# Patient Record
Sex: MALE | Race: WHITE | URBAN - METROPOLITAN AREA
[De-identification: names, ages, dates, MRNs, and addresses within clinical notes are randomized per-mention and may not be internally consistent; named-entity substitution may affect disease eponyms.]

---

## 2017-01-03 ENCOUNTER — OUTPATIENT (OUTPATIENT)
Dept: OUTPATIENT SERVICES | Facility: HOSPITAL | Age: 72
LOS: 1 days | Discharge: HOME | End: 2017-01-03

## 2017-06-27 DIAGNOSIS — R80.9 PROTEINURIA, UNSPECIFIED: ICD-10-CM

## 2017-08-01 ENCOUNTER — INPATIENT (INPATIENT)
Facility: HOSPITAL | Age: 72
LOS: 4 days | Discharge: HOME | End: 2017-08-06
Attending: INTERNAL MEDICINE
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
## 2017-08-01 DIAGNOSIS — I21.4 NON-ST ELEVATION (NSTEMI) MYOCARDIAL INFARCTION: ICD-10-CM

## 2017-08-01 DIAGNOSIS — S22.49XA MULTIPLE FRACTURES OF RIBS, UNSPECIFIED SIDE, INITIAL ENCOUNTER FOR CLOSED FRACTURE: ICD-10-CM

## 2017-08-08 DIAGNOSIS — I25.10 ATHEROSCLEROTIC HEART DISEASE OF NATIVE CORONARY ARTERY WITHOUT ANGINA PECTORIS: ICD-10-CM

## 2017-08-08 DIAGNOSIS — R55 SYNCOPE AND COLLAPSE: ICD-10-CM

## 2017-08-08 DIAGNOSIS — Z87.891 PERSONAL HISTORY OF NICOTINE DEPENDENCE: ICD-10-CM

## 2017-08-08 DIAGNOSIS — T82.855A STENOSIS OF CORONARY ARTERY STENT, INITIAL ENCOUNTER: ICD-10-CM

## 2017-08-08 DIAGNOSIS — E11.65 TYPE 2 DIABETES MELLITUS WITH HYPERGLYCEMIA: ICD-10-CM

## 2017-08-08 DIAGNOSIS — I21.4 NON-ST ELEVATION (NSTEMI) MYOCARDIAL INFARCTION: ICD-10-CM
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
## 2017-08-08 DIAGNOSIS — N40.0 BENIGN PROSTATIC HYPERPLASIA WITHOUT LOWER URINARY TRACT SYMPTOMS: ICD-10-CM

## 2017-08-08 DIAGNOSIS — E78.5 HYPERLIPIDEMIA, UNSPECIFIED: ICD-10-CM

## 2017-08-08 DIAGNOSIS — L50.9 URTICARIA, UNSPECIFIED: ICD-10-CM

## 2017-08-08 DIAGNOSIS — Z79.84 LONG TERM (CURRENT) USE OF ORAL HYPOGLYCEMIC DRUGS: ICD-10-CM

## 2017-08-08 DIAGNOSIS — E11.42 TYPE 2 DIABETES MELLITUS WITH DIABETIC POLYNEUROPATHY: ICD-10-CM
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
## 2017-08-17 ENCOUNTER — OUTPATIENT (OUTPATIENT)
Dept: OUTPATIENT SERVICES | Facility: HOSPITAL | Age: 72
LOS: 1 days | Discharge: HOME | End: 2017-08-17

## 2017-08-17 DIAGNOSIS — S22.49XA MULTIPLE FRACTURES OF RIBS, UNSPECIFIED SIDE, INITIAL ENCOUNTER FOR CLOSED FRACTURE: ICD-10-CM
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
## 2017-08-17 DIAGNOSIS — I21.4 NON-ST ELEVATION (NSTEMI) MYOCARDIAL INFARCTION: ICD-10-CM

## 2017-08-17 DIAGNOSIS — Z01.812 ENCOUNTER FOR PREPROCEDURAL LABORATORY EXAMINATION: ICD-10-CM

## 2017-08-17 DIAGNOSIS — I25.10 ATHEROSCLEROTIC HEART DISEASE OF NATIVE CORONARY ARTERY WITHOUT ANGINA PECTORIS: ICD-10-CM

## 2017-08-22 ENCOUNTER — OUTPATIENT (OUTPATIENT)
Dept: OUTPATIENT SERVICES | Facility: HOSPITAL | Age: 72
LOS: 1 days | Discharge: HOME | End: 2017-08-22

## 2017-08-22 DIAGNOSIS — I21.4 NON-ST ELEVATION (NSTEMI) MYOCARDIAL INFARCTION: ICD-10-CM
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
## 2017-08-22 DIAGNOSIS — S22.49XA MULTIPLE FRACTURES OF RIBS, UNSPECIFIED SIDE, INITIAL ENCOUNTER FOR CLOSED FRACTURE: ICD-10-CM

## 2017-08-22 PROBLEM — Z00.00 ENCOUNTER FOR PREVENTIVE HEALTH EXAMINATION: Status: ACTIVE | Noted: 2017-08-22

## 2017-08-30 DIAGNOSIS — Z79.4 LONG TERM (CURRENT) USE OF INSULIN: ICD-10-CM

## 2017-08-30 DIAGNOSIS — E78.4 OTHER HYPERLIPIDEMIA: ICD-10-CM

## 2017-08-30 DIAGNOSIS — I10 ESSENTIAL (PRIMARY) HYPERTENSION: ICD-10-CM

## 2017-08-30 DIAGNOSIS — Z98.61 CORONARY ANGIOPLASTY STATUS: ICD-10-CM

## 2017-08-30 DIAGNOSIS — E11.9 TYPE 2 DIABETES MELLITUS WITHOUT COMPLICATIONS: ICD-10-CM

## 2017-08-30 DIAGNOSIS — R52 PAIN, UNSPECIFIED: ICD-10-CM

## 2017-08-30 DIAGNOSIS — I25.10 ATHEROSCLEROTIC HEART DISEASE OF NATIVE CORONARY ARTERY WITHOUT ANGINA PECTORIS: ICD-10-CM

## 2018-07-12 ENCOUNTER — OUTPATIENT (OUTPATIENT)
Dept: OUTPATIENT SERVICES | Facility: HOSPITAL | Age: 73
LOS: 1 days | Discharge: HOME | End: 2018-07-12

## 2018-07-16 DIAGNOSIS — E11.9 TYPE 2 DIABETES MELLITUS WITHOUT COMPLICATIONS: ICD-10-CM

## 2018-07-16 DIAGNOSIS — I10 ESSENTIAL (PRIMARY) HYPERTENSION: ICD-10-CM

## 2019-11-04 ENCOUNTER — OUTPATIENT (OUTPATIENT)
Dept: OUTPATIENT SERVICES | Facility: HOSPITAL | Age: 74
LOS: 1 days | Discharge: HOME | End: 2019-11-04

## 2019-11-04 DIAGNOSIS — E78.5 HYPERLIPIDEMIA, UNSPECIFIED: ICD-10-CM

## 2019-11-04 DIAGNOSIS — I10 ESSENTIAL (PRIMARY) HYPERTENSION: ICD-10-CM

## 2019-11-04 DIAGNOSIS — I25.10 ATHEROSCLEROTIC HEART DISEASE OF NATIVE CORONARY ARTERY WITHOUT ANGINA PECTORIS: ICD-10-CM

## 2020-12-10 ENCOUNTER — INPATIENT (INPATIENT)
Facility: HOSPITAL | Age: 75
LOS: 13 days | Discharge: HOME HEALTH | End: 2020-12-24
Attending: HOSPITALIST | Admitting: HOSPITALIST
Payer: MEDICARE

## 2020-12-10 VITALS
HEART RATE: 114 BPM | TEMPERATURE: 99 F | RESPIRATION RATE: 25 BRPM | DIASTOLIC BLOOD PRESSURE: 79 MMHG | SYSTOLIC BLOOD PRESSURE: 131 MMHG | OXYGEN SATURATION: 93 % | WEIGHT: 190.04 LBS

## 2020-12-10 DIAGNOSIS — I26.99 OTHER PULMONARY EMBOLISM WITHOUT ACUTE COR PULMONALE: ICD-10-CM

## 2020-12-10 LAB
ALBUMIN SERPL ELPH-MCNC: 3.7 G/DL — SIGNIFICANT CHANGE UP (ref 3.5–5.2)
ALP SERPL-CCNC: 91 U/L — SIGNIFICANT CHANGE UP (ref 30–115)
ALT FLD-CCNC: 28 U/L — SIGNIFICANT CHANGE UP (ref 0–41)
ANION GAP SERPL CALC-SCNC: 13 MMOL/L — SIGNIFICANT CHANGE UP (ref 7–14)
APTT BLD: 25.5 SEC — LOW (ref 27–39.2)
AST SERPL-CCNC: 33 U/L — SIGNIFICANT CHANGE UP (ref 0–41)
BASOPHILS # BLD AUTO: 0.01 K/UL — SIGNIFICANT CHANGE UP (ref 0–0.2)
BASOPHILS NFR BLD AUTO: 0.1 % — SIGNIFICANT CHANGE UP (ref 0–1)
BILIRUB SERPL-MCNC: 1.2 MG/DL — SIGNIFICANT CHANGE UP (ref 0.2–1.2)
BUN SERPL-MCNC: 32 MG/DL — HIGH (ref 10–20)
CALCIUM SERPL-MCNC: 9 MG/DL — SIGNIFICANT CHANGE UP (ref 8.5–10.1)
CHLORIDE SERPL-SCNC: 104 MMOL/L — SIGNIFICANT CHANGE UP (ref 98–110)
CO2 SERPL-SCNC: 21 MMOL/L — SIGNIFICANT CHANGE UP (ref 17–32)
CREAT SERPL-MCNC: 1.6 MG/DL — HIGH (ref 0.7–1.5)
D DIMER BLD IA.RAPID-MCNC: 9146 NG/ML DDU — HIGH (ref 0–230)
EOSINOPHIL # BLD AUTO: 0.02 K/UL — SIGNIFICANT CHANGE UP (ref 0–0.7)
EOSINOPHIL NFR BLD AUTO: 0.2 % — SIGNIFICANT CHANGE UP (ref 0–8)
GLUCOSE BLDC GLUCOMTR-MCNC: 224 MG/DL — HIGH (ref 70–99)
GLUCOSE SERPL-MCNC: 211 MG/DL — HIGH (ref 70–99)
HCT VFR BLD CALC: 47 % — SIGNIFICANT CHANGE UP (ref 42–52)
HGB BLD-MCNC: 15.7 G/DL — SIGNIFICANT CHANGE UP (ref 14–18)
IMM GRANULOCYTES NFR BLD AUTO: 1.5 % — HIGH (ref 0.1–0.3)
INR BLD: 1.19 RATIO — SIGNIFICANT CHANGE UP (ref 0.65–1.3)
LYMPHOCYTES # BLD AUTO: 1.16 K/UL — LOW (ref 1.2–3.4)
LYMPHOCYTES # BLD AUTO: 10 % — LOW (ref 20.5–51.1)
MCHC RBC-ENTMCNC: 31.5 PG — HIGH (ref 27–31)
MCHC RBC-ENTMCNC: 33.4 G/DL — SIGNIFICANT CHANGE UP (ref 32–37)
MCV RBC AUTO: 94.4 FL — HIGH (ref 80–94)
MONOCYTES # BLD AUTO: 0.75 K/UL — HIGH (ref 0.1–0.6)
MONOCYTES NFR BLD AUTO: 6.4 % — SIGNIFICANT CHANGE UP (ref 1.7–9.3)
NEUTROPHILS # BLD AUTO: 9.52 K/UL — HIGH (ref 1.4–6.5)
NEUTROPHILS NFR BLD AUTO: 81.8 % — HIGH (ref 42.2–75.2)
NRBC # BLD: 0 /100 WBCS — SIGNIFICANT CHANGE UP (ref 0–0)
NT-PROBNP SERPL-SCNC: 637 PG/ML — HIGH (ref 0–300)
PLATELET # BLD AUTO: 164 K/UL — SIGNIFICANT CHANGE UP (ref 130–400)
POTASSIUM SERPL-MCNC: 5.3 MMOL/L — HIGH (ref 3.5–5)
POTASSIUM SERPL-SCNC: 5.3 MMOL/L — HIGH (ref 3.5–5)
PROT SERPL-MCNC: 6.9 G/DL — SIGNIFICANT CHANGE UP (ref 6–8)
PROTHROM AB SERPL-ACNC: 13.7 SEC — HIGH (ref 9.95–12.87)
RAPID RVP RESULT: DETECTED
RBC # BLD: 4.98 M/UL — SIGNIFICANT CHANGE UP (ref 4.7–6.1)
RBC # FLD: 12.9 % — SIGNIFICANT CHANGE UP (ref 11.5–14.5)
SARS-COV-2 RNA SPEC QL NAA+PROBE: DETECTED
SODIUM SERPL-SCNC: 138 MMOL/L — SIGNIFICANT CHANGE UP (ref 135–146)
TROPONIN T SERPL-MCNC: <0.01 NG/ML — SIGNIFICANT CHANGE UP
WBC # BLD: 11.63 K/UL — HIGH (ref 4.8–10.8)
WBC # FLD AUTO: 11.63 K/UL — HIGH (ref 4.8–10.8)

## 2020-12-10 PROCEDURE — 99223 1ST HOSP IP/OBS HIGH 75: CPT | Mod: CS,AI

## 2020-12-10 PROCEDURE — 99285 EMERGENCY DEPT VISIT HI MDM: CPT | Mod: CS,GC

## 2020-12-10 PROCEDURE — 71275 CT ANGIOGRAPHY CHEST: CPT | Mod: 26

## 2020-12-10 PROCEDURE — 71045 X-RAY EXAM CHEST 1 VIEW: CPT | Mod: 26

## 2020-12-10 RX ORDER — SODIUM CHLORIDE 9 MG/ML
1000 INJECTION INTRAMUSCULAR; INTRAVENOUS; SUBCUTANEOUS
Refills: 0 | Status: DISCONTINUED | OUTPATIENT
Start: 2020-12-10 | End: 2020-12-11

## 2020-12-10 RX ORDER — GLUCAGON INJECTION, SOLUTION 0.5 MG/.1ML
1 INJECTION, SOLUTION SUBCUTANEOUS ONCE
Refills: 0 | Status: DISCONTINUED | OUTPATIENT
Start: 2020-12-10 | End: 2020-12-24

## 2020-12-10 RX ORDER — CLOPIDOGREL BISULFATE 75 MG/1
75 TABLET, FILM COATED ORAL DAILY
Refills: 0 | Status: DISCONTINUED | OUTPATIENT
Start: 2020-12-10 | End: 2020-12-24

## 2020-12-10 RX ORDER — RANOLAZINE 500 MG/1
500 TABLET, FILM COATED, EXTENDED RELEASE ORAL
Refills: 0 | Status: DISCONTINUED | OUTPATIENT
Start: 2020-12-10 | End: 2020-12-24

## 2020-12-10 RX ORDER — SODIUM CHLORIDE 9 MG/ML
1000 INJECTION, SOLUTION INTRAVENOUS
Refills: 0 | Status: DISCONTINUED | OUTPATIENT
Start: 2020-12-10 | End: 2020-12-24

## 2020-12-10 RX ORDER — ACETAMINOPHEN 500 MG
650 TABLET ORAL EVERY 4 HOURS
Refills: 0 | Status: DISCONTINUED | OUTPATIENT
Start: 2020-12-10 | End: 2020-12-24

## 2020-12-10 RX ORDER — TAMSULOSIN HYDROCHLORIDE 0.4 MG/1
0.4 CAPSULE ORAL AT BEDTIME
Refills: 0 | Status: DISCONTINUED | OUTPATIENT
Start: 2020-12-10 | End: 2020-12-24

## 2020-12-10 RX ORDER — DEXTROSE 50 % IN WATER 50 %
12.5 SYRINGE (ML) INTRAVENOUS ONCE
Refills: 0 | Status: DISCONTINUED | OUTPATIENT
Start: 2020-12-10 | End: 2020-12-24

## 2020-12-10 RX ORDER — HEPARIN SODIUM 5000 [USP'U]/ML
7000 INJECTION INTRAVENOUS; SUBCUTANEOUS ONCE
Refills: 0 | Status: COMPLETED | OUTPATIENT
Start: 2020-12-10 | End: 2020-12-10

## 2020-12-10 RX ORDER — DEXAMETHASONE 0.5 MG/5ML
6 ELIXIR ORAL DAILY
Refills: 0 | Status: DISCONTINUED | OUTPATIENT
Start: 2020-12-10 | End: 2020-12-13

## 2020-12-10 RX ORDER — METOPROLOL TARTRATE 50 MG
100 TABLET ORAL DAILY
Refills: 0 | Status: DISCONTINUED | OUTPATIENT
Start: 2020-12-10 | End: 2020-12-24

## 2020-12-10 RX ORDER — SODIUM ZIRCONIUM CYCLOSILICATE 10 G/10G
10 POWDER, FOR SUSPENSION ORAL ONCE
Refills: 0 | Status: COMPLETED | OUTPATIENT
Start: 2020-12-10 | End: 2020-12-10

## 2020-12-10 RX ORDER — DULOXETINE HYDROCHLORIDE 30 MG/1
20 CAPSULE, DELAYED RELEASE ORAL DAILY
Refills: 0 | Status: DISCONTINUED | OUTPATIENT
Start: 2020-12-10 | End: 2020-12-24

## 2020-12-10 RX ORDER — HEPARIN SODIUM 5000 [USP'U]/ML
1600 INJECTION INTRAVENOUS; SUBCUTANEOUS
Qty: 25000 | Refills: 0 | Status: ACTIVE | OUTPATIENT
Start: 2020-12-10 | End: 2021-11-08

## 2020-12-10 RX ORDER — ATORVASTATIN CALCIUM 80 MG/1
80 TABLET, FILM COATED ORAL AT BEDTIME
Refills: 0 | Status: DISCONTINUED | OUTPATIENT
Start: 2020-12-10 | End: 2020-12-24

## 2020-12-10 RX ORDER — ZOLPIDEM TARTRATE 10 MG/1
5 TABLET ORAL AT BEDTIME
Refills: 0 | Status: DISCONTINUED | OUTPATIENT
Start: 2020-12-10 | End: 2020-12-10

## 2020-12-10 RX ORDER — ZOLPIDEM TARTRATE 10 MG/1
5 TABLET ORAL AT BEDTIME
Refills: 0 | Status: DISCONTINUED | OUTPATIENT
Start: 2020-12-10 | End: 2020-12-12

## 2020-12-10 RX ORDER — INSULIN GLARGINE 100 [IU]/ML
15 INJECTION, SOLUTION SUBCUTANEOUS AT BEDTIME
Refills: 0 | Status: DISCONTINUED | OUTPATIENT
Start: 2020-12-10 | End: 2020-12-14

## 2020-12-10 RX ORDER — DEXTROSE 50 % IN WATER 50 %
25 SYRINGE (ML) INTRAVENOUS ONCE
Refills: 0 | Status: DISCONTINUED | OUTPATIENT
Start: 2020-12-10 | End: 2020-12-24

## 2020-12-10 RX ORDER — DULOXETINE HYDROCHLORIDE 30 MG/1
1 CAPSULE, DELAYED RELEASE ORAL
Qty: 0 | Refills: 0 | DISCHARGE

## 2020-12-10 RX ORDER — DEXTROSE 50 % IN WATER 50 %
15 SYRINGE (ML) INTRAVENOUS ONCE
Refills: 0 | Status: DISCONTINUED | OUTPATIENT
Start: 2020-12-10 | End: 2020-12-24

## 2020-12-10 RX ORDER — INSULIN LISPRO 100/ML
VIAL (ML) SUBCUTANEOUS
Refills: 0 | Status: DISCONTINUED | OUTPATIENT
Start: 2020-12-10 | End: 2020-12-11

## 2020-12-10 RX ADMIN — HEPARIN SODIUM 7000 UNIT(S): 5000 INJECTION INTRAVENOUS; SUBCUTANEOUS at 13:55

## 2020-12-10 RX ADMIN — SODIUM CHLORIDE 75 MILLILITER(S): 9 INJECTION INTRAMUSCULAR; INTRAVENOUS; SUBCUTANEOUS at 20:51

## 2020-12-10 RX ADMIN — HEPARIN SODIUM 16 UNIT(S)/HR: 5000 INJECTION INTRAVENOUS; SUBCUTANEOUS at 13:55

## 2020-12-10 NOTE — H&P ADULT - ASSESSMENT
Patient is a 75 year old Male with a past medical history of CAD s/p PCI, DM presented to the ER today with worsening Shortness of Breath x 2 weeks.       # COVID19   - Airborne isolation precautions   - ID consult   - Dexamethasone course  - Inflammatory markers CRP, Procalcitonin, Ferritin, LDH, Mg and Coags   - Continue supplemental oxygen. Maintain Sats >92%.    # CARINA   - Hold lisinopril   - Trend creatinine  - Intravenous Fluids     # Pulmonary Embolism   - Heparin drip   - Monitor PTT   - Vascular Consult     # DM   - Patient was prescribed with insulin 40units qPM but has never filled [facility]   - Monitor Fingersticks  - Diabetic carbohydrate consistent diet   - Sliding scale     # CAD   - Continue with Plavix, Atorvastatin  - Continue with Metoprolol     Patient is a 75 year old Male with a past medical history of CAD s/p PCI, DM presented to the ER today with worsening Shortness of Breath x 2 weeks.       # COVID19   - Airborne isolation precautions   - ID consult   - Dexamethasone course  - Inflammatory markers CRP, Procalcitonin, Ferritin, LDH, Mg and Coags   - Continue supplemental oxygen. Maintain Sats >92%.    # CARINA   - Hold lisinopril   - Trend creatinine  - Intravenous Fluids     # Pulmonary Embolism   - Heparin drip   - Monitor PTT  - Echocardiogram    - Consider Vascular Consult     # Hyperkalemia   - Lokelma x 1   - Monitor BMP     # DM   - Patient was prescribed with insulin 40units qPM but has never filled [facility], will start her with 15 units qHS   - Monitor Fingersticks  - Diabetic carbohydrate consistent diet   - Sliding scale     # CAD   - Continue with Plavix, Atorvastatin  - Continue with Metoprolol

## 2020-12-10 NOTE — ED PROVIDER NOTE - PHYSICAL EXAMINATION
CONSTITUTIONAL: tachypneic  SKIN: Warm dry, normal skin turgor  HEAD: NCAT  EYES: EOMI, PERRLA, no scleral icterus, conjunctiva pink  ENT: normal pharynx with no erythema or exudates  NECK: Supple; non tender. Full ROM.  CARD: tachy, no murmurs.  RESP: clear to ausculation b/l. No crackles or wheezing. 89% on RA at rest, 94% on 4L NC. Tachypneic.  ABD: soft, non-tender, non-distended, no rebound or guarding.  EXT: Full ROM, no bony tenderness, no pedal edema, no calf tenderness  NEURO: normal motor. normal sensory. Normal gait.  PSYCH: Cooperative, appropriate.

## 2020-12-10 NOTE — ED ADULT NURSE NOTE - CHIEF COMPLAINT QUOTE
BIBA pt tested positive Nov 27 th . Pt c/o SOB and cough. On EMS arrival pt pulse ox 76% RA. Pt placed on NRB pt pulse ox went to 93%. EMS noticed a cough with a streak of blood.    Family contacts  Sol wife- [phone]  Valery Daughter- [phone]

## 2020-12-10 NOTE — ED ADULT TRIAGE NOTE - CHIEF COMPLAINT QUOTE
BIBA pt tested positive Nov 27 th . Pt c/o SOB and cough. On EMS arrival pt pulse ox 76% RA. Pt placed on NRB pt pulse ox went to 93%. EMS noticed a cough with a streak of blood. BIBA pt tested positive Nov 27 th . Pt c/o SOB and cough. On EMS arrival pt pulse ox 76% RA. Pt placed on NRB pt pulse ox went to 93%. EMS noticed a cough with a streak of blood.    Family contacts  Sol wife- [phone]  Valery Daughter- [phone]

## 2020-12-10 NOTE — ED PROVIDER NOTE - PROGRESS NOTE DETAILS
BI: Per Dr. Jean Baptiste who spoke with Dr. Olivares, pt stable for floor at this time. Endorsed to Dr. Tommy Alexis hospitalist.

## 2020-12-10 NOTE — ED ADULT NURSE REASSESSMENT NOTE - NS ED NURSE REASSESS COMMENT FT1
[facility], heparin gtt infusing at 1600 units/hr as per orders

## 2020-12-10 NOTE — H&P ADULT - ATTENDING COMMENTS
#Acute hypoxic resp failure, due to covid with subsegmental PE  cta noted, no R heart strain  check tte  hep gtt  start decadron 6  check crp, ferritin, trop  nc to keep spo2 >92  #CARINA, c/b hyperkalemia  presumed pre-renal  ns 75 cc/hr  hold acei  give lokelma 10 x1  trend scr  check renal us  lyrica 150 bid change to daily

## 2020-12-10 NOTE — H&P ADULT - NSHPLABSRESULTS_GEN_ALL_CORE
15.7   11.63 )-----------( 164      ( 10 Dec 2020 10:32 )             47.0       12-10    138  |  104  |  32<H>  ----------------------------<  211<H>  5.3<H>   |  21  |  1.6<H>    Ca    9.0      10 Dec 2020 10:32    TPro  6.9  /  Alb  3.7  /  TBili  1.2  /  DBili  x   /  AST  33  /  ALT  28  /  AlkPhos  91  12-10                      PT/INR - ( 10 Dec 2020 13:30 )   PT: 13.70 sec;   INR: 1.19 ratio         PTT - ( 10 Dec 2020 13:30 )  PTT:25.5 sec    Lactate Trend      CARDIAC MARKERS ( 10 Dec 2020 10:32 )  x     / <0.01 ng/mL / x     / x     / x          SARS-CoV-2: Detected (10 Dec 2020 10:32)        CT Angio Chest PE Protocol w/ IV Cont (12.10.20 @ 12:39)   IMPRESSION:  Left middle lobe subsegmental pulmonary artery filling defect (3-106). No CT evidence of right heart strain.  New extensive bilateral peripheral patchy groundglass opacities are likely infectious/inflammatory. Though non-specific, these findings can be seen with COVID pneumonia.  Spoke with ILYAGUYEV, BENEDIKT on 12/10/2020 1:33 PM with readback.    Xray Chest 1 View- PORTABLE-Urgent (12.10.20 @ 11:34)   Impression:  Bilateral opacities. Further evaluation/follow-up is recommended.

## 2020-12-10 NOTE — ED PROVIDER NOTE - CLINICAL SUMMARY MEDICAL DECISION MAKING FREE TEXT BOX
pt with 2 weeks malaise/cough covid+ now with worsening sob, on EMS arrival sat 76% RA/93% nrb, in ED sat 94% on 4L and comfortable but desaturates to high 80s with movement, head nc/at, perrla, EOMI, conj pink op clear neck supple cor rrr lungs cta abd snt no c/c/e pulses equal calves nontender neuro intac, labs and studies reviewed and d/w Dr Olivares, will heparinize and admit floor at [place]

## 2020-12-10 NOTE — H&P ADULT - HISTORY OF PRESENT ILLNESS
Patient is a 75 year old Male with a past medical history of CAD s/p PCI, DM presented to the ER today with worsening Shortness of Breath x 2 weeks. Patient was tested positive for COVID 2 weeks ago and has been quarantined at home. Chief complaint is associated with ambulatory dysfunction. Patient was brought in by EMS with 79% Saturation. Patient was tachypnic 25bpm, and with 3L of NC sating 96%. Patient was tachycardic, leukocytosis of 11k but afebrile. K5.3, CARINA (32/1.6), D-dimer elevated, therefore, CTA showed PE. Patient is a 75 year old Male with a past medical history of CAD s/p PCI, DM presented to the ER today with worsening Shortness of Breath x 2 weeks. Patient was tested positive for COVID 2 weeks ago and has been quarantined at home. Chief complaint is associated with ambulatory dysfunction. Patient was brought in by EMS with 79% Saturation. Patient was tachypnic 25bpm, and with 3L of NC sating 96%. Patient was tachycardic, leukocytosis of 11k but afebrile. K5.3, CARINA (32/1.6), D-dimer elevated, therefore, CTA showed PE.   Patient was seen in the ER and examined. Patient admits to SOB but states it has improved with NC. Patient denies any fevers, chills, nausea, vomiting, chest pain, abdominal pain, dysuria, constipation, diarrhea, new onset of leg swelling, peripheral neuropathy. Patient admits to sedentary life style with a history of smoking (>40 years ago). Patient denies recent travel, sick contacts personally (wife had a few friends).

## 2020-12-10 NOTE — ED PROVIDER NOTE - OBJECTIVE STATEMENT
75 y.o M w/ pmhx CAD s/p PCI, DM p/w SOB x 2 weeks worsening since yesterday. Pt tested positive 2 wks ago for COVID and has been managing at home but has been having difficulty ambulating due to SOB. No CP, no dizziness, no abd pain, no n/v, no diarrhea, fevers. Per EMS pt was saturating 79% on RA when they arrived.

## 2020-12-10 NOTE — H&P ADULT - NSHPPHYSICALEXAM_GEN_ALL_CORE
VITALS: Vital Signs Last 24 Hrs  T(C): 37.7 (10 Dec 2020 12:43), Max: 37.7 (10 Dec 2020 12:43)  T(F): 99.9 (10 Dec 2020 12:43), Max: 99.9 (10 Dec 2020 12:43)  HR: 99 (10 Dec 2020 14:02) (99 - 114)  BP: 118/78 (10 Dec 2020 14:02) (109/62 - 131/79)  RR: 18 (10 Dec 2020 14:02) (18 - 25)  SpO2: 96% (10 Dec 2020 14:02) (93% - 96%)    GENERAL: NAD, lying in bed comfortably  HEAD:  Atraumatic, Normocephalic  EYES: Conjunctiva and sclera clear  ENT: Moist mucous membranes, on NC   NECK: Supple  CHEST/LUNG: Clear to auscultation bilaterally. Unlabored respirations, no accessory muscle use  HEART: Regular rate and rhythm  ABDOMEN: Bowel sounds present; Soft, Nontender, Nondistended.  EXTREMITIES:  2+ Peripheral Pulses, brisk capillary refill. No edema  NERVOUS SYSTEM:  Alert & Oriented X3, speech clear.   MSK: FROM all 4 extremities, full and equal strength  SKIN: No rashes or lesions

## 2020-12-10 NOTE — ED PROVIDER NOTE - ATTENDING CONTRIBUTION TO CARE
pt with 2 weeks malaise/cough covid+ now with worsening sob, on EMS arrival sat 76% RA/93% nrb, in ED sat 94% on 4L and comfortable but desaturates to high 80s with movement, head nc/at, perrla, EOMI, conj pink op clear neck supple cor rrr lungs cta abd snt no c/c/e pulses equal calves nontender neuro intac, labs and studies reviewed and d/w Dr Olivares, will heparinize and admit floor at [place]

## 2020-12-10 NOTE — ED ADULT NURSE REASSESSMENT NOTE - NS ED NURSE REASSESS COMMENT FT1
pt being transferred to [facility], 3A BED 15 report given to AUGIE Darden and transport requested

## 2020-12-10 NOTE — ED PROVIDER NOTE - CARE PLAN
Pharmacy Note  ED Culture Follow-up    Perry Gimenez is a 44 y.o. male. Allergies: Patient has no known allergies. Labs:  Lab Results   Component Value Date    BUN 9 07/16/2018    CREATININE 0.7 07/16/2018    WBC 13.7 (H) 07/16/2018     Estimated Creatinine Clearance: 188 mL/min (based on SCr of 0.7 mg/dL). Current antimicrobials:   · Cipro drops    ASSESSMENT:  Micro results:   Cx: pseudomonas      PLAN:  Need for intervention: No 2/2 s-cipro   Discussed with: Dr. Luann Terry MD   Chosen treatment:    · Patient already on appropriate treatment as above    Patient response:   · No need to contact patient    Called/sent in prescription to: Not applicable    Please call with any questions.  Sara Tobin PharmD 5:40 PM 7/21/2018 Principal Discharge DX:	COVID-19  Secondary Diagnosis:	Pulmonary embolism  Secondary Diagnosis:	Hypoxia

## 2020-12-11 LAB
A1C WITH ESTIMATED AVERAGE GLUCOSE RESULT: 8.4 % — HIGH (ref 4–5.6)
ABO RH CONFIRMATION: SIGNIFICANT CHANGE UP
ALBUMIN SERPL ELPH-MCNC: 3 G/DL — LOW (ref 3.5–5.2)
ALP SERPL-CCNC: 66 U/L — SIGNIFICANT CHANGE UP (ref 30–115)
ALT FLD-CCNC: 21 U/L — SIGNIFICANT CHANGE UP (ref 0–41)
ANION GAP SERPL CALC-SCNC: 12 MMOL/L — SIGNIFICANT CHANGE UP (ref 7–14)
ANION GAP SERPL CALC-SCNC: 15 MMOL/L — HIGH (ref 7–14)
APTT BLD: 24.9 SEC — LOW (ref 27–39.2)
APTT BLD: >200 SEC — CRITICAL HIGH (ref 27–39.2)
AST SERPL-CCNC: 24 U/L — SIGNIFICANT CHANGE UP (ref 0–41)
BASOPHILS # BLD AUTO: 0.03 K/UL — SIGNIFICANT CHANGE UP (ref 0–0.2)
BASOPHILS NFR BLD AUTO: 0.4 % — SIGNIFICANT CHANGE UP (ref 0–1)
BILIRUB SERPL-MCNC: 1.2 MG/DL — SIGNIFICANT CHANGE UP (ref 0.2–1.2)
BLD GP AB SCN SERPL QL: SIGNIFICANT CHANGE UP
BUN SERPL-MCNC: 25 MG/DL — HIGH (ref 10–20)
BUN SERPL-MCNC: 28 MG/DL — HIGH (ref 10–20)
CALCIUM SERPL-MCNC: 7.9 MG/DL — LOW (ref 8.5–10.1)
CALCIUM SERPL-MCNC: 8.1 MG/DL — LOW (ref 8.5–10.1)
CHLORIDE SERPL-SCNC: 106 MMOL/L — SIGNIFICANT CHANGE UP (ref 98–110)
CHLORIDE SERPL-SCNC: 111 MMOL/L — HIGH (ref 98–110)
CO2 SERPL-SCNC: 18 MMOL/L — SIGNIFICANT CHANGE UP (ref 17–32)
CO2 SERPL-SCNC: 18 MMOL/L — SIGNIFICANT CHANGE UP (ref 17–32)
CREAT SERPL-MCNC: 1.3 MG/DL — SIGNIFICANT CHANGE UP (ref 0.7–1.5)
CREAT SERPL-MCNC: 1.4 MG/DL — SIGNIFICANT CHANGE UP (ref 0.7–1.5)
CRP SERPL-MCNC: 10.58 MG/DL — HIGH (ref 0–0.4)
EOSINOPHIL # BLD AUTO: 0.11 K/UL — SIGNIFICANT CHANGE UP (ref 0–0.7)
EOSINOPHIL NFR BLD AUTO: 1.3 % — SIGNIFICANT CHANGE UP (ref 0–8)
ESTIMATED AVERAGE GLUCOSE: 194 MG/DL — HIGH (ref 68–114)
FERRITIN SERPL-MCNC: 537 NG/ML — HIGH (ref 30–400)
GLUCOSE BLDC GLUCOMTR-MCNC: 124 MG/DL — HIGH (ref 70–99)
GLUCOSE BLDC GLUCOMTR-MCNC: 294 MG/DL — HIGH (ref 70–99)
GLUCOSE BLDC GLUCOMTR-MCNC: 307 MG/DL — HIGH (ref 70–99)
GLUCOSE BLDC GLUCOMTR-MCNC: 349 MG/DL — HIGH (ref 70–99)
GLUCOSE BLDC GLUCOMTR-MCNC: 381 MG/DL — HIGH (ref 70–99)
GLUCOSE SERPL-MCNC: 143 MG/DL — HIGH (ref 70–99)
GLUCOSE SERPL-MCNC: 189 MG/DL — HIGH (ref 70–99)
HCT VFR BLD CALC: 42.6 % — SIGNIFICANT CHANGE UP (ref 42–52)
HCV AB S/CO SERPL IA: 0.03 COI — SIGNIFICANT CHANGE UP
HCV AB SERPL-IMP: SIGNIFICANT CHANGE UP
HGB BLD-MCNC: 14.3 G/DL — SIGNIFICANT CHANGE UP (ref 14–18)
IMM GRANULOCYTES NFR BLD AUTO: 2 % — HIGH (ref 0.1–0.3)
INR BLD: 1.2 RATIO — SIGNIFICANT CHANGE UP (ref 0.65–1.3)
INR BLD: 1.23 RATIO — SIGNIFICANT CHANGE UP (ref 0.65–1.3)
LYMPHOCYTES # BLD AUTO: 1.03 K/UL — LOW (ref 1.2–3.4)
LYMPHOCYTES # BLD AUTO: 12 % — LOW (ref 20.5–51.1)
MAGNESIUM SERPL-MCNC: 2 MG/DL — SIGNIFICANT CHANGE UP (ref 1.8–2.4)
MCHC RBC-ENTMCNC: 31.7 PG — HIGH (ref 27–31)
MCHC RBC-ENTMCNC: 33.6 G/DL — SIGNIFICANT CHANGE UP (ref 32–37)
MCV RBC AUTO: 94.5 FL — HIGH (ref 80–94)
MONOCYTES # BLD AUTO: 0.74 K/UL — HIGH (ref 0.1–0.6)
MONOCYTES NFR BLD AUTO: 8.7 % — SIGNIFICANT CHANGE UP (ref 1.7–9.3)
NEUTROPHILS # BLD AUTO: 6.47 K/UL — SIGNIFICANT CHANGE UP (ref 1.4–6.5)
NEUTROPHILS NFR BLD AUTO: 75.6 % — HIGH (ref 42.2–75.2)
NRBC # BLD: 0 /100 WBCS — SIGNIFICANT CHANGE UP (ref 0–0)
PLATELET # BLD AUTO: 137 K/UL — SIGNIFICANT CHANGE UP (ref 130–400)
POTASSIUM SERPL-MCNC: 4.3 MMOL/L — SIGNIFICANT CHANGE UP (ref 3.5–5)
POTASSIUM SERPL-MCNC: 4.8 MMOL/L — SIGNIFICANT CHANGE UP (ref 3.5–5)
POTASSIUM SERPL-SCNC: 4.3 MMOL/L — SIGNIFICANT CHANGE UP (ref 3.5–5)
POTASSIUM SERPL-SCNC: 4.8 MMOL/L — SIGNIFICANT CHANGE UP (ref 3.5–5)
PROT SERPL-MCNC: 5.5 G/DL — LOW (ref 6–8)
PROTHROM AB SERPL-ACNC: 13.8 SEC — HIGH (ref 9.95–12.87)
PROTHROM AB SERPL-ACNC: 14.2 SEC — HIGH (ref 9.95–12.87)
RBC # BLD: 4.51 M/UL — LOW (ref 4.7–6.1)
RBC # FLD: 13.1 % — SIGNIFICANT CHANGE UP (ref 11.5–14.5)
SODIUM SERPL-SCNC: 136 MMOL/L — SIGNIFICANT CHANGE UP (ref 135–146)
SODIUM SERPL-SCNC: 144 MMOL/L — SIGNIFICANT CHANGE UP (ref 135–146)
WBC # BLD: 8.55 K/UL — SIGNIFICANT CHANGE UP (ref 4.8–10.8)
WBC # FLD AUTO: 8.55 K/UL — SIGNIFICANT CHANGE UP (ref 4.8–10.8)

## 2020-12-11 PROCEDURE — 71045 X-RAY EXAM CHEST 1 VIEW: CPT | Mod: 26

## 2020-12-11 PROCEDURE — 99233 SBSQ HOSP IP/OBS HIGH 50: CPT | Mod: CS

## 2020-12-11 RX ORDER — APIXABAN 2.5 MG/1
5 TABLET, FILM COATED ORAL EVERY 12 HOURS
Refills: 0 | Status: DISCONTINUED | OUTPATIENT
Start: 2020-12-11 | End: 2020-12-24

## 2020-12-11 RX ORDER — SODIUM CHLORIDE 0.65 %
1 AEROSOL, SPRAY (ML) NASAL THREE TIMES A DAY
Refills: 0 | Status: DISCONTINUED | OUTPATIENT
Start: 2020-12-11 | End: 2020-12-24

## 2020-12-11 RX ORDER — INSULIN LISPRO 100/ML
VIAL (ML) SUBCUTANEOUS EVERY 6 HOURS
Refills: 0 | Status: DISCONTINUED | OUTPATIENT
Start: 2020-12-11 | End: 2020-12-24

## 2020-12-11 RX ORDER — APIXABAN 2.5 MG/1
2.5 TABLET, FILM COATED ORAL EVERY 12 HOURS
Refills: 0 | Status: DISCONTINUED | OUTPATIENT
Start: 2020-12-11 | End: 2020-12-11

## 2020-12-11 RX ORDER — PROTAMINE SULFATE 10 MG/ML
1 AMPUL (ML) INTRAVENOUS ONCE
Refills: 0 | Status: DISCONTINUED | OUTPATIENT
Start: 2020-12-11 | End: 2020-12-11

## 2020-12-11 RX ADMIN — INSULIN GLARGINE 15 UNIT(S): 100 INJECTION, SOLUTION SUBCUTANEOUS at 21:48

## 2020-12-11 RX ADMIN — Medication 100 MILLIGRAM(S): at 05:47

## 2020-12-11 RX ADMIN — Medication 8: at 21:53

## 2020-12-11 RX ADMIN — Medication 1 TABLET(S): at 12:37

## 2020-12-11 RX ADMIN — APIXABAN 5 MILLIGRAM(S): 2.5 TABLET, FILM COATED ORAL at 17:36

## 2020-12-11 RX ADMIN — RANOLAZINE 500 MILLIGRAM(S): 500 TABLET, FILM COATED, EXTENDED RELEASE ORAL at 17:36

## 2020-12-11 RX ADMIN — Medication 150 MILLIGRAM(S): at 12:39

## 2020-12-11 RX ADMIN — Medication 4: at 17:35

## 2020-12-11 RX ADMIN — Medication 6 MILLIGRAM(S): at 05:51

## 2020-12-11 RX ADMIN — Medication 5: at 12:12

## 2020-12-11 RX ADMIN — CLOPIDOGREL BISULFATE 75 MILLIGRAM(S): 75 TABLET, FILM COATED ORAL at 12:37

## 2020-12-11 RX ADMIN — TAMSULOSIN HYDROCHLORIDE 0.4 MILLIGRAM(S): 0.4 CAPSULE ORAL at 21:46

## 2020-12-11 RX ADMIN — Medication 1 SPRAY(S): at 21:46

## 2020-12-11 RX ADMIN — RANOLAZINE 500 MILLIGRAM(S): 500 TABLET, FILM COATED, EXTENDED RELEASE ORAL at 05:50

## 2020-12-11 RX ADMIN — ATORVASTATIN CALCIUM 80 MILLIGRAM(S): 80 TABLET, FILM COATED ORAL at 21:47

## 2020-12-11 RX ADMIN — Medication 1 SPRAY(S): at 14:13

## 2020-12-11 RX ADMIN — DULOXETINE HYDROCHLORIDE 20 MILLIGRAM(S): 30 CAPSULE, DELAYED RELEASE ORAL at 12:37

## 2020-12-11 NOTE — CHART NOTE - NSCHARTNOTEFT_GEN_A_CORE
I made rounds today with the treatment team including the hospitalist, residents,  nurses and  and discussed the patient's current medical status and discharge  planning needs, and reviewed the chart.    T(C): 35.6 (12-11-20 @ 08:40), Max: 37.7 (12-10-20 @ 12:43)  HR: 71 (12-11-20 @ 08:40) (71 - 106)  BP: 106/59 (12-11-20 @ 08:40) (106/59 - 132/75)  RR: 18 (12-11-20 @ 08:40) (18 - 20)  SpO2: 97% (12-11-20 @ 08:40) (94% - 97%)          I reached out to the patient's health care proxy/ responsible family member-           [     ]  I reached                                     and discussed the patient's medical condition,                   family concerns, and discharge planning           [     ]  I left a message with family               [     ]  I personally participated in rounds with the medical team and my resident and discussed the case. My resident reached                   family member/ HCP                                under my direction and supervision  and we reviewed the conversation.          [   x  ]  My resident tried to leave a message with family under my direction and supervision - [phone], but no answer or voice mail           [     ]   My resident attended medical rounds and called the family                [      ]    The following was discussed:  The patient's medical status over the past 24 hrs was reviewed, as well as oxygen needs and medication changes and labs.          [      ]   The following concerns were raised:           [     ] I spent 5-10 minutes on the above discussing medical issues with team members and family and/ or my resident    [     ] I spent 11-20 minutes on the above discussing medical issues with team members and family and/ or my resident    [     ] I spent 21-30 minutes on the above discussing medical issues with team members and family and/ or my resident

## 2020-12-11 NOTE — PROGRESS NOTE ADULT - ATTENDING COMMENTS
I saw and evaluated patient  by bedside, c/o dry nostrils with small blood clots , tolerating diet well.    All labs, radiology studies, VS was reviewed  I have reviewed the resident's note and agree with documented findings and  plan of care.  a/p:  Patient is a 75 year old Male with a past medical history of CAD s/p PCI, DM presented to the ER today with worsening Shortness of Breath x 2 weeks.       # Acute hypoxic respiratory failure due to COVID19 viral pneumonia  - Airborne/contact/droplet  isolation precautions   - ID f/up   - Dexamethasone 6 mg IV once daily x 10 days course  - monitor Inflammatory markers CRP, Procalcitonin, Ferritin, LDH  - Continue supplemental oxygen. Maintain Sats >92%.    # CARINA - renal function has improved with IVF, d/c IVF.  - Hold lisinopril   - Trend creatinine      # Acute Pulmonary Embolism - most likely due to covid induced hypercoagulable state  - transition to PO Eliquis tx.        # Hyperkalemia   - Lokelma x 1   - Monitor BMP     # DM   - Patient was prescribed with insulin 40units qPM but has never filled [facility], will start her with 15 units qHS   - Monitor Fingersticks  - Diabetic carbohydrate consistent diet   - Sliding scale     # CAD   - Continue with Plavix, Atorvastatin  - Continue with Metoprolol    # nasal blood clots due to mucosal dryness- ocean NS spray to b/l nostrils three times daily    # Obesity - BMI above 35- weight loss tx.    #Progress Note Handoff: monitor pulse ox, daily renal function, inflammatory markers every 48 hours.  Family discussion: medical plan of tx. d/w pt. by bedside Disposition: Home___once medically stable I saw and evaluated patient  by bedside, c/o cough with small blood clots in sputum, tolerating diet well.    All labs, radiology studies, VS was reviewed  I have reviewed the resident's note and agree with documented findings and  plan of care.  a/p:  Patient is a 75 year old Male with a past medical history of CAD s/p PCI, DM presented to the ER today with worsening Shortness of Breath x 2 weeks.       # Acute hypoxic respiratory failure due to COVID19 viral pneumonia  - Airborne/contact/droplet  isolation precautions   - ID f/up   - Dexamethasone 6 mg IV once daily x 10 days course ( day 2)  - monitor Inflammatory markers CRP, Procalcitonin, Ferritin, LDH  - Continue supplemental oxygen. Maintain Sats >92%.    # CARINA - renal function has improved with IVF, d/c IVF.  - Hold lisinopril   - Trend creatinine      # Acute Pulmonary Embolism - most likely due to covid induced hypercoagulable state  - transition to PO Eliquis tx.        # Hyperkalemia   - Lokelma x 1   - Monitor BMP     # DM   - Patient was prescribed with insulin 40units qPM but has never filled [facility], will start her with 15 units qHS   - Monitor Fingersticks  - Diabetic carbohydrate consistent diet   - Sliding scale     # CAD   - Continue with Plavix, Atorvastatin  - Continue with Metoprolol    # Hemoptysis due to mucosal dryness from oxygen tx. - ocean NS spray to b/l nostrils three times daily, oral hydration    # Obesity - BMI above 30- weight loss tx.    #Progress Note Handoff: monitor pulse ox, daily renal function, inflammatory markers every 48 hours.  Family discussion: medical plan of tx. d/w pt. by bedside Disposition: Home___once medically stable

## 2020-12-11 NOTE — CONSULT NOTE ADULT - ASSESSMENT
75 year old Male with a past medical history of CAD s/p PCI, DM presented to the ER today with worsening Shortness of Breath x 2 weeks.     IMPRESSION;  COVID 19 with severe illness. SpO2 < 94% on RA and need for supplemental O2.  Pt is in the late inflammatory response phase ot the illness based on the onset of symptoms. ( 2 weeks )  procalcitonin 0.01 , not suggestive of a bacterial PNA.  Ferritin 537  CRP 10.5  Ddimers 9146 ( secondary to PE )  CT 12/10 with GGO, LML PE  No bacterial PNA    RECOMMENDATIONS;  Anticoagulation as per team  Target SpO2 92 % to 96 %  Dexamethasone 6 mg iv q24h for 10 days 12/10 ( or until discharge ) or equivalent glucocorticoid dose may be substituted. Equivalent total dosis of alternative steroids are Methylprednisolone 32 mg and prednisone 40 mg q24h.  Monitor for side effects: hyperglycemia, neurological ( agitation/confusion), adrenal suppression, bacterial and fungal infections

## 2020-12-11 NOTE — PROGRESS NOTE ADULT - SUBJECTIVE AND OBJECTIVE BOX
24H events:    Patient is a 75y old Male who presents with a chief complaint of COVID and PE (10 Dec 2020 13:58)    Primary diagnosis of COVID-19       Today is hospital day 1d. This morning patient was seen and examined at bedside, resting comfortably in bed.    Patient had few episodes of hemoptysis; mild amount of pinkish sputum  No major complaints otherwise; not sob no chest or abdominal pain  no leg pain no diarrhea  No major events overnight  sating 95% on 5 LPM NC    PAST MEDICAL & SURGICAL HISTORY  CAD (coronary artery disease)    DM (diabetes mellitus)    No significant past surgical history      SOCIAL HISTORY:  Social History:  Tobacco use: Exsmoker, quit 40 years ago  EtOH use: Occasional   Illicit drug use: Denies (10 Dec 2020 13:58)      ALLERGIES:  No Known Allergies    MEDICATIONS:  STANDING MEDICATIONS  apixaban 2.5 milliGRAM(s) Oral every 12 hours  atorvastatin 80 milliGRAM(s) Oral at bedtime  clopidogrel Tablet 75 milliGRAM(s) Oral daily  dexAMETHasone  Injectable 6 milliGRAM(s) IV Push daily  dextrose 40% Gel 15 Gram(s) Oral once  dextrose 5%. 1000 milliLiter(s) IV Continuous <Continuous>  dextrose 5%. 1000 milliLiter(s) IV Continuous <Continuous>  dextrose 50% Injectable 25 Gram(s) IV Push once  dextrose 50% Injectable 12.5 Gram(s) IV Push once  dextrose 50% Injectable 25 Gram(s) IV Push once  DULoxetine 20 milliGRAM(s) Oral daily  glucagon  Injectable 1 milliGRAM(s) IntraMuscular once  guaiFENesin  milliGRAM(s) Oral every 12 hours  insulin glargine Injectable (LANTUS) 15 Unit(s) SubCutaneous at bedtime  insulin lispro (ADMELOG) corrective regimen sliding scale   SubCutaneous three times a day before meals  metoprolol succinate  milliGRAM(s) Oral daily  multivitamin 1 Tablet(s) Oral daily  pantoprazole    Tablet 40 milliGRAM(s) Oral before breakfast  pregabalin 150 milliGRAM(s) Oral daily  ranolazine 500 milliGRAM(s) Oral two times a day  sodium chloride 0.9%. 1000 milliLiter(s) IV Continuous <Continuous>  sodium zirconium cyclosilicate 10 Gram(s) Oral once  tamsulosin 0.4 milliGRAM(s) Oral at bedtime    PRN MEDICATIONS  acetaminophen   Tablet .. 650 milliGRAM(s) Oral every 4 hours PRN  acetaminophen  Suppository .. 650 milliGRAM(s) Rectal every 4 hours PRN  zolpidem 5 milliGRAM(s) Oral at bedtime PRN  zolpidem 5 milliGRAM(s) Oral at bedtime PRN    VITALS:   T(F): 96.1  HR: 71  BP: 106/59  RR: 18  SpO2: 97%    PHYSICAL EXAM:  GENERAL: NAD,  NERVOUS SYSTEM:  Alert & Oriented X3, non focal   CHEST/LUNG: Clear to auscultation bilaterally;   HEART: Regular rate and rhythm; No murmurs, rubs, or gallops  ABDOMEN: Soft, Nontender, Nondistended;   EXTREMITIES: No clubbing, cyanosis, or edema    LABS:                        14.3   8.55  )-----------( 137      ( 11 Dec 2020 06:50 )             42.6     12-11    136  |  106  |  25<H>  ----------------------------<  143<H>  4.3   |  18  |  1.3    Ca    7.9<L>      11 Dec 2020 06:50  Mg     2.0     12-11    TPro  5.5<L>  /  Alb  3.0<L>  /  TBili  1.2  /  DBili  x   /  AST  24  /  ALT  21  /  AlkPhos  66  12-11    PT/INR - ( 11 Dec 2020 06:50 )   PT: 13.80 sec;   INR: 1.20 ratio         PTT - ( 11 Dec 2020 06:50 )  PTT:24.9 sec      Troponin T, Serum: <0.01 ng/mL (12-10-20 @ 10:32)      CARDIAC MARKERS ( 10 Dec 2020 10:32 )  x     / <0.01 ng/mL / x     / x     / x          RADIOLOGY:      < from: CT Angio Chest PE Protocol w/ IV Cont (12.10.20 @ 12:39) >  Left middle lobe subsegmental pulmonary artery filling defect (3-106). No CT evidence of right heart strain.    New extensive bilateral peripheral patchy groundglass opacities are likely infectious/inflammatory. Though non-specific, these findings can be seen with COVID pneumonia.

## 2020-12-11 NOTE — CONSULT NOTE ADULT - SUBJECTIVE AND OBJECTIVE BOX
CASEY BENÍTEZ  75y, Male  Allergy: No Known Allergies      All historical available data reviewed.    HPI:  Patient is a 75 year old Male with a past medical history of CAD s/p PCI, DM presented to the ER today with worsening Shortness of Breath x 2 weeks. Patient was tested positive for COVID 2 weeks ago and has been quarantined at home. Chief complaint is associated with ambulatory dysfunction. Patient was brought in by EMS with 79% Saturation. Patient was tachypnic 25bpm, and with 3L of NC sating 96%. Patient was tachycardic, leukocytosis of 11k but afebrile. K5.3, CARINA (32/1.6), D-dimer elevated, therefore, CTA showed PE.   Patient was seen in the ER and examined. Patient admits to SOB but states it has improved with NC. Patient denies any fevers, chills, nausea, vomiting, chest pain, abdominal pain, dysuria, constipation, diarrhea, new onset of leg swelling, peripheral neuropathy. Patient admits to sedentary life style with a history of smoking (>40 years ago). Patient denies recent travel, sick contacts personally (wife had a few friends).  (10 Dec 2020 13:58)  ID called for COVID-19     FAMILY HISTORY:  Family history of breast cancer in mother      PAST MEDICAL & SURGICAL HISTORY:  CAD (coronary artery disease)    DM (diabetes mellitus)    No significant past surgical history          VITALS:  T(F): 96.1, Max: 99.9 (12-10-20 @ 12:43)  HR: 71  BP: 106/59  RR: 18Vital Signs Last 24 Hrs  T(C): 35.6 (11 Dec 2020 08:40), Max: 37.7 (10 Dec 2020 12:43)  T(F): 96.1 (11 Dec 2020 08:40), Max: 99.9 (10 Dec 2020 12:43)  HR: 71 (11 Dec 2020 08:40) (71 - 114)  BP: 106/59 (11 Dec 2020 08:40) (106/59 - 132/75)  BP(mean): --  RR: 18 (11 Dec 2020 08:40) (18 - 25)  SpO2: 97% (11 Dec 2020 08:40) (93% - 97%)    TESTS & MEASUREMENTS:                        14.3   8.55  )-----------( 137      ( 11 Dec 2020 06:50 )             42.6     12-11    136  |  106  |  25<H>  ----------------------------<  143<H>  4.3   |  18  |  1.3    Ca    7.9<L>      11 Dec 2020 06:50  Mg     2.0     12-11    TPro  5.5<L>  /  Alb  3.0<L>  /  TBili  1.2  /  DBili  x   /  AST  24  /  ALT  21  /  AlkPhos  66  12-11    LIVER FUNCTIONS - ( 11 Dec 2020 06:50 )  Alb: 3.0 g/dL / Pro: 5.5 g/dL / ALK PHOS: 66 U/L / ALT: 21 U/L / AST: 24 U/L / GGT: x                   RADIOLOGY & ADDITIONAL TESTS:  Personal review of radiological diagnostics performed  Echo and EKG results noted when applicable.     MEDICATIONS:  acetaminophen   Tablet .. 650 milliGRAM(s) Oral every 4 hours PRN  acetaminophen  Suppository .. 650 milliGRAM(s) Rectal every 4 hours PRN  apixaban 2.5 milliGRAM(s) Oral every 12 hours  atorvastatin 80 milliGRAM(s) Oral at bedtime  clopidogrel Tablet 75 milliGRAM(s) Oral daily  dexAMETHasone  Injectable 6 milliGRAM(s) IV Push daily  dextrose 40% Gel 15 Gram(s) Oral once  dextrose 5%. 1000 milliLiter(s) IV Continuous <Continuous>  dextrose 5%. 1000 milliLiter(s) IV Continuous <Continuous>  dextrose 50% Injectable 25 Gram(s) IV Push once  dextrose 50% Injectable 12.5 Gram(s) IV Push once  dextrose 50% Injectable 25 Gram(s) IV Push once  DULoxetine 20 milliGRAM(s) Oral daily  glucagon  Injectable 1 milliGRAM(s) IntraMuscular once  guaiFENesin  milliGRAM(s) Oral every 12 hours  insulin glargine Injectable (LANTUS) 15 Unit(s) SubCutaneous at bedtime  insulin lispro (ADMELOG) corrective regimen sliding scale   SubCutaneous three times a day before meals  metoprolol succinate  milliGRAM(s) Oral daily  multivitamin 1 Tablet(s) Oral daily  pantoprazole    Tablet 40 milliGRAM(s) Oral before breakfast  pregabalin 150 milliGRAM(s) Oral daily  ranolazine 500 milliGRAM(s) Oral two times a day  sodium chloride 0.9%. 1000 milliLiter(s) IV Continuous <Continuous>  sodium zirconium cyclosilicate 10 Gram(s) Oral once  tamsulosin 0.4 milliGRAM(s) Oral at bedtime  zolpidem 5 milliGRAM(s) Oral at bedtime PRN  zolpidem 5 milliGRAM(s) Oral at bedtime PRN      ANTIBIOTICS:

## 2020-12-11 NOTE — CHART NOTE - NSCHARTNOTEFT_GEN_A_CORE
Called by night float PGY1 that patient had just arrived from [facility] ~1AM.   Patient not signed out to night MAR but was admitted during the day shift @ south.   Patient is COVID+ve with PE on heparin drip due to CARINA. Patient was coughing up small clots and smears of blood.   Hemodynamically vitals stable.    PTT drawn at 1pm and was normal. Heparin ordered ~1pm.   Per floor RN she got in report that she was to call the Dr to draw a stat PTT ~2am.     PLAN:  Heparin drip @ 16 stopped  Pt seen at bedside, said he felt well on O2 except for the coughing. Never had this before.   Stat CBC, T&S, PTT/INR drawn  Stat CXR ordered    Will monitor. Called by night float PGY1 that patient had just arrived from [facility] ~1AM.   Patient not signed out to night MAR but was admitted during the day shift @ south.   Patient is COVID+ve with PE on heparin drip due to CARINA. Patient was coughing up small clots and smears of blood.   Hemodynamically vitals stable.    PTT drawn at 1pm and was normal. Heparin ordered ~1pm.   Per floor RN she got in report that she was to call the Dr to draw a stat PTT ~2am.     PLAN:  Heparin drip @ 16 stopped  Pt seen at bedside, said he felt well on O2 except for the coughing. Never had this before.   Stat CBC, T&S, PTT/INR drawn  Stat CXR ordered    Will monitor.    UPDATE: PTT >200  keep heparin drip off until AM draw.

## 2020-12-11 NOTE — PROGRESS NOTE ADULT - ASSESSMENT
Patient is a 75 year old Male with a past medical history of CAD s/p PCI, DM presented to the ER today with worsening Shortness of Breath x 2 weeks.       # COVID19 pneumonia     - Airborne isolation precautions   - FU ID recommendations   - Dexamethasone day 2/10  - Continue supplemental oxygen NC 5 LPM Maintain Sats >92%.  - Dimer 9146; Ferritin 537; CRP 10.58, Procal 0.09  - On eliquis for PE    #Left middle lobe subsegmental pulmonary artery filling defect #PE    - No evidence of right heart strain  - Seen on CTA  - eliquis 5 mg BID      # CARINA     - likely pre renal  - resolving with hydration; 75 cc /hr  - Hold lisinopril   - Hyperkalemia resolved with Lokelma    # DM type II    - Lantus 15 u at bed time  - Monitor Fingersticks  - Diabetic carbohydrate consistent diet   - Sliding scale     # CAD s/p PCI  - Continue with Plavix, Atorvastatin  - Continue with Metoprolol      Diet Consistent Carbs Dash/TLC  DVT ppx Eliquis  GI ppx protonix  Full code

## 2020-12-12 LAB
ALBUMIN SERPL ELPH-MCNC: 3.2 G/DL — LOW (ref 3.5–5.2)
ALP SERPL-CCNC: 79 U/L — SIGNIFICANT CHANGE UP (ref 30–115)
ALT FLD-CCNC: 22 U/L — SIGNIFICANT CHANGE UP (ref 0–41)
ANION GAP SERPL CALC-SCNC: 14 MMOL/L — SIGNIFICANT CHANGE UP (ref 7–14)
APTT BLD: 26.4 SEC — LOW (ref 27–39.2)
AST SERPL-CCNC: 19 U/L — SIGNIFICANT CHANGE UP (ref 0–41)
BASOPHILS # BLD AUTO: 0.02 K/UL — SIGNIFICANT CHANGE UP (ref 0–0.2)
BASOPHILS NFR BLD AUTO: 0.1 % — SIGNIFICANT CHANGE UP (ref 0–1)
BILIRUB SERPL-MCNC: 1 MG/DL — SIGNIFICANT CHANGE UP (ref 0.2–1.2)
BLD GP AB SCN SERPL QL: SIGNIFICANT CHANGE UP
BUN SERPL-MCNC: 29 MG/DL — HIGH (ref 10–20)
CALCIUM SERPL-MCNC: 8.5 MG/DL — SIGNIFICANT CHANGE UP (ref 8.5–10.1)
CHLORIDE SERPL-SCNC: 104 MMOL/L — SIGNIFICANT CHANGE UP (ref 98–110)
CO2 SERPL-SCNC: 19 MMOL/L — SIGNIFICANT CHANGE UP (ref 17–32)
CREAT SERPL-MCNC: 1.3 MG/DL — SIGNIFICANT CHANGE UP (ref 0.7–1.5)
D DIMER BLD IA.RAPID-MCNC: 2797 NG/ML DDU — HIGH (ref 0–230)
EOSINOPHIL # BLD AUTO: 0 K/UL — SIGNIFICANT CHANGE UP (ref 0–0.7)
EOSINOPHIL NFR BLD AUTO: 0 % — SIGNIFICANT CHANGE UP (ref 0–8)
GLUCOSE BLDC GLUCOMTR-MCNC: 195 MG/DL — HIGH (ref 70–99)
GLUCOSE BLDC GLUCOMTR-MCNC: 261 MG/DL — HIGH (ref 70–99)
GLUCOSE BLDC GLUCOMTR-MCNC: 272 MG/DL — HIGH (ref 70–99)
GLUCOSE BLDC GLUCOMTR-MCNC: 282 MG/DL — HIGH (ref 70–99)
GLUCOSE SERPL-MCNC: 223 MG/DL — HIGH (ref 70–99)
HCT VFR BLD CALC: 44.8 % — SIGNIFICANT CHANGE UP (ref 42–52)
HGB BLD-MCNC: 14.6 G/DL — SIGNIFICANT CHANGE UP (ref 14–18)
IMM GRANULOCYTES NFR BLD AUTO: 1.4 % — HIGH (ref 0.1–0.3)
INR BLD: 1.39 RATIO — HIGH (ref 0.65–1.3)
LYMPHOCYTES # BLD AUTO: 1.01 K/UL — LOW (ref 1.2–3.4)
LYMPHOCYTES # BLD AUTO: 6.8 % — LOW (ref 20.5–51.1)
MAGNESIUM SERPL-MCNC: 2.4 MG/DL — SIGNIFICANT CHANGE UP (ref 1.8–2.4)
MCHC RBC-ENTMCNC: 31 PG — SIGNIFICANT CHANGE UP (ref 27–31)
MCHC RBC-ENTMCNC: 32.6 G/DL — SIGNIFICANT CHANGE UP (ref 32–37)
MCV RBC AUTO: 95.1 FL — HIGH (ref 80–94)
MONOCYTES # BLD AUTO: 1.14 K/UL — HIGH (ref 0.1–0.6)
MONOCYTES NFR BLD AUTO: 7.7 % — SIGNIFICANT CHANGE UP (ref 1.7–9.3)
NEUTROPHILS # BLD AUTO: 12.37 K/UL — HIGH (ref 1.4–6.5)
NEUTROPHILS NFR BLD AUTO: 84 % — HIGH (ref 42.2–75.2)
NRBC # BLD: 0 /100 WBCS — SIGNIFICANT CHANGE UP (ref 0–0)
PLATELET # BLD AUTO: 168 K/UL — SIGNIFICANT CHANGE UP (ref 130–400)
POTASSIUM SERPL-MCNC: 5 MMOL/L — SIGNIFICANT CHANGE UP (ref 3.5–5)
POTASSIUM SERPL-SCNC: 5 MMOL/L — SIGNIFICANT CHANGE UP (ref 3.5–5)
PROT SERPL-MCNC: 6 G/DL — SIGNIFICANT CHANGE UP (ref 6–8)
PROTHROM AB SERPL-ACNC: 16 SEC — HIGH (ref 9.95–12.87)
RBC # BLD: 4.71 M/UL — SIGNIFICANT CHANGE UP (ref 4.7–6.1)
RBC # FLD: 12.8 % — SIGNIFICANT CHANGE UP (ref 11.5–14.5)
SARS-COV-2 IGG SERPL QL IA: POSITIVE
SARS-COV-2 IGM SERPL IA-ACNC: 6.61 INDEX — HIGH
SODIUM SERPL-SCNC: 137 MMOL/L — SIGNIFICANT CHANGE UP (ref 135–146)
WBC # BLD: 14.75 K/UL — HIGH (ref 4.8–10.8)
WBC # FLD AUTO: 14.75 K/UL — HIGH (ref 4.8–10.8)

## 2020-12-12 PROCEDURE — 99233 SBSQ HOSP IP/OBS HIGH 50: CPT | Mod: CS

## 2020-12-12 RX ADMIN — RANOLAZINE 500 MILLIGRAM(S): 500 TABLET, FILM COATED, EXTENDED RELEASE ORAL at 17:09

## 2020-12-12 RX ADMIN — INSULIN GLARGINE 15 UNIT(S): 100 INJECTION, SOLUTION SUBCUTANEOUS at 22:20

## 2020-12-12 RX ADMIN — Medication 100 MILLIGRAM(S): at 06:07

## 2020-12-12 RX ADMIN — RANOLAZINE 500 MILLIGRAM(S): 500 TABLET, FILM COATED, EXTENDED RELEASE ORAL at 06:07

## 2020-12-12 RX ADMIN — DULOXETINE HYDROCHLORIDE 20 MILLIGRAM(S): 30 CAPSULE, DELAYED RELEASE ORAL at 11:49

## 2020-12-12 RX ADMIN — Medication 2: at 08:09

## 2020-12-12 RX ADMIN — TAMSULOSIN HYDROCHLORIDE 0.4 MILLIGRAM(S): 0.4 CAPSULE ORAL at 22:20

## 2020-12-12 RX ADMIN — Medication 1 SPRAY(S): at 22:21

## 2020-12-12 RX ADMIN — Medication 6 MILLIGRAM(S): at 06:08

## 2020-12-12 RX ADMIN — Medication 1 SPRAY(S): at 13:00

## 2020-12-12 RX ADMIN — ZOLPIDEM TARTRATE 5 MILLIGRAM(S): 10 TABLET ORAL at 22:55

## 2020-12-12 RX ADMIN — APIXABAN 5 MILLIGRAM(S): 2.5 TABLET, FILM COATED ORAL at 17:09

## 2020-12-12 RX ADMIN — Medication 6: at 00:24

## 2020-12-12 RX ADMIN — ATORVASTATIN CALCIUM 80 MILLIGRAM(S): 80 TABLET, FILM COATED ORAL at 22:20

## 2020-12-12 RX ADMIN — Medication 1 SPRAY(S): at 06:08

## 2020-12-12 RX ADMIN — Medication 150 MILLIGRAM(S): at 11:52

## 2020-12-12 RX ADMIN — Medication 6: at 17:10

## 2020-12-12 RX ADMIN — Medication 6: at 11:50

## 2020-12-12 RX ADMIN — APIXABAN 5 MILLIGRAM(S): 2.5 TABLET, FILM COATED ORAL at 06:07

## 2020-12-12 RX ADMIN — Medication 1 TABLET(S): at 11:50

## 2020-12-12 RX ADMIN — CLOPIDOGREL BISULFATE 75 MILLIGRAM(S): 75 TABLET, FILM COATED ORAL at 11:49

## 2020-12-12 NOTE — PROGRESS NOTE ADULT - SUBJECTIVE AND OBJECTIVE BOX
24H events:    Patient is a 75y old Male who presents with a chief complaint of COVID and PE (12 Dec 2020 08:42)    Primary diagnosis of COVID-19       Today is hospital day 2d. This morning patient was seen and examined at bedside, resting comfortably in bed.    sating 97% on NC  No major complaints or events overnight  No chest pain no abdominal pain no diarrhea    PAST MEDICAL & SURGICAL HISTORY  CAD (coronary artery disease)    DM (diabetes mellitus)    No significant past surgical history      SOCIAL HISTORY:  Social History:  Tobacco use: Exsmoker, quit 40 years ago  EtOH use: Occasional   Illicit drug use: Denies (10 Dec 2020 13:58)      ALLERGIES:  No Known Allergies    MEDICATIONS:  STANDING MEDICATIONS  apixaban 5 milliGRAM(s) Oral every 12 hours  atorvastatin 80 milliGRAM(s) Oral at bedtime  clopidogrel Tablet 75 milliGRAM(s) Oral daily  dexAMETHasone  Injectable 6 milliGRAM(s) IV Push daily  dextrose 40% Gel 15 Gram(s) Oral once  dextrose 5%. 1000 milliLiter(s) IV Continuous <Continuous>  dextrose 5%. 1000 milliLiter(s) IV Continuous <Continuous>  dextrose 50% Injectable 25 Gram(s) IV Push once  dextrose 50% Injectable 12.5 Gram(s) IV Push once  dextrose 50% Injectable 25 Gram(s) IV Push once  DULoxetine 20 milliGRAM(s) Oral daily  glucagon  Injectable 1 milliGRAM(s) IntraMuscular once  guaiFENesin  milliGRAM(s) Oral every 12 hours  insulin glargine Injectable (LANTUS) 15 Unit(s) SubCutaneous at bedtime  insulin lispro (ADMELOG) corrective regimen sliding scale   SubCutaneous every 6 hours  metoprolol succinate  milliGRAM(s) Oral daily  multivitamin 1 Tablet(s) Oral daily  pantoprazole    Tablet 40 milliGRAM(s) Oral before breakfast  pregabalin 150 milliGRAM(s) Oral daily  ranolazine 500 milliGRAM(s) Oral two times a day  sodium chloride 0.65% Nasal 1 Spray(s) Both Nostrils three times a day  sodium zirconium cyclosilicate 10 Gram(s) Oral once  tamsulosin 0.4 milliGRAM(s) Oral at bedtime    PRN MEDICATIONS  acetaminophen   Tablet .. 650 milliGRAM(s) Oral every 4 hours PRN  acetaminophen  Suppository .. 650 milliGRAM(s) Rectal every 4 hours PRN  zolpidem 5 milliGRAM(s) Oral at bedtime PRN  zolpidem 5 milliGRAM(s) Oral at bedtime PRN    VITALS:   T(F): 96.9  HR: 75  BP: 120/68  RR: 18  SpO2: 96%    PHYSICAL EXAM:  GENERAL: NAD,   NERVOUS SYSTEM:  Alert & Oriented X3, non focal   CHEST/LUNG: Clear to auscultation bilaterally;   HEART: Regular rate and rhythm;   ABDOMEN: Soft, Nontender, Nondistended; Bowel sounds present  EXTREMITIES: No clubbing, cyanosis, or edema    LABS:                        14.3   8.55  )-----------( 137      ( 11 Dec 2020 06:50 )             42.6     12-11    136  |  106  |  25<H>  ----------------------------<  143<H>  4.3   |  18  |  1.3    Ca    7.9<L>      11 Dec 2020 06:50  Mg     2.0     12-11    TPro  5.5<L>  /  Alb  3.0<L>  /  TBili  1.2  /  DBili  x   /  AST  24  /  ALT  21  /  AlkPhos  66  12-11    PT/INR - ( 11 Dec 2020 06:50 )   PT: 13.80 sec;   INR: 1.20 ratio         PTT - ( 11 Dec 2020 06:50 )  PTT:24.9 sec          CARDIAC MARKERS ( 10 Dec 2020 10:32 )  x     / <0.01 ng/mL / x     / x     / x          RADIOLOGY:

## 2020-12-12 NOTE — PROGRESS NOTE ADULT - ATTENDING COMMENTS
I saw and evaluated patient  by bedside, no dyspnea at rest, patient remains on 5 L, sat 96%  , tolerating diet well.    All labs, radiology studies, VS was reviewed  I have reviewed the resident's note and agree with documented findings and  plan of care.  a/p:  Patient is a 75 year old Male with a past medical history of CAD s/p PCI, DM presented to the ER today with worsening Shortness of Breath x 2 weeks.       # Acute hypoxic respiratory failure due to COVID19 viral pneumonia  - Airborne/contact/droplet  isolation precautions   - ID f/up   - Dexamethasone 6 mg IV once daily x 10 days course ( day 3)  - repeat  Inflammatory markers today: CRP, Procalcitonin, Ferritin, LDH  - Continue supplemental oxygen. Maintain Sats >92%.  -12/12- 5 L -sat 96%    # CARINA - renal function has improved with IVF, d/c IVF.  - Hold lisinopril   - Trend creatinine      # Acute Pulmonary Embolism - most likely due to covid induced hypercoagulable state  -Ddimer- 9146  - transition to PO Eliquis tx.        # Hyperkalemia - potassium level borderline elevated, - Monitor BMP     # DM   - Patient was prescribed with insulin 40units qPM but has never filled as per [facility],   - started on lantus 15 units subc.  qHS   - Monitor Fingersticks  - Diabetic carbohydrate consistent diet   - Sliding scale     # CAD   - Continue with Plavix, Atorvastatin  - Continue with Metoprolol    # Hemoptysis due to mucosal dryness from oxygen tx. - ocean NS spray to b/l nostrils three times daily, oral hydration    # Obesity - BMI above 30- weight loss tx.    #Progress Note Handoff: monitor pulse ox, daily renal function, inflammatory markers every 48 hours. Patient will be transferred to [facility] for further inpatient care.   Family discussion: medical plan of tx. d/w pt. by bedside Disposition: Home___once medically stable.

## 2020-12-12 NOTE — PROGRESS NOTE ADULT - ASSESSMENT
75 year old Male with a past medical history of CAD s/p PCI, DM presented to the ER today with worsening Shortness of Breath x 2 weeks.     IMPRESSION;  COVID 19 with severe illness. SpO2 < 94% on RA and need for supplemental O2. 97% NC 5 LIT  Pt is in the late inflammatory response phase ot the illness based on the onset of symptoms. ( 2 weeks )  procalcitonin 0.01 , not suggestive of a bacterial PNA.  Ferritin 537  CRP 10.5  Ddimers 9146 ( secondary to PE )  CT 12/10 with GGO, LML PE  No bacterial PNA    RECOMMENDATIONS;  Anticoagulation as per team  Target SpO2 92 % to 96 %  Dexamethasone 6 mg iv q24h for 10 days 12/10 ( or until discharge ) or equivalent glucocorticoid dose may be substituted. Equivalent total dosis of alternative steroids are Methylprednisolone 32 mg and prednisone 40 mg q24h.  Monitor for side effects: hyperglycemia, neurological ( agitation/confusion), adrenal suppression, bacterial and fungal infections

## 2020-12-12 NOTE — PROGRESS NOTE ADULT - SUBJECTIVE AND OBJECTIVE BOX
CAESY BENÍTEZ  75y, Male    All available historical data reviewed    OVERNIGHT EVENTS:    no fevers  feels well and has no complaints  97% NC 5 LIT  ROS:  General: Denies rigors, nightsweats  HEENT: Denies headache, rhinorrhea, sore throat, eye pain  CV: Denies CP, palpitations  PULM: Denies wheezing, hemoptysis  GI: Denies hematemesis, hematochezia, melena  : Denies discharge, hematuria  MSK: Denies arthralgias, myalgias  SKIN: Denies rash, lesions  NEURO: Denies paresthesias, weakness  PSYCH: Denies depression, anxiety    VITALS:  T(F): 96.9, Max: 96.9 (12-12-20 @ 05:00)  HR: 75  BP: 120/68  RR: 18Vital Signs Last 24 Hrs  T(C): 36.1 (12 Dec 2020 05:00), Max: 36.1 (12 Dec 2020 05:00)  T(F): 96.9 (12 Dec 2020 05:00), Max: 96.9 (12 Dec 2020 05:00)  HR: 75 (12 Dec 2020 05:00) (71 - 79)  BP: 120/68 (12 Dec 2020 05:00) (111/64 - 129/71)  BP(mean): --  RR: 18 (12 Dec 2020 05:00) (18 - 18)  SpO2: 96% (12 Dec 2020 08:05) (94% - 99%)    TESTS & MEASUREMENTS:                        14.3   8.55  )-----------( 137      ( 11 Dec 2020 06:50 )             42.6     12-11    136  |  106  |  25<H>  ----------------------------<  143<H>  4.3   |  18  |  1.3    Ca    7.9<L>      11 Dec 2020 06:50  Mg     2.0     12-11    TPro  5.5<L>  /  Alb  3.0<L>  /  TBili  1.2  /  DBili  x   /  AST  24  /  ALT  21  /  AlkPhos  66  12-11    LIVER FUNCTIONS - ( 11 Dec 2020 06:50 )  Alb: 3.0 g/dL / Pro: 5.5 g/dL / ALK PHOS: 66 U/L / ALT: 21 U/L / AST: 24 U/L / GGT: x                   RADIOLOGY & ADDITIONAL TESTS:  Personal review of radiological diagnostics performed  Echo and EKG results noted when applicable.     MEDICATIONS:  acetaminophen   Tablet .. 650 milliGRAM(s) Oral every 4 hours PRN  acetaminophen  Suppository .. 650 milliGRAM(s) Rectal every 4 hours PRN  apixaban 5 milliGRAM(s) Oral every 12 hours  atorvastatin 80 milliGRAM(s) Oral at bedtime  clopidogrel Tablet 75 milliGRAM(s) Oral daily  dexAMETHasone  Injectable 6 milliGRAM(s) IV Push daily  dextrose 40% Gel 15 Gram(s) Oral once  dextrose 5%. 1000 milliLiter(s) IV Continuous <Continuous>  dextrose 5%. 1000 milliLiter(s) IV Continuous <Continuous>  dextrose 50% Injectable 25 Gram(s) IV Push once  dextrose 50% Injectable 12.5 Gram(s) IV Push once  dextrose 50% Injectable 25 Gram(s) IV Push once  DULoxetine 20 milliGRAM(s) Oral daily  glucagon  Injectable 1 milliGRAM(s) IntraMuscular once  guaiFENesin  milliGRAM(s) Oral every 12 hours  insulin glargine Injectable (LANTUS) 15 Unit(s) SubCutaneous at bedtime  insulin lispro (ADMELOG) corrective regimen sliding scale   SubCutaneous every 6 hours  metoprolol succinate  milliGRAM(s) Oral daily  multivitamin 1 Tablet(s) Oral daily  pantoprazole    Tablet 40 milliGRAM(s) Oral before breakfast  pregabalin 150 milliGRAM(s) Oral daily  ranolazine 500 milliGRAM(s) Oral two times a day  sodium chloride 0.65% Nasal 1 Spray(s) Both Nostrils three times a day  sodium zirconium cyclosilicate 10 Gram(s) Oral once  tamsulosin 0.4 milliGRAM(s) Oral at bedtime  zolpidem 5 milliGRAM(s) Oral at bedtime PRN  zolpidem 5 milliGRAM(s) Oral at bedtime PRN      ANTIBIOTICS:

## 2020-12-12 NOTE — PROGRESS NOTE ADULT - ASSESSMENT
Patient is a 75 year old Male with a past medical history of CAD s/p PCI, DM presented to the ER today with worsening Shortness of Breath x 2 weeks.       # COVID19 pneumonia     - Airborne isolation precautions   - ID on board  - Dexamethasone day 3/10  - Continue supplemental oxygen NC 5 LPM Maintain Sats >92%.  - Dimer 9146; Ferritin 537; CRP 10.58, Procal 0.09  - On eliquis for PE    #Left middle lobe subsegmental pulmonary artery filling defect #PE    - No evidence of right heart strain  - Seen on CTA  - eliquis 5 mg BID      # CARINA     - likely pre renal  - resolved with hydration; 75 cc /hr  - Hold lisinopril   - Hyperkalemia resolved with Lokelma    # DM type II    - Lantus 15 u at bed time  - Monitor Fingersticks  - Diabetic carbohydrate consistent diet   - Sliding scale     # CAD s/p PCI  - Continue with Plavix, Atorvastatin  - Continue with Metoprolol      Diet Consistent Carbs Dash/TLC  DVT ppx Eliquis  GI ppx protonix  Full code Patient is a 75 year old Male with a past medical history of CAD s/p PCI, DM presented to the ER today with worsening Shortness of Breath x 2 weeks.       # COVID19 pneumonia     - Airborne isolation precautions   - ID on board  - Dexamethasone day 3/10  - Continue supplemental oxygen NC 5 LPM Maintain Sats >92%.  - Dimer 9146; Ferritin 537; CRP 10.58, Procal 0.09  - On eliquis for PE    #Left middle lobe subsegmental pulmonary artery filling defect #PE    - No evidence of right heart strain  - Seen on CTA  - eliquis 5 mg BID      # CARINA     - likely pre renal  - resolved with hydration; 75 cc /hr  - Hold lisinopril   - Hyperkalemia resolved with Lokelma    # DM type II    - Lantus 15 u at bed time  - Monitor Fingersticks  - Diabetic carbohydrate consistent diet   - Sliding scale     # CAD s/p PCI  - Continue with Plavix, Atorvastatin  - Continue with Metoprolol      Diet Consistent Carbs Dash/TLC  DVT ppx Eliquis  GI ppx protonix  Full code    spoke to his wife Xiomara Mcclain at  and answered her questions

## 2020-12-13 LAB
CRP SERPL-MCNC: 5.29 MG/DL — HIGH (ref 0–0.4)
FERRITIN SERPL-MCNC: 584 NG/ML — HIGH (ref 30–400)
GLUCOSE BLDC GLUCOMTR-MCNC: 179 MG/DL — HIGH (ref 70–99)
GLUCOSE BLDC GLUCOMTR-MCNC: 265 MG/DL — HIGH (ref 70–99)
GLUCOSE BLDC GLUCOMTR-MCNC: 292 MG/DL — HIGH (ref 70–99)
GLUCOSE BLDC GLUCOMTR-MCNC: 294 MG/DL — HIGH (ref 70–99)
GLUCOSE BLDC GLUCOMTR-MCNC: 384 MG/DL — HIGH (ref 70–99)
PROCALCITONIN SERPL-MCNC: 0.04 NG/ML — SIGNIFICANT CHANGE UP (ref 0.02–0.1)

## 2020-12-13 PROCEDURE — 99233 SBSQ HOSP IP/OBS HIGH 50: CPT | Mod: CS

## 2020-12-13 RX ORDER — POLYETHYLENE GLYCOL 3350 17 G/17G
17 POWDER, FOR SOLUTION ORAL DAILY
Refills: 0 | Status: DISCONTINUED | OUTPATIENT
Start: 2020-12-13 | End: 2020-12-24

## 2020-12-13 RX ORDER — SENNA PLUS 8.6 MG/1
2 TABLET ORAL AT BEDTIME
Refills: 0 | Status: DISCONTINUED | OUTPATIENT
Start: 2020-12-13 | End: 2020-12-24

## 2020-12-13 RX ADMIN — Medication 1 TABLET(S): at 11:28

## 2020-12-13 RX ADMIN — Medication 10: at 11:27

## 2020-12-13 RX ADMIN — TAMSULOSIN HYDROCHLORIDE 0.4 MILLIGRAM(S): 0.4 CAPSULE ORAL at 21:10

## 2020-12-13 RX ADMIN — Medication 1 SPRAY(S): at 21:11

## 2020-12-13 RX ADMIN — RANOLAZINE 500 MILLIGRAM(S): 500 TABLET, FILM COATED, EXTENDED RELEASE ORAL at 17:20

## 2020-12-13 RX ADMIN — POLYETHYLENE GLYCOL 3350 17 GRAM(S): 17 POWDER, FOR SOLUTION ORAL at 21:07

## 2020-12-13 RX ADMIN — Medication 150 MILLIGRAM(S): at 11:30

## 2020-12-13 RX ADMIN — Medication 2: at 08:39

## 2020-12-13 RX ADMIN — Medication 100 MILLIGRAM(S): at 06:24

## 2020-12-13 RX ADMIN — APIXABAN 5 MILLIGRAM(S): 2.5 TABLET, FILM COATED ORAL at 17:20

## 2020-12-13 RX ADMIN — Medication 1 SPRAY(S): at 13:02

## 2020-12-13 RX ADMIN — Medication 6: at 00:11

## 2020-12-13 RX ADMIN — APIXABAN 5 MILLIGRAM(S): 2.5 TABLET, FILM COATED ORAL at 06:24

## 2020-12-13 RX ADMIN — INSULIN GLARGINE 15 UNIT(S): 100 INJECTION, SOLUTION SUBCUTANEOUS at 21:23

## 2020-12-13 RX ADMIN — DULOXETINE HYDROCHLORIDE 20 MILLIGRAM(S): 30 CAPSULE, DELAYED RELEASE ORAL at 11:28

## 2020-12-13 RX ADMIN — Medication 6: at 17:27

## 2020-12-13 RX ADMIN — Medication 1 SPRAY(S): at 06:25

## 2020-12-13 RX ADMIN — RANOLAZINE 500 MILLIGRAM(S): 500 TABLET, FILM COATED, EXTENDED RELEASE ORAL at 06:24

## 2020-12-13 RX ADMIN — CLOPIDOGREL BISULFATE 75 MILLIGRAM(S): 75 TABLET, FILM COATED ORAL at 11:28

## 2020-12-13 RX ADMIN — ATORVASTATIN CALCIUM 80 MILLIGRAM(S): 80 TABLET, FILM COATED ORAL at 21:10

## 2020-12-13 RX ADMIN — SENNA PLUS 2 TABLET(S): 8.6 TABLET ORAL at 21:08

## 2020-12-13 RX ADMIN — Medication 6 MILLIGRAM(S): at 06:24

## 2020-12-13 NOTE — PROGRESS NOTE ADULT - SUBJECTIVE AND OBJECTIVE BOX
CASEY BENÍTEZ  [facility] T2-3A 015 A ([facility] T2-3A)            Patient was evaluated and examined  by bedside, clinically has improved, no dyspnea at rest, remains on 4L via NC, sat 96%          REVIEW OF SYSTEMS:  please see pertinent positives mentioned above, all other 12 ROS negative      T(C): , Max: 36.4 (12-12-20 @ 14:30)  HR: 96 (12-13-20 @ 09:00)  BP: 115/63 (12-13-20 @ 09:00)  RR: 18 (12-13-20 @ 09:00)  SpO2: 96% (12-13-20 @ 06:42)  CAPILLARY BLOOD GLUCOSE      POCT Blood Glucose.: 179 mg/dL (13 Dec 2020 08:22)  POCT Blood Glucose.: 265 mg/dL (13 Dec 2020 00:08)  POCT Blood Glucose.: 272 mg/dL (12 Dec 2020 22:17)  POCT Blood Glucose.: 261 mg/dL (12 Dec 2020 16:58)  POCT Blood Glucose.: 282 mg/dL (12 Dec 2020 11:48)      PHYSICAL EXAM:  General: NAD, AAOX3, patient is laying comfortably in bed  HEENT: AT, NC, Supple, NO JVD, NO CB  Lungs: good breath sounds B/L, no wheezing, no rhonchi  CVS: normal S1, S2, RRR, NO M/G/R  Abdomen: soft, bowel sounds present, non-tender, non-distended  Extremities: no edema, no clubbing, no cyanosis, positive peripheral pulses b/l  Neuro: no acute focal neurological deficits, generalized body weakness  Skin: no rash, no ecchymosis      LAB  CBC  Date: 12-12-20 @ 06:56  Mean cell Slhnuufpns12.0  Mean cell Hemoglobin Conc32.6  Mean cell Volum 95.1  Platelet count-Automate 168  RBC Count 4.71  Red Cell Distrib Width12.8  WBC Count14.75  % Albumin, Urine--  Hematocrit 44.8  Hemoglobin 14.6  CBC  Date: 12-11-20 @ 06:50  Mean cell Gdjntfnntj31.7  Mean cell Hemoglobin Conc33.6  Mean cell Volum 94.5  Platelet count-Automate 137  RBC Count 4.51  Red Cell Distrib Width13.1  WBC Count8.55  % Albumin, Urine--  Hematocrit 42.6  Hemoglobin 14.3  CBC  Date: 12-11-20 @ 01:00  Mean cell Vjgyrdbseo49.8  Mean cell Hemoglobin Conc33.9  Mean cell Volum 93.7  Platelet count-Automate 140  RBC Count 4.75  Red Cell Distrib Width13.0  WBC Count9.86  % Albumin, Urine--  Hematocrit 44.5  Hemoglobin 15.1  CBC  Date: 12-10-20 @ 10:32  Mean cell Celedeawdf71.5  Mean cell Hemoglobin Conc33.4  Mean cell Volum 94.4  Platelet count-Automate 164  RBC Count 4.98  Red Cell Distrib Width12.9  WBC Count11.63  % Albumin, Urine--  Hematocrit 47.0  Hemoglobin 15.7    BMP  12-12-20 @ 06:56  Blood Gas Arterial-Calcium,Ionized--  Blood Urea Nitrogen, Serum 29 mg/dL<H> [10 - 20]  Carbon Dioxide, Serum19 mmol/L [17 - 32]  Chloride, Fnkvk224 mmol/L [98 - 110]  Creatinie, Serum1.3 mg/dL [0.7 - 1.5]  Glucose, Cicnf345 mg/dL<H> [70 - 99]  Potassium, Serum5.0 mmol/L [3.5 - 5.0]  Sodium, Serum 137 mmol/L [135 - 146]  [facility]  12-11-20 @ 06:50  Blood Gas Arterial-Calcium,Ionized--  Blood Urea Nitrogen, Serum 25 mg/dL<H> [10 - 20]  Carbon Dioxide, Serum18 mmol/L [17 - 32]  Chloride, Hhqgo479 mmol/L [98 - 110]  Creatinie, Serum1.3 mg/dL [0.7 - 1.5]  Glucose, Hnmhe508 mg/dL<H> [70 - 99]  Potassium, Serum4.3 mmol/L [3.5 - 5.0]  Sodium, Serum 136 mmol/L [135 - 146]  [facility]  12-11-20 @ 01:00  Blood Gas Arterial-Calcium,Ionized--  Blood Urea Nitrogen, Serum 28 mg/dL<H> [10 - 20]  Carbon Dioxide, Serum18 mmol/L [17 - 32]  Chloride, Fbxlq903 mmol/L<H> [98 - 110]  Creatinie, Serum1.4 mg/dL [0.7 - 1.5]  Glucose, Icems361 mg/dL<H> [70 - 99]  Potassium, Serum4.8 mmol/L [3.5 - 5.0]  Sodium, Serum 144 mmol/L [135 - 146]  BMP  12-10-20 @ 10:32  Blood Gas Arterial-Calcium,Ionized--  Blood Urea Nitrogen, Serum 32 mg/dL<H> [10 - 20]  Carbon Dioxide, Serum21 mmol/L [17 - 32]  Chloride, Fqsmr790 mmol/L [98 - 110]  Creatinie, Serum1.6 mg/dL<H> [0.7 - 1.5]  Glucose, Qmdlt496 mg/dL<H> [70 - 99]  Potassium, Serum5.3 mmol/L<H> [3.5 - 5.0]  Sodium, Serum 138 mmol/L [135 - 146]        PT/INR - ( 12 Dec 2020 06:56 )   PT: 16.00 sec;   INR: 1.39 ratio         PTT - ( 12 Dec 2020 06:56 )  PTT:26.4 sec    Medications:  acetaminophen   Tablet .. 650 milliGRAM(s) Oral every 4 hours PRN  acetaminophen  Suppository .. 650 milliGRAM(s) Rectal every 4 hours PRN  apixaban 5 milliGRAM(s) Oral every 12 hours  atorvastatin 80 milliGRAM(s) Oral at bedtime  clopidogrel Tablet 75 milliGRAM(s) Oral daily  dexAMETHasone  Injectable 6 milliGRAM(s) IV Push daily  dextrose 40% Gel 15 Gram(s) Oral once  dextrose 5%. 1000 milliLiter(s) IV Continuous <Continuous>  dextrose 5%. 1000 milliLiter(s) IV Continuous <Continuous>  dextrose 50% Injectable 25 Gram(s) IV Push once  dextrose 50% Injectable 12.5 Gram(s) IV Push once  dextrose 50% Injectable 25 Gram(s) IV Push once  DULoxetine 20 milliGRAM(s) Oral daily  glucagon  Injectable 1 milliGRAM(s) IntraMuscular once  guaiFENesin  milliGRAM(s) Oral every 12 hours  insulin glargine Injectable (LANTUS) 15 Unit(s) SubCutaneous at bedtime  insulin lispro (ADMELOG) corrective regimen sliding scale   SubCutaneous every 6 hours  metoprolol succinate  milliGRAM(s) Oral daily  multivitamin 1 Tablet(s) Oral daily  pantoprazole    Tablet 40 milliGRAM(s) Oral before breakfast  pregabalin 150 milliGRAM(s) Oral daily  ranolazine 500 milliGRAM(s) Oral two times a day  sodium chloride 0.65% Nasal 1 Spray(s) Both Nostrils three times a day  sodium zirconium cyclosilicate 10 Gram(s) Oral once  tamsulosin 0.4 milliGRAM(s) Oral at bedtime  zolpidem 5 milliGRAM(s) Oral at bedtime PRN  zolpidem 5 milliGRAM(s) Oral at bedtime PRN        Assessment and Plan:  Patient is a 75 year old Male with a past medical history of CAD s/p PCI, DM presented to the ER today with worsening Shortness of Breath x 2 weeks.       # Acute hypoxic respiratory failure due to COVID19 viral pneumonia  - Airborne/contact/droplet  isolation precautions   - ID f/up   - Dexamethasone 6 mg IV once daily x 10 days course ( day 4)- will switch to PO Prednisone from tomorrow to complete 10 days course.  - repeat  Inflammatory markers today: CRP, Procalcitonin, Ferritin, LDH  - Continue supplemental oxygen. Maintain Sats >92%.  -12/12- 5 L -sat 96%  12/13- 4L-sat 96%    # CARINA - renal function has improved with IVF, d/c IVF.  - Hold lisinopril   - Trend creatinine      # Acute Pulmonary Embolism - most likely due to covid induced hypercoagulable state  -Ddimer- 9146  - transitioned to PO Eliquis tx.        # Hyperkalemia - potassium level borderline elevated, - Monitor BMP     # DM   - Patient was prescribed with insulin 40units qPM but has never filled [facility],   - started on lantus 15 units subc.  qHS   - Monitor Fingersticks  - Diabetic carbohydrate consistent diet   - Sliding scale     # CAD   - Continue with Plavix, Atorvastatin  - Continue with Metoprolol    # Hemoptysis due to mucosal dryness from oxygen tx. - ocean NS spray to b/l nostrils three times daily, oral hydration    # Obesity - BMI above 30- weight loss tx.    #Progress Note Handoff: monitor pulse ox,  renal function. Patient can be  transferred to [facility] unit for further inpatient care. start PT tx. as tolerated  Family discussion: medical plan of tx. d/w pt. by bedside Disposition: Home___once medically stable.

## 2020-12-14 LAB
GLUCOSE BLDC GLUCOMTR-MCNC: 146 MG/DL — HIGH (ref 70–99)
GLUCOSE BLDC GLUCOMTR-MCNC: 175 MG/DL — HIGH (ref 70–99)
GLUCOSE BLDC GLUCOMTR-MCNC: 232 MG/DL — HIGH (ref 70–99)
GLUCOSE BLDC GLUCOMTR-MCNC: 233 MG/DL — HIGH (ref 70–99)
GLUCOSE BLDC GLUCOMTR-MCNC: 328 MG/DL — HIGH (ref 70–99)

## 2020-12-14 RX ORDER — INSULIN LISPRO 100/ML
6 VIAL (ML) SUBCUTANEOUS
Refills: 0 | Status: DISCONTINUED | OUTPATIENT
Start: 2020-12-14 | End: 2020-12-24

## 2020-12-14 RX ORDER — POLYETHYLENE GLYCOL 3350 17 G/17G
17 POWDER, FOR SOLUTION ORAL ONCE
Refills: 0 | Status: COMPLETED | OUTPATIENT
Start: 2020-12-14 | End: 2020-12-14

## 2020-12-14 RX ORDER — INSULIN GLARGINE 100 [IU]/ML
20 INJECTION, SOLUTION SUBCUTANEOUS AT BEDTIME
Refills: 0 | Status: DISCONTINUED | OUTPATIENT
Start: 2020-12-14 | End: 2020-12-20

## 2020-12-14 RX ORDER — LANOLIN ALCOHOL/MO/W.PET/CERES
3 CREAM (GRAM) TOPICAL ONCE
Refills: 0 | Status: COMPLETED | OUTPATIENT
Start: 2020-12-14 | End: 2020-12-14

## 2020-12-14 RX ADMIN — POLYETHYLENE GLYCOL 3350 17 GRAM(S): 17 POWDER, FOR SOLUTION ORAL at 12:26

## 2020-12-14 RX ADMIN — Medication 6 UNIT(S): at 17:27

## 2020-12-14 RX ADMIN — Medication 1 SPRAY(S): at 21:29

## 2020-12-14 RX ADMIN — Medication 6 UNIT(S): at 12:24

## 2020-12-14 RX ADMIN — Medication 40 MILLIGRAM(S): at 05:35

## 2020-12-14 RX ADMIN — INSULIN GLARGINE 20 UNIT(S): 100 INJECTION, SOLUTION SUBCUTANEOUS at 22:27

## 2020-12-14 RX ADMIN — CLOPIDOGREL BISULFATE 75 MILLIGRAM(S): 75 TABLET, FILM COATED ORAL at 12:26

## 2020-12-14 RX ADMIN — SENNA PLUS 2 TABLET(S): 8.6 TABLET ORAL at 21:28

## 2020-12-14 RX ADMIN — APIXABAN 5 MILLIGRAM(S): 2.5 TABLET, FILM COATED ORAL at 17:29

## 2020-12-14 RX ADMIN — APIXABAN 5 MILLIGRAM(S): 2.5 TABLET, FILM COATED ORAL at 05:36

## 2020-12-14 RX ADMIN — Medication 1 TABLET(S): at 12:24

## 2020-12-14 RX ADMIN — Medication 3 MILLIGRAM(S): at 23:19

## 2020-12-14 RX ADMIN — DULOXETINE HYDROCHLORIDE 20 MILLIGRAM(S): 30 CAPSULE, DELAYED RELEASE ORAL at 12:25

## 2020-12-14 RX ADMIN — Medication 1 SPRAY(S): at 17:18

## 2020-12-14 RX ADMIN — TAMSULOSIN HYDROCHLORIDE 0.4 MILLIGRAM(S): 0.4 CAPSULE ORAL at 21:29

## 2020-12-14 RX ADMIN — RANOLAZINE 500 MILLIGRAM(S): 500 TABLET, FILM COATED, EXTENDED RELEASE ORAL at 05:35

## 2020-12-14 RX ADMIN — Medication 6 UNIT(S): at 07:52

## 2020-12-14 RX ADMIN — RANOLAZINE 500 MILLIGRAM(S): 500 TABLET, FILM COATED, EXTENDED RELEASE ORAL at 17:29

## 2020-12-14 RX ADMIN — Medication 4: at 17:28

## 2020-12-14 RX ADMIN — Medication 4: at 07:04

## 2020-12-14 RX ADMIN — Medication 8: at 12:25

## 2020-12-14 RX ADMIN — ATORVASTATIN CALCIUM 80 MILLIGRAM(S): 80 TABLET, FILM COATED ORAL at 21:28

## 2020-12-14 RX ADMIN — Medication 1 SPRAY(S): at 05:38

## 2020-12-14 NOTE — PROGRESS NOTE ADULT - SUBJECTIVE AND OBJECTIVE BOX
· Subjective and Objective:     CASEY BENÍTEZ    Patient was evaluated and examined  by bedside, seen resting comfortably, no dyspnea at rest. Pt O2 94% on 5L. Plan to dc home once medically optimized.     Vital Signs Last 24 Hrs  T(C): 36.5 (14 Dec 2020 03:45), Max: 36.5 (13 Dec 2020 13:00)  T(F): 97.7 (14 Dec 2020 03:45), Max: 97.7 (13 Dec 2020 13:00)  HR: 54 (14 Dec 2020 04:19) (50 - 72)  BP: 146/81 (14 Dec 2020 03:45) (113/57 - 146/81)  BP(mean): 103 (14 Dec 2020 03:45) (103 - 103)  RR: 18 (14 Dec 2020 04:19) (18 - 18)  SpO2: 92% (14 Dec 2020 04:30) (89% - 98%)    PHYSICAL EXAM:  General: NAD, AAOX3, patient is laying comfortably in bed  HEENT: AT, NC, Supple, NO JVD, NO CB  Lungs: good breath sounds B/L, no wheezing, no rhonchi  CVS: normal S1, S2, RRR, NO M/G/R  Abdomen: soft, bowel sounds present, non-tender, non-distended  Extremities: no edema, no clubbing, no cyanosis, positive peripheral pulses b/l  Neuro: no acute focal neurological deficits, generalized body weakness  Skin: no rash, no ecchymosis      Medications:  acetaminophen   Tablet .. 650 milliGRAM(s) Oral every 4 hours PRN  acetaminophen  Suppository .. 650 milliGRAM(s) Rectal every 4 hours PRN  apixaban 5 milliGRAM(s) Oral every 12 hours  atorvastatin 80 milliGRAM(s) Oral at bedtime  clopidogrel Tablet 75 milliGRAM(s) Oral daily  dexAMETHasone  Injectable 6 milliGRAM(s) IV Push daily  dextrose 40% Gel 15 Gram(s) Oral once  dextrose 5%. 1000 milliLiter(s) IV Continuous <Continuous>  dextrose 5%. 1000 milliLiter(s) IV Continuous <Continuous>  dextrose 50% Injectable 25 Gram(s) IV Push once  dextrose 50% Injectable 12.5 Gram(s) IV Push once  dextrose 50% Injectable 25 Gram(s) IV Push once  DULoxetine 20 milliGRAM(s) Oral daily  glucagon  Injectable 1 milliGRAM(s) IntraMuscular once  guaiFENesin  milliGRAM(s) Oral every 12 hours  insulin glargine Injectable (LANTUS) 15 Unit(s) SubCutaneous at bedtime  insulin lispro (ADMELOG) corrective regimen sliding scale   SubCutaneous every 6 hours  metoprolol succinate  milliGRAM(s) Oral daily  multivitamin 1 Tablet(s) Oral daily  pantoprazole    Tablet 40 milliGRAM(s) Oral before breakfast  pregabalin 150 milliGRAM(s) Oral daily  ranolazine 500 milliGRAM(s) Oral two times a day  sodium chloride 0.65% Nasal 1 Spray(s) Both Nostrils three times a day  sodium zirconium cyclosilicate 10 Gram(s) Oral once  tamsulosin 0.4 milliGRAM(s) Oral at bedtime  zolpidem 5 milliGRAM(s) Oral at bedtime PRN  zolpidem 5 milliGRAM(s) Oral at bedtime PRN

## 2020-12-14 NOTE — CHART NOTE - NSCHARTNOTEFT_GEN_A_CORE
FF: 74 y/o male with a PMH of CAD s/p PCI, DM, full code, and PE on Eliquis presents to the [facility] from [facility] for evaluation of COVID pneumonia and hypoxia. Pt tested positive for COVID about 2 weeks ago for COVID. Pt CTA 12/10 revealed PE. Pt on 5L NC. pt seen by ID and placed on dexamethasone 6mg for 10 days, and to maintain saturations >92% on NC. pt to be discharged home once medically stable as per internal medicine resident progress note 12/12.       VS: FF: 76 y/o male with a PMH of CAD s/p PCI, DM, full code, and PE on Eliquis presents [facility] the [facility] from [facility] for evaluation of COVID pneumonia and hypoxia. Pt tested positive for COVID about 2 weeks ago for COVID. Pt CTA 12/10 revealed PE. Pt on 5L NC. pt seen by ID and placed on dexamethasone 6mg for 10 days, and to maintain saturations >92% on NC. pt to be discharged home once medically stable as per internal medicine resident progress note 12/12.       VS: HR: 50, BP: 146/81, RR: 18, Temp: 97.7, O2 Sat: 89%. FF: 76 y/o male with a PMH of CAD s/p PCI, DM, full code, and PE on Eliquis presents to the [facility] from [facility] for evaluation of COVID pneumonia and hypoxia. Pt tested positive for COVID about 2 weeks ago for COVID. Pt CTA 12/10 revealed PE. Pt on 5L NC. pt seen by ID and placed on dexamethasone 6mg for 10 days, and to maintain saturations >92% on NC. pt to be discharged home once medically stable as per internal medicine resident progress note 12/12.       VS: HR: 50, BP: 146/81, RR: 18, Temp: 97.7, O2 Sat: 89%.     Pt O2 saturation returned to 92% on 5L NC    Physical Exam    Vital Signs: I have reviewed the initial vital signs.  Constitutional: well-nourished, appears stated age, no acute distress  Eyes: Conjunctiva pink, Sclera clear  Cardiovascular: S1 and S2, regular rate, regular rhythm, well-perfused extremities, radial pulses equal and 2+ b/l.   Respiratory: unlabored respiratory effort, clear to auscultation bilaterally no wheezing, rales and rhonchi. pt is speaking full sentences. no accessory muscle use.   Gastrointestinal: soft, non-tender, nondistended abdomen, no pulsatile mass, normal bowl sounds, no rebound, no guarding  Musculoskeletal: supple neck, no lower extremity edema, no calf tenderness  Integumentary: warm, dry, no rash  Neurologic: awake, alert  Psychiatric: appropriate mood, appropriate affect

## 2020-12-14 NOTE — PROGRESS NOTE ADULT - ASSESSMENT
Assessment and Plan:  Patient is a 75 year old Male with a past medical history of CAD s/p PCI, DM presented to the ER with worsening Shortness of Breath x 2 weeks.     # Acute hypoxic respiratory failure due to COVID19 viral pneumonia  - Airborne/contact/droplet  isolation precautions   - ID f/up   - Dexamethasone 6 mg IV once daily x 10 days course ( day 4)- will switch to PO Prednisone from tomorrow to complete 10 days course.  - repeat  Inflammatory markers today: CRP, Procalcitonin, Ferritin, LDH  - Continue supplemental oxygen. Maintain Sats >92%.  -covid 12/10 +, 12/14 pending    # CARINA - renal function has improved with IVF, d/c IVF.  - Hold lisinopril   - Trend creatinine      # Acute Pulmonary Embolism - most likely due to covid induced hypercoagulable state  -Ddimer- 9146  - transitioned to PO Eliquis tx.     # Hyperkalemia - potassium level borderline elevated, - Monitor BMP     # DM   - Patient was prescribed with insulin 40units qPM but has [facility],   - started on lantus 15 units subc.  qHS   - Monitor Fingersticks  - Diabetic carbohydrate consistent diet   - Sliding scale     # CAD   - Continue with Plavix, Atorvastatin  - Continue with Metoprolol    # Hemoptysis due to mucosal dryness from oxygen tx. - ocean NS spray to b/l nostrils three times daily, oral hydration    # Obesity - BMI above 30- weight loss tx.    Dispo: home with O2 when medically optimized.

## 2020-12-14 NOTE — PROGRESS NOTE ADULT - ATTENDING COMMENTS
Patient seen and discussed with above provider. Agree with history, physical and plan except where noted. Patient has uncontrolled sugars, likely from use of steroids. Started long acting insulin 20U QHS and 6U per meal afterwards. Might improve after finishing course of steroids. Continue to monitor and swab.

## 2020-12-15 LAB
GLUCOSE BLDC GLUCOMTR-MCNC: 115 MG/DL — HIGH (ref 70–99)
GLUCOSE BLDC GLUCOMTR-MCNC: 156 MG/DL — HIGH (ref 70–99)
GLUCOSE BLDC GLUCOMTR-MCNC: 205 MG/DL — HIGH (ref 70–99)
GLUCOSE BLDC GLUCOMTR-MCNC: 270 MG/DL — HIGH (ref 70–99)
GLUCOSE BLDC GLUCOMTR-MCNC: 331 MG/DL — HIGH (ref 70–99)
GLUCOSE BLDC GLUCOMTR-MCNC: 398 MG/DL — HIGH (ref 70–99)

## 2020-12-15 RX ADMIN — Medication 40 MILLIGRAM(S): at 05:01

## 2020-12-15 RX ADMIN — DULOXETINE HYDROCHLORIDE 20 MILLIGRAM(S): 30 CAPSULE, DELAYED RELEASE ORAL at 13:36

## 2020-12-15 RX ADMIN — RANOLAZINE 500 MILLIGRAM(S): 500 TABLET, FILM COATED, EXTENDED RELEASE ORAL at 05:02

## 2020-12-15 RX ADMIN — APIXABAN 5 MILLIGRAM(S): 2.5 TABLET, FILM COATED ORAL at 05:01

## 2020-12-15 RX ADMIN — Medication 6: at 12:36

## 2020-12-15 RX ADMIN — TAMSULOSIN HYDROCHLORIDE 0.4 MILLIGRAM(S): 0.4 CAPSULE ORAL at 21:51

## 2020-12-15 RX ADMIN — RANOLAZINE 500 MILLIGRAM(S): 500 TABLET, FILM COATED, EXTENDED RELEASE ORAL at 18:24

## 2020-12-15 RX ADMIN — Medication 1 SPRAY(S): at 13:30

## 2020-12-15 RX ADMIN — CLOPIDOGREL BISULFATE 75 MILLIGRAM(S): 75 TABLET, FILM COATED ORAL at 13:36

## 2020-12-15 RX ADMIN — Medication 100 MILLIGRAM(S): at 05:02

## 2020-12-15 RX ADMIN — Medication 6 UNIT(S): at 12:36

## 2020-12-15 RX ADMIN — APIXABAN 5 MILLIGRAM(S): 2.5 TABLET, FILM COATED ORAL at 18:20

## 2020-12-15 RX ADMIN — ATORVASTATIN CALCIUM 80 MILLIGRAM(S): 80 TABLET, FILM COATED ORAL at 21:51

## 2020-12-15 RX ADMIN — SENNA PLUS 2 TABLET(S): 8.6 TABLET ORAL at 21:51

## 2020-12-15 RX ADMIN — Medication 1 SPRAY(S): at 21:52

## 2020-12-15 RX ADMIN — Medication 6 UNIT(S): at 17:15

## 2020-12-15 RX ADMIN — Medication 1 SPRAY(S): at 05:16

## 2020-12-15 RX ADMIN — Medication 150 MILLIGRAM(S): at 15:00

## 2020-12-15 RX ADMIN — INSULIN GLARGINE 20 UNIT(S): 100 INJECTION, SOLUTION SUBCUTANEOUS at 21:51

## 2020-12-15 RX ADMIN — Medication 1 TABLET(S): at 13:40

## 2020-12-15 RX ADMIN — Medication 8: at 17:15

## 2020-12-15 NOTE — PROGRESS NOTE ADULT - SUBJECTIVE AND OBJECTIVE BOX
CASEY BENÍTEZ      Patient was evaluated and examined  by bedside, seen resting comfortably, no dyspnea at rest. Pt O2 94% on 5L. Plan to dc home once medically optimized.     Vital Signs Last 24 Hrs  T(C): 36.5 (15 Dec 2020 04:53), Max: 36.6 (14 Dec 2020 17:44)  T(F): 97.7 (15 Dec 2020 04:53), Max: 97.8 (14 Dec 2020 17:44)  HR: 75 (15 Dec 2020 04:53) (69 - 86)  BP: 126/77 (15 Dec 2020 04:53) (121/79 - 147/68)  BP(mean): 93 (15 Dec 2020 04:53) (93 - 93)  RR: 18 (15 Dec 2020 04:53) (16 - 18)  SpO2: 91% (15 Dec 2020 04:53) (91% - 94%)    PHYSICAL EXAM:  General: NAD, AAOX3, patient is laying comfortably in bed  HEENT: AT, NC, Supple, NO JVD, NO CB  Lungs: good breath sounds B/L, no wheezing, no rhonchi  CVS: normal S1, S2, RRR, NO M/G/R  Abdomen: soft, bowel sounds present, non-tender, non-distended  Extremities: no edema, no clubbing, no cyanosis, positive peripheral pulses b/l  Neuro: no acute focal neurological deficits, generalized body weakness  Skin: no rash, no ecchymosis    MEDICATIONS  (STANDING):  apixaban 5 milliGRAM(s) Oral every 12 hours  atorvastatin 80 milliGRAM(s) Oral at bedtime  clopidogrel Tablet 75 milliGRAM(s) Oral daily  dextrose 40% Gel 15 Gram(s) Oral once  dextrose 5%. 1000 milliLiter(s) (50 mL/Hr) IV Continuous <Continuous>  dextrose 5%. 1000 milliLiter(s) (100 mL/Hr) IV Continuous <Continuous>  dextrose 50% Injectable 25 Gram(s) IV Push once  dextrose 50% Injectable 12.5 Gram(s) IV Push once  dextrose 50% Injectable 25 Gram(s) IV Push once  DULoxetine 20 milliGRAM(s) Oral daily  glucagon  Injectable 1 milliGRAM(s) IntraMuscular once  guaiFENesin  milliGRAM(s) Oral every 12 hours  insulin glargine Injectable (LANTUS) 20 Unit(s) SubCutaneous at bedtime  insulin lispro (ADMELOG) corrective regimen sliding scale   SubCutaneous every 6 hours  insulin lispro Injectable (ADMELOG) 6 Unit(s) SubCutaneous before breakfast  insulin lispro Injectable (ADMELOG) 6 Unit(s) SubCutaneous before lunch  insulin lispro Injectable (ADMELOG) 6 Unit(s) SubCutaneous before dinner  metoprolol succinate  milliGRAM(s) Oral daily  multivitamin 1 Tablet(s) Oral daily  pantoprazole    Tablet 40 milliGRAM(s) Oral before breakfast  predniSONE   Tablet 40 milliGRAM(s) Oral daily  pregabalin 150 milliGRAM(s) Oral daily  ranolazine 500 milliGRAM(s) Oral two times a day  senna 2 Tablet(s) Oral at bedtime  sodium chloride 0.65% Nasal 1 Spray(s) Both Nostrils three times a day  tamsulosin 0.4 milliGRAM(s) Oral at bedtime    MEDICATIONS  (PRN):  acetaminophen   Tablet .. 650 milliGRAM(s) Oral every 4 hours PRN Temp greater or equal to 38.5C (101.3F)  acetaminophen  Suppository .. 650 milliGRAM(s) Rectal every 4 hours PRN Temp greater or equal to 38.5C (101.3F)  polyethylene glycol 3350 17 Gram(s) Oral daily PRN Constipation  zolpidem 5 milliGRAM(s) Oral at bedtime PRN Insomnia  zolpidem 5 milliGRAM(s) Oral at bedtime PRN Insomnia   CASEY BENÍTEZ    Patient seen and examined bedside. Patient had to be placed on a non rebreather overnight when sating 85%; patient taken off this am and placed on 5L NC, now sating 92-93%. Will continue to monitor. Patient is receiving prednisone to be completed on 12/19. Plan to dc home with oxygen once medically stable.       Vital Signs Last 24 Hrs  T(C): 36.5 (15 Dec 2020 04:53), Max: 36.6 (14 Dec 2020 17:44)  T(F): 97.7 (15 Dec 2020 04:53), Max: 97.8 (14 Dec 2020 17:44)  HR: 75 (15 Dec 2020 04:53) (69 - 86)  BP: 126/77 (15 Dec 2020 04:53) (121/79 - 147/68)  BP(mean): 93 (15 Dec 2020 04:53) (93 - 93)  RR: 18 (15 Dec 2020 04:53) (16 - 18)  SpO2: 91% (15 Dec 2020 04:53) (91% - 94%)    PHYSICAL EXAM:  General: NAD, AAOX3, patient is laying comfortably in bed  HEENT: AT, NC, Supple, NO JVD, NO CB  Lungs: good breath sounds B/L, no wheezing, no rhonchi  CVS: normal S1, S2, RRR, NO M/G/R  Abdomen: soft, bowel sounds present, non-tender, non-distended  Extremities: no edema, no clubbing, no cyanosis, positive peripheral pulses b/l  Neuro: no acute focal neurological deficits, generalized body weakness  Skin: no rash, no ecchymosis    MEDICATIONS  (STANDING):  apixaban 5 milliGRAM(s) Oral every 12 hours  atorvastatin 80 milliGRAM(s) Oral at bedtime  clopidogrel Tablet 75 milliGRAM(s) Oral daily  dextrose 40% Gel 15 Gram(s) Oral once  dextrose 5%. 1000 milliLiter(s) (50 mL/Hr) IV Continuous <Continuous>  dextrose 5%. 1000 milliLiter(s) (100 mL/Hr) IV Continuous <Continuous>  dextrose 50% Injectable 25 Gram(s) IV Push once  dextrose 50% Injectable 12.5 Gram(s) IV Push once  dextrose 50% Injectable 25 Gram(s) IV Push once  DULoxetine 20 milliGRAM(s) Oral daily  glucagon  Injectable 1 milliGRAM(s) IntraMuscular once  guaiFENesin  milliGRAM(s) Oral every 12 hours  insulin glargine Injectable (LANTUS) 20 Unit(s) SubCutaneous at bedtime  insulin lispro (ADMELOG) corrective regimen sliding scale   SubCutaneous every 6 hours  insulin lispro Injectable (ADMELOG) 6 Unit(s) SubCutaneous before breakfast  insulin lispro Injectable (ADMELOG) 6 Unit(s) SubCutaneous before lunch  insulin lispro Injectable (ADMELOG) 6 Unit(s) SubCutaneous before dinner  metoprolol succinate  milliGRAM(s) Oral daily  multivitamin 1 Tablet(s) Oral daily  pantoprazole    Tablet 40 milliGRAM(s) Oral before breakfast  predniSONE   Tablet 40 milliGRAM(s) Oral daily  pregabalin 150 milliGRAM(s) Oral daily  ranolazine 500 milliGRAM(s) Oral two times a day  senna 2 Tablet(s) Oral at bedtime  sodium chloride 0.65% Nasal 1 Spray(s) Both Nostrils three times a day  tamsulosin 0.4 milliGRAM(s) Oral at bedtime    MEDICATIONS  (PRN):  acetaminophen   Tablet .. 650 milliGRAM(s) Oral every 4 hours PRN Temp greater or equal to 38.5C (101.3F)  acetaminophen  Suppository .. 650 milliGRAM(s) Rectal every 4 hours PRN Temp greater or equal to 38.5C (101.3F)  polyethylene glycol 3350 17 Gram(s) Oral daily PRN Constipation  zolpidem 5 milliGRAM(s) Oral at bedtime PRN Insomnia  zolpidem 5 milliGRAM(s) Oral at bedtime PRN Insomnia

## 2020-12-15 NOTE — PROGRESS NOTE ADULT - ASSESSMENT
Assessment and Plan:  Patient is a 75 year old Male with a past medical history of CAD s/p PCI, DM presented to the ER with worsening Shortness of Breath x 2 weeks.     # Acute hypoxic respiratory failure due to COVID19 viral pneumonia  - Airborne/contact/droplet  isolation precautions   - ID f/up   - Dexamethasone 6 mg IV once daily x 10 days course ( day 4)- will switch to PO Prednisone from tomorrow to complete 10 days course.  - repeat  Inflammatory markers today: CRP, Procalcitonin, Ferritin, LDH  - Continue supplemental oxygen. Maintain Sats >92%.  -covid 12/10 +, 12/14 pending  -patient began taking PO prednisone 40mg daily; began on 12/14--> complete on 12/19    # CARINA - renal function has improved with IVF, d/c IVF.  - Hold lisinopril   - Trend creatinine      # Acute Pulmonary Embolism - most likely due to covid induced hypercoagulable state  -Ddimer- 9146  - transitioned to PO Eliquis tx.     # Hyperkalemia - potassium level borderline elevated, - Monitor BMP     # DM   - Patient was prescribed with insulin 40units qPM but has never filled [facility],   - started on lantus 15 units subc.  qHS   - Monitor Fingersticks  - Diabetic carbohydrate consistent diet   - Sliding scale     # CAD   - Continue with Plavix, Atorvastatin  - Continue with Metoprolol    # Hemoptysis due to mucosal dryness from oxygen tx. - ocean NS spray to b/l nostrils three times daily, oral hydration    # Obesity - BMI above 30- weight loss tx.    Dispo: home with O2 when medically optimized.

## 2020-12-15 NOTE — CHART NOTE - NSCHARTNOTEFT_GEN_A_CORE
Attempted to call daughter Valery at [phone] 2x @ [phone]. Left message.     Will attempt again later.

## 2020-12-15 NOTE — OCCUPATIONAL THERAPY INITIAL EVALUATION ADULT - GENERAL OBSERVATIONS, REHAB EVAL
Pt encountered semi ware in bed in NAD, + IV locked, + 5L O2 via NC. Pt agreeable to OT evaluation, may be seen as confirmed by RN. Pt returned to bed in NAD, + 5L O2 via NC, RN aware

## 2020-12-16 LAB
GLUCOSE BLDC GLUCOMTR-MCNC: 113 MG/DL — HIGH (ref 70–99)
GLUCOSE BLDC GLUCOMTR-MCNC: 201 MG/DL — HIGH (ref 70–99)
GLUCOSE BLDC GLUCOMTR-MCNC: 259 MG/DL — HIGH (ref 70–99)
GLUCOSE BLDC GLUCOMTR-MCNC: 335 MG/DL — HIGH (ref 70–99)
SARS-COV-2 RNA SPEC QL NAA+PROBE: SIGNIFICANT CHANGE UP

## 2020-12-16 RX ORDER — SODIUM CHLORIDE 9 MG/ML
1000 INJECTION INTRAMUSCULAR; INTRAVENOUS; SUBCUTANEOUS ONCE
Refills: 0 | Status: COMPLETED | OUTPATIENT
Start: 2020-12-16 | End: 2020-12-16

## 2020-12-16 RX ADMIN — RANOLAZINE 500 MILLIGRAM(S): 500 TABLET, FILM COATED, EXTENDED RELEASE ORAL at 05:22

## 2020-12-16 RX ADMIN — RANOLAZINE 500 MILLIGRAM(S): 500 TABLET, FILM COATED, EXTENDED RELEASE ORAL at 17:49

## 2020-12-16 RX ADMIN — Medication 6: at 17:46

## 2020-12-16 RX ADMIN — Medication 1 TABLET(S): at 13:06

## 2020-12-16 RX ADMIN — CLOPIDOGREL BISULFATE 75 MILLIGRAM(S): 75 TABLET, FILM COATED ORAL at 13:05

## 2020-12-16 RX ADMIN — ATORVASTATIN CALCIUM 80 MILLIGRAM(S): 80 TABLET, FILM COATED ORAL at 21:27

## 2020-12-16 RX ADMIN — Medication 0: at 08:10

## 2020-12-16 RX ADMIN — Medication 40 MILLIGRAM(S): at 05:22

## 2020-12-16 RX ADMIN — Medication 1 SPRAY(S): at 05:22

## 2020-12-16 RX ADMIN — Medication 2: at 00:00

## 2020-12-16 RX ADMIN — Medication 6 UNIT(S): at 12:46

## 2020-12-16 RX ADMIN — Medication 1 SPRAY(S): at 21:28

## 2020-12-16 RX ADMIN — Medication 150 MILLIGRAM(S): at 13:06

## 2020-12-16 RX ADMIN — DULOXETINE HYDROCHLORIDE 20 MILLIGRAM(S): 30 CAPSULE, DELAYED RELEASE ORAL at 13:05

## 2020-12-16 RX ADMIN — Medication 1 SPRAY(S): at 13:06

## 2020-12-16 RX ADMIN — SENNA PLUS 2 TABLET(S): 8.6 TABLET ORAL at 21:27

## 2020-12-16 RX ADMIN — Medication 100 MILLIGRAM(S): at 05:21

## 2020-12-16 RX ADMIN — TAMSULOSIN HYDROCHLORIDE 0.4 MILLIGRAM(S): 0.4 CAPSULE ORAL at 21:27

## 2020-12-16 RX ADMIN — INSULIN GLARGINE 20 UNIT(S): 100 INJECTION, SOLUTION SUBCUTANEOUS at 21:26

## 2020-12-16 RX ADMIN — SODIUM CHLORIDE 1000 MILLILITER(S): 9 INJECTION INTRAMUSCULAR; INTRAVENOUS; SUBCUTANEOUS at 22:01

## 2020-12-16 RX ADMIN — Medication 6 UNIT(S): at 17:46

## 2020-12-16 RX ADMIN — APIXABAN 5 MILLIGRAM(S): 2.5 TABLET, FILM COATED ORAL at 05:21

## 2020-12-16 RX ADMIN — Medication 6 UNIT(S): at 08:33

## 2020-12-16 RX ADMIN — Medication 4: at 12:43

## 2020-12-16 RX ADMIN — APIXABAN 5 MILLIGRAM(S): 2.5 TABLET, FILM COATED ORAL at 17:48

## 2020-12-16 NOTE — CHART NOTE - NSCHARTNOTEFT_GEN_A_CORE
pt on % NRB sats are 95%.  pt taken off NRB and placed on NC 5L via concentrator.  sats are 87-89%. pt is asympt. will reeval.

## 2020-12-16 NOTE — PROGRESS NOTE ADULT - SUBJECTIVE AND OBJECTIVE BOX
Name: CASEY BENÍTEZ  Age: 75y  Gender: Male      HOD  Pt was seen and examined at bedside after i received a call from the nurse concerning about the patient desating. He was sitting in no acute distress with NRB 99% at 20 L. We placed him back in NC at 5 l and went to 83%. after a few minutes he went back to 925 in 5L. we will watch him closely for possible transfer back to ED if patient continue to desat    Allergies:  No Known Allergies      PHYSICAL EXAM:    Vitals:  Vital Signs Last 24 Hrs  T(C): 36.6 (16 Dec 2020 08:18), Max: 36.6 (15 Dec 2020 17:28)  T(F): 97.9 (16 Dec 2020 08:18), Max: 97.9 (15 Dec 2020 17:28)  HR: 66 (16 Dec 2020 08:18) (62 - 79)  BP: 134/96 (16 Dec 2020 08:18) (104/66 - 134/96)  BP(mean): 82 (16 Dec 2020 05:00) (79 - 82)  RR: 18 (16 Dec 2020 05:13) (17 - 18)  SpO2: 89% (16 Dec 2020 08:18) (86% - 91%)    GENERAL: NAD  CHEST/LUNG: CTAB  HEART: S1,S2 RRR  ABDOMEN: Soft, NT/ND, +BS  EXTREMITIES:  2+ DP, no LE edema      LABS:  MEDICATIONS  (STANDING):  apixaban 5 milliGRAM(s) Oral every 12 hours  atorvastatin 80 milliGRAM(s) Oral at bedtime  clopidogrel Tablet 75 milliGRAM(s) Oral daily  dextrose 40% Gel 15 Gram(s) Oral once  dextrose 5%. 1000 milliLiter(s) (50 mL/Hr) IV Continuous <Continuous>  dextrose 5%. 1000 milliLiter(s) (100 mL/Hr) IV Continuous <Continuous>  dextrose 50% Injectable 25 Gram(s) IV Push once  dextrose 50% Injectable 12.5 Gram(s) IV Push once  dextrose 50% Injectable 25 Gram(s) IV Push once  DULoxetine 20 milliGRAM(s) Oral daily  glucagon  Injectable 1 milliGRAM(s) IntraMuscular once  guaiFENesin  milliGRAM(s) Oral every 12 hours  insulin glargine Injectable (LANTUS) 20 Unit(s) SubCutaneous at bedtime  insulin lispro (ADMELOG) corrective regimen sliding scale   SubCutaneous every 6 hours  insulin lispro Injectable (ADMELOG) 6 Unit(s) SubCutaneous before breakfast  insulin lispro Injectable (ADMELOG) 6 Unit(s) SubCutaneous before lunch  insulin lispro Injectable (ADMELOG) 6 Unit(s) SubCutaneous before dinner  metoprolol succinate  milliGRAM(s) Oral daily  multivitamin 1 Tablet(s) Oral daily  pantoprazole    Tablet 40 milliGRAM(s) Oral before breakfast  predniSONE   Tablet 40 milliGRAM(s) Oral daily  pregabalin 150 milliGRAM(s) Oral daily  ranolazine 500 milliGRAM(s) Oral two times a day  senna 2 Tablet(s) Oral at bedtime  sodium chloride 0.65% Nasal 1 Spray(s) Both Nostrils three times a day  tamsulosin 0.4 milliGRAM(s) Oral at bedtime        RADIOLOGY & ADDITIONAL TESTS:    Imaging Personally Reviewed:  [x] YES  [ ] NO

## 2020-12-16 NOTE — PROGRESS NOTE ADULT - ASSESSMENT
Patient is a 75 year old Male with a past medical history of CAD s/p PCI, DM presented to the ER with worsening Shortness of Breath x 2 weeks.     # Acute hypoxic respiratory failure due to COVID19 viral pneumonia  - Airborne/contact/droplet  isolation precautions   - ID f/up   - Dexamethasone 6 mg IV once daily x 10 days course ( day 4)- will switch to PO Prednisone from tomorrow to complete 10 days course.  - repeat  Inflammatory markers today: CRP, Procalcitonin, Ferritin, LDH  - Continue supplemental oxygen. Maintain Sats >92%.  -covid 12/14 +, 12/16 to collect  -patient began taking PO prednisone 40mg daily; began on 12/14--> complete on 12/19    # CARINA - renal function has improved with IVF, d/c IVF.  - Hold lisinopril   - Trend creatinine      # Acute Pulmonary Embolism - most likely due to covid induced hypercoagulable state  -Ddimer- 9146  - transitioned to PO Eliquis tx.     # Hyperkalemia - potassium level borderline elevated, - Monitor BMP     # DM   - Patient was prescribed with insulin 40units qPM but has never filled as per [facility],   - started on lantus 15 units subc.  qHS   - Monitor Fingersticks  - Diabetic carbohydrate consistent diet   - Sliding scale     # CAD   - Continue with Plavix, Atorvastatin  - Continue with Metoprolol    # Hemoptysis due to mucosal dryness from oxygen tx. - ocean NS spray to b/l nostrils three times daily, oral hydration    # Obesity - BMI above 30- weight loss tx.    Dispo: home with O2 when medically optimized.

## 2020-12-16 NOTE — PROGRESS NOTE ADULT - ATTENDING COMMENTS
Continue to monitor pulse ox.   Oxygen saturation improved with 5L NC.   If patient becomes symptomatic and hypoxia worsens transfer back to ER for treatment.

## 2020-12-17 LAB
ALBUMIN SERPL ELPH-MCNC: 2.7 G/DL — LOW (ref 3.5–5.2)
ALP SERPL-CCNC: 98 U/L — SIGNIFICANT CHANGE UP (ref 30–115)
ALT FLD-CCNC: 30 U/L — SIGNIFICANT CHANGE UP (ref 0–41)
ANION GAP SERPL CALC-SCNC: 13 MMOL/L — SIGNIFICANT CHANGE UP (ref 7–14)
AST SERPL-CCNC: 22 U/L — SIGNIFICANT CHANGE UP (ref 0–41)
BILIRUB SERPL-MCNC: 0.7 MG/DL — SIGNIFICANT CHANGE UP (ref 0.2–1.2)
BUN SERPL-MCNC: 32 MG/DL — HIGH (ref 10–20)
CALCIUM SERPL-MCNC: 7.8 MG/DL — LOW (ref 8.5–10.1)
CHLORIDE SERPL-SCNC: 105 MMOL/L — SIGNIFICANT CHANGE UP (ref 98–110)
CO2 SERPL-SCNC: 19 MMOL/L — SIGNIFICANT CHANGE UP (ref 17–32)
CREAT SERPL-MCNC: 1.4 MG/DL — SIGNIFICANT CHANGE UP (ref 0.7–1.5)
GLUCOSE BLDC GLUCOMTR-MCNC: 132 MG/DL — HIGH (ref 70–99)
GLUCOSE BLDC GLUCOMTR-MCNC: 151 MG/DL — HIGH (ref 70–99)
GLUCOSE BLDC GLUCOMTR-MCNC: 157 MG/DL — HIGH (ref 70–99)
GLUCOSE BLDC GLUCOMTR-MCNC: 224 MG/DL — HIGH (ref 70–99)
GLUCOSE BLDC GLUCOMTR-MCNC: 354 MG/DL — HIGH (ref 70–99)
GLUCOSE SERPL-MCNC: 152 MG/DL — HIGH (ref 70–99)
POTASSIUM SERPL-MCNC: 4.4 MMOL/L — SIGNIFICANT CHANGE UP (ref 3.5–5)
POTASSIUM SERPL-SCNC: 4.4 MMOL/L — SIGNIFICANT CHANGE UP (ref 3.5–5)
PROT SERPL-MCNC: 5.3 G/DL — LOW (ref 6–8)
SODIUM SERPL-SCNC: 137 MMOL/L — SIGNIFICANT CHANGE UP (ref 135–146)

## 2020-12-17 RX ADMIN — INSULIN GLARGINE 20 UNIT(S): 100 INJECTION, SOLUTION SUBCUTANEOUS at 21:40

## 2020-12-17 RX ADMIN — DULOXETINE HYDROCHLORIDE 20 MILLIGRAM(S): 30 CAPSULE, DELAYED RELEASE ORAL at 13:00

## 2020-12-17 RX ADMIN — RANOLAZINE 500 MILLIGRAM(S): 500 TABLET, FILM COATED, EXTENDED RELEASE ORAL at 17:39

## 2020-12-17 RX ADMIN — Medication 100 MILLIGRAM(S): at 05:16

## 2020-12-17 RX ADMIN — Medication 150 MILLIGRAM(S): at 22:28

## 2020-12-17 RX ADMIN — ATORVASTATIN CALCIUM 80 MILLIGRAM(S): 80 TABLET, FILM COATED ORAL at 21:44

## 2020-12-17 RX ADMIN — Medication 6 UNIT(S): at 12:30

## 2020-12-17 RX ADMIN — Medication 6 UNIT(S): at 17:40

## 2020-12-17 RX ADMIN — Medication 40 MILLIGRAM(S): at 05:16

## 2020-12-17 RX ADMIN — SENNA PLUS 2 TABLET(S): 8.6 TABLET ORAL at 21:45

## 2020-12-17 RX ADMIN — Medication 1 TABLET(S): at 13:00

## 2020-12-17 RX ADMIN — Medication 1 SPRAY(S): at 05:17

## 2020-12-17 RX ADMIN — Medication 10: at 17:39

## 2020-12-17 RX ADMIN — RANOLAZINE 500 MILLIGRAM(S): 500 TABLET, FILM COATED, EXTENDED RELEASE ORAL at 05:16

## 2020-12-17 RX ADMIN — APIXABAN 5 MILLIGRAM(S): 2.5 TABLET, FILM COATED ORAL at 05:15

## 2020-12-17 RX ADMIN — TAMSULOSIN HYDROCHLORIDE 0.4 MILLIGRAM(S): 0.4 CAPSULE ORAL at 21:45

## 2020-12-17 RX ADMIN — CLOPIDOGREL BISULFATE 75 MILLIGRAM(S): 75 TABLET, FILM COATED ORAL at 13:00

## 2020-12-17 RX ADMIN — Medication 4: at 00:04

## 2020-12-17 RX ADMIN — Medication 6 UNIT(S): at 09:01

## 2020-12-17 RX ADMIN — Medication 1 SPRAY(S): at 13:01

## 2020-12-17 RX ADMIN — APIXABAN 5 MILLIGRAM(S): 2.5 TABLET, FILM COATED ORAL at 17:39

## 2020-12-17 NOTE — PROGRESS NOTE ADULT - ASSESSMENT
Patient is a 75 year old Male with a past medical history of CAD s/p PCI, DM presented to the ER with worsening Shortness of Breath x 2 weeks.     # Acute hypoxic respiratory failure due to COVID19 viral pneumonia  - Airborne/contact/droplet  isolation precautions   - ID consult appreciated  - Dexamethasone 6 mg IV once daily switched to PO Prednisone to complete 10 day course.  - Continue supplemental oxygen.  -covid 12/14 +, 12/16 to collect  -patient began taking PO prednisone 40mg daily; began on 12/14--> complete on 12/19    # CARINA - renal function has improved with IVF,  - cre 1.6--1.3--1.3   - IVF were dc'd  - Hold lisinopril   - f/u labs    # Acute Pulmonary Embolism - most likely due to covid induced hypercoagulable state  - Ddimer- 3975--2984  - transitioned to PO Eliquis tx.     # Hyperkalemia - potassium level borderline elevated, - Monitor BMP   -K+-5.3--5    # DM   - Patient was prescribed with insulin 40units qPM but has never filled as per [facility],   - c/w lantus 20 units subc.  qHS  - lispro 6 unit qac   - Monitor Fingersticks  - Diabetic carbohydrate consistent diet   - Sliding scale     # CAD   - Continue with Plavix, Atorvastatin  - Continue with Metoprolol    # Hemoptysis due to mucosal dryness from oxygen tx. - ocean NS spray to b/l nostrils three times daily, oral hydration    # Obesity - BMI above 30- weight loss tx.    Dispo: home with O2 when medically optimized.    Patient is a 75 year old Male with a past medical history of CAD s/p PCI, DM presented to the ER with worsening Shortness of Breath x 2 weeks.     # Acute hypoxic respiratory failure due to COVID19 viral pneumonia  - Airborne/contact/droplet  isolation precautions   - ID consult appreciated  - Dexamethasone 6 mg IV once daily switched to PO Prednisone to complete 10 day course.  - Continue supplemental oxygen.  -covid 12/14 +, 12/17 pending  -patient began taking PO prednisone 40mg daily; began on 12/14--> complete on 12/19    # CARINA - renal function has improved with IVF,  - cre 1.6--1.3--1.3--1.4   - IVF were dc'd  - Hold lisinopril   - f/u labs    # Acute Pulmonary Embolism - most likely due to covid induced hypercoagulable state  - Ddimer- 3429--2546  - transitioned to PO Eliquis tx.     # Hyperkalemia - potassium level borderline elevated, - Monitor BMP   -K+-5.3--5---4.4    # DM   - Patient was prescribed with insulin 40units qPM but has never filled as per [facility],   - c/w lantus 20 units subc.  qHS  - lispro 6 unit qac   - Monitor Fingersticks  - Diabetic carbohydrate consistent diet   - Sliding scale     # CAD   - Continue with Plavix, Atorvastatin  - Continue with Metoprolol    # Hemoptysis due to mucosal dryness from oxygen tx. - ocean NS spray to b/l nostrils three times daily, oral hydration    # Obesity - BMI above 30- weight loss tx.    Dispo: home with O2 when medically optimized.    Patient is a 75 year old Male with a past medical history of CAD s/p PCI, DM presented to the ER with worsening Shortness of Breath x 2 weeks.     # Acute hypoxic respiratory failure due to COVID19 viral pneumonia  - Airborne/contact/droplet  isolation precautions   - ID consult appreciated  - Dexamethasone 6 mg IV once daily switched to PO Prednisone to complete 10 day course.  - Continue supplemental oxygen.  -covid 12/14 +, 12/17 pending  -patient began taking PO prednisone 40mg daily; began on 12/14--> complete on 12/19    # CARINA - renal function has improved with IVF,  - cre 1.6--1.3--1.3--1.4   - Hold lisinopril   - Monitor renal function and electrolytes    # Acute Pulmonary Embolism - most likely due to covid induced hypercoagulable state  - Continue Eliquis for at least 3 months  - Needs follow up with PMD and vascular as outpatient    # Hyperkalemia   - Resolved  - Monitor electrolytes    # DM   - Patient was prescribed with insulin 40units qPM but has never filled as per [facility],   - c/w lantus 20 units subc.  qHS  - lispro 6 unit qac   - Monitor Fingersticks  - Diabetic carbohydrate consistent diet   - Sliding scale     # CAD   - Continue with Plavix, Atorvastatin  - Continue with Metoprolol    # Hemoptysis due to mucosal dryness from oxygen tx. - ocean NS spray to b/l nostrils three times daily, oral hydration    # Obesity - BMI above 30- weight loss tx.    Dispo: home with O2 when medically optimized.

## 2020-12-17 NOTE — PROGRESS NOTE ADULT - SUBJECTIVE AND OBJECTIVE BOX
Patient was noted to have Hyperglycemia (trending up) last night. 1L of NS bolus ordered and given. FS improved after IVF. will recheck in am again. Patient was saturating 92-94% on 5L NC last night. Appeared well this am. Encouraged incentive spirometer use. will continue to monitor

## 2020-12-17 NOTE — PROGRESS NOTE ADULT - ATTENDING COMMENTS
Patient seen and evaluated at bedside. Patient reports no acute concerns at this time. Requires oxygen by nasal cannula at 5L/min. Plan for discharge to home when condition improves. May need supplemental oxygen at home.

## 2020-12-17 NOTE — CHART NOTE - NSCHARTNOTEFT_GEN_A_CORE
Attempted to call daughter Vickie to give daily update but she did not answer the phone and could not leave message (not accepting according to automated recording).

## 2020-12-17 NOTE — PROGRESS NOTE ADULT - SUBJECTIVE AND OBJECTIVE BOX
HPI:    PAST MEDICAL & SURGICAL HISTORY:  CAD (coronary artery disease)    DM (diabetes mellitus)    No significant past surgical history        Allergies    No Known Allergies          MEDICATIONS  (STANDING):  apixaban 5 milliGRAM(s) Oral every 12 hours  atorvastatin 80 milliGRAM(s) Oral at bedtime  clopidogrel Tablet 75 milliGRAM(s) Oral daily  dextrose 40% Gel 15 Gram(s) Oral once  dextrose 5%. 1000 milliLiter(s) (100 mL/Hr) IV Continuous <Continuous>  dextrose 5%. 1000 milliLiter(s) (50 mL/Hr) IV Continuous <Continuous>  dextrose 50% Injectable 25 Gram(s) IV Push once  dextrose 50% Injectable 12.5 Gram(s) IV Push once  dextrose 50% Injectable 25 Gram(s) IV Push once  DULoxetine 20 milliGRAM(s) Oral daily  glucagon  Injectable 1 milliGRAM(s) IntraMuscular once  guaiFENesin  milliGRAM(s) Oral every 12 hours  insulin glargine Injectable (LANTUS) 20 Unit(s) SubCutaneous at bedtime  insulin lispro (ADMELOG) corrective regimen sliding scale   SubCutaneous every 6 hours  insulin lispro Injectable (ADMELOG) 6 Unit(s) SubCutaneous before breakfast  insulin lispro Injectable (ADMELOG) 6 Unit(s) SubCutaneous before lunch  insulin lispro Injectable (ADMELOG) 6 Unit(s) SubCutaneous before dinner  metoprolol succinate  milliGRAM(s) Oral daily  multivitamin 1 Tablet(s) Oral daily  pantoprazole    Tablet 40 milliGRAM(s) Oral before breakfast  predniSONE   Tablet 40 milliGRAM(s) Oral daily  pregabalin 150 milliGRAM(s) Oral daily  ranolazine 500 milliGRAM(s) Oral two times a day  senna 2 Tablet(s) Oral at bedtime  sodium chloride 0.65% Nasal 1 Spray(s) Both Nostrils three times a day  tamsulosin 0.4 milliGRAM(s) Oral at bedtime    MEDICATIONS  (PRN):  acetaminophen   Tablet .. 650 milliGRAM(s) Oral every 4 hours PRN Temp greater or equal to 38.5C (101.3F)  acetaminophen  Suppository .. 650 milliGRAM(s) Rectal every 4 hours PRN Temp greater or equal to 38.5C (101.3F)  polyethylene glycol 3350 17 Gram(s) Oral daily PRN Constipation  zolpidem 5 milliGRAM(s) Oral at bedtime PRN Insomnia  zolpidem 5 milliGRAM(s) Oral at bedtime PRN Insomnia      ROS:  General:	no fever, weight loss,  chills  Skin: no rash, ulcers  Ophthalmologic: no visual changes  ENMT:	no sore throat  Respiratory and Thorax: no cough, wheeze,  sob  Cardiovascular:	no chest pain, palpitations, dizziness  Gastrointestinal:	no nausea, vomiting, diarrhea, abd pain  Genitourinary:	no dysuria, hematuria  Musculoskeletal:	no joint pains  Neurological:	 no speech disturbance, focal weakness, numbness  Psychiatric:	no depression, anxiety, psychosis  Hematology/Lymphatics:	no anemia  Endocrine:	no polyuria, polydipsia        Vital Signs Last 24 Hrs  T(F): 97.3 (17 Dec 2020 05:11), Max: 97.9 (16 Dec 2020 08:18)  HR: 61 (17 Dec 2020 05:11) (61 - 67)  BP: 113/68 (17 Dec 2020 05:11) (113/68 - 143/71)  BP(mean): 93 (16 Dec 2020 21:14) (93 - 93)  RR: 18 (17 Dec 2020 05:11) (18 - 18)  SpO2: 94% (17 Dec 2020 06:09) (89% - 94%)    PHYSICAL EXAM:      Constitutional: A&Ox4  Respiratory: cta b/l  Cardiovascular: s1 s2 rrr  Gastrointestinal: soft nt  nd + bs no rebound or guarding  Genitourinary: no cva tenderness  Extremities: normal rom, no edema, calf tenderness  Neurological:no focal deficits  Skin: no rash                         HPI:  Pt feeling better, fatigue and sob has improved. Denies any cp. c/o nasal dryness.    PAST MEDICAL & SURGICAL HISTORY:  CAD (coronary artery disease)    DM (diabetes mellitus)    No significant past surgical history        Allergies    No Known Allergies          MEDICATIONS  (STANDING):  apixaban 5 milliGRAM(s) Oral every 12 hours  atorvastatin 80 milliGRAM(s) Oral at bedtime  clopidogrel Tablet 75 milliGRAM(s) Oral daily  dextrose 40% Gel 15 Gram(s) Oral once  dextrose 5%. 1000 milliLiter(s) (100 mL/Hr) IV Continuous <Continuous>  dextrose 5%. 1000 milliLiter(s) (50 mL/Hr) IV Continuous <Continuous>  dextrose 50% Injectable 25 Gram(s) IV Push once  dextrose 50% Injectable 12.5 Gram(s) IV Push once  dextrose 50% Injectable 25 Gram(s) IV Push once  DULoxetine 20 milliGRAM(s) Oral daily  glucagon  Injectable 1 milliGRAM(s) IntraMuscular once  guaiFENesin  milliGRAM(s) Oral every 12 hours  insulin glargine Injectable (LANTUS) 20 Unit(s) SubCutaneous at bedtime  insulin lispro (ADMELOG) corrective regimen sliding scale   SubCutaneous every 6 hours  insulin lispro Injectable (ADMELOG) 6 Unit(s) SubCutaneous before breakfast  insulin lispro Injectable (ADMELOG) 6 Unit(s) SubCutaneous before lunch  insulin lispro Injectable (ADMELOG) 6 Unit(s) SubCutaneous before dinner  metoprolol succinate  milliGRAM(s) Oral daily  multivitamin 1 Tablet(s) Oral daily  pantoprazole    Tablet 40 milliGRAM(s) Oral before breakfast  predniSONE   Tablet 40 milliGRAM(s) Oral daily  pregabalin 150 milliGRAM(s) Oral daily  ranolazine 500 milliGRAM(s) Oral two times a day  senna 2 Tablet(s) Oral at bedtime  sodium chloride 0.65% Nasal 1 Spray(s) Both Nostrils three times a day  tamsulosin 0.4 milliGRAM(s) Oral at bedtime    MEDICATIONS  (PRN):  acetaminophen   Tablet .. 650 milliGRAM(s) Oral every 4 hours PRN Temp greater or equal to 38.5C (101.3F)  acetaminophen  Suppository .. 650 milliGRAM(s) Rectal every 4 hours PRN Temp greater or equal to 38.5C (101.3F)  polyethylene glycol 3350 17 Gram(s) Oral daily PRN Constipation  zolpidem 5 milliGRAM(s) Oral at bedtime PRN Insomnia  zolpidem 5 milliGRAM(s) Oral at bedtime PRN Insomnia      ROS:  General:	no fever, weight loss,  chills  Skin: no rash, ulcers  Ophthalmologic: no visual changes  ENMT:	no sore throat  Respiratory and Thorax: + cough,  sob  Cardiovascular:	no chest pain, palpitations, dizziness  Gastrointestinal:	no nausea, vomiting, diarrhea, abd pain  Genitourinary:	no dysuria, hematuria  Musculoskeletal:	no joint pains  Neurological:	 no speech disturbance, focal weakness, numbness  Psychiatric:	no depression, anxiety, psychosis  Hematology/Lymphatics:	no anemia  Endocrine:	no polyuria, polydipsia        Vital Signs Last 24 Hrs  T(F): 97.3 (17 Dec 2020 05:11), Max: 97.9 (16 Dec 2020 08:18)  HR: 61 (17 Dec 2020 05:11) (61 - 67)  BP: 113/68 (17 Dec 2020 05:11) (113/68 - 143/71)  BP(mean): 93 (16 Dec 2020 21:14) (93 - 93)  RR: 18 (17 Dec 2020 05:11) (18 - 18)  SpO2: 94% (17 Dec 2020 06:09) (89% - 94%)    PHYSICAL EXAM:      Constitutional: A&Ox4  Respiratory: cta b/l  Cardiovascular: s1 s2 rrr  Gastrointestinal: soft nt  nd + bs no rebound or guarding  Genitourinary: no cva tenderness  Extremities: normal rom, no edema, calf tenderness  Neurological: no focal deficits  Skin: no rash          12-17    137  |  105  |  32<H>  ----------------------------<  152<H>  4.4   |  19  |  1.4    Ca    7.8<L>      17 Dec 2020 04:30    TPro  5.3<L>  /  Alb  2.7<L>  /  TBili  0.7  /  DBili  x   /  AST  22  /  ALT  30  /  AlkPhos  98  12-17              POCT Blood Glucose.: 157 mg/dL (17 Dec 2020 08:45)

## 2020-12-18 LAB
GLUCOSE BLDC GLUCOMTR-MCNC: 182 MG/DL — HIGH (ref 70–99)
GLUCOSE BLDC GLUCOMTR-MCNC: 273 MG/DL — HIGH (ref 70–99)
GLUCOSE BLDC GLUCOMTR-MCNC: 344 MG/DL — HIGH (ref 70–99)
GLUCOSE BLDC GLUCOMTR-MCNC: 81 MG/DL — SIGNIFICANT CHANGE UP (ref 70–99)
GLUCOSE BLDC GLUCOMTR-MCNC: 93 MG/DL — SIGNIFICANT CHANGE UP (ref 70–99)
SARS-COV-2 RNA SPEC QL NAA+PROBE: SIGNIFICANT CHANGE UP

## 2020-12-18 RX ADMIN — TAMSULOSIN HYDROCHLORIDE 0.4 MILLIGRAM(S): 0.4 CAPSULE ORAL at 21:18

## 2020-12-18 RX ADMIN — SENNA PLUS 2 TABLET(S): 8.6 TABLET ORAL at 21:18

## 2020-12-18 RX ADMIN — Medication 100 MILLIGRAM(S): at 05:59

## 2020-12-18 RX ADMIN — Medication 6 UNIT(S): at 16:49

## 2020-12-18 RX ADMIN — APIXABAN 5 MILLIGRAM(S): 2.5 TABLET, FILM COATED ORAL at 16:47

## 2020-12-18 RX ADMIN — DULOXETINE HYDROCHLORIDE 20 MILLIGRAM(S): 30 CAPSULE, DELAYED RELEASE ORAL at 12:30

## 2020-12-18 RX ADMIN — RANOLAZINE 500 MILLIGRAM(S): 500 TABLET, FILM COATED, EXTENDED RELEASE ORAL at 16:47

## 2020-12-18 RX ADMIN — Medication 6: at 23:14

## 2020-12-18 RX ADMIN — Medication 8: at 16:52

## 2020-12-18 RX ADMIN — APIXABAN 5 MILLIGRAM(S): 2.5 TABLET, FILM COATED ORAL at 05:58

## 2020-12-18 RX ADMIN — RANOLAZINE 500 MILLIGRAM(S): 500 TABLET, FILM COATED, EXTENDED RELEASE ORAL at 05:59

## 2020-12-18 RX ADMIN — Medication 6 UNIT(S): at 12:29

## 2020-12-18 RX ADMIN — CLOPIDOGREL BISULFATE 75 MILLIGRAM(S): 75 TABLET, FILM COATED ORAL at 12:30

## 2020-12-18 RX ADMIN — INSULIN GLARGINE 20 UNIT(S): 100 INJECTION, SOLUTION SUBCUTANEOUS at 23:02

## 2020-12-18 RX ADMIN — Medication 6 UNIT(S): at 08:41

## 2020-12-18 RX ADMIN — Medication 1 TABLET(S): at 12:31

## 2020-12-18 RX ADMIN — Medication 1 SPRAY(S): at 21:19

## 2020-12-18 RX ADMIN — Medication 2: at 12:28

## 2020-12-18 RX ADMIN — Medication 40 MILLIGRAM(S): at 05:59

## 2020-12-18 RX ADMIN — Medication 1 SPRAY(S): at 05:59

## 2020-12-18 RX ADMIN — Medication 1 SPRAY(S): at 15:47

## 2020-12-18 RX ADMIN — ATORVASTATIN CALCIUM 80 MILLIGRAM(S): 80 TABLET, FILM COATED ORAL at 21:18

## 2020-12-18 NOTE — PHYSICAL THERAPY INITIAL EVALUATION ADULT - LEVEL OF INDEPENDENCE: SIT/STAND, REHAB EVAL
deferred at this time; SpO2: low 76% sitting EOB at 5L/min; pt returned to bed, prone, SpO2: 89% on 4L/min O2. PA aware.

## 2020-12-18 NOTE — DIETITIAN INITIAL EVALUATION ADULT. - OTHER INFO
P/w:  Acute hypoxic respiratory failure due to COVID19 viral pneumonia. ID following. CARINA - renal function has improved with IVF. DM: insulin regimen.

## 2020-12-18 NOTE — PROGRESS NOTE ADULT - ATTENDING COMMENTS
Patient seen and evaluated at bedside. Patient had dyspnea this morning with low SaO2. SaO2 and symptoms improved with proning. Patient reports no other concerns at this time. Continue to monitor condition.

## 2020-12-18 NOTE — DIETITIAN INITIAL EVALUATION ADULT. - RD TO REMAIN AVAILABLE
RD to monitor diet order, energy intake, body composition, NFPF, glucose profile. Goal: In 7 days pt. to consistently consume >75% PO at meals ./yes

## 2020-12-18 NOTE — PROGRESS NOTE ADULT - SUBJECTIVE AND OBJECTIVE BOX
Name: CASEY BENÍTEZ  Age: 75y  Gender: Male        HOD 8  Pt was seen and examined.       Allergies:  No Known Allergies      PHYSICAL EXAM:    Vitals:  Vital Signs Last 24 Hrs  T(C): 36.6 (18 Dec 2020 12:43), Max: 36.9 (17 Dec 2020 16:00)  T(F): 97.8 (18 Dec 2020 12:43), Max: 98.5 (17 Dec 2020 16:00)  HR: 62 (18 Dec 2020 12:43) (59 - 67)  BP: 98/55 (18 Dec 2020 12:43) (98/55 - 143/80)  BP(mean): --  RR: 18 (18 Dec 2020 12:43) (17 - 18)  SpO2: 94% (18 Dec 2020 12:43) (94% - 95%)    GENERAL: NAD  CHEST/LUNG: CTAB  HEART: S1,S2 RRR  ABDOMEN: Soft, NT/ND, +BS  EXTREMITIES:  2+ DP, no LE edema      LABS:    12-17    137  |  105  |  32<H>  ----------------------------<  152<H>  4.4   |  19  |  1.4    Ca    7.8<L>      17 Dec 2020 04:30    TPro  5.3<L>  /  Alb  2.7<L>  /  TBili  0.7  /  DBili  x   /  AST  22  /  ALT  30  /  AlkPhos  98  12-17    LIVER FUNCTIONS - ( 17 Dec 2020 04:30 )  Alb: 2.7 g/dL / Pro: 5.3 g/dL / ALK PHOS: 98 U/L / ALT: 30 U/L / AST: 22 U/L / GGT: x                 MEDICATIONS  (STANDING):  apixaban 5 milliGRAM(s) Oral every 12 hours  atorvastatin 80 milliGRAM(s) Oral at bedtime  clopidogrel Tablet 75 milliGRAM(s) Oral daily  dextrose 40% Gel 15 Gram(s) Oral once  dextrose 5%. 1000 milliLiter(s) (50 mL/Hr) IV Continuous <Continuous>  dextrose 5%. 1000 milliLiter(s) (100 mL/Hr) IV Continuous <Continuous>  dextrose 50% Injectable 25 Gram(s) IV Push once  dextrose 50% Injectable 12.5 Gram(s) IV Push once  dextrose 50% Injectable 25 Gram(s) IV Push once  DULoxetine 20 milliGRAM(s) Oral daily  glucagon  Injectable 1 milliGRAM(s) IntraMuscular once  guaiFENesin  milliGRAM(s) Oral every 12 hours  insulin glargine Injectable (LANTUS) 20 Unit(s) SubCutaneous at bedtime  insulin lispro (ADMELOG) corrective regimen sliding scale   SubCutaneous every 6 hours  insulin lispro Injectable (ADMELOG) 6 Unit(s) SubCutaneous before lunch  insulin lispro Injectable (ADMELOG) 6 Unit(s) SubCutaneous before dinner  insulin lispro Injectable (ADMELOG) 6 Unit(s) SubCutaneous before breakfast  metoprolol succinate  milliGRAM(s) Oral daily  multivitamin 1 Tablet(s) Oral daily  pantoprazole    Tablet 40 milliGRAM(s) Oral before breakfast  predniSONE   Tablet 40 milliGRAM(s) Oral daily  ranolazine 500 milliGRAM(s) Oral two times a day  senna 2 Tablet(s) Oral at bedtime  sodium chloride 0.65% Nasal 1 Spray(s) Both Nostrils three times a day  tamsulosin 0.4 milliGRAM(s) Oral at bedtime        RADIOLOGY & ADDITIONAL TESTS:    Imaging Personally Reviewed:  [x] YES  [ ] NO Name: CASEY BENÍTEZ  Age: 75y  Gender: Male    HOD 8  Pt was seen and examined.     Allergies:  No Known Allergies      PHYSICAL EXAM:    Vitals:  Vital Signs Last 24 Hrs  T(C): 36.6 (18 Dec 2020 12:43), Max: 36.9 (17 Dec 2020 16:00)  T(F): 97.8 (18 Dec 2020 12:43), Max: 98.5 (17 Dec 2020 16:00)  HR: 62 (18 Dec 2020 12:43) (59 - 67)  BP: 98/55 (18 Dec 2020 12:43) (98/55 - 143/80)  BP(mean): --  RR: 18 (18 Dec 2020 12:43) (17 - 18)  SpO2: 94% (18 Dec 2020 12:43) (94% - 95%)    GENERAL: NAD  CHEST/LUNG: CTAB  HEART: S1,S2 RRR  ABDOMEN: Soft, NT/ND, +BS  EXTREMITIES:  2+ DP, no LE edema      LABS:    12-17    137  |  105  |  32<H>  ----------------------------<  152<H>  4.4   |  19  |  1.4    Ca    7.8<L>      17 Dec 2020 04:30    TPro  5.3<L>  /  Alb  2.7<L>  /  TBili  0.7  /  DBili  x   /  AST  22  /  ALT  30  /  AlkPhos  98  12-17    LIVER FUNCTIONS - ( 17 Dec 2020 04:30 )  Alb: 2.7 g/dL / Pro: 5.3 g/dL / ALK PHOS: 98 U/L / ALT: 30 U/L / AST: 22 U/L / GGT: x                 MEDICATIONS  (STANDING):  apixaban 5 milliGRAM(s) Oral every 12 hours  atorvastatin 80 milliGRAM(s) Oral at bedtime  clopidogrel Tablet 75 milliGRAM(s) Oral daily  dextrose 40% Gel 15 Gram(s) Oral once  dextrose 5%. 1000 milliLiter(s) (50 mL/Hr) IV Continuous <Continuous>  dextrose 5%. 1000 milliLiter(s) (100 mL/Hr) IV Continuous <Continuous>  dextrose 50% Injectable 25 Gram(s) IV Push once  dextrose 50% Injectable 12.5 Gram(s) IV Push once  dextrose 50% Injectable 25 Gram(s) IV Push once  DULoxetine 20 milliGRAM(s) Oral daily  glucagon  Injectable 1 milliGRAM(s) IntraMuscular once  guaiFENesin  milliGRAM(s) Oral every 12 hours  insulin glargine Injectable (LANTUS) 20 Unit(s) SubCutaneous at bedtime  insulin lispro (ADMELOG) corrective regimen sliding scale   SubCutaneous every 6 hours  insulin lispro Injectable (ADMELOG) 6 Unit(s) SubCutaneous before lunch  insulin lispro Injectable (ADMELOG) 6 Unit(s) SubCutaneous before dinner  insulin lispro Injectable (ADMELOG) 6 Unit(s) SubCutaneous before breakfast  metoprolol succinate  milliGRAM(s) Oral daily  multivitamin 1 Tablet(s) Oral daily  pantoprazole    Tablet 40 milliGRAM(s) Oral before breakfast  predniSONE   Tablet 40 milliGRAM(s) Oral daily  ranolazine 500 milliGRAM(s) Oral two times a day  senna 2 Tablet(s) Oral at bedtime  sodium chloride 0.65% Nasal 1 Spray(s) Both Nostrils three times a day  tamsulosin 0.4 milliGRAM(s) Oral at bedtime        RADIOLOGY & ADDITIONAL TESTS:    Imaging Personally Reviewed:  [x] YES  [ ] NO

## 2020-12-18 NOTE — DIETITIAN INITIAL EVALUATION ADULT. - OTHER CALCULATIONS
ideal weight 58.9kg  calorie 1472-1767kcal (25-30kcal/kg IBW) for obesity  protein 59-71g (1-1.2g/kg IBW) as above fluid 1ml/kcal or per LIP

## 2020-12-18 NOTE — DIETITIAN INITIAL EVALUATION ADULT. - CONTINUE CURRENT NUTRITION CARE PLAN
Nutrition intervention: meals and snacks. Continue [facility] soft diet order add consistent carb w/snack modifier./yes

## 2020-12-18 NOTE — CHART NOTE - NSCHARTNOTEFT_GEN_A_CORE
Notified family: Vickie  Update given: Patient is COVID negative, still requiring oxygen. Plan is for pt to DC home with oxygen once he requires less than 4L.  All questions and concerns addressed to family's satisfaction.  Family encouraged to contact me with any further concerns.

## 2020-12-19 LAB
GLUCOSE BLDC GLUCOMTR-MCNC: 136 MG/DL — HIGH (ref 70–99)
GLUCOSE BLDC GLUCOMTR-MCNC: 217 MG/DL — HIGH (ref 70–99)
GLUCOSE BLDC GLUCOMTR-MCNC: 241 MG/DL — HIGH (ref 70–99)
GLUCOSE BLDC GLUCOMTR-MCNC: 336 MG/DL — HIGH (ref 70–99)

## 2020-12-19 RX ADMIN — Medication 6 UNIT(S): at 09:11

## 2020-12-19 RX ADMIN — RANOLAZINE 500 MILLIGRAM(S): 500 TABLET, FILM COATED, EXTENDED RELEASE ORAL at 17:33

## 2020-12-19 RX ADMIN — Medication 6 UNIT(S): at 17:34

## 2020-12-19 RX ADMIN — APIXABAN 5 MILLIGRAM(S): 2.5 TABLET, FILM COATED ORAL at 17:33

## 2020-12-19 RX ADMIN — Medication 1 SPRAY(S): at 21:13

## 2020-12-19 RX ADMIN — CLOPIDOGREL BISULFATE 75 MILLIGRAM(S): 75 TABLET, FILM COATED ORAL at 12:57

## 2020-12-19 RX ADMIN — APIXABAN 5 MILLIGRAM(S): 2.5 TABLET, FILM COATED ORAL at 06:11

## 2020-12-19 RX ADMIN — Medication 4: at 17:33

## 2020-12-19 RX ADMIN — Medication 1 SPRAY(S): at 06:15

## 2020-12-19 RX ADMIN — SENNA PLUS 2 TABLET(S): 8.6 TABLET ORAL at 21:14

## 2020-12-19 RX ADMIN — Medication 6 UNIT(S): at 12:57

## 2020-12-19 RX ADMIN — Medication 1 TABLET(S): at 12:57

## 2020-12-19 RX ADMIN — RANOLAZINE 500 MILLIGRAM(S): 500 TABLET, FILM COATED, EXTENDED RELEASE ORAL at 06:10

## 2020-12-19 RX ADMIN — TAMSULOSIN HYDROCHLORIDE 0.4 MILLIGRAM(S): 0.4 CAPSULE ORAL at 21:14

## 2020-12-19 RX ADMIN — DULOXETINE HYDROCHLORIDE 20 MILLIGRAM(S): 30 CAPSULE, DELAYED RELEASE ORAL at 12:57

## 2020-12-19 RX ADMIN — Medication 40 MILLIGRAM(S): at 06:11

## 2020-12-19 RX ADMIN — INSULIN GLARGINE 20 UNIT(S): 100 INJECTION, SOLUTION SUBCUTANEOUS at 22:29

## 2020-12-19 RX ADMIN — Medication 2: at 06:12

## 2020-12-19 RX ADMIN — ATORVASTATIN CALCIUM 80 MILLIGRAM(S): 80 TABLET, FILM COATED ORAL at 21:14

## 2020-12-19 RX ADMIN — Medication 150 MILLIGRAM(S): at 17:33

## 2020-12-19 RX ADMIN — Medication 8: at 12:57

## 2020-12-19 NOTE — PROGRESS NOTE ADULT - ASSESSMENT
Patient is a 75 year old Male with a past medical history of CAD s/p PCI, DM presented to the ER with worsening Shortness of Breath x 2 weeks.     # Acute hypoxic respiratory failure due to COVID19 viral pneumonia  - Airborne/contact/droplet  isolation precautions   - ID consult appreciated  - Dexamethasone 6 mg IV once daily switched to PO Prednisone to complete 10 day course.  - Continue supplemental oxygen.  -covid 12/14 negative, 12/17 negative  -patient taking PO prednisone 40mg daily completed on 12/19  -On 12/18, pt desat to upper 70s with PT, proned and sat on 4LNC improved to 97%. Will follow  -12/19, pt on 3LNC sat 90%    # CARINA - renal function has improved with IVF,  - cre 1.6--1.3--1.3--1.4   - Hold lisinopril   - Monitor renal function and electrolytes    # Acute Pulmonary Embolism - most likely due to covid induced hypercoagulable state  - Continue Eliquis for at least 3 months  - Needs follow up with PMD and vascular as outpatient    # Hyperkalemia   - Resolved  - Monitor electrolytes    # DM   - Patient was prescribed with insulin 40units qPM but has never filled as per [facility],   - c/w lantus 20 units subc.  qHS  - lispro 6 unit qac   - Monitor Fingersticks  - Diabetic carbohydrate consistent diet   - Sliding scale     # CAD   - Continue with Plavix, Atorvastatin  - Continue with Metoprolol    # Hemoptysis due to mucosal dryness from oxygen tx. - ocean NS spray to b/l nostrils three times daily, oral hydration    # Obesity - BMI above 30- weight loss tx.    Dispo: home with O2 when medically optimized.    Patient is a 75 year old Male with a past medical history of CAD s/p PCI, DM presented to the ER with worsening Shortness of Breath x 2 weeks.     # Acute hypoxic respiratory failure due to COVID19 viral pneumonia  - Airborne/contact/droplet  isolation precautions   - ID consult appreciated  - Continue supplemental oxygen.  -covid 12/14 negative, 12/17 negative  -prednisone 40mg daily completed on 12/19  -12/19, pt on 3LNC sat 90%    # CARINA - renal function has improved with IVF,  - Hold lisinopril   - Monitor renal function and electrolytes    # Acute Pulmonary Embolism - most likely due to covid induced hypercoagulable state  - Continue Eliquis for at least 3 months  - Needs follow up with PMD and vascular as outpatient    # Hyperkalemia   - Resolved  - Monitor electrolytes    # DM   - c/w lantus 20 units subc at bedtime  - lispro 6 units TID with meals   - Monitor Fingersticks  - Diabetic carbohydrate consistent diet   - Sliding scale     # CAD   - Continue with Plavix, Atorvastatin  - Continue with Metoprolol    # Hemoptysis due to mucosal dryness from oxygen tx. - ocean NS spray to b/l nostrils three times daily, oral hydration    # Obesity - BMI above 30- weight loss tx.    Dispo: home with O2 when medically optimized.    Patient is a 75 year old Male with a past medical history of CAD s/p PCI, DM presented to the ER with worsening Shortness of Breath x 2 weeks.     # Acute hypoxic respiratory failure due to COVID19 viral pneumonia  - Airborne/contact/droplet  isolation precautions   - ID consult appreciated  - Continue supplemental oxygen.  -covid 12/14 negative, 12/17 negative  -prednisone 40mg daily completed on 12/19  -12/19, pt on 3LNC sat 90%    # CARINA - renal function has improved with IVF,  - Hold lisinopril   - Monitor renal function and electrolytes    # Acute Pulmonary Embolism - most likely due to covid induced hypercoagulable state  - Continue Eliquis for at least 3 months  - Needs follow up with PMD and vascular as outpatient    # Hyperkalemia   - Resolved  - Monitor electrolytes    # DM   - c/w lantus 20 units subc at bedtime  - lispro 6 units TID with meals   - Monitor Fingersticks  - Diabetic carbohydrate consistent diet   - Sliding scale     # CAD   - Continue with Plavix, Atorvastatin  - Continue with Metoprolol    # Hemoptysis due to mucosal dryness from oxygen tx. - ocean NS spray to b/l nostrils three times daily, oral hydration    # Obesity - BMI above 30- weight loss tx.    Confirmed patient's Lyrica 150mg q 12 dose with [facility]. Ordered for patient.     Dispo: home with O2 when medically optimized.

## 2020-12-19 NOTE — PROGRESS NOTE ADULT - ATTENDING COMMENTS
Patient seen and evaluated at bedside. Patient reports no acute concerns at this time. Using oxygen by nasal cannula, although requires more oxygen after activity. Physical exam shows no acute findings on lung exam. Consider Chest X-ray if requiring more oxygen by nasal cannula. Monitor glucose levels and adjust insulin as needed. May need adjustment on lispro as glucose usually elevated in middle of day.

## 2020-12-19 NOTE — PROGRESS NOTE ADULT - SUBJECTIVE AND OBJECTIVE BOX
Name: CASEY BENÍTEZ  Age: 75y  Gender: Male        HOD 9  Pt was seen and examined.       Allergies:  No Known Allergies      PHYSICAL EXAM:    Vitals:  Vital Signs Last 24 Hrs  T(C): 36.3 (19 Dec 2020 12:29), Max: 36.4 (18 Dec 2020 21:01)  T(F): 97.4 (19 Dec 2020 12:29), Max: 97.6 (19 Dec 2020 05:43)  HR: 94 (19 Dec 2020 12:29) (59 - 94)  BP: 115/64 (19 Dec 2020 12:29) (99/59 - 119/70)  BP(mean): --  RR: 17 (19 Dec 2020 12:29) (16 - 19)  SpO2: 89% (19 Dec 2020 12:29) (89% - 93%)    GENERAL: NAD  CHEST/LUNG: CTAB  HEART: S1,S2 RRR  ABDOMEN: Soft, NT/ND, +BS  EXTREMITIES:  2+ DP, no LE edema        MEDICATIONS  (STANDING):  apixaban 5 milliGRAM(s) Oral every 12 hours  atorvastatin 80 milliGRAM(s) Oral at bedtime  clopidogrel Tablet 75 milliGRAM(s) Oral daily  dextrose 40% Gel 15 Gram(s) Oral once  dextrose 5%. 1000 milliLiter(s) (50 mL/Hr) IV Continuous <Continuous>  dextrose 5%. 1000 milliLiter(s) (100 mL/Hr) IV Continuous <Continuous>  dextrose 50% Injectable 25 Gram(s) IV Push once  dextrose 50% Injectable 12.5 Gram(s) IV Push once  dextrose 50% Injectable 25 Gram(s) IV Push once  DULoxetine 20 milliGRAM(s) Oral daily  glucagon  Injectable 1 milliGRAM(s) IntraMuscular once  guaiFENesin  milliGRAM(s) Oral every 12 hours  insulin glargine Injectable (LANTUS) 20 Unit(s) SubCutaneous at bedtime  insulin lispro (ADMELOG) corrective regimen sliding scale   SubCutaneous every 6 hours  insulin lispro Injectable (ADMELOG) 6 Unit(s) SubCutaneous before breakfast  insulin lispro Injectable (ADMELOG) 6 Unit(s) SubCutaneous before lunch  insulin lispro Injectable (ADMELOG) 6 Unit(s) SubCutaneous before dinner  metoprolol succinate  milliGRAM(s) Oral daily  multivitamin 1 Tablet(s) Oral daily  pantoprazole    Tablet 40 milliGRAM(s) Oral before breakfast  pregabalin 150 milliGRAM(s) Oral every 12 hours  ranolazine 500 milliGRAM(s) Oral two times a day  senna 2 Tablet(s) Oral at bedtime  sodium chloride 0.65% Nasal 1 Spray(s) Both Nostrils three times a day  tamsulosin 0.4 milliGRAM(s) Oral at bedtime        RADIOLOGY & ADDITIONAL TESTS:    Imaging Personally Reviewed:  [x] YES  [ ] NO Name: CASEY BENÍTEZ  Age: 75y  Gender: Male    HOD 9  Pt was seen and examined.     Allergies:  No Known Allergies      PHYSICAL EXAM:    Vitals:  Vital Signs Last 24 Hrs  T(C): 36.3 (19 Dec 2020 12:29), Max: 36.4 (18 Dec 2020 21:01)  T(F): 97.4 (19 Dec 2020 12:29), Max: 97.6 (19 Dec 2020 05:43)  HR: 94 (19 Dec 2020 12:29) (59 - 94)  BP: 115/64 (19 Dec 2020 12:29) (99/59 - 119/70)  BP(mean): --  RR: 17 (19 Dec 2020 12:29) (16 - 19)  SpO2: 89% (19 Dec 2020 12:29) (89% - 93%)    GENERAL: NAD  CHEST/LUNG: CTAB  HEART: S1,S2 RRR  ABDOMEN: Soft, NT/ND, +BS  EXTREMITIES:  2+ DP, no LE edema        MEDICATIONS  (STANDING):  apixaban 5 milliGRAM(s) Oral every 12 hours  atorvastatin 80 milliGRAM(s) Oral at bedtime  clopidogrel Tablet 75 milliGRAM(s) Oral daily  dextrose 40% Gel 15 Gram(s) Oral once  dextrose 5%. 1000 milliLiter(s) (50 mL/Hr) IV Continuous <Continuous>  dextrose 5%. 1000 milliLiter(s) (100 mL/Hr) IV Continuous <Continuous>  dextrose 50% Injectable 25 Gram(s) IV Push once  dextrose 50% Injectable 12.5 Gram(s) IV Push once  dextrose 50% Injectable 25 Gram(s) IV Push once  DULoxetine 20 milliGRAM(s) Oral daily  glucagon  Injectable 1 milliGRAM(s) IntraMuscular once  guaiFENesin  milliGRAM(s) Oral every 12 hours  insulin glargine Injectable (LANTUS) 20 Unit(s) SubCutaneous at bedtime  insulin lispro (ADMELOG) corrective regimen sliding scale   SubCutaneous every 6 hours  insulin lispro Injectable (ADMELOG) 6 Unit(s) SubCutaneous before breakfast  insulin lispro Injectable (ADMELOG) 6 Unit(s) SubCutaneous before lunch  insulin lispro Injectable (ADMELOG) 6 Unit(s) SubCutaneous before dinner  metoprolol succinate  milliGRAM(s) Oral daily  multivitamin 1 Tablet(s) Oral daily  pantoprazole    Tablet 40 milliGRAM(s) Oral before breakfast  pregabalin 150 milliGRAM(s) Oral every 12 hours  ranolazine 500 milliGRAM(s) Oral two times a day  senna 2 Tablet(s) Oral at bedtime  sodium chloride 0.65% Nasal 1 Spray(s) Both Nostrils three times a day  tamsulosin 0.4 milliGRAM(s) Oral at bedtime        RADIOLOGY & ADDITIONAL TESTS:    Imaging Personally Reviewed:  [x] YES  [ ] NO

## 2020-12-19 NOTE — PROVIDER CONTACT NOTE (MEDICATION) - ACTION/TREATMENT ORDERED:
6am Metoprolol ER 100mg PO 6am dose held as per PA verbal order. Repeat vital signs later. Will endorse to incoming RN

## 2020-12-19 NOTE — PROVIDER CONTACT NOTE (MEDICATION) - SITUATION
Pt with Low BP: 99/59, rpt BP: 103/55; HR 60, rpt HR:59. Ordered medication Metoprolol ER 100mg PO @ 6am.

## 2020-12-20 LAB
GLUCOSE BLDC GLUCOMTR-MCNC: 103 MG/DL — HIGH (ref 70–99)
GLUCOSE BLDC GLUCOMTR-MCNC: 136 MG/DL — HIGH (ref 70–99)
GLUCOSE BLDC GLUCOMTR-MCNC: 147 MG/DL — HIGH (ref 70–99)
GLUCOSE BLDC GLUCOMTR-MCNC: 237 MG/DL — HIGH (ref 70–99)
GLUCOSE BLDC GLUCOMTR-MCNC: 321 MG/DL — HIGH (ref 70–99)

## 2020-12-20 RX ORDER — INSULIN GLARGINE 100 [IU]/ML
22 INJECTION, SOLUTION SUBCUTANEOUS AT BEDTIME
Refills: 0 | Status: DISCONTINUED | OUTPATIENT
Start: 2020-12-20 | End: 2020-12-24

## 2020-12-20 RX ADMIN — CLOPIDOGREL BISULFATE 75 MILLIGRAM(S): 75 TABLET, FILM COATED ORAL at 11:39

## 2020-12-20 RX ADMIN — RANOLAZINE 500 MILLIGRAM(S): 500 TABLET, FILM COATED, EXTENDED RELEASE ORAL at 07:46

## 2020-12-20 RX ADMIN — APIXABAN 5 MILLIGRAM(S): 2.5 TABLET, FILM COATED ORAL at 17:24

## 2020-12-20 RX ADMIN — Medication 6 UNIT(S): at 09:10

## 2020-12-20 RX ADMIN — Medication 8: at 12:48

## 2020-12-20 RX ADMIN — Medication 4: at 17:25

## 2020-12-20 RX ADMIN — SENNA PLUS 2 TABLET(S): 8.6 TABLET ORAL at 21:35

## 2020-12-20 RX ADMIN — Medication 150 MILLIGRAM(S): at 17:24

## 2020-12-20 RX ADMIN — ATORVASTATIN CALCIUM 80 MILLIGRAM(S): 80 TABLET, FILM COATED ORAL at 21:35

## 2020-12-20 RX ADMIN — Medication 1 SPRAY(S): at 21:38

## 2020-12-20 RX ADMIN — Medication 150 MILLIGRAM(S): at 07:46

## 2020-12-20 RX ADMIN — Medication 100 MILLIGRAM(S): at 06:18

## 2020-12-20 RX ADMIN — DULOXETINE HYDROCHLORIDE 20 MILLIGRAM(S): 30 CAPSULE, DELAYED RELEASE ORAL at 11:39

## 2020-12-20 RX ADMIN — Medication 1 SPRAY(S): at 06:17

## 2020-12-20 RX ADMIN — RANOLAZINE 500 MILLIGRAM(S): 500 TABLET, FILM COATED, EXTENDED RELEASE ORAL at 17:24

## 2020-12-20 RX ADMIN — Medication 1 TABLET(S): at 11:39

## 2020-12-20 RX ADMIN — TAMSULOSIN HYDROCHLORIDE 0.4 MILLIGRAM(S): 0.4 CAPSULE ORAL at 21:35

## 2020-12-20 RX ADMIN — APIXABAN 5 MILLIGRAM(S): 2.5 TABLET, FILM COATED ORAL at 06:18

## 2020-12-20 RX ADMIN — Medication 6 UNIT(S): at 12:49

## 2020-12-20 RX ADMIN — Medication 6 UNIT(S): at 17:26

## 2020-12-20 NOTE — PROGRESS NOTE ADULT - ASSESSMENT
Assessment and Plan:   · Assessment	  Patient is a 75 year old Male with a past medical history of CAD s/p PCI, DM presented to the ER with worsening Shortness of Breath x 2 weeks.     # Acute hypoxic respiratory failure due to COVID19 viral pneumonia  - Airborne/contact/droplet  isolation precautions   - ID consult appreciated  - Continue supplemental oxygen.  -covid 12/14 negative, 12/17 negative  -prednisone 40mg daily completed on 12/19  -12/20, pt on 7LNC sat 97%    # CARINA - renal function has improved with IVF,  - Hold lisinopril   - Monitor renal function and electrolytes    # Acute Pulmonary Embolism - most likely due to covid induced hypercoagulable state  - Continue Eliquis for at least 3 months  - Needs follow up with PMD and vascular as outpatient    # Hyperkalemia   - Resolved  - Monitor electrolytes    # DM   - c/w lantus 20 units subc at bedtime  - lispro 6 units TID with meals   - Monitor Fingersticks  - Diabetic carbohydrate consistent diet   - Sliding scale     # CAD   - Continue with Plavix, Atorvastatin  - Continue with Metoprolol    # Hemoptysis due to mucosal dryness from oxygen tx. - ocean NS spray to b/l nostrils three times daily, oral hydration    # Obesity - BMI above 30- weight loss tx.    COVID swab negative x 2.       Dispo: home with O2 when medically optimized.

## 2020-12-20 NOTE — PROGRESS NOTE ADULT - SUBJECTIVE AND OBJECTIVE BOX
Name: CASEY BENÍTEZ  Age: 75y  Gender: Male    HOD  Pt was seen and examined. Pt noted to be doing bedside exercise and ambulating. Noted to have difficulty with breathing. Saturating ~97% 4L.       Allergies:  No Known Allergies      PHYSICAL EXAM:    Vitals:  Vital Signs Last 24 Hrs  T(C): 36.2 (20 Dec 2020 09:37), Max: 36.7 (19 Dec 2020 18:50)  T(F): 97.1 (20 Dec 2020 09:37), Max: 98.1 (19 Dec 2020 18:50)  HR: 70 (20 Dec 2020 09:37) (65 - 94)  BP: 109/71 (20 Dec 2020 09:37) (109/71 - 126/79)  BP(mean): --  RR: 19 (20 Dec 2020 09:37) (17 - 19)  SpO2: 97% (20 Dec 2020 09:37) (89% - 97%)    GENERAL: NAD  CHEST/LUNG: CTAB  HEART: S1,S2 RRR  ABDOMEN: Soft, NT/ND, +BS  EXTREMITIES:  2+ DP, no LE edema    MEDICATIONS  (STANDING):  apixaban 5 milliGRAM(s) Oral every 12 hours  atorvastatin 80 milliGRAM(s) Oral at bedtime  clopidogrel Tablet 75 milliGRAM(s) Oral daily  dextrose 40% Gel 15 Gram(s) Oral once  dextrose 5%. 1000 milliLiter(s) (50 mL/Hr) IV Continuous <Continuous>  dextrose 5%. 1000 milliLiter(s) (100 mL/Hr) IV Continuous <Continuous>  dextrose 50% Injectable 25 Gram(s) IV Push once  dextrose 50% Injectable 12.5 Gram(s) IV Push once  dextrose 50% Injectable 25 Gram(s) IV Push once  DULoxetine 20 milliGRAM(s) Oral daily  glucagon  Injectable 1 milliGRAM(s) IntraMuscular once  guaiFENesin  milliGRAM(s) Oral every 12 hours  insulin glargine Injectable (LANTUS) 22 Unit(s) SubCutaneous at bedtime  insulin lispro (ADMELOG) corrective regimen sliding scale   SubCutaneous every 6 hours  insulin lispro Injectable (ADMELOG) 6 Unit(s) SubCutaneous before breakfast  insulin lispro Injectable (ADMELOG) 6 Unit(s) SubCutaneous before lunch  insulin lispro Injectable (ADMELOG) 6 Unit(s) SubCutaneous before dinner  metoprolol succinate  milliGRAM(s) Oral daily  multivitamin 1 Tablet(s) Oral daily  pantoprazole    Tablet 40 milliGRAM(s) Oral before breakfast  pregabalin 150 milliGRAM(s) Oral every 12 hours  ranolazine 500 milliGRAM(s) Oral two times a day  senna 2 Tablet(s) Oral at bedtime  sodium chloride 0.65% Nasal 1 Spray(s) Both Nostrils three times a day  tamsulosin 0.4 milliGRAM(s) Oral at bedtime        RADIOLOGY & ADDITIONAL TESTS:    Imaging Personally Reviewed:  [x] YES  [ ] NO

## 2020-12-20 NOTE — CHART NOTE - NSCHARTNOTEFT_GEN_A_CORE
Notified family: Xiomara - Wife  Update given: Patients oxygen saturation is doing better but still continues to have some difficulty with breathing.   All questions and concerns addressed to family's satisfaction.  Family encouraged to contact me with any further concerns.

## 2020-12-20 NOTE — PROGRESS NOTE ADULT - ATTENDING COMMENTS
75 yr old morbidly obese male with COVID. Last few days has been borderline, occasionally needing more than 5L NC. Patient may potentially need transfer back to [facility] if O2 requirements increase. Continue PT/OT with patient. Patient seen with above provider, agree with history, physical and plan except where noted.

## 2020-12-21 LAB
GLUCOSE BLDC GLUCOMTR-MCNC: 115 MG/DL — HIGH (ref 70–99)
GLUCOSE BLDC GLUCOMTR-MCNC: 151 MG/DL — HIGH (ref 70–99)
GLUCOSE BLDC GLUCOMTR-MCNC: 164 MG/DL — HIGH (ref 70–99)
GLUCOSE BLDC GLUCOMTR-MCNC: 188 MG/DL — HIGH (ref 70–99)
GLUCOSE BLDC GLUCOMTR-MCNC: 195 MG/DL — HIGH (ref 70–99)
GLUCOSE BLDC GLUCOMTR-MCNC: 242 MG/DL — HIGH (ref 70–99)
GLUCOSE BLDC GLUCOMTR-MCNC: 258 MG/DL — HIGH (ref 70–99)
GLUCOSE BLDC GLUCOMTR-MCNC: 78 MG/DL — SIGNIFICANT CHANGE UP (ref 70–99)

## 2020-12-21 RX ADMIN — DULOXETINE HYDROCHLORIDE 20 MILLIGRAM(S): 30 CAPSULE, DELAYED RELEASE ORAL at 11:54

## 2020-12-21 RX ADMIN — APIXABAN 5 MILLIGRAM(S): 2.5 TABLET, FILM COATED ORAL at 06:29

## 2020-12-21 RX ADMIN — ATORVASTATIN CALCIUM 80 MILLIGRAM(S): 80 TABLET, FILM COATED ORAL at 21:28

## 2020-12-21 RX ADMIN — CLOPIDOGREL BISULFATE 75 MILLIGRAM(S): 75 TABLET, FILM COATED ORAL at 11:55

## 2020-12-21 RX ADMIN — Medication 6 UNIT(S): at 11:54

## 2020-12-21 RX ADMIN — INSULIN GLARGINE 22 UNIT(S): 100 INJECTION, SOLUTION SUBCUTANEOUS at 21:38

## 2020-12-21 RX ADMIN — RANOLAZINE 500 MILLIGRAM(S): 500 TABLET, FILM COATED, EXTENDED RELEASE ORAL at 06:30

## 2020-12-21 RX ADMIN — Medication 100 MILLIGRAM(S): at 06:30

## 2020-12-21 RX ADMIN — Medication 1 SPRAY(S): at 14:00

## 2020-12-21 RX ADMIN — Medication 150 MILLIGRAM(S): at 06:28

## 2020-12-21 RX ADMIN — RANOLAZINE 500 MILLIGRAM(S): 500 TABLET, FILM COATED, EXTENDED RELEASE ORAL at 17:36

## 2020-12-21 RX ADMIN — TAMSULOSIN HYDROCHLORIDE 0.4 MILLIGRAM(S): 0.4 CAPSULE ORAL at 21:41

## 2020-12-21 RX ADMIN — Medication 6 UNIT(S): at 18:22

## 2020-12-21 RX ADMIN — Medication 6 UNIT(S): at 08:55

## 2020-12-21 RX ADMIN — Medication 1 SPRAY(S): at 06:30

## 2020-12-21 RX ADMIN — Medication 1 SPRAY(S): at 21:26

## 2020-12-21 RX ADMIN — Medication 1 TABLET(S): at 11:54

## 2020-12-21 RX ADMIN — SENNA PLUS 2 TABLET(S): 8.6 TABLET ORAL at 21:27

## 2020-12-21 RX ADMIN — Medication 6: at 11:53

## 2020-12-21 RX ADMIN — Medication 150 MILLIGRAM(S): at 17:34

## 2020-12-21 RX ADMIN — APIXABAN 5 MILLIGRAM(S): 2.5 TABLET, FILM COATED ORAL at 17:37

## 2020-12-21 NOTE — PROGRESS NOTE ADULT - ASSESSMENT
Assessment	  Patient is a 75 year old Male with a past medical history of CAD s/p PCI, DM presented to the ER with worsening Shortness of Breath x 2 weeks.     # Acute hypoxic respiratory failure due to COVID19 viral pneumonia  - Airborne/contact/droplet  isolation precautions   - ID consult appreciated  - Continue supplemental oxygen.  -covid 12/14 negative, 12/17 negative  -prednisone 40mg daily completed on 12/19  -12/20, pt on 7LNC sat 97%  -12/21 pt on nc 3L sat : 96%    # CARINA - renal function has improved with IVF,  - Hold lisinopril   - Monitor renal function and electrolytes    # Acute Pulmonary Embolism - most likely due to covid induced hypercoagulable state  - Continue Eliquis for at least 3 months  - Needs follow up with PMD and vascular as outpatient    # Hyperkalemia   - Resolved  - Monitor electrolytes    # DM   - c/w lantus 20 units subc at bedtime  - lispro 6 units TID with meals   - Monitor Fingersticks  - Diabetic carbohydrate consistent diet   - Sliding scale     # CAD   - Continue with Plavix, Atorvastatin  - Continue with Metoprolol    # Hemoptysis due to mucosal dryness from oxygen tx. - ocean NS spray to b/l nostrils three times daily, oral hydration    # Obesity - BMI above 30- weight loss tx.    COVID swab negative x 2.       Dispo: home with O2 when medically optimized.       Assessment	  Patient is a 75 year old Male with a past medical history of CAD s/p PCI, DM presented to the ER with worsening Shortness of Breath x 2 weeks.     # Acute hypoxic respiratory failure due to COVID19 viral pneumonia  - Airborne/contact/droplet  isolation precautions   - ID consult appreciated  - Continue supplemental oxygen.  -covid 12/14 negative, 12/17 negative  -prednisone 40mg daily completed on 12/19  -12/20, pt on 7LNC sat 97%  -12/21 pt on nc 3L sat : 96%    # CARINA - renal function has improved with IVF,  - Hold lisinopril   - Monitor renal function and electrolytes    # Acute Pulmonary Embolism - most likely due to covid induced hypercoagulable state  - Continue Eliquis for at least 3 months  - Needs follow up with PMD and vascular as outpatient    # Hyperkalemia   - Resolved  - Monitor electrolytes    # DM   - c/w lantus 22 units subc at bedtime  - lispro 6 units TID with meals   - Monitor Fingersticks  - Diabetic carbohydrate consistent diet   - Sliding scale     # CAD   - Continue with Plavix, Atorvastatin  - Continue with Metoprolol    # Hemoptysis due to mucosal dryness from oxygen tx. - ocean NS spray to b/l nostrils three times daily, oral hydration    # Obesity - BMI above 30- weight loss tx.    COVID swab negative x 2.       Dispo: home with O2 when medically optimized.

## 2020-12-21 NOTE — PROGRESS NOTE ADULT - SUBJECTIVE AND OBJECTIVE BOX
Name: CASEY BENÍTEZ  Age: 75y  Gender: Male    HOD  Pt was seen and examined. Pt noted to be doing bedside exercise and ambulating.   required oxygen over night   denies any cp/ nvd/ fever/ chills    PAST MEDICAL & SURGICAL HISTORY:  CAD (coronary artery disease)    DM (diabetes mellitus)    No significant past surgical history    Allergies:  No Known Allergies      PHYSICAL EXAM:    Vital Signs Last 24 Hrs  T(C): 36.5 (21 Dec 2020 08:26), Max: 37.2 (20 Dec 2020 16:43)  T(F): 97.7 (21 Dec 2020 08:26), Max: 99 (20 Dec 2020 16:43)  HR: 57 (21 Dec 2020 08:26) (57 - 78)  BP: 103/65 (21 Dec 2020 08:26) (103/65 - 127/80)  RR: 19 (21 Dec 2020 08:26) (17 - 19)  SpO2: 80% (21 Dec 2020 08:26) (80% - 97%)    GENERAL: NAD  CHEST/LUNG: CTAB  HEART: S1,S2 RRR  ABDOMEN: Soft, NT/ND, +BS  EXTREMITIES:  2+ DP, no LE edema    MEDICATIONS  (STANDING):  apixaban 5 milliGRAM(s) Oral every 12 hours  atorvastatin 80 milliGRAM(s) Oral at bedtime  clopidogrel Tablet 75 milliGRAM(s) Oral daily  dextrose 40% Gel 15 Gram(s) Oral once  dextrose 5%. 1000 milliLiter(s) (50 mL/Hr) IV Continuous <Continuous>  dextrose 5%. 1000 milliLiter(s) (100 mL/Hr) IV Continuous <Continuous>  dextrose 50% Injectable 25 Gram(s) IV Push once  dextrose 50% Injectable 12.5 Gram(s) IV Push once  dextrose 50% Injectable 25 Gram(s) IV Push once  DULoxetine 20 milliGRAM(s) Oral daily  glucagon  Injectable 1 milliGRAM(s) IntraMuscular once  guaiFENesin  milliGRAM(s) Oral every 12 hours  insulin glargine Injectable (LANTUS) 22 Unit(s) SubCutaneous at bedtime  insulin lispro (ADMELOG) corrective regimen sliding scale   SubCutaneous every 6 hours  insulin lispro Injectable (ADMELOG) 6 Unit(s) SubCutaneous before breakfast  insulin lispro Injectable (ADMELOG) 6 Unit(s) SubCutaneous before lunch  insulin lispro Injectable (ADMELOG) 6 Unit(s) SubCutaneous before dinner  metoprolol succinate  milliGRAM(s) Oral daily  multivitamin 1 Tablet(s) Oral daily  pantoprazole    Tablet 40 milliGRAM(s) Oral before breakfast  pregabalin 150 milliGRAM(s) Oral every 12 hours  ranolazine 500 milliGRAM(s) Oral two times a day  senna 2 Tablet(s) Oral at bedtime  sodium chloride 0.65% Nasal 1 Spray(s) Both Nostrils three times a day  tamsulosin 0.4 milliGRAM(s) Oral at bedtime

## 2020-12-21 NOTE — PROGRESS NOTE ADULT - ATTENDING COMMENTS
75 yr old morbidly obese male patient with a hx of COVID currently on O2 pending weaning. Patient has needed more oxygen in the past, but requirements are decreasing. Patient also has elevated sugars likely from prednisone, increased lantus to 22U QHS. Continue to do rehab. Patient seen with above provider, agree with history, physical and plan except where noted.

## 2020-12-22 LAB
GLUCOSE BLDC GLUCOMTR-MCNC: 169 MG/DL — HIGH (ref 70–99)
GLUCOSE BLDC GLUCOMTR-MCNC: 178 MG/DL — HIGH (ref 70–99)
GLUCOSE BLDC GLUCOMTR-MCNC: 205 MG/DL — HIGH (ref 70–99)
GLUCOSE BLDC GLUCOMTR-MCNC: 352 MG/DL — HIGH (ref 70–99)
GLUCOSE BLDC GLUCOMTR-MCNC: 86 MG/DL — SIGNIFICANT CHANGE UP (ref 70–99)

## 2020-12-22 RX ADMIN — Medication 6 UNIT(S): at 17:10

## 2020-12-22 RX ADMIN — Medication 2: at 17:09

## 2020-12-22 RX ADMIN — APIXABAN 5 MILLIGRAM(S): 2.5 TABLET, FILM COATED ORAL at 17:06

## 2020-12-22 RX ADMIN — Medication 6 UNIT(S): at 08:39

## 2020-12-22 RX ADMIN — RANOLAZINE 500 MILLIGRAM(S): 500 TABLET, FILM COATED, EXTENDED RELEASE ORAL at 05:04

## 2020-12-22 RX ADMIN — Medication 10: at 12:28

## 2020-12-22 RX ADMIN — Medication 1 SPRAY(S): at 22:37

## 2020-12-22 RX ADMIN — Medication 1 TABLET(S): at 11:20

## 2020-12-22 RX ADMIN — DULOXETINE HYDROCHLORIDE 20 MILLIGRAM(S): 30 CAPSULE, DELAYED RELEASE ORAL at 11:20

## 2020-12-22 RX ADMIN — Medication 150 MILLIGRAM(S): at 05:31

## 2020-12-22 RX ADMIN — SENNA PLUS 2 TABLET(S): 8.6 TABLET ORAL at 22:38

## 2020-12-22 RX ADMIN — Medication 4: at 05:31

## 2020-12-22 RX ADMIN — APIXABAN 5 MILLIGRAM(S): 2.5 TABLET, FILM COATED ORAL at 05:04

## 2020-12-22 RX ADMIN — Medication 0: at 22:05

## 2020-12-22 RX ADMIN — RANOLAZINE 500 MILLIGRAM(S): 500 TABLET, FILM COATED, EXTENDED RELEASE ORAL at 17:06

## 2020-12-22 RX ADMIN — Medication 150 MILLIGRAM(S): at 17:09

## 2020-12-22 RX ADMIN — ATORVASTATIN CALCIUM 80 MILLIGRAM(S): 80 TABLET, FILM COATED ORAL at 22:38

## 2020-12-22 RX ADMIN — CLOPIDOGREL BISULFATE 75 MILLIGRAM(S): 75 TABLET, FILM COATED ORAL at 11:20

## 2020-12-22 RX ADMIN — TAMSULOSIN HYDROCHLORIDE 0.4 MILLIGRAM(S): 0.4 CAPSULE ORAL at 22:38

## 2020-12-22 RX ADMIN — Medication 6 UNIT(S): at 12:29

## 2020-12-22 NOTE — CHART NOTE - NSCHARTNOTEFT_GEN_A_CORE
Spoke with pts wife, sts she would like to take pt home when he is ready to be dc. Pt currently on 4 L O2.  All questions and concerns addressed to family's satisfaction.  Family encouraged to contact me with any further concerns.

## 2020-12-22 NOTE — PROGRESS NOTE ADULT - SUBJECTIVE AND OBJECTIVE BOX
· Reason for Admission	COVID and PE    · Subjective and Objective:   Name: CASEY BENÍTEZ  Age: 75y  Gender: Male    Pt was seen and examined. Pt ambulating with PT. O2 sat at 4L NC 95%, drops to 85% with ambulation.     PAST MEDICAL & SURGICAL HISTORY:  CAD (coronary artery disease)    DM (diabetes mellitus)    No significant past surgical history    Allergies:  No Known Allergies    Vital Signs Last 24 Hrs  T(C): 36.4 (22 Dec 2020 12:03), Max: 37.1 (21 Dec 2020 16:25)  T(F): 97.5 (22 Dec 2020 12:03), Max: 98.8 (21 Dec 2020 16:25)  HR: 72 (22 Dec 2020 12:03) (61 - 72)  BP: 106/65 (22 Dec 2020 12:03) (96/60 - 106/65)  BP(mean): --  RR: 16 (22 Dec 2020 12:03) (16 - 19)  SpO2: 92% (22 Dec 2020 12:03) (92% - 95%)    GENERAL: NAD  CHEST/LUNG: CTAB  HEART: S1,S2 RRR  ABDOMEN: Soft, NT/ND, +BS  EXTREMITIES:  2+ DP, no LE edema    MEDICATIONS  (STANDING):  apixaban 5 milliGRAM(s) Oral every 12 hours  atorvastatin 80 milliGRAM(s) Oral at bedtime  clopidogrel Tablet 75 milliGRAM(s) Oral daily  dextrose 40% Gel 15 Gram(s) Oral once  dextrose 5%. 1000 milliLiter(s) (50 mL/Hr) IV Continuous <Continuous>  dextrose 5%. 1000 milliLiter(s) (100 mL/Hr) IV Continuous <Continuous>  dextrose 50% Injectable 25 Gram(s) IV Push once  dextrose 50% Injectable 12.5 Gram(s) IV Push once  dextrose 50% Injectable 25 Gram(s) IV Push once  DULoxetine 20 milliGRAM(s) Oral daily  glucagon  Injectable 1 milliGRAM(s) IntraMuscular once  guaiFENesin  milliGRAM(s) Oral every 12 hours  insulin glargine Injectable (LANTUS) 22 Unit(s) SubCutaneous at bedtime  insulin lispro (ADMELOG) corrective regimen sliding scale   SubCutaneous every 6 hours  insulin lispro Injectable (ADMELOG) 6 Unit(s) SubCutaneous before breakfast  insulin lispro Injectable (ADMELOG) 6 Unit(s) SubCutaneous before lunch  insulin lispro Injectable (ADMELOG) 6 Unit(s) SubCutaneous before dinner  metoprolol succinate  milliGRAM(s) Oral daily  multivitamin 1 Tablet(s) Oral daily  pantoprazole    Tablet 40 milliGRAM(s) Oral before breakfast  pregabalin 150 milliGRAM(s) Oral every 12 hours  ranolazine 500 milliGRAM(s) Oral two times a day  senna 2 Tablet(s) Oral at bedtime  sodium chloride 0.65% Nasal 1 Spray(s) Both Nostrils three times a day  tamsulosin 0.4 milliGRAM(s) Oral at bedtime

## 2020-12-22 NOTE — PROGRESS NOTE ADULT - ASSESSMENT
Assessment and Plan:   · Assessment	     Assessment	  Patient is a 75 year old Male with a past medical history of CAD s/p PCI, DM presented to the ER with worsening Shortness of Breath x 2 weeks.     # Acute hypoxic respiratory failure due to COVID19 viral pneumonia  - Airborne/contact/droplet  isolation precautions   - ID consult appreciated  - Continue supplemental oxygen.  -covid 12/21 pending, will swab 12/23  -prednisone 40mg daily completed on 12/19  -12/22 on 4L: 95 % at rest    # CARINA - renal function has improved with IVF,  - Hold lisinopril   - Monitor renal function and electrolytes    # Acute Pulmonary Embolism - most likely due to covid induced hypercoagulable state  - Continue Eliquis for at least 3 months  - Needs follow up with PMD and vascular as outpatient    # Hyperkalemia   - Resolved  - Monitor electrolytes    # DM   - c/w lantus 20 units subc at bedtime  - lispro 6 units TID with meals   - Monitor Fingersticks  - Diabetic carbohydrate consistent diet   - Sliding scale     # CAD   - Continue with Plavix, Atorvastatin  - Continue with Metoprolol    # Hemoptysis due to mucosal dryness from oxygen tx. - ocean NS spray to b/l nostrils three times daily, oral hydration    # Obesity - BMI above 30- weight loss tx.    COVID swab negative x 2.       Dispo: home with O2 when medically optimized.

## 2020-12-23 LAB
GLUCOSE BLDC GLUCOMTR-MCNC: 127 MG/DL — HIGH (ref 70–99)
GLUCOSE BLDC GLUCOMTR-MCNC: 137 MG/DL — HIGH (ref 70–99)
GLUCOSE BLDC GLUCOMTR-MCNC: 208 MG/DL — HIGH (ref 70–99)
GLUCOSE BLDC GLUCOMTR-MCNC: 292 MG/DL — HIGH (ref 70–99)
GLUCOSE BLDC GLUCOMTR-MCNC: 96 MG/DL — SIGNIFICANT CHANGE UP (ref 70–99)
SARS-COV-2 RNA SPEC QL NAA+PROBE: SIGNIFICANT CHANGE UP

## 2020-12-23 RX ADMIN — Medication 1 SPRAY(S): at 14:26

## 2020-12-23 RX ADMIN — Medication 100 MILLIGRAM(S): at 05:27

## 2020-12-23 RX ADMIN — Medication 150 MILLIGRAM(S): at 17:31

## 2020-12-23 RX ADMIN — CLOPIDOGREL BISULFATE 75 MILLIGRAM(S): 75 TABLET, FILM COATED ORAL at 12:49

## 2020-12-23 RX ADMIN — Medication 0: at 05:38

## 2020-12-23 RX ADMIN — Medication 1 SPRAY(S): at 05:30

## 2020-12-23 RX ADMIN — Medication 1 TABLET(S): at 12:49

## 2020-12-23 RX ADMIN — TAMSULOSIN HYDROCHLORIDE 0.4 MILLIGRAM(S): 0.4 CAPSULE ORAL at 22:45

## 2020-12-23 RX ADMIN — SENNA PLUS 2 TABLET(S): 8.6 TABLET ORAL at 22:45

## 2020-12-23 RX ADMIN — Medication 150 MILLIGRAM(S): at 05:35

## 2020-12-23 RX ADMIN — APIXABAN 5 MILLIGRAM(S): 2.5 TABLET, FILM COATED ORAL at 05:37

## 2020-12-23 RX ADMIN — DULOXETINE HYDROCHLORIDE 20 MILLIGRAM(S): 30 CAPSULE, DELAYED RELEASE ORAL at 15:22

## 2020-12-23 RX ADMIN — Medication 6 UNIT(S): at 12:42

## 2020-12-23 RX ADMIN — Medication 6 UNIT(S): at 17:00

## 2020-12-23 RX ADMIN — Medication 6: at 12:40

## 2020-12-23 RX ADMIN — RANOLAZINE 500 MILLIGRAM(S): 500 TABLET, FILM COATED, EXTENDED RELEASE ORAL at 17:31

## 2020-12-23 RX ADMIN — APIXABAN 5 MILLIGRAM(S): 2.5 TABLET, FILM COATED ORAL at 17:30

## 2020-12-23 RX ADMIN — Medication 1 SPRAY(S): at 22:45

## 2020-12-23 RX ADMIN — RANOLAZINE 500 MILLIGRAM(S): 500 TABLET, FILM COATED, EXTENDED RELEASE ORAL at 05:30

## 2020-12-23 RX ADMIN — Medication 6 UNIT(S): at 08:31

## 2020-12-23 RX ADMIN — ATORVASTATIN CALCIUM 80 MILLIGRAM(S): 80 TABLET, FILM COATED ORAL at 22:45

## 2020-12-23 NOTE — PROGRESS NOTE ADULT - PROVIDER SPECIALTY LIST ADULT
Hospitalist
Infectious Disease
Internal Medicine
Hospitalist
Hospitalist
Internal Medicine
Hospitalist
Internal Medicine

## 2020-12-23 NOTE — PROGRESS NOTE ADULT - REASON FOR ADMISSION
COVID and PE

## 2020-12-23 NOTE — PROGRESS NOTE ADULT - ASSESSMENT
Assessment and Plan:   · Assessment	     Assessment	  Patient is a 75 year old Male with a past medical history of CAD s/p PCI, DM presented to the ER with worsening Shortness of Breath x 2 weeks. Admitted to [facility], [facility].     # Acute hypoxic respiratory failure due to COVID19 viral pneumonia  - Airborne/contact/droplet  isolation precautions   - ID consult appreciated  - Continue supplemental oxygen.  -covid 12/22 (-)  -prednisone 40mg daily completed on 12/19  -12/23 on 2L: 93 % at rest    # CARINA - renal function has improved with IVF,  - Hold lisinopril   - Monitor renal function and electrolytes    # Acute Pulmonary Embolism - most likely due to covid induced hypercoagulable state  - Continue Eliquis for at least 3 months  - Needs follow up with PMD and vascular as outpatient    # Hyperkalemia   - Resolved  - Monitor electrolytes    # DM   - c/w lantus 20 units subc at bedtime  - lispro 6 units TID with meals   - Monitor Fingersticks  - Diabetic carbohydrate consistent diet   - Sliding scale     # CAD   - Continue with Plavix, Atorvastatin  - Continue with Metoprolol    # Hemoptysis due to mucosal dryness from oxygen tx. - ocean NS spray to b/l nostrils three times daily, oral hydration    # Obesity - BMI above 30- weight loss tx.    Dispo: home with O2, discussed with case management 12/23

## 2020-12-23 NOTE — CHART NOTE - NSCHARTNOTEFT_GEN_A_CORE
Patient's O2 sat is 90% on room air at rest. Patient's O2 sat is 88% on room air with ambualtion. Patient's O2 sat is 92% on 2 LPM via nasal cannula upon ambulation. Patient has been diagnosed with COVID PNA and therefore needs home O2. Patient is aware that he/she will be going home on oxygen. Patient was tested in a chronic stable state.

## 2020-12-23 NOTE — CHART NOTE - NSCHARTNOTEFT_GEN_A_CORE
spoke with pts wife  discussed plan for dispo home with home O2 possible 12/24 or 12/25 depending on clinical improvement today

## 2020-12-23 NOTE — PROGRESS NOTE ADULT - SUBJECTIVE AND OBJECTIVE BOX
Name: CASEY BENÍTEZ  Age: 75y  Gender: Male    Pt was seen and examined. 93% on 2L at rest, mild SOB with ambulation on 2L and 3L requirement when walking. Discussed dispo plan wit pt.     PAST MEDICAL & SURGICAL HISTORY:  CAD (coronary artery disease)    DM (diabetes mellitus)    No significant past surgical history    Allergies:  No Known Allergies    Vital Signs Last 24 Hrs  Vital Signs Last 24 Hrs  T(C): 36.8 (23 Dec 2020 11:54), Max: 36.8 (23 Dec 2020 11:54)  T(F): 98.3 (23 Dec 2020 11:54), Max: 98.3 (23 Dec 2020 11:54)  HR: 77 (23 Dec 2020 11:54) (59 - 77)  BP: 111/65 (23 Dec 2020 11:54) (102/67 - 128/72)  BP(mean): 98 (22 Dec 2020 23:45) (98 - 98)  RR: 22 (23 Dec 2020 11:54) (16 - 22)  SpO2: 93% (23 Dec 2020 11:54) (93% - 96%)    GENERAL: NAD  CHEST/LUNG: CTAB  HEART: S1,S2 RRR  ABDOMEN: Soft, NT/ND, +BS  EXTREMITIES:  2+ DP, no LE edema    MEDICATIONS  (STANDING):  apixaban 5 milliGRAM(s) Oral every 12 hours  atorvastatin 80 milliGRAM(s) Oral at bedtime  clopidogrel Tablet 75 milliGRAM(s) Oral daily  dextrose 40% Gel 15 Gram(s) Oral once  dextrose 5%. 1000 milliLiter(s) (50 mL/Hr) IV Continuous <Continuous>  dextrose 5%. 1000 milliLiter(s) (100 mL/Hr) IV Continuous <Continuous>  dextrose 50% Injectable 25 Gram(s) IV Push once  dextrose 50% Injectable 12.5 Gram(s) IV Push once  dextrose 50% Injectable 25 Gram(s) IV Push once  DULoxetine 20 milliGRAM(s) Oral daily  glucagon  Injectable 1 milliGRAM(s) IntraMuscular once  guaiFENesin  milliGRAM(s) Oral every 12 hours  insulin glargine Injectable (LANTUS) 22 Unit(s) SubCutaneous at bedtime  insulin lispro (ADMELOG) corrective regimen sliding scale   SubCutaneous every 6 hours  insulin lispro Injectable (ADMELOG) 6 Unit(s) SubCutaneous before breakfast  insulin lispro Injectable (ADMELOG) 6 Unit(s) SubCutaneous before lunch  insulin lispro Injectable (ADMELOG) 6 Unit(s) SubCutaneous before dinner  metoprolol succinate  milliGRAM(s) Oral daily  multivitamin 1 Tablet(s) Oral daily  pantoprazole    Tablet 40 milliGRAM(s) Oral before breakfast  pregabalin 150 milliGRAM(s) Oral every 12 hours  ranolazine 500 milliGRAM(s) Oral two times a day  senna 2 Tablet(s) Oral at bedtime  sodium chloride 0.65% Nasal 1 Spray(s) Both Nostrils three times a day  tamsulosin 0.4 milliGRAM(s) Oral at bedtime

## 2020-12-24 ENCOUNTER — TRANSCRIPTION ENCOUNTER (OUTPATIENT)
Age: 75
End: 2020-12-24

## 2020-12-24 VITALS
RESPIRATION RATE: 19 BRPM | HEART RATE: 66 BPM | TEMPERATURE: 98 F | DIASTOLIC BLOOD PRESSURE: 68 MMHG | OXYGEN SATURATION: 93 % | SYSTOLIC BLOOD PRESSURE: 105 MMHG

## 2020-12-24 LAB
GLUCOSE BLDC GLUCOMTR-MCNC: 114 MG/DL — HIGH (ref 70–99)
GLUCOSE BLDC GLUCOMTR-MCNC: 139 MG/DL — HIGH (ref 70–99)
GLUCOSE BLDC GLUCOMTR-MCNC: 203 MG/DL — HIGH (ref 70–99)
GLUCOSE BLDC GLUCOMTR-MCNC: 398 MG/DL — HIGH (ref 70–99)

## 2020-12-24 RX ORDER — METOPROLOL TARTRATE 50 MG
1 TABLET ORAL
Qty: 0 | Refills: 0 | DISCHARGE

## 2020-12-24 RX ORDER — ATORVASTATIN CALCIUM 80 MG/1
1 TABLET, FILM COATED ORAL
Qty: 0 | Refills: 0 | DISCHARGE

## 2020-12-24 RX ORDER — RANOLAZINE 500 MG/1
1 TABLET, FILM COATED, EXTENDED RELEASE ORAL
Qty: 0 | Refills: 0 | DISCHARGE
Start: 2020-12-24

## 2020-12-24 RX ORDER — METFORMIN HYDROCHLORIDE 850 MG/1
1 TABLET ORAL
Qty: 60 | Refills: 0
Start: 2020-12-24 | End: 2021-01-22

## 2020-12-24 RX ORDER — DULOXETINE HYDROCHLORIDE 30 MG/1
1 CAPSULE, DELAYED RELEASE ORAL
Qty: 0 | Refills: 0 | DISCHARGE
Start: 2020-12-24

## 2020-12-24 RX ORDER — PANTOPRAZOLE SODIUM 20 MG/1
1 TABLET, DELAYED RELEASE ORAL
Qty: 30 | Refills: 0
Start: 2020-12-24 | End: 2021-01-22

## 2020-12-24 RX ORDER — LISINOPRIL 2.5 MG/1
1 TABLET ORAL
Qty: 0 | Refills: 0 | DISCHARGE

## 2020-12-24 RX ORDER — DULOXETINE HYDROCHLORIDE 30 MG/1
1 CAPSULE, DELAYED RELEASE ORAL
Qty: 0 | Refills: 0 | DISCHARGE

## 2020-12-24 RX ORDER — METOPROLOL TARTRATE 50 MG
1 TABLET ORAL
Qty: 0 | Refills: 0 | DISCHARGE
Start: 2020-12-24

## 2020-12-24 RX ORDER — APIXABAN 2.5 MG/1
1 TABLET, FILM COATED ORAL
Qty: 180 | Refills: 0
Start: 2020-12-24 | End: 2021-03-23

## 2020-12-24 RX ORDER — CLOPIDOGREL BISULFATE 75 MG/1
1 TABLET, FILM COATED ORAL
Qty: 0 | Refills: 0 | DISCHARGE

## 2020-12-24 RX ORDER — ACETAMINOPHEN 500 MG
2 TABLET ORAL
Qty: 0 | Refills: 0 | DISCHARGE
Start: 2020-12-24

## 2020-12-24 RX ORDER — RANOLAZINE 500 MG/1
1 TABLET, FILM COATED, EXTENDED RELEASE ORAL
Qty: 0 | Refills: 0 | DISCHARGE

## 2020-12-24 RX ORDER — CLOPIDOGREL BISULFATE 75 MG/1
1 TABLET, FILM COATED ORAL
Qty: 0 | Refills: 0 | DISCHARGE
Start: 2020-12-24

## 2020-12-24 RX ORDER — ACETAMINOPHEN 500 MG
2 TABLET ORAL
Qty: 100 | Refills: 0
Start: 2020-12-24 | End: 2021-01-22

## 2020-12-24 RX ORDER — ATORVASTATIN CALCIUM 80 MG/1
1 TABLET, FILM COATED ORAL
Qty: 0 | Refills: 0 | DISCHARGE
Start: 2020-12-24

## 2020-12-24 RX ORDER — TAMSULOSIN HYDROCHLORIDE 0.4 MG/1
1 CAPSULE ORAL
Qty: 0 | Refills: 0 | DISCHARGE
Start: 2020-12-24

## 2020-12-24 RX ORDER — SODIUM CHLORIDE 0.65 %
1 AEROSOL, SPRAY (ML) NASAL
Qty: 15 | Refills: 0
Start: 2020-12-24 | End: 2021-01-22

## 2020-12-24 RX ORDER — TAMSULOSIN HYDROCHLORIDE 0.4 MG/1
1 CAPSULE ORAL
Qty: 0 | Refills: 0 | DISCHARGE

## 2020-12-24 RX ADMIN — Medication 100 MILLIGRAM(S): at 06:30

## 2020-12-24 RX ADMIN — DULOXETINE HYDROCHLORIDE 20 MILLIGRAM(S): 30 CAPSULE, DELAYED RELEASE ORAL at 12:25

## 2020-12-24 RX ADMIN — RANOLAZINE 500 MILLIGRAM(S): 500 TABLET, FILM COATED, EXTENDED RELEASE ORAL at 17:29

## 2020-12-24 RX ADMIN — Medication 1 SPRAY(S): at 13:50

## 2020-12-24 RX ADMIN — APIXABAN 5 MILLIGRAM(S): 2.5 TABLET, FILM COATED ORAL at 06:30

## 2020-12-24 RX ADMIN — Medication 150 MILLIGRAM(S): at 17:31

## 2020-12-24 RX ADMIN — Medication 1 TABLET(S): at 12:23

## 2020-12-24 RX ADMIN — Medication 150 MILLIGRAM(S): at 06:35

## 2020-12-24 RX ADMIN — APIXABAN 5 MILLIGRAM(S): 2.5 TABLET, FILM COATED ORAL at 17:27

## 2020-12-24 RX ADMIN — CLOPIDOGREL BISULFATE 75 MILLIGRAM(S): 75 TABLET, FILM COATED ORAL at 12:23

## 2020-12-24 RX ADMIN — Medication 10: at 12:27

## 2020-12-24 RX ADMIN — Medication 6 UNIT(S): at 08:29

## 2020-12-24 RX ADMIN — Medication 6 UNIT(S): at 12:28

## 2020-12-24 RX ADMIN — Medication 1 SPRAY(S): at 06:29

## 2020-12-24 RX ADMIN — Medication 6 UNIT(S): at 17:26

## 2020-12-24 RX ADMIN — RANOLAZINE 500 MILLIGRAM(S): 500 TABLET, FILM COATED, EXTENDED RELEASE ORAL at 06:29

## 2020-12-24 NOTE — DISCHARGE NOTE PROVIDER - NSDCCPCAREPLAN_GEN_ALL_CORE_FT
PRINCIPAL DISCHARGE DIAGNOSIS  Diagnosis: COVID-19  Assessment and Plan of Treatment: Follow up with your primary care provider. Continue home oxygen. Take medication as prescribed      SECONDARY DISCHARGE DIAGNOSES  Diagnosis: CARINA (acute kidney injury)  Assessment and Plan of Treatment: Lisinopril was discontinued    Diagnosis: Drug-induced hyperkalemia  Assessment and Plan of Treatment: Likely due to Lisinipril, was switched to metoprolol    Diagnosis: Hypoxia  Assessment and Plan of Treatment: Continuous oxygen supplementation    Diagnosis: Pulmonary embolism  Assessment and Plan of Treatment: Take eliquis as prescribed for 3 months and follow up with vascular surgery     PRINCIPAL DISCHARGE DIAGNOSIS  Diagnosis: COVID-19  Assessment and Plan of Treatment: Follow up with your primary care provider, or Dr. Oswald in the COVID clinic. Continue home oxygen. Take medication as prescribed.   You have tested POSITIVE for the novel coronavirus (COVID-19). Upon discharge, you must self-quarantine for 14 days, or until the Department of Health contacts you. Please wear a face mask if you are around other individuals. Try to avoid contact with house members, family, and friends for the duration of this quarantine. Please follow up with your primary care physician within 2-3 weeks of your discharge from [facility]. Please take all medications as prescribed. If you experience any worsening or recurrence of your symptoms, particularly worsening or high fever, shortness of breathe, extreme fatigue, or bloody cough please call [phone] immediately or report to the nearest Emergency Department. If you have any questions or concerns, please do not hesitate to call the hospital at [phone]        SECONDARY DISCHARGE DIAGNOSES  Diagnosis: CARINA (acute kidney injury)  Assessment and Plan of Treatment: Lisinopril was discontinued. Avoid nephrotoxic medications such as NSAIDS, ACE inhibitors.    Diagnosis: Drug-induced hyperkalemia  Assessment and Plan of Treatment: Likely due to Lisinipril, was switched to metoprolol    Diagnosis: Hypoxia  Assessment and Plan of Treatment: Continuous oxygen supplementation    Diagnosis: Pulmonary embolism  Assessment and Plan of Treatment: Take eliquis as prescribed for 3 months and follow up with vascular surgery

## 2020-12-24 NOTE — CHART NOTE - NSCHARTNOTESELECT_GEN_ALL_CORE
PA NOTE
chart/Event Note
family discussion- PA note
Event Note
Event Note/COVID Communication Team
Event Note/family contact
Event Note/family discussion
Family note
Family note
Family note/Event Note
Home O2/Event Note
Transfer Note

## 2020-12-24 NOTE — DISCHARGE NOTE PROVIDER - NSDCMRMEDTOKEN_GEN_ALL_CORE_FT
acetaminophen 325 mg oral tablet: 2 tab(s) orally every 6 hours, As Needed for fevers or pain  apixaban 5 mg oral tablet: 1 tab(s) orally every 12 hours  atorvastatin 80 mg oral tablet: 1 tab(s) orally once a day (at bedtime)  clopidogrel 75 mg oral tablet: 1 tab(s) orally once a day  DULoxetine 20 mg oral delayed release capsule: 1 cap(s) orally once a day  metFORMIN 500 mg oral tablet: 1 tab(s) orally 2 times a day   metoprolol succinate 100 mg oral tablet, extended release: 1 tab(s) orally once a day  Multiple Vitamins oral tablet: 1 tab(s) orally once a day  pantoprazole 20 mg oral delayed release tablet: 1 tab(s) orally once a day   pregabalin 150 mg oral capsule: 1 cap(s) orally every 12 hours  ranolazine 500 mg oral tablet, extended release: 1 tab(s) orally 2 times a day  sodium chloride 0.65% nasal spray: 1 spray(s) nasal 3 times a day as needed for dry nostrils  tamsulosin 0.4 mg oral capsule: 1 cap(s) orally once a day (at bedtime)  zolpidem 10 mg oral tablet: 1 tab(s) orally once a day (at bedtime)

## 2020-12-24 NOTE — DISCHARGE NOTE PROVIDER - HOSPITAL COURSE
Patient is a 75 year old Male with a past medical history of CAD s/p PCI, DM, was admitted for COVID19, complicated by acute hypoxic respiratory failure, acute pulmonary embolism, CARINA, and hyperkalemia. Prednisone course was given and completed on 12/19, lisinopril was held due to CAIRNA and hyperK.  Pt is improving now sating at 95% on 2LNC at rest. Pt is stable for discharge home with home oxygen.     Dr. León is aware and approves of this plan.      	  Patient is a 75 year old Male with a past medical history of CAD s/p PCI, DM, was admitted for COVID19, complicated by acute hypoxic respiratory failure, acute pulmonary embolism, CARINA, and hyperkalemia. Prednisone course was given and completed on 12/19, lisinopril was held due to CARINA and hyperK.  Pt is improving now sating at 95% on 2LNC at rest. Pt is stable for discharge home with home oxygen.     Dr. León is aware and approves of this plan.

## 2020-12-24 NOTE — CHART NOTE - NSCHARTNOTEFT_GEN_A_CORE
Notified family: Valery, daughter of patient   Update given:   Pt is stable for discharge home today once home oxygen care has been established. Daughter is aware and understands plan. Daughter was notified of prescription changes and follow ups. Daughter was also told to notify us once the home oxygen has been delivered to prepare discharge orders for pt.   All questions and concerns addressed to family's satisfaction.  Family encouraged to contact me with any further concerns.

## 2020-12-24 NOTE — DISCHARGE NOTE PROVIDER - CARE PROVIDER_API CALL
Sen Oswald (DO)  Medicine  [facility]  [address] [place] [address]  Phone: [phone]  Fax: [phone]  Follow Up Time:

## 2020-12-29 DIAGNOSIS — R26.2 DIFFICULTY IN WALKING, NOT ELSEWHERE CLASSIFIED: ICD-10-CM

## 2020-12-29 DIAGNOSIS — T46.4X5A ADVERSE EFFECT OF ANGIOTENSIN-CONVERTING-ENZYME INHIBITORS, INITIAL ENCOUNTER: ICD-10-CM

## 2020-12-29 DIAGNOSIS — R06.02 SHORTNESS OF BREATH: ICD-10-CM

## 2020-12-29 DIAGNOSIS — I26.93 SINGLE SUBSEGMENTAL PULMONARY EMBOLISM WITHOUT ACUTE COR PULMONALE: ICD-10-CM

## 2020-12-29 DIAGNOSIS — Z87.891 PERSONAL HISTORY OF NICOTINE DEPENDENCE: ICD-10-CM

## 2020-12-29 DIAGNOSIS — D68.69 OTHER THROMBOPHILIA: ICD-10-CM

## 2020-12-29 DIAGNOSIS — R68.2 DRY MOUTH, UNSPECIFIED: ICD-10-CM

## 2020-12-29 DIAGNOSIS — E87.5 HYPERKALEMIA: ICD-10-CM

## 2020-12-29 DIAGNOSIS — J96.01 ACUTE RESPIRATORY FAILURE WITH HYPOXIA: ICD-10-CM

## 2020-12-29 DIAGNOSIS — R79.89 OTHER SPECIFIED ABNORMAL FINDINGS OF BLOOD CHEMISTRY: ICD-10-CM

## 2020-12-29 DIAGNOSIS — E11.9 TYPE 2 DIABETES MELLITUS WITHOUT COMPLICATIONS: ICD-10-CM

## 2020-12-29 DIAGNOSIS — R00.0 TACHYCARDIA, UNSPECIFIED: ICD-10-CM

## 2020-12-29 DIAGNOSIS — R04.2 HEMOPTYSIS: ICD-10-CM
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
## 2020-12-29 DIAGNOSIS — Z98.61 CORONARY ANGIOPLASTY STATUS: ICD-10-CM

## 2020-12-29 DIAGNOSIS — I25.10 ATHEROSCLEROTIC HEART DISEASE OF NATIVE CORONARY ARTERY WITHOUT ANGINA PECTORIS: ICD-10-CM

## 2020-12-29 DIAGNOSIS — J12.89 OTHER VIRAL PNEUMONIA: ICD-10-CM

## 2020-12-29 DIAGNOSIS — U07.1 COVID-19: ICD-10-CM

## 2020-12-29 DIAGNOSIS — D72.829 ELEVATED WHITE BLOOD CELL COUNT, UNSPECIFIED: ICD-10-CM

## 2020-12-29 DIAGNOSIS — I10 ESSENTIAL (PRIMARY) HYPERTENSION: ICD-10-CM

## 2020-12-29 DIAGNOSIS — N17.9 ACUTE KIDNEY FAILURE, UNSPECIFIED: ICD-10-CM

## 2020-12-29 DIAGNOSIS — E66.9 OBESITY, UNSPECIFIED: ICD-10-CM

## 2021-01-11 PROBLEM — I25.10 ATHEROSCLEROTIC HEART DISEASE OF NATIVE CORONARY ARTERY WITHOUT ANGINA PECTORIS: Chronic | Status: ACTIVE | Noted: 2020-12-10

## 2021-01-11 PROBLEM — E11.9 TYPE 2 DIABETES MELLITUS WITHOUT COMPLICATIONS: Chronic | Status: ACTIVE | Noted: 2020-12-10

## 2021-01-12 ENCOUNTER — APPOINTMENT (OUTPATIENT)
Dept: OTOLARYNGOLOGY | Facility: CLINIC | Age: 76
End: 2021-01-12
Payer: MEDICARE

## 2021-01-12 VITALS — TEMPERATURE: 98.4 F

## 2021-01-12 DIAGNOSIS — H91.8X9 OTHER SPECIFIED HEARING LOSS, UNSPECIFIED EAR: ICD-10-CM

## 2021-01-12 DIAGNOSIS — H90.72 MIXED CONDUCTIVE AND SENSORINEURAL HEARING LOSS, UNILATERAL, LEFT EAR, WITH UNRESTRICTED HEARING ON THE CONTRALATERAL SIDE: ICD-10-CM

## 2021-01-12 DIAGNOSIS — Z90.89 ACQUIRED ABSENCE OF OTHER ORGANS: ICD-10-CM

## 2021-01-12 PROCEDURE — 99203 OFFICE O/P NEW LOW 30 MIN: CPT | Mod: 25

## 2021-01-12 PROCEDURE — 92504 EAR MICROSCOPY EXAMINATION: CPT

## 2021-01-12 PROCEDURE — 92557 COMPREHENSIVE HEARING TEST: CPT

## 2021-01-12 PROCEDURE — 92550 TYMPANOMETRY & REFLEX THRESH: CPT

## 2021-01-12 NOTE — PHYSICAL EXAM
[Normal] : mucosa is normal [Midline] : trachea located in midline position [de-identified] : s/p middle ear and mastoid surgery

## 2021-01-12 NOTE — HISTORY OF PRESENT ILLNESS
[None] : No associated symptoms are reported. [de-identified] : Patient presents today c/o hearing loss . Left ear is worst then right ear.  Last hearing test was over 6 years ago. Denies any impacted cerumen . Had left ear surgery back in 1992, cyst in ear and near his brain. s/p mastoid and middle ear surgery as well.  [Ear Fullness] : no ear fullness [Tinnitus] : no tinnitus

## 2021-01-22 ENCOUNTER — APPOINTMENT (OUTPATIENT)
Dept: OTOLARYNGOLOGY | Facility: CLINIC | Age: 76
End: 2021-01-22
Payer: MEDICARE

## 2021-01-22 PROCEDURE — V5299A: CUSTOM | Mod: NC

## 2021-04-06 ENCOUNTER — APPOINTMENT (OUTPATIENT)
Dept: PULMONOLOGY | Facility: CLINIC | Age: 76
End: 2021-04-06
Payer: MEDICARE

## 2021-04-06 VITALS
SYSTOLIC BLOOD PRESSURE: 122 MMHG | WEIGHT: 198 LBS | OXYGEN SATURATION: 95 % | HEIGHT: 65 IN | HEART RATE: 70 BPM | BODY MASS INDEX: 32.99 KG/M2 | DIASTOLIC BLOOD PRESSURE: 82 MMHG | RESPIRATION RATE: 16 BRPM

## 2021-04-06 PROCEDURE — 99072 ADDL SUPL MATRL&STAF TM PHE: CPT

## 2021-04-06 PROCEDURE — 99213 OFFICE O/P EST LOW 20 MIN: CPT | Mod: 25

## 2021-04-06 PROCEDURE — 71046 X-RAY EXAM CHEST 2 VIEWS: CPT

## 2021-06-17 ENCOUNTER — APPOINTMENT (OUTPATIENT)
Dept: PULMONOLOGY | Facility: CLINIC | Age: 76
End: 2021-06-17

## 2022-02-10 ENCOUNTER — RESULT REVIEW (OUTPATIENT)
Age: 77
End: 2022-02-10

## 2022-02-10 ENCOUNTER — OUTPATIENT (OUTPATIENT)
Dept: OUTPATIENT SERVICES | Facility: HOSPITAL | Age: 77
LOS: 1 days | Discharge: HOME | End: 2022-02-10
Payer: MEDICARE

## 2022-02-10 DIAGNOSIS — M54.9 DORSALGIA, UNSPECIFIED: ICD-10-CM

## 2022-02-10 PROCEDURE — 72100 X-RAY EXAM L-S SPINE 2/3 VWS: CPT | Mod: 26

## 2022-02-14 ENCOUNTER — RESULT REVIEW (OUTPATIENT)
Age: 77
End: 2022-02-14

## 2022-02-14 ENCOUNTER — OUTPATIENT (OUTPATIENT)
Dept: OUTPATIENT SERVICES | Facility: HOSPITAL | Age: 77
LOS: 1 days | Discharge: HOME | End: 2022-02-14
Payer: MEDICARE

## 2022-02-14 DIAGNOSIS — R10.84 GENERALIZED ABDOMINAL PAIN: ICD-10-CM

## 2022-02-14 PROCEDURE — 76770 US EXAM ABDO BACK WALL COMP: CPT | Mod: 26

## 2022-03-31 ENCOUNTER — EMERGENCY (EMERGENCY)
Facility: HOSPITAL | Age: 77
LOS: 0 days | Discharge: HOME | End: 2022-03-31
Attending: EMERGENCY MEDICINE | Admitting: EMERGENCY MEDICINE
Payer: MEDICARE

## 2022-03-31 VITALS
RESPIRATION RATE: 19 BRPM | OXYGEN SATURATION: 95 % | DIASTOLIC BLOOD PRESSURE: 70 MMHG | TEMPERATURE: 97 F | SYSTOLIC BLOOD PRESSURE: 160 MMHG | HEART RATE: 84 BPM

## 2022-03-31 VITALS — WEIGHT: 190.04 LBS | HEIGHT: 65 IN

## 2022-03-31 DIAGNOSIS — I25.10 ATHEROSCLEROTIC HEART DISEASE OF NATIVE CORONARY ARTERY WITHOUT ANGINA PECTORIS: ICD-10-CM

## 2022-03-31 DIAGNOSIS — R42 DIZZINESS AND GIDDINESS: ICD-10-CM

## 2022-03-31 DIAGNOSIS — R26.9 UNSPECIFIED ABNORMALITIES OF GAIT AND MOBILITY: ICD-10-CM

## 2022-03-31 DIAGNOSIS — E11.40 TYPE 2 DIABETES MELLITUS WITH DIABETIC NEUROPATHY, UNSPECIFIED: ICD-10-CM

## 2022-03-31 DIAGNOSIS — Z79.84 LONG TERM (CURRENT) USE OF ORAL HYPOGLYCEMIC DRUGS: ICD-10-CM

## 2022-03-31 DIAGNOSIS — Z87.891 PERSONAL HISTORY OF NICOTINE DEPENDENCE: ICD-10-CM

## 2022-03-31 DIAGNOSIS — Z86.16 PERSONAL HISTORY OF COVID-19: ICD-10-CM

## 2022-03-31 DIAGNOSIS — R27.0 ATAXIA, UNSPECIFIED: ICD-10-CM

## 2022-03-31 LAB
ALBUMIN SERPL ELPH-MCNC: 4.4 G/DL — SIGNIFICANT CHANGE UP (ref 3.5–5.2)
ALP SERPL-CCNC: 89 U/L — SIGNIFICANT CHANGE UP (ref 30–115)
ALT FLD-CCNC: 17 U/L — SIGNIFICANT CHANGE UP (ref 0–41)
ANION GAP SERPL CALC-SCNC: 8 MMOL/L — SIGNIFICANT CHANGE UP (ref 7–14)
AST SERPL-CCNC: 23 U/L — SIGNIFICANT CHANGE UP (ref 0–41)
BASOPHILS # BLD AUTO: 0.05 K/UL — SIGNIFICANT CHANGE UP (ref 0–0.2)
BASOPHILS NFR BLD AUTO: 0.5 % — SIGNIFICANT CHANGE UP (ref 0–1)
BILIRUB SERPL-MCNC: 0.6 MG/DL — SIGNIFICANT CHANGE UP (ref 0.2–1.2)
BUN SERPL-MCNC: 30 MG/DL — HIGH (ref 10–20)
CALCIUM SERPL-MCNC: 9.1 MG/DL — SIGNIFICANT CHANGE UP (ref 8.5–10.1)
CHLORIDE SERPL-SCNC: 105 MMOL/L — SIGNIFICANT CHANGE UP (ref 98–110)
CO2 SERPL-SCNC: 26 MMOL/L — SIGNIFICANT CHANGE UP (ref 17–32)
CREAT SERPL-MCNC: 2 MG/DL — HIGH (ref 0.7–1.5)
EGFR: 34 ML/MIN/1.73M2 — LOW
EOSINOPHIL # BLD AUTO: 0.3 K/UL — SIGNIFICANT CHANGE UP (ref 0–0.7)
EOSINOPHIL NFR BLD AUTO: 3.1 % — SIGNIFICANT CHANGE UP (ref 0–8)
GLUCOSE SERPL-MCNC: 117 MG/DL — HIGH (ref 70–99)
HCT VFR BLD CALC: 39 % — LOW (ref 42–52)
HGB BLD-MCNC: 12.5 G/DL — LOW (ref 14–18)
IMM GRANULOCYTES NFR BLD AUTO: 0.3 % — SIGNIFICANT CHANGE UP (ref 0.1–0.3)
LYMPHOCYTES # BLD AUTO: 2.6 K/UL — SIGNIFICANT CHANGE UP (ref 1.2–3.4)
LYMPHOCYTES # BLD AUTO: 26.8 % — SIGNIFICANT CHANGE UP (ref 20.5–51.1)
MCHC RBC-ENTMCNC: 28.8 PG — SIGNIFICANT CHANGE UP (ref 27–31)
MCHC RBC-ENTMCNC: 32.1 G/DL — SIGNIFICANT CHANGE UP (ref 32–37)
MCV RBC AUTO: 89.9 FL — SIGNIFICANT CHANGE UP (ref 80–94)
MONOCYTES # BLD AUTO: 0.96 K/UL — HIGH (ref 0.1–0.6)
MONOCYTES NFR BLD AUTO: 9.9 % — HIGH (ref 1.7–9.3)
NEUTROPHILS # BLD AUTO: 5.77 K/UL — SIGNIFICANT CHANGE UP (ref 1.4–6.5)
NEUTROPHILS NFR BLD AUTO: 59.4 % — SIGNIFICANT CHANGE UP (ref 42.2–75.2)
NRBC # BLD: 0 /100 WBCS — SIGNIFICANT CHANGE UP (ref 0–0)
PLATELET # BLD AUTO: 133 K/UL — SIGNIFICANT CHANGE UP (ref 130–400)
POTASSIUM SERPL-MCNC: 5.1 MMOL/L — HIGH (ref 3.5–5)
POTASSIUM SERPL-SCNC: 5.1 MMOL/L — HIGH (ref 3.5–5)
PROT SERPL-MCNC: 6.6 G/DL — SIGNIFICANT CHANGE UP (ref 6–8)
RBC # BLD: 4.34 M/UL — LOW (ref 4.7–6.1)
RBC # FLD: 15.1 % — HIGH (ref 11.5–14.5)
SODIUM SERPL-SCNC: 139 MMOL/L — SIGNIFICANT CHANGE UP (ref 135–146)
WBC # BLD: 9.71 K/UL — SIGNIFICANT CHANGE UP (ref 4.8–10.8)
WBC # FLD AUTO: 9.71 K/UL — SIGNIFICANT CHANGE UP (ref 4.8–10.8)

## 2022-03-31 PROCEDURE — 70498 CT ANGIOGRAPHY NECK: CPT | Mod: 26,MA

## 2022-03-31 PROCEDURE — 93010 ELECTROCARDIOGRAM REPORT: CPT

## 2022-03-31 PROCEDURE — 99285 EMERGENCY DEPT VISIT HI MDM: CPT | Mod: FS

## 2022-03-31 PROCEDURE — 70450 CT HEAD/BRAIN W/O DYE: CPT | Mod: 26,MA,59

## 2022-03-31 PROCEDURE — 70496 CT ANGIOGRAPHY HEAD: CPT | Mod: 26,MA

## 2022-03-31 RX ORDER — METOCLOPRAMIDE HCL 10 MG
4 TABLET ORAL ONCE
Refills: 0 | Status: COMPLETED | OUTPATIENT
Start: 2022-03-31 | End: 2022-03-31

## 2022-03-31 RX ADMIN — Medication 4 MILLIGRAM(S): at 20:59

## 2022-03-31 NOTE — ED PROVIDER NOTE - NSFOLLOWUPCLINICS_GEN_ALL_ED_FT
Neurosurgery at [facility]  [address] [place] [address], [address]  [place] [address]  Phone: [phone]  Fax:

## 2022-03-31 NOTE — ED PROVIDER NOTE - NS ED ROS FT
Review of Systems  Constitutional:  No fever, chills, malaise, generalized weakness.  Eyes:  No visual changes, eye pain, or discharge.  ENMT:  No hearing changes, pain, or discharge. No nasal congestion, discharge, or bleeding. No throat pain, swelling, or difficulty swallowing.  Cardiac:  No chest pain,  syncope, or edema.  Respiratory:  No dyspnea, or cough.   GI:  No nausea, vomiting, diarrhea, or abdominal pain  :  No dysuria,   MS:  No myalgia, muscle weakness, gait abnormality, joint swelling, joint pain, or back pain.  Skin:  No skin rash, pruritis, jaundice, or lesions.  Neuro:   headache,+ dizziness,- loss of sensation, - focal weakness.  No change in mental status.

## 2022-03-31 NOTE — ED PROVIDER NOTE - PATIENT PORTAL LINK FT
You can access the FollowMyHealth Patient Portal offered by [facility] by registering at the following website: [email or web address]. By joining [facility]’s FollowMyHealth portal, you will also be able to view your health information using other applications (apps) compatible with our system.

## 2022-03-31 NOTE — ED PROVIDER NOTE - ATTENDING CONTRIBUTION TO CARE
77yM DM CAD p/w dizziness - has prior hx vertigo - reports dizziness and gait instability/balance issue for days to weeks that seemed worse today.  No fever, resp distress, CP, SOB.  No ear pain.

## 2022-03-31 NOTE — ED PROVIDER NOTE - PROGRESS NOTE DETAILS
reassessed pt, neuro exam:  Alert, oriented. Grossly unremarkable. No focal deficits.     pt feels better, plan to DC home with neuro f/u and Antivert PA fellow note reviewed and agree Patient Dc'd home

## 2022-03-31 NOTE — ED PROVIDER NOTE - CLINICAL SUMMARY MEDICAL DECISION MAKING FREE TEXT BOX
77yF CAD DM intermittent vertigo p/w dizziness.  No focal neuro deficits or hemodynamic instability and pt reports this dizziness is not that different from multiple prior episodes (all of which seem to have started after COVID infection in Dec 2020), for which she has not yet seen neurology.  EKG w/o ischemia or arrhythmia.  Imaging w/o acute pathology.  Labs notable for Cr 2.0, up from only visible prior 1.4 in 2020, though unknown renal function in the interim 18 months or so.  Pt feeling much better after reglan, now able to ambulate w/o dizziness.  Recommend supportive care, o/p neuro f/u, return precautions.

## 2022-03-31 NOTE — ED PROVIDER NOTE - OBJECTIVE STATEMENT
77 M pmhx of DM, vertigo, neuropathy, CAD s/p triple bypass 2017 presents to the ED for intermittent dizziness since 2020. pt states he had COVID at the end of 2020 and since then has been having dizziness with associated ataxia. pt denies f/u with neurologist and is coming in today to be assessed. denies HA, blurry vison, fever, chills, sick contacts,

## 2022-03-31 NOTE — ED PROVIDER NOTE - PHYSICAL EXAMINATION
VITAL SIGNS: I have reviewed nursing notes and confirm.  CONSTITUTIONAL: Well-developed; well-nourished; in no acute distress.  SKIN: Skin exam is warm and dry, no acute rash.  HEAD: Normocephalic; atraumatic.  EYES: PERRL, EOM intact; conjunctiva and sclera clear.  ENT: No nasal discharge; airway clear. TMs clear.  NECK: Supple; non tender.  CARD: S1, S2 normal; no murmurs, gallops, or rubs. Regular rate and rhythm.  RESP: No wheezes, rales or rhonchi. Speaking in full sentences.   ABD: Normal bowel sounds; soft; non-distended; non-tender; No rebound or guarding. No CVA tenderness.  EXT: Normal ROM. No clubbing, cyanosis or edema.  NEURO: Alert, oriented. Grossly unremarkable. No focal deficits.   PSYCH: Cooperative, appropriate. VITAL SIGNS: I have reviewed nursing notes and confirm.  CONSTITUTIONAL: Well-developed; well-nourished; in no acute distress.  SKIN: Skin exam is warm and dry, no acute rash.  HEAD: Normocephalic; atraumatic.  EYES: PERRL, EOM intact; conjunctiva and sclera clear.  ENT: No nasal discharge; airway clear.   NECK: Supple; non tender.  CARD: S1, S2 normal; no murmurs, gallops, or rubs. Regular rate and rhythm.  RESP: No wheezes, rales or rhonchi. Speaking in full sentences.   ABD: soft; non-distended; non-tender  EXT: Normal ROM. No clubbing, cyanosis or edema.  NEURO: Alert, oriented. Grossly unremarkable. No focal deficits.   PSYCH: Cooperative, appropriate.

## 2022-03-31 NOTE — ED PROVIDER NOTE - NSFOLLOWUPINSTRUCTIONS_ED_ALL_ED_FT
Please follow up with conner in 1-3 days     Dizziness    Dizziness is a common problem. It is a feeling of unsteadiness or light-headedness. You may feel like you are about to faint. This condition can be caused by a number of things, including medicines, dehydration, or illness. Drink enough fluid to keep your urine clear or pale yellow. Do not drink alcohol and limit your caffeine and salt intake. Avoid quick movement.  Rise slowly from chairs and steady yourself until you feel okay. In the morning, first sit up on the side of the bed.    SEEK IMMEDIATE MEDICAL CARE IF YOU HAVE THE FOLLOWING SYMPTOMS: vomiting, changes in your vision or speech, weakness in your arms or legs, trouble speaking or swallowing, chest pain, abdominal pain, shortness of breath, sweating, bleeding, headache, neck pain, or fever.

## 2022-03-31 NOTE — ED PROVIDER NOTE - NS ED ATTENDING STATEMENT MOD
This was a shared visit with the ALISA. I reviewed and verified the documentation and independently performed the documented:

## 2022-03-31 NOTE — ED PROVIDER NOTE - CHIEF COMPLAINT
The patient is a 77y Male complaining of dizziness. DVT ppx: lovenox daily  Diet: pleasure feeds  Dispo: likely home  Transitions of Care Status:  1.  Name of PCP:   2.  PCP Contacted on Admission: [ ] Y    [ x] N    3.  PCP contacted at Discharge: [ ] Y    [ ] N    [ ] N/A  4.  Post-Discharge Appointment Date and Location:  5.  Summary of Handoff given to PCP: DVT ppx: lovenox daily  Diet: pleasure feeds  Dispo: Plan is for home with home PT  Transitions of Care Status:  1.  Name of PCP:   2.  PCP Contacted on Admission: [ ] Y    [ x] N    3.  PCP contacted at Discharge: [ ] Y    [ ] N    [ ] N/A  4.  Post-Discharge Appointment Date and Location:  5.  Summary of Handoff given to PCP:

## 2022-04-27 ENCOUNTER — APPOINTMENT (OUTPATIENT)
Dept: NEUROLOGY | Facility: CLINIC | Age: 77
End: 2022-04-27

## 2023-03-20 NOTE — DISCHARGE NOTE PROVIDER - NS AS DC PROVIDER CONTACT Y/N MULTI
Prescription refill received for Metformin     Last office visit 01/13/2023-Jessica Hendricks    Next scheduled office visit-No follow up yet scheduled. Patient instructed follow up in 3-4 months. Writer transferred patient up to  to schedule a follow up.    CMP completed on 8/19/2022--GFR=>90    At this moment the [facility] don't have standing lab order guidelines. Jessica Hendricks us under  who also does not currently have standing orders to follow.  is currently covering for  writer will follow her guidelines for ordering labs.    -Continue Metformin  mg daily     Labs ordered: CMP,CBC.    Patient will receive a 30 day supply with note to complete labs for further refills. Spoke to patient and informed her of labs needing to be performed.      Yes

## 2023-03-22 ENCOUNTER — EMERGENCY (EMERGENCY)
Facility: HOSPITAL | Age: 78
LOS: 0 days | Discharge: HOME | End: 2023-03-22
Attending: EMERGENCY MEDICINE
Payer: MEDICARE

## 2023-03-22 ENCOUNTER — OUTPATIENT (OUTPATIENT)
Dept: OUTPATIENT SERVICES | Facility: HOSPITAL | Age: 78
LOS: 1 days | End: 2023-03-22
Payer: MEDICARE

## 2023-03-22 VITALS
WEIGHT: 194.01 LBS | DIASTOLIC BLOOD PRESSURE: 90 MMHG | TEMPERATURE: 97 F | HEART RATE: 56 BPM | RESPIRATION RATE: 16 BRPM | HEIGHT: 65 IN | SYSTOLIC BLOOD PRESSURE: 158 MMHG | OXYGEN SATURATION: 100 %

## 2023-03-22 VITALS
HEART RATE: 72 BPM | HEIGHT: 65 IN | DIASTOLIC BLOOD PRESSURE: 67 MMHG | TEMPERATURE: 96 F | WEIGHT: 190.04 LBS | OXYGEN SATURATION: 98 % | RESPIRATION RATE: 18 BRPM | SYSTOLIC BLOOD PRESSURE: 145 MMHG

## 2023-03-22 VITALS
RESPIRATION RATE: 16 BRPM | OXYGEN SATURATION: 98 % | SYSTOLIC BLOOD PRESSURE: 140 MMHG | DIASTOLIC BLOOD PRESSURE: 70 MMHG | HEART RATE: 76 BPM

## 2023-03-22 DIAGNOSIS — Z95.5 PRESENCE OF CORONARY ANGIOPLASTY IMPLANT AND GRAFT: ICD-10-CM

## 2023-03-22 DIAGNOSIS — E87.5 HYPERKALEMIA: ICD-10-CM

## 2023-03-22 DIAGNOSIS — Z95.5 PRESENCE OF CORONARY ANGIOPLASTY IMPLANT AND GRAFT: Chronic | ICD-10-CM

## 2023-03-22 DIAGNOSIS — I25.10 ATHEROSCLEROTIC HEART DISEASE OF NATIVE CORONARY ARTERY WITHOUT ANGINA PECTORIS: ICD-10-CM

## 2023-03-22 DIAGNOSIS — Z79.82 LONG TERM (CURRENT) USE OF ASPIRIN: ICD-10-CM

## 2023-03-22 DIAGNOSIS — Z98.890 OTHER SPECIFIED POSTPROCEDURAL STATES: Chronic | ICD-10-CM

## 2023-03-22 DIAGNOSIS — Z79.84 LONG TERM (CURRENT) USE OF ORAL HYPOGLYCEMIC DRUGS: ICD-10-CM

## 2023-03-22 DIAGNOSIS — N40.0 BENIGN PROSTATIC HYPERPLASIA WITHOUT LOWER URINARY TRACT SYMPTOMS: ICD-10-CM

## 2023-03-22 DIAGNOSIS — Z98.890 OTHER SPECIFIED POSTPROCEDURAL STATES: ICD-10-CM

## 2023-03-22 DIAGNOSIS — R19.00 INTRA-ABDOMINAL AND PELVIC SWELLING, MASS AND LUMP, UNSPECIFIED SITE: ICD-10-CM

## 2023-03-22 DIAGNOSIS — Z79.4 LONG TERM (CURRENT) USE OF INSULIN: ICD-10-CM

## 2023-03-22 DIAGNOSIS — E11.9 TYPE 2 DIABETES MELLITUS WITHOUT COMPLICATIONS: ICD-10-CM

## 2023-03-22 DIAGNOSIS — Z01.818 ENCOUNTER FOR OTHER PREPROCEDURAL EXAMINATION: ICD-10-CM

## 2023-03-22 LAB
A1C WITH ESTIMATED AVERAGE GLUCOSE RESULT: 7.4 % — HIGH (ref 4–5.6)
ALBUMIN SERPL ELPH-MCNC: 4.4 G/DL — SIGNIFICANT CHANGE UP (ref 3.5–5.2)
ALP SERPL-CCNC: 83 U/L — SIGNIFICANT CHANGE UP (ref 30–115)
ALT FLD-CCNC: 21 U/L — SIGNIFICANT CHANGE UP (ref 0–41)
ANION GAP SERPL CALC-SCNC: 12 MMOL/L — SIGNIFICANT CHANGE UP (ref 7–14)
ANION GAP SERPL CALC-SCNC: 9 MMOL/L — SIGNIFICANT CHANGE UP (ref 7–14)
APTT BLD: 29.4 SEC — SIGNIFICANT CHANGE UP (ref 27–39.2)
AST SERPL-CCNC: 20 U/L — SIGNIFICANT CHANGE UP (ref 0–41)
BASE EXCESS BLDV CALC-SCNC: -2.3 MMOL/L — LOW (ref -2–3)
BASOPHILS # BLD AUTO: 0.08 K/UL — SIGNIFICANT CHANGE UP (ref 0–0.2)
BASOPHILS NFR BLD AUTO: 0.9 % — SIGNIFICANT CHANGE UP (ref 0–1)
BILIRUB SERPL-MCNC: 0.7 MG/DL — SIGNIFICANT CHANGE UP (ref 0.2–1.2)
BUN SERPL-MCNC: 35 MG/DL — HIGH (ref 10–20)
BUN SERPL-MCNC: 39 MG/DL — HIGH (ref 10–20)
CA-I SERPL-SCNC: 1.22 MMOL/L — SIGNIFICANT CHANGE UP (ref 1.15–1.33)
CALCIUM SERPL-MCNC: 9.1 MG/DL — SIGNIFICANT CHANGE UP (ref 8.4–10.5)
CALCIUM SERPL-MCNC: 9.6 MG/DL — SIGNIFICANT CHANGE UP (ref 8.4–10.5)
CHLORIDE SERPL-SCNC: 104 MMOL/L — SIGNIFICANT CHANGE UP (ref 98–110)
CHLORIDE SERPL-SCNC: 105 MMOL/L — SIGNIFICANT CHANGE UP (ref 98–110)
CO2 SERPL-SCNC: 23 MMOL/L — SIGNIFICANT CHANGE UP (ref 17–32)
CO2 SERPL-SCNC: 24 MMOL/L — SIGNIFICANT CHANGE UP (ref 17–32)
CREAT SERPL-MCNC: 1.8 MG/DL — HIGH (ref 0.7–1.5)
CREAT SERPL-MCNC: 1.9 MG/DL — HIGH (ref 0.7–1.5)
EGFR: 36 ML/MIN/1.73M2 — LOW
EGFR: 38 ML/MIN/1.73M2 — LOW
EOSINOPHIL # BLD AUTO: 0.31 K/UL — SIGNIFICANT CHANGE UP (ref 0–0.7)
EOSINOPHIL NFR BLD AUTO: 3.4 % — SIGNIFICANT CHANGE UP (ref 0–8)
ESTIMATED AVERAGE GLUCOSE: 166 MG/DL — HIGH (ref 68–114)
GAS PNL BLDV: 133 MMOL/L — LOW (ref 136–145)
GAS PNL BLDV: SIGNIFICANT CHANGE UP
GLUCOSE SERPL-MCNC: 179 MG/DL — HIGH (ref 70–99)
GLUCOSE SERPL-MCNC: 237 MG/DL — HIGH (ref 70–99)
HCO3 BLDV-SCNC: 25 MMOL/L — SIGNIFICANT CHANGE UP (ref 22–29)
HCT VFR BLD CALC: 52.6 % — HIGH (ref 42–52)
HCT VFR BLDA CALC: 50 % — SIGNIFICANT CHANGE UP (ref 39–51)
HGB BLD CALC-MCNC: 16.7 G/DL — SIGNIFICANT CHANGE UP (ref 12.6–17.4)
HGB BLD-MCNC: 17 G/DL — SIGNIFICANT CHANGE UP (ref 14–18)
IMM GRANULOCYTES NFR BLD AUTO: 1.1 % — HIGH (ref 0.1–0.3)
INR BLD: 0.96 RATIO — SIGNIFICANT CHANGE UP (ref 0.65–1.3)
LACTATE BLDV-MCNC: 1.4 MMOL/L — SIGNIFICANT CHANGE UP (ref 0.5–2)
LYMPHOCYTES # BLD AUTO: 2.54 K/UL — SIGNIFICANT CHANGE UP (ref 1.2–3.4)
LYMPHOCYTES # BLD AUTO: 28.1 % — SIGNIFICANT CHANGE UP (ref 20.5–51.1)
MCHC RBC-ENTMCNC: 32.3 G/DL — SIGNIFICANT CHANGE UP (ref 32–37)
MCHC RBC-ENTMCNC: 32.8 PG — HIGH (ref 27–31)
MCV RBC AUTO: 101.3 FL — HIGH (ref 80–94)
MONOCYTES # BLD AUTO: 0.85 K/UL — HIGH (ref 0.1–0.6)
MONOCYTES NFR BLD AUTO: 9.4 % — HIGH (ref 1.7–9.3)
NEUTROPHILS # BLD AUTO: 5.15 K/UL — SIGNIFICANT CHANGE UP (ref 1.4–6.5)
NEUTROPHILS NFR BLD AUTO: 57.1 % — SIGNIFICANT CHANGE UP (ref 42.2–75.2)
NRBC # BLD: 0 /100 WBCS — SIGNIFICANT CHANGE UP (ref 0–0)
PCO2 BLDV: 49 MMHG — SIGNIFICANT CHANGE UP (ref 42–55)
PH BLDV: 7.31 — LOW (ref 7.32–7.43)
PLATELET # BLD AUTO: 132 K/UL — SIGNIFICANT CHANGE UP (ref 130–400)
PO2 BLDV: 35 MMHG — SIGNIFICANT CHANGE UP
POTASSIUM BLDV-SCNC: 5.3 MMOL/L — HIGH (ref 3.5–5.1)
POTASSIUM SERPL-MCNC: 5.2 MMOL/L — HIGH (ref 3.5–5)
POTASSIUM SERPL-MCNC: 6 MMOL/L — CRITICAL HIGH (ref 3.5–5)
POTASSIUM SERPL-SCNC: 5.2 MMOL/L — HIGH (ref 3.5–5)
POTASSIUM SERPL-SCNC: 6 MMOL/L — CRITICAL HIGH (ref 3.5–5)
PROT SERPL-MCNC: 6.9 G/DL — SIGNIFICANT CHANGE UP (ref 6–8)
PROTHROM AB SERPL-ACNC: 10.9 SEC — SIGNIFICANT CHANGE UP (ref 9.95–12.87)
RBC # BLD: 5.19 M/UL — SIGNIFICANT CHANGE UP (ref 4.7–6.1)
RBC # FLD: 13.7 % — SIGNIFICANT CHANGE UP (ref 11.5–14.5)
SAO2 % BLDV: 60.6 % — SIGNIFICANT CHANGE UP
SODIUM SERPL-SCNC: 137 MMOL/L — SIGNIFICANT CHANGE UP (ref 135–146)
SODIUM SERPL-SCNC: 140 MMOL/L — SIGNIFICANT CHANGE UP (ref 135–146)
WBC # BLD: 9.03 K/UL — SIGNIFICANT CHANGE UP (ref 4.8–10.8)
WBC # FLD AUTO: 9.03 K/UL — SIGNIFICANT CHANGE UP (ref 4.8–10.8)

## 2023-03-22 PROCEDURE — 82803 BLOOD GASES ANY COMBINATION: CPT

## 2023-03-22 PROCEDURE — 82330 ASSAY OF CALCIUM: CPT

## 2023-03-22 PROCEDURE — 85014 HEMATOCRIT: CPT

## 2023-03-22 PROCEDURE — 84132 ASSAY OF SERUM POTASSIUM: CPT

## 2023-03-22 PROCEDURE — 93010 ELECTROCARDIOGRAM REPORT: CPT | Mod: NC

## 2023-03-22 PROCEDURE — 85018 HEMOGLOBIN: CPT

## 2023-03-22 PROCEDURE — 83605 ASSAY OF LACTIC ACID: CPT

## 2023-03-22 PROCEDURE — 93005 ELECTROCARDIOGRAM TRACING: CPT

## 2023-03-22 PROCEDURE — 80048 BASIC METABOLIC PNL TOTAL CA: CPT

## 2023-03-22 PROCEDURE — 71046 X-RAY EXAM CHEST 2 VIEWS: CPT | Mod: 26

## 2023-03-22 PROCEDURE — 36415 COLL VENOUS BLD VENIPUNCTURE: CPT

## 2023-03-22 PROCEDURE — 99283 EMERGENCY DEPT VISIT LOW MDM: CPT | Mod: 25

## 2023-03-22 PROCEDURE — 99285 EMERGENCY DEPT VISIT HI MDM: CPT | Mod: FS

## 2023-03-22 PROCEDURE — 84295 ASSAY OF SERUM SODIUM: CPT

## 2023-03-22 PROCEDURE — 93010 ELECTROCARDIOGRAM REPORT: CPT

## 2023-03-22 RX ORDER — ZOLPIDEM TARTRATE 10 MG/1
1 TABLET ORAL
Qty: 0 | Refills: 0 | DISCHARGE

## 2023-03-22 NOTE — H&P PST ADULT - HISTORY OF PRESENT ILLNESS
Pts first language is Estonian but prefers to complete visit in English. Wife (Sol) was also present. Pt states he had an abdominal cyst for 2 years. Cyst periodically becomes inflamed and drains blood and pus. Most recent episode was within the last month. Denies any pain at this time. Was advised to remove the area to prevent reoccurrence. Proceeding with above.    Denies any chest pain, difficulty breathing, SOB, palpitations, dysuria, URI, or any other infections in the last 2 weeks. Denies any recent travel, contact, or exposure to any persons with known or suspected COVID-19. Pt also denies COVID testing within the last 2 weeks. Pt admits to receiving all doses of COVID vaccine. Denies any suicidal or homicidal ideations. Pt advised to self quarantine until day of procedure. Exercise tolerance of 1-2 flights of stairs without dyspnea. AUDIE reviewed with patient. Pt verbalized understanding of all pre-operative instructions.    Anesthesia Alert  NO--Difficult Airway  YES--History of neck surgery or radiation  NO--Limited ROM of neck  NO--History of Malignant hyperthermia  NO--No personal or family history of Pseudocholinesterase deficiency.  NO--Prior Anesthesia Complication  NO--Latex Allergy  NO--Loose teeth  NO--History of Rheumatoid Arthritis  NO--AUDIE  NO--Bleeding Risk  NO--Other_____

## 2023-03-22 NOTE — H&P PST ADULT - NSICDXPASTSURGICALHX_GEN_ALL_CORE_FT
PAST SURGICAL HISTORY:  H/O coronary angiogram     History of ear surgery     Stented coronary artery

## 2023-03-22 NOTE — ED PROVIDER NOTE - DURATION
AB patient asking to resend order for rosuvstatin 10 mg medication as pharmacy told patient they never got order for it. Would like for 90 days upply   today

## 2023-03-22 NOTE — ED PROVIDER NOTE - PROGRESS NOTE DETAILS
results discussed with patient.   patient is on lisinopril. instructed to call primary medical provider in am to discuss

## 2023-03-22 NOTE — ED PROVIDER NOTE - CLINICAL SUMMARY MEDICAL DECISION MAKING FREE TEXT BOX
Patient here with outpatient labs demonstrating hyperkalemia.  However, here BMP demonstrates potassium of 5.2 with no EKG changes.  Patient's creatinine is 1.9 which appears her baseline for the patient.  Advised patient discussed with PMD about continuing losartan.

## 2023-03-22 NOTE — ED PROVIDER NOTE - NSFOLLOWUPINSTRUCTIONS_ED_ALL_ED_FT
return to ed for any new or worsening symptoms       call primary medical provider kateryna and follow up

## 2023-03-22 NOTE — ED PROVIDER NOTE - OBJECTIVE STATEMENT
78-year-old male presents to the ED for evaluation.  Patient states that he went for preadmission testing today.  Patient states he received a phone call stating that his potassium level was abnormal.  Patient denies any symptoms

## 2023-03-22 NOTE — ED PROVIDER NOTE - PATIENT PORTAL LINK FT
You can access the FollowMyHealth Patient Portal offered by [facility] by registering at the following website: [email or web address]. By joining [employer]’s FollowMyHealth portal, you will also be able to view your health information using other applications (apps) compatible with our system.

## 2023-03-22 NOTE — H&P PST ADULT - REASON FOR ADMISSION
79 yo male presents for PAST in preparation for excision of abdominal cyst on 3/27/2023 under [facility] by Dr. CA Venegas ([facility]).

## 2023-03-22 NOTE — ED PROVIDER NOTE - PHYSICAL EXAMINATION
--EXAM--  VITAL SIGNS: I have reviewed vs documented at present.  CONSTITUTIONAL: Well-developed; well-nourished; in no acute distress.   SKIN: Warm and dry, no acute rash.     NECK: Supple; non tender.  CARD: S1, S2, Regular rate and rhythm.   RESP: No wheezes, rales or rhonchi.  ABD: Normal bowel sounds; soft; non-distended; non-tender.

## 2023-03-22 NOTE — ED ADULT NURSE NOTE - NSICDXPASTMEDICALHX_GEN_ALL_CORE_FT
PAST MEDICAL HISTORY:  BPH (benign prostatic hyperplasia)     CAD (coronary artery disease)     DM (diabetes mellitus)     History of hematologic disorder

## 2023-03-22 NOTE — H&P PST ADULT - ANESTHESIA, PREVIOUS REACTION, PROFILE
"Reoccurring stomach issue.    No covid symptoms.  No known covid contact.        Reason for Disposition  • Abdominal pain is a chronic symptom (recurrent or ongoing AND lasting > 4 weeks)    Additional Information  • Negative: Passed out (i.e., fainted, collapsed and was not responding)  • Negative: Shock suspected (e.g., cold/pale/clammy skin, too weak to stand, low BP, rapid pulse)  • Negative: Sounds like a life-threatening emergency to the triager  • Negative: Chest pain  • Negative: Pain is mainly in upper abdomen (if needed ask: 'is it mainly above the belly button?')  • Negative: SEVERE abdominal pain (e.g., excruciating)  • Negative: Vomiting red blood or black (coffee ground) material  • Negative: Bloody, black, or tarry bowel movements  • Negative: Unable to urinate (or only a few drops) and bladder feels very full  • Negative: Pain in scrotum persists > 1 hour  • Negative: Constant abdominal pain lasting > 2 hours  • Negative: Vomiting bile (green color)  • Negative: Patient sounds very sick or weak to the triager  • Negative: Vomiting and abdomen looks much more swollen than usual  • Negative: White of the eyes have turned yellow (i.e., jaundice)  • Negative: Blood in urine (red, pink, or tea-colored)  • Negative: Fever > 103 F (39.4 C)  • Negative: Fever > 101 F (38.3 C) and over 60 years of age  • Negative: Fever > 100.0 F (37.8 C) and has diabetes mellitus or a weak immune system (e.g., HIV positive, cancer chemotherapy, organ transplant, splenectomy, chronic steroids)  • Negative: Fever > 100.0 F (37.8 C) and bedridden (e.g., nursing home patient, stroke, chronic illness, recovering from surgery)    Answer Assessment - Initial Assessment Questions  1. LOCATION: \"Where does it hurt?\"       No higher than belly button & lower abdomen, mostly low  2. RADIATION: \"Does the pain shoot anywhere else?\" (e.g., chest, back)      Sometimes into back  3. ONSET: \"When did the pain begin?\" (Minutes, hours or days " "ago)       11/4  4. SUDDEN: \"Gradual or sudden onset?\"      sudden  5. PATTERN \"Does the pain come and go, or is it constant?\"     - If constant: \"Is it getting better, staying the same, or worsening?\"       (Note: Constant means the pain never goes away completely; most serious pain is constant and it progresses)      - If intermittent: \"How long does it last?\" \"Do you have pain now?\"      (Note: Intermittent means the pain goes away completely between bouts)      yes  6. SEVERITY: \"How bad is the pain?\"  (e.g., Scale 1-10; mild, moderate, or severe)     - MILD (1-3): doesn't interfere with normal activities, abdomen soft and not tender to touch      - MODERATE (4-7): interferes with normal activities or awakens from sleep, tender to touch      - SEVERE (8-10): excruciating pain, doubled over, unable to do any normal activities        6 to 8  7. RECURRENT SYMPTOM: \"Have you ever had this type of abdominal pain before?\" If so, ask: \"When was the last time?\" and \"What happened that time?\"       Yes, always a little uncomfortable gut feeling all the time, every month, get diarrhea all the time, comes & goes, wants to find out the cause.    8. CAUSE: \"What do you think is causing the abdominal pain?\"      Don't know, diet maybe  9. RELIEVING/AGGRAVATING FACTORS: \"What makes it better or worse?\" (e.g., movement, antacids, bowel movement)      Heat sometimes makes it better, feels gassy a lot  10. OTHER SYMPTOMS: \"Has there been any vomiting, diarrhea, constipation, or urine problems?\"        Sometimes wakes up & feels bladder completely full.    Protocols used: ABDOMINAL PAIN - MALE-A-OH      " none

## 2023-03-22 NOTE — ED PROVIDER NOTE - ATTENDING APP SHARED VISIT CONTRIBUTION OF CARE
78-year-old male, starting consideration of low potassium.  Patient permission testing done as well as potassium was 6.  The patient has no complaints.  Here on exam patient distress, s1s2 clear auscultation belly soft nontender  Impression  Patient here with outpatient labs demonstrating hyperkalemia.  However, here BMP demonstrates potassium of 5.2 with no EKG changes.  Patient's creatinine is 1.9 which appears her baseline for the patient.  Advised patient discussed with PMD about continuing losartan.

## 2023-03-22 NOTE — H&P PST ADULT - NSICDXFAMILYHX_GEN_ALL_CORE_FT
FAMILY HISTORY:  Family history of breast cancer in mother  Family history of diabetes mellitus (DM)  FHx: heart disease

## 2023-03-22 NOTE — ED PROVIDER NOTE - NSICDXPASTMEDICALHX_GEN_ALL_CORE_FT
PAST MEDICAL HISTORY:  BPH (benign prostatic hyperplasia)     CAD (coronary artery disease)     DM (diabetes mellitus)

## 2023-03-22 NOTE — H&P PST ADULT - NSICDXPASTMEDICALHX_GEN_ALL_CORE_FT
PAST MEDICAL HISTORY:  CAD (coronary artery disease)     DM (diabetes mellitus)      PAST MEDICAL HISTORY:  BPH (benign prostatic hyperplasia)     CAD (coronary artery disease)     DM (diabetes mellitus)

## 2023-03-23 DIAGNOSIS — R19.00 INTRA-ABDOMINAL AND PELVIC SWELLING, MASS AND LUMP, UNSPECIFIED SITE: ICD-10-CM

## 2023-03-23 DIAGNOSIS — Z01.818 ENCOUNTER FOR OTHER PREPROCEDURAL EXAMINATION: ICD-10-CM

## 2023-03-27 ENCOUNTER — TRANSCRIPTION ENCOUNTER (OUTPATIENT)
Age: 78
End: 2023-03-27

## 2023-03-27 ENCOUNTER — OUTPATIENT (OUTPATIENT)
Dept: INPATIENT UNIT | Facility: HOSPITAL | Age: 78
LOS: 1 days | Discharge: HOME | End: 2023-03-27
Payer: MEDICARE

## 2023-03-27 VITALS — HEART RATE: 51 BPM | SYSTOLIC BLOOD PRESSURE: 126 MMHG | DIASTOLIC BLOOD PRESSURE: 51 MMHG | RESPIRATION RATE: 15 BRPM

## 2023-03-27 VITALS
WEIGHT: 190.04 LBS | OXYGEN SATURATION: 96 % | HEART RATE: 49 BPM | DIASTOLIC BLOOD PRESSURE: 79 MMHG | SYSTOLIC BLOOD PRESSURE: 146 MMHG | RESPIRATION RATE: 17 BRPM | HEIGHT: 65 IN | TEMPERATURE: 98 F

## 2023-03-27 DIAGNOSIS — Z98.890 OTHER SPECIFIED POSTPROCEDURAL STATES: Chronic | ICD-10-CM

## 2023-03-27 DIAGNOSIS — L72.0 EPIDERMAL CYST: ICD-10-CM

## 2023-03-27 DIAGNOSIS — Z95.5 PRESENCE OF CORONARY ANGIOPLASTY IMPLANT AND GRAFT: ICD-10-CM

## 2023-03-27 DIAGNOSIS — Z95.5 PRESENCE OF CORONARY ANGIOPLASTY IMPLANT AND GRAFT: Chronic | ICD-10-CM

## 2023-03-27 DIAGNOSIS — Z87.09 PERSONAL HISTORY OF OTHER DISEASES OF THE RESPIRATORY SYSTEM: Chronic | ICD-10-CM

## 2023-03-27 DIAGNOSIS — I25.10 ATHEROSCLEROTIC HEART DISEASE OF NATIVE CORONARY ARTERY WITHOUT ANGINA PECTORIS: ICD-10-CM

## 2023-03-27 DIAGNOSIS — E11.9 TYPE 2 DIABETES MELLITUS WITHOUT COMPLICATIONS: ICD-10-CM

## 2023-03-27 DIAGNOSIS — R19.00 INTRA-ABDOMINAL AND PELVIC SWELLING, MASS AND LUMP, UNSPECIFIED SITE: ICD-10-CM

## 2023-03-27 DIAGNOSIS — Z79.4 LONG TERM (CURRENT) USE OF INSULIN: ICD-10-CM

## 2023-03-27 LAB — GLUCOSE BLDC GLUCOMTR-MCNC: 166 MG/DL — HIGH (ref 70–99)

## 2023-03-27 PROCEDURE — 88304 TISSUE EXAM BY PATHOLOGIST: CPT | Mod: 26

## 2023-03-27 PROCEDURE — 82962 GLUCOSE BLOOD TEST: CPT

## 2023-03-27 PROCEDURE — 88304 TISSUE EXAM BY PATHOLOGIST: CPT

## 2023-03-27 RX ORDER — HYDROMORPHONE HYDROCHLORIDE 2 MG/ML
0.5 INJECTION INTRAMUSCULAR; INTRAVENOUS; SUBCUTANEOUS
Refills: 0 | Status: DISCONTINUED | OUTPATIENT
Start: 2023-03-27 | End: 2023-03-27

## 2023-03-27 RX ORDER — SODIUM CHLORIDE 9 MG/ML
1000 INJECTION, SOLUTION INTRAVENOUS
Refills: 0 | Status: DISCONTINUED | OUTPATIENT
Start: 2023-03-27 | End: 2023-03-27

## 2023-03-27 RX ORDER — ONDANSETRON 8 MG/1
4 TABLET, FILM COATED ORAL ONCE
Refills: 0 | Status: DISCONTINUED | OUTPATIENT
Start: 2023-03-27 | End: 2023-03-27

## 2023-03-27 RX ADMIN — SODIUM CHLORIDE 100 MILLILITER(S): 9 INJECTION, SOLUTION INTRAVENOUS at 10:47

## 2023-03-27 NOTE — ASU PATIENT PROFILE, ADULT - NSICDXPASTSURGICALHX_GEN_ALL_CORE_FT
PAST SURGICAL HISTORY:  H/O coronary angiogram     H/O tonsillitis     History of ear surgery     Stented coronary artery

## 2023-03-27 NOTE — ASU PATIENT PROFILE, ADULT - FALL HARM RISK - HARM RISK INTERVENTIONS
Assistance with ambulation/Assistance OOB with selected safe patient handling equipment/Communicate Risk of Fall with Harm to all staff/Discuss with provider need for PT consult/Monitor gait and stability/Reinforce activity limits and safety measures with patient and family/Tailored Fall Risk Interventions/Visual Cue: Yellow wristband and red socks/Bed in lowest position, wheels locked, appropriate side rails in place/Call bell, personal items and telephone in reach/Instruct patient to call for assistance before getting out of bed or chair/Non-slip footwear when patient is out of bed/[place] to call system/Physically safe environment - no spills, clutter or unnecessary equipment/Purposeful Proactive Rounding/Room/bathroom lighting operational, light cord in reach

## 2023-03-27 NOTE — ASU PREOP CHECKLIST - INTERNAL PROSTHESES
b/l iols 3x cardiacc stents dental top and bottom/yes(specify) b/l iols 3x cardiac stents dental top and bottom/yes(specify)

## 2023-03-27 NOTE — ASU DISCHARGE PLAN (ADULT/PEDIATRIC) - CARE PROVIDER_API CALL
Zaynab Venegas)  Surgery  [address] [place] [address]  Phone: [phone]  Fax: [phone]  Established Patient  Follow Up Time:

## 2023-03-27 NOTE — CHART NOTE - NSCHARTNOTEFT_GEN_A_CORE
Pt to PACU  All VSS  No issues  Report given to RN What Type Of Note Output Would You Prefer (Optional)?: Bullet Format How Severe Is Your Skin Lesion?: mild Has Your Skin Lesion Been Treated?: not been treated Is This A New Presentation, Or A Follow-Up?: Skin Lesion

## 2023-03-27 NOTE — ASU PATIENT PROFILE, ADULT - NSICDXPASTMEDICALHX_GEN_ALL_CORE_FT
PAST MEDICAL HISTORY:  Acute myocardial infarction     BPH (benign prostatic hyperplasia)     CAD (coronary artery disease)     DM (diabetes mellitus)     History of hematologic disorder     [place] (hard of hearing)     Total cataract

## 2023-03-27 NOTE — ASU DISCHARGE PLAN (ADULT/PEDIATRIC) - NS MD DC FALL RISK RISK
For information on Fall & Injury Prevention, visit: [email or web address] OR  [email or web address] OR  https://www.cdc.gov/steadi/patient.html

## 2023-03-29 LAB — SURGICAL PATHOLOGY STUDY: SIGNIFICANT CHANGE UP

## 2023-04-15 ENCOUNTER — INPATIENT (INPATIENT)
Facility: HOSPITAL | Age: 78
LOS: 5 days | Discharge: HOME HEALTH | DRG: 244 | End: 2023-04-21
Attending: INTERNAL MEDICINE | Admitting: INTERNAL MEDICINE
Payer: MEDICARE

## 2023-04-15 VITALS
RESPIRATION RATE: 18 BRPM | HEART RATE: 101 BPM | OXYGEN SATURATION: 96 % | TEMPERATURE: 98 F | HEIGHT: 65 IN | WEIGHT: 197.98 LBS | DIASTOLIC BLOOD PRESSURE: 62 MMHG | SYSTOLIC BLOOD PRESSURE: 117 MMHG

## 2023-04-15 DIAGNOSIS — Z98.890 OTHER SPECIFIED POSTPROCEDURAL STATES: Chronic | ICD-10-CM

## 2023-04-15 DIAGNOSIS — Z95.5 PRESENCE OF CORONARY ANGIOPLASTY IMPLANT AND GRAFT: Chronic | ICD-10-CM

## 2023-04-15 DIAGNOSIS — I49.9 CARDIAC ARRHYTHMIA, UNSPECIFIED: ICD-10-CM

## 2023-04-15 DIAGNOSIS — Z87.09 PERSONAL HISTORY OF OTHER DISEASES OF THE RESPIRATORY SYSTEM: Chronic | ICD-10-CM

## 2023-04-15 PROBLEM — N40.0 BENIGN PROSTATIC HYPERPLASIA WITHOUT LOWER URINARY TRACT SYMPTOMS: Chronic | Status: ACTIVE | Noted: 2023-03-22

## 2023-04-15 PROBLEM — H91.90 UNSPECIFIED HEARING LOSS, UNSPECIFIED EAR: Chronic | Status: ACTIVE | Noted: 2023-03-27

## 2023-04-15 PROBLEM — Z86.2 PERSONAL HISTORY OF DISEASES OF THE BLOOD AND BLOOD-FORMING ORGANS AND CERTAIN DISORDERS INVOLVING THE IMMUNE MECHANISM: Chronic | Status: ACTIVE | Noted: 2023-03-22

## 2023-04-15 PROBLEM — I21.9 ACUTE MYOCARDIAL INFARCTION, UNSPECIFIED: Chronic | Status: ACTIVE | Noted: 2023-03-27

## 2023-04-15 PROBLEM — H26.9 UNSPECIFIED CATARACT: Chronic | Status: ACTIVE | Noted: 2023-03-27

## 2023-04-15 LAB
ALBUMIN SERPL ELPH-MCNC: 4.1 G/DL — SIGNIFICANT CHANGE UP (ref 3.5–5.2)
ALP SERPL-CCNC: 86 U/L — SIGNIFICANT CHANGE UP (ref 30–115)
ALT FLD-CCNC: 20 U/L — SIGNIFICANT CHANGE UP (ref 0–41)
ANION GAP SERPL CALC-SCNC: 13 MMOL/L — SIGNIFICANT CHANGE UP (ref 7–14)
AST SERPL-CCNC: 42 U/L — HIGH (ref 0–41)
BASOPHILS # BLD AUTO: 0.07 K/UL — SIGNIFICANT CHANGE UP (ref 0–0.2)
BASOPHILS NFR BLD AUTO: 0.7 % — SIGNIFICANT CHANGE UP (ref 0–1)
BILIRUB SERPL-MCNC: 0.5 MG/DL — SIGNIFICANT CHANGE UP (ref 0.2–1.2)
BUN SERPL-MCNC: 34 MG/DL — HIGH (ref 10–20)
CALCIUM SERPL-MCNC: 8.6 MG/DL — SIGNIFICANT CHANGE UP (ref 8.4–10.5)
CHLORIDE SERPL-SCNC: 103 MMOL/L — SIGNIFICANT CHANGE UP (ref 98–110)
CO2 SERPL-SCNC: 20 MMOL/L — SIGNIFICANT CHANGE UP (ref 17–32)
CREAT SERPL-MCNC: 1.6 MG/DL — HIGH (ref 0.7–1.5)
D DIMER BLD IA.RAPID-MCNC: <150 NG/ML DDU — SIGNIFICANT CHANGE UP
EGFR: 44 ML/MIN/1.73M2 — LOW
EOSINOPHIL # BLD AUTO: 0.19 K/UL — SIGNIFICANT CHANGE UP (ref 0–0.7)
EOSINOPHIL NFR BLD AUTO: 1.9 % — SIGNIFICANT CHANGE UP (ref 0–8)
GLUCOSE BLDC GLUCOMTR-MCNC: 115 MG/DL — HIGH (ref 70–99)
GLUCOSE BLDC GLUCOMTR-MCNC: 222 MG/DL — HIGH (ref 70–99)
GLUCOSE SERPL-MCNC: 204 MG/DL — HIGH (ref 70–99)
HCT VFR BLD CALC: 46.5 % — SIGNIFICANT CHANGE UP (ref 42–52)
HGB BLD-MCNC: 15.2 G/DL — SIGNIFICANT CHANGE UP (ref 14–18)
IMM GRANULOCYTES NFR BLD AUTO: 1 % — HIGH (ref 0.1–0.3)
LYMPHOCYTES # BLD AUTO: 2.75 K/UL — SIGNIFICANT CHANGE UP (ref 1.2–3.4)
LYMPHOCYTES # BLD AUTO: 26.8 % — SIGNIFICANT CHANGE UP (ref 20.5–51.1)
MAGNESIUM SERPL-MCNC: 2 MG/DL — SIGNIFICANT CHANGE UP (ref 1.8–2.4)
MCHC RBC-ENTMCNC: 32.5 PG — HIGH (ref 27–31)
MCHC RBC-ENTMCNC: 32.7 G/DL — SIGNIFICANT CHANGE UP (ref 32–37)
MCV RBC AUTO: 99.6 FL — HIGH (ref 80–94)
MONOCYTES # BLD AUTO: 0.75 K/UL — HIGH (ref 0.1–0.6)
MONOCYTES NFR BLD AUTO: 7.3 % — SIGNIFICANT CHANGE UP (ref 1.7–9.3)
NEUTROPHILS # BLD AUTO: 6.41 K/UL — SIGNIFICANT CHANGE UP (ref 1.4–6.5)
NEUTROPHILS NFR BLD AUTO: 62.3 % — SIGNIFICANT CHANGE UP (ref 42.2–75.2)
NRBC # BLD: 0 /100 WBCS — SIGNIFICANT CHANGE UP (ref 0–0)
NT-PROBNP SERPL-SCNC: 181 PG/ML — SIGNIFICANT CHANGE UP (ref 0–300)
PLATELET # BLD AUTO: 137 K/UL — SIGNIFICANT CHANGE UP (ref 130–400)
PMV BLD: 12.9 FL — HIGH (ref 7.4–10.4)
POTASSIUM SERPL-MCNC: SIGNIFICANT CHANGE UP MMOL/L (ref 3.5–5)
POTASSIUM SERPL-SCNC: SIGNIFICANT CHANGE UP MMOL/L (ref 3.5–5)
PROT SERPL-MCNC: 6.8 G/DL — SIGNIFICANT CHANGE UP (ref 6–8)
RBC # BLD: 4.67 M/UL — LOW (ref 4.7–6.1)
RBC # FLD: 13 % — SIGNIFICANT CHANGE UP (ref 11.5–14.5)
SARS-COV-2 RNA SPEC QL NAA+PROBE: SIGNIFICANT CHANGE UP
SODIUM SERPL-SCNC: 136 MMOL/L — SIGNIFICANT CHANGE UP (ref 135–146)
TROPONIN T SERPL-MCNC: <0.01 NG/ML — SIGNIFICANT CHANGE UP
WBC # BLD: 10.27 K/UL — SIGNIFICANT CHANGE UP (ref 4.8–10.8)
WBC # FLD AUTO: 10.27 K/UL — SIGNIFICANT CHANGE UP (ref 4.8–10.8)

## 2023-04-15 PROCEDURE — C1785: CPT

## 2023-04-15 PROCEDURE — U0005: CPT

## 2023-04-15 PROCEDURE — C1898: CPT

## 2023-04-15 PROCEDURE — 93306 TTE W/DOPPLER COMPLETE: CPT

## 2023-04-15 PROCEDURE — 80048 BASIC METABOLIC PNL TOTAL CA: CPT

## 2023-04-15 PROCEDURE — C1892: CPT

## 2023-04-15 PROCEDURE — 71045 X-RAY EXAM CHEST 1 VIEW: CPT | Mod: 26

## 2023-04-15 PROCEDURE — C1769: CPT

## 2023-04-15 PROCEDURE — 93010 ELECTROCARDIOGRAM REPORT: CPT

## 2023-04-15 PROCEDURE — 36415 COLL VENOUS BLD VENIPUNCTURE: CPT

## 2023-04-15 PROCEDURE — 71046 X-RAY EXAM CHEST 2 VIEWS: CPT

## 2023-04-15 PROCEDURE — 93280 PM DEVICE PROGR EVAL DUAL: CPT

## 2023-04-15 PROCEDURE — 85025 COMPLETE CBC W/AUTO DIFF WBC: CPT

## 2023-04-15 PROCEDURE — 71045 X-RAY EXAM CHEST 1 VIEW: CPT

## 2023-04-15 PROCEDURE — 84100 ASSAY OF PHOSPHORUS: CPT

## 2023-04-15 PROCEDURE — U0003: CPT

## 2023-04-15 PROCEDURE — 80053 COMPREHEN METABOLIC PANEL: CPT

## 2023-04-15 PROCEDURE — 82962 GLUCOSE BLOOD TEST: CPT

## 2023-04-15 PROCEDURE — 99223 1ST HOSP IP/OBS HIGH 75: CPT

## 2023-04-15 PROCEDURE — 84484 ASSAY OF TROPONIN QUANT: CPT

## 2023-04-15 PROCEDURE — 85027 COMPLETE CBC AUTOMATED: CPT

## 2023-04-15 PROCEDURE — 84443 ASSAY THYROID STIM HORMONE: CPT

## 2023-04-15 PROCEDURE — 83735 ASSAY OF MAGNESIUM: CPT

## 2023-04-15 PROCEDURE — 99285 EMERGENCY DEPT VISIT HI MDM: CPT

## 2023-04-15 PROCEDURE — 33208 INSRT HEART PM ATRIAL & VENT: CPT | Mod: KX

## 2023-04-15 RX ORDER — MAGNESIUM OXIDE 400 MG ORAL TABLET 241.3 MG
400 TABLET ORAL DAILY
Refills: 0 | Status: DISCONTINUED | OUTPATIENT
Start: 2023-04-15 | End: 2023-04-21

## 2023-04-15 RX ORDER — GLUCAGON INJECTION, SOLUTION 0.5 MG/.1ML
1 INJECTION, SOLUTION SUBCUTANEOUS ONCE
Refills: 0 | Status: DISCONTINUED | OUTPATIENT
Start: 2023-04-15 | End: 2023-04-21

## 2023-04-15 RX ORDER — DEXTROSE 50 % IN WATER 50 %
25 SYRINGE (ML) INTRAVENOUS ONCE
Refills: 0 | Status: DISCONTINUED | OUTPATIENT
Start: 2023-04-15 | End: 2023-04-21

## 2023-04-15 RX ORDER — INSULIN LISPRO 100/ML
VIAL (ML) SUBCUTANEOUS
Refills: 0 | Status: DISCONTINUED | OUTPATIENT
Start: 2023-04-15 | End: 2023-04-21

## 2023-04-15 RX ORDER — INSULIN LISPRO 100/ML
5 VIAL (ML) SUBCUTANEOUS
Refills: 0 | Status: DISCONTINUED | OUTPATIENT
Start: 2023-04-15 | End: 2023-04-21

## 2023-04-15 RX ORDER — HEPARIN SODIUM 5000 [USP'U]/ML
5000 INJECTION INTRAVENOUS; SUBCUTANEOUS EVERY 12 HOURS
Refills: 0 | Status: DISCONTINUED | OUTPATIENT
Start: 2023-04-15 | End: 2023-04-21

## 2023-04-15 RX ORDER — SODIUM CHLORIDE 9 MG/ML
1000 INJECTION, SOLUTION INTRAVENOUS
Refills: 0 | Status: DISCONTINUED | OUTPATIENT
Start: 2023-04-15 | End: 2023-04-21

## 2023-04-15 RX ORDER — TAMSULOSIN HYDROCHLORIDE 0.4 MG/1
0.4 CAPSULE ORAL AT BEDTIME
Refills: 0 | Status: DISCONTINUED | OUTPATIENT
Start: 2023-04-15 | End: 2023-04-21

## 2023-04-15 RX ORDER — PANTOPRAZOLE SODIUM 20 MG/1
40 TABLET, DELAYED RELEASE ORAL
Refills: 0 | Status: DISCONTINUED | OUTPATIENT
Start: 2023-04-15 | End: 2023-04-21

## 2023-04-15 RX ORDER — RANOLAZINE 500 MG/1
500 TABLET, FILM COATED, EXTENDED RELEASE ORAL
Refills: 0 | Status: DISCONTINUED | OUTPATIENT
Start: 2023-04-15 | End: 2023-04-21

## 2023-04-15 RX ORDER — DEXTROSE 50 % IN WATER 50 %
15 SYRINGE (ML) INTRAVENOUS ONCE
Refills: 0 | Status: DISCONTINUED | OUTPATIENT
Start: 2023-04-15 | End: 2023-04-21

## 2023-04-15 RX ORDER — ASPIRIN/CALCIUM CARB/MAGNESIUM 324 MG
81 TABLET ORAL DAILY
Refills: 0 | Status: DISCONTINUED | OUTPATIENT
Start: 2023-04-15 | End: 2023-04-21

## 2023-04-15 RX ORDER — METOPROLOL TARTRATE 50 MG
100 TABLET ORAL
Refills: 0 | Status: DISCONTINUED | OUTPATIENT
Start: 2023-04-15 | End: 2023-04-17

## 2023-04-15 RX ORDER — ATORVASTATIN CALCIUM 80 MG/1
80 TABLET, FILM COATED ORAL AT BEDTIME
Refills: 0 | Status: DISCONTINUED | OUTPATIENT
Start: 2023-04-15 | End: 2023-04-21

## 2023-04-15 RX ORDER — INSULIN GLARGINE 100 [IU]/ML
45 INJECTION, SOLUTION SUBCUTANEOUS AT BEDTIME
Refills: 0 | Status: DISCONTINUED | OUTPATIENT
Start: 2023-04-15 | End: 2023-04-21

## 2023-04-15 RX ORDER — DEXTROSE 50 % IN WATER 50 %
12.5 SYRINGE (ML) INTRAVENOUS ONCE
Refills: 0 | Status: DISCONTINUED | OUTPATIENT
Start: 2023-04-15 | End: 2023-04-21

## 2023-04-15 RX ADMIN — PANTOPRAZOLE SODIUM 40 MILLIGRAM(S): 20 TABLET, DELAYED RELEASE ORAL at 22:03

## 2023-04-15 RX ADMIN — Medication 150 MILLIGRAM(S): at 22:02

## 2023-04-15 RX ADMIN — TAMSULOSIN HYDROCHLORIDE 0.4 MILLIGRAM(S): 0.4 CAPSULE ORAL at 22:02

## 2023-04-15 RX ADMIN — INSULIN GLARGINE 45 UNIT(S): 100 INJECTION, SOLUTION SUBCUTANEOUS at 22:03

## 2023-04-15 RX ADMIN — MAGNESIUM OXIDE 400 MG ORAL TABLET 400 MILLIGRAM(S): 241.3 TABLET ORAL at 22:08

## 2023-04-15 RX ADMIN — ATORVASTATIN CALCIUM 80 MILLIGRAM(S): 80 TABLET, FILM COATED ORAL at 22:02

## 2023-04-15 RX ADMIN — Medication 100 MILLIGRAM(S): at 20:18

## 2023-04-15 RX ADMIN — RANOLAZINE 500 MILLIGRAM(S): 500 TABLET, FILM COATED, EXTENDED RELEASE ORAL at 22:03

## 2023-04-15 RX ADMIN — HEPARIN SODIUM 5000 UNIT(S): 5000 INJECTION INTRAVENOUS; SUBCUTANEOUS at 20:17

## 2023-04-15 NOTE — H&P ADULT - ATTENDING COMMENTS
Patient seen and examined at bedside independently of the residents. I read the resident's note and agree with the plan with the additions and corrections as noted below. My note supersedes the resident's note.     REVIEW OF SYSTEMS:  CONSTITUTIONAL: No weakness, fevers or chills  EYES/ENT: No visual changes;  No vertigo or throat pain   NECK: No pain or stiffness  RESPIRATORY: No cough, wheezing, hemoptysis; No shortness of breath  CARDIOVASCULAR: No chest pain. + palpitation.  GASTROINTESTINAL: No abdominal or epigastric pain. No nausea, vomiting, or hematemesis; No diarrhea or constipation. No melena or hematochezia.  GENITOURINARY: No dysuria, frequency or hematuria  NEUROLOGICAL: No numbness or weakness  MSK: No pain. No weakness.   SKIN: No itching, rashes.     PMH: CAD s/p PCI, DM, HTM, HLD, BPH and hemochromatosis (phlebotomy every 3 months)    FHx: Reviewed. No fhx of asthma/copd, No fhx of liver and pulmonary disease. No fhx of hematological disorder.     Physical Exam:  GEN: No acute distress. Awake, Alert and oriented x 3.   Head: Atraumatic, Normocephalic.   Eye: PEERLA. No sclera icterus. EOMI.   ENT: Normal oropharynx, no thyromegaly, no mass, no lymphadenopathy.   LUNGS: Clear to auscultation bilaterally. No wheeze/rales/crackles.   HEART: Normal. S1/S2 present. RRR. No murmur/gallops.   ABD: Soft, non-tender, non-distended. Bowel sounds present.   EXT: No pitting edema. No erythema. No tenderness.  Integumentary: No rash, No sore, No petechia.   NEURO: CN III-XII intact. Strength: 5/5 b/l ULE. Sensory intact b/l ULE.     Vital Signs Last 24 Hrs  T(C): 36.7 (15 Apr 2023 10:32), Max: 36.7 (15 Apr 2023 10:32)  T(F): 98.1 (15 Apr 2023 10:32), Max: 98.1 (15 Apr 2023 10:32)  HR: 101 (15 Apr 2023 10:32) (101 - 101)  BP: 117/62 (15 Apr 2023 10:32) (117/62 - 117/62)  BP(mean): --  RR: 18 (15 Apr 2023 10:32) (18 - 18)  SpO2: 96% (15 Apr 2023 10:32) (96% - 96%)    Parameters below as of 15 Apr 2023 10:32  Patient On (Oxygen Delivery Method): room air      Please see the above notes for Labs and radiology.     Assessment and Plan:     77 yo M with hx of CAD s/p PCI, DM, HTM, HLD, BPH and hemochromatosis (phlebotomy every 3 months) sent by PMD Dr. Merlos out of concern for possible A-fib/arrhythmia.    Palpitation  - EKG shows NSR.   - monitor electrolytes and correct prn.   - check TTE and TSH   - monitor on Telemetry.   - Cardiology consult with Dr. Freed.     CAD s/p PCI   - c/w home meds    HTN - hold anti-HTN med for now.     Hemochromatosis - on phlebotomy q3mo    BPH - on flomax     DM II - monitor FS AC HS. Insulin regimen.     DVT ppx: Heparin SC  GI ppx:  PPI  Diet: DASH/CC diet   Activity: as tolerated.     Date seen by the attendin/15/2023  Total time spent: 75 minutes.

## 2023-04-15 NOTE — H&P ADULT - HISTORY OF PRESENT ILLNESS
78-year-old male with past medical history of CAD diabetes hypertension hyperlipidemia BPH hemochromatosis status post phlebotomy every 3 months, last was yesterday.  Sent by PMD Dr. Merlos out of concern for possible A-fib/arrhythmia.  Patient reports 1 week of intermittent hypotension at home.  Yesterday after phlebotomy was 80/60, felt better after IV fluids given.  Seen by PMD today who noted irregular heartbeat so sent patient to ED.  No chest pain.  Reports intermittent dyspnea on exertion which is worse in the last week.  No leg pain swelling.  No fever cough.  No LOC.  Follows with Dr. Freed cardiology.    On exam, AFVSS, Well appearing, No acute distress, NCAT, EOMI, PERRLA, MMM, Neck supple, LCTAB, RRR nl s1s2 No mrg, Abdomen Soft NTND, AAOx3, No Focal Deficits, No LE edema or calf TTP    Vital Signs Last 24 Hrs  T(C): 36.7 (15 Apr 2023 10:32), Max: 36.7 (15 Apr 2023 10:32)  T(F): 98.1 (15 Apr 2023 10:32), Max: 98.1 (15 Apr 2023 10:32)  HR: 101 (15 Apr 2023 10:32) (101 - 101)  BP: 117/62 (15 Apr 2023 10:32) (117/62 - 117/62)  BP(mean): --  RR: 18 (15 Apr 2023 10:32) (18 - 18)  SpO2: 96% (15 Apr 2023 10:32) (96% - 96%)    Parameters below as of 15 Apr 2023 10:32  Patient On (Oxygen Delivery Method): room air   78-year-old male with PMHx of CAD s/p PCI, DM, HTM, HLD, BPH and hemochromatosis (phlebotomy every 3 months) sent by PMD Dr. Merlos out of concern for possible A-fib/arrhythmia.  PAtient reports that since sunday he has noted some episodes of palpitations with diaphoresis but no chest pain,  unsure if he was having hypoglycemic episodes but wife reports that at time of event his FS is . Patient also reports 1 week of intermittent hypotension at home with yesterday an episode of BP 80/60 after phlebotomy felt better after IV fluids given.  Seen by PMD today who noted irregular heartbeat so sent patient to ED.  No chest pain.  Reports intermittent dyspnea on exertion which is worse in the last week.  No leg pain swelling.  No fever cough.  No LOC.  Follows with Dr. Freed cardiology.    Vital Signs Last 24 Hrs  T(C): 36.7 (15 Apr 2023 10:32), Max: 36.7 (15 Apr 2023 10:32)  T(F): 98.1 (15 Apr 2023 10:32), Max: 98.1 (15 Apr 2023 10:32)  HR: 101 (15 Apr 2023 10:32) (101 - 101)  BP: 117/62 (15 Apr 2023 10:32) (117/62 - 117/62)  BP(mean): --  RR: 18 (15 Apr 2023 10:32) (18 - 18)  SpO2: 96% (15 Apr 2023 10:32) (96% - 96%)    Parameters below as of 15 Apr 2023 10:32  Patient On (Oxygen Delivery Method): room air

## 2023-04-15 NOTE — ED PROVIDER NOTE - PHYSICAL EXAMINATION
CONSTITUTIONAL: Well-appearing; well-nourished; in no apparent distress.   NECK: Supple; non-tender; no cervical lymphadenopathy.   CARDIOVASCULAR: Normal S1, S2; no murmurs, rubs, or gallops.   RESPIRATORY: Normal chest excursion with respiration; breath sounds clear and equal bilaterally; no wheezes, rhonchi, or rales.  GI/: Non-distended; non-tender; no palpable organomegaly.   MS: No evidence of trauma or deformity. No CVA tenderness. Normal ROM in all four extremities; non-tender to palpation; distal pulses are normal.   SKIN: Normal for age and race; warm; dry; good turgor; no apparent lesions or exudate.   NEURO/PSYCH: A & O x 4; grossly unremarkable. mood and manner are appropriate.

## 2023-04-15 NOTE — H&P ADULT - NSHPLABSRESULTS_GEN_ALL_CORE
15.2   10.27 )-----------( 137      ( 15 Apr 2023 12:48 )             46.5       04-15    136  |  103  |  34<H>  ----------------------------<  204<H>  HEMOLYZED   |  20  |  1.6<H>    Ca    8.6      15 Apr 2023 12:48  Mg     2.0     04-15    TPro  6.8  /  Alb  4.1  /  TBili  0.5  /  DBili  x   /  AST  42<H>  /  ALT  20  /  AlkPhos  86  04-15

## 2023-04-15 NOTE — ED PROVIDER NOTE - OBJECTIVE STATEMENT
pt with pmhx CAD, DM, HTN, HLD, BPH, hemochromatosis status post phlebotomy every 3 months, last done yesterday at which time he was found to be HOTN and given IVF with improvement. saw PMD for f/u today, concern for transient A.fib, sent to ED for admission to tele. Denies fever/chill/HA/dizziness/chest pain/palpitation/sob/abd pain/n/v/d/ black stool/bloody stool/urinary sxs  Follows with Dr. Freed cardiology.

## 2023-04-15 NOTE — ED PROVIDER NOTE - WET READ LAUNCH FT
[address] [facility]     Department of Interventional Radiology     [facility] ([place], [place]) Radiology     [phone] Office     [phone] Fax       POST LUMBAR PUNCTURE / Lazara [place] Rosario / INTRATHECAL CHEMOTHERAPY DISCHARGE INSTRUCTIONS           General Information:           Lumbar Puncture: A LP is done to help diagnose several disorders, like pseudo tumor, migraines, meningitis, and multiple sclerosis. It involves a puncture (usually in the lower spine) into the sac that protects the spinal column. A sample of the fluid in that space is removed and tested in the lab. Myelogram:        A Myelogram involves a lumbar puncture, and instead of removing fluid, contrast will be injected into the sac surrounding the spinal column. It is done to visualize the spinal column, nerve roots, spinal canal, vertebral discs and disc space. It is usually done to diagnose back pain with unknown cause or in preparation for surgery. After the injection, a CT scan will be done, usually within two hours of the injection. Intrathecal Chemotherapy:         Chemotherapy can be given in many forms. Intrathecal chemo involves a lumbar puncture, and instead of removing fluid, the chemo will be injected into the space. After any of procedures, you will be asked to lie flat on your back for 4-6 hours to prevent complications. You should also rest for 24 hours after you go home, and force fluids. If you have a headache, you should take Tylenol or acetaminophen. Call If:        You should call your Physician and/or the Radiology Nurse if you develop a headache that is not relieved by Tylenol, and worsens when you stand and eases when you lie down, you need to call. You may have developed what is referred to as a spinal headache. Our physician's will probably advise you to be on strict bed rest for 24 hours, to drink lots of fluids and caffeine.  If this does not help the head pain, call again the next day. You should call if you have bleeding other than a small spot on your bandage. You should call if you have any numbness, tingling, weakness, fever, chills, urinary retention, severe itching, rash, welts, swelling, or confusion. Follow-Up Instructions:   See the doctor who ordered your procedure as he/she has instructed. If you had a Lumbar Puncture or Myelogram, your results should be available to your ordering doctor in 3-5 business days. You can remove your dressing in 24 hours and shower regularly. Do not bathe or swim for 72 hours. To Reach Us: If you have any questions about your procedure, please call the Interventional Radiology department at [phone]. After business hours (5pm) and weekends, call the answering service at [phone] and ask for the Radiologist on call to be paged. Si tiene Preguntas acerca del procedimiento, por favor llame al departamento de Radiología Intervencional al [phone]. Después de horas de oficina (5 pm) y los fines de [place], llamar al Dejan Arnett al [phone] y pregunte por el Radiologo de [facility] [place] [facility]. There is 1 Wet Read(s) to document. There are no Wet Read(s) to document.

## 2023-04-15 NOTE — ED ADULT TRIAGE NOTE - CHIEF COMPLAINT QUOTE
as per daughter his bp has been low. yesterday 80/60 and was given 2 bags of iv fluid and today palpitations  fs 195

## 2023-04-15 NOTE — ED PROVIDER NOTE - ATTENDING APP SHARED VISIT CONTRIBUTION OF CARE
78-year-old male with past medical history of CAD diabetes hypertension hyperlipidemia BPH hemochromatosis status post phlebotomy every 3 months, last was yesterday.  Sent by PMD Dr. Merlos out of concern for possible A-fib/arrhythmia.  Patient reports 1 week of intermittent hypotension at home.  Yesterday after phlebotomy was 80/60, felt better after IV fluids given.  Seen by PMD today who noted irregular heartbeat so sent patient to ED.  No chest pain.  Reports intermittent dyspnea on exertion which is worse in the last week.  No leg pain swelling.  No fever cough.  No LOC.  Follows with Dr. Freed cardiology.    On exam, AFVSS, Well appearing, No acute distress, NCAT, EOMI, PERRLA, MMM, Neck supple, LCTAB, RRR nl s1s2 No mrg, Abdomen Soft NTND, AAOx3, No Focal Deficits, No LE edema or calf TTP,    A/P; concern for arrhythmia, dyspnea on exertion, hypotension at home, /62 in ED now, rule out ACS/CHF/PE, A-fib, will do labs EKG troponin BNP D-dimer chest x-ray telemetry monitor reeval

## 2023-04-15 NOTE — H&P ADULT - NSICDXPASTMEDICALHX_GEN_ALL_CORE_FT
PAST MEDICAL HISTORY:  Acute myocardial infarction     BPH (benign prostatic hyperplasia)     CAD (coronary artery disease)     DM (diabetes mellitus)     History of hematologic disorder     [place] (hard of hearing)     Total cataract

## 2023-04-15 NOTE — ED PROVIDER NOTE - INPATIENT RESIDENT/ACP NOTIFIED
Patient is awake and alert. Post combi treatment patient lung sounds clear bilaterally. + Abdominal breathing noted.+ Cough noted. Will continue to monitor closely.
mar

## 2023-04-15 NOTE — ED PROVIDER NOTE - NSICDXPASTMEDICALHX_GEN_ALL_CORE_FT
PAST MEDICAL HISTORY:  Acute myocardial infarction     BPH (benign prostatic hyperplasia)     CAD (coronary artery disease)     DM (diabetes mellitus)     History of hematologic disorder     [place] (hard of hearing)     Total cataract

## 2023-04-15 NOTE — H&P ADULT - NSHPPHYSICALEXAM_GEN_ALL_CORE
T(C): 36.7 (04-15-23 @ 10:32), Max: 36.7 (04-15-23 @ 10:32)  HR: 101 (04-15-23 @ 10:32) (101 - 101)  BP: 117/62 (04-15-23 @ 10:32) (117/62 - 117/62)  RR: 18 (04-15-23 @ 10:32) (18 - 18)  SpO2: 96% (04-15-23 @ 10:32) (96% - 96%)    CONSTITUTIONAL: Well groomed, no apparent distress  RESP: No respiratory distress, no use of accessory muscles; CTA b/l, no WRR  CV: RRR, +S1S2, no MRG; no JVD; no peripheral edema  GI: Soft, NT, ND, no rebound, no guarding; no palpable masses; no hepatosplenomegaly  PSYCH: Appropriate insight/judgment; A+O x 3 T(C): 36.7 (04-15-23 @ 10:32), Max: 36.7 (04-15-23 @ 10:32)  HR: 101 (04-15-23 @ 10:32) (101 - 101)  BP: 117/62 (04-15-23 @ 10:32) (117/62 - 117/62)  RR: 18 (04-15-23 @ 10:32) (18 - 18)  SpO2: 96% (04-15-23 @ 10:32) (96% - 96%)    CONSTITUTIONAL: Well groomed, no apparent distress  RESP: No respiratory distress, no use of accessory muscles; CTA b/l, no WRR  CV: irregular , +S1S2, no MRG; no JVD; no peripheral edema  GI: Soft, NT, ND, no rebound, no guarding; stables >> from recent cyst removal   PSYCH: Appropriate insight/judgment; A+O x 3

## 2023-04-15 NOTE — ED ADULT NURSE NOTE - NSICDXPASTMEDICALHX_GEN_ALL_CORE_FT
PAST MEDICAL HISTORY:  Acute myocardial infarction     BPH (benign prostatic hyperplasia)     CAD (coronary artery disease)     DM (diabetes mellitus)     History of hematologic disorder     [place] (hard of hearing)     Total cataract

## 2023-04-15 NOTE — H&P ADULT - ASSESSMENT
78-year-old male with PMHx of CAD s/p PCI, DM, HTM, HLD, BPH and hemochromatosis (phlebotomy every 3 months) sent by PMD Dr. Merlos out of concern for possible A-fib/arrhythmia.        #DVT - Heparin SQ  #GI - pantoprazole   #Diet - DASH/ CC  #Activity - IAT   #Code - Full code     #Disposition - IP  Med/Rec Completed bedside  78-year-old male with PMHx of CAD s/p PCI, DM, HTM, HLD, BPH and hemochromatosis (phlebotomy every 3 months) sent by PMD Dr. Merlos out of concern for possible A-fib/arrhythmia.    # Irregular rhythm   # Palpitations/diaphoresis   Possible episodes of arrhythmias. Episodic hypotension with diaphoresis and palpitations. Nee monitoring on Telemetry   - Normotensive in ED BP 120s and ECG: NRS with PACs   - no further episodes today  - Monitoring in telemetry > might need outpatient holter   - Consult Dr Freed for Cardiology   - Will hold antihypertensive for now     # CAD s/p PCI   Home meds: Metoprolol 100mg BID, atorvastatin and aspirin   - c/w with all home medications   - trops neg for now   - no need for TTE inpatient   - f/u with Cardiology consult     # HTN   Home med: Lisinopril 5 mg   - Will hold given recurrent hypotension to monitor   - If BP increased in hospital >>> will resume home meds    # Hemochromatosis   - Gets recurrent phlebotomy [id number]   - last one yesterday >>> possible hypovolemia afterwards causing hypotension s/p IVF   - currently stable     # BPH  Home med: Tamsulosin   - c/w with home meds    # DM   Home med: Lantus 45 units, Lispro 10 units TID, Jardiance and Glipizide   - Monitor FS   - Resume Lantus and lispro in hospital   - adjust insulin according to FS     #DVT - Heparin SQ  #GI - pantoprazole   #Diet - DASH/ CC  #Activity - IAT   #Code - Full code     #Disposition - IP  Med/Rec Completed bedside

## 2023-04-16 LAB
ALBUMIN SERPL ELPH-MCNC: 3.8 G/DL — SIGNIFICANT CHANGE UP (ref 3.5–5.2)
ALP SERPL-CCNC: 65 U/L — SIGNIFICANT CHANGE UP (ref 30–115)
ALT FLD-CCNC: 16 U/L — SIGNIFICANT CHANGE UP (ref 0–41)
ANION GAP SERPL CALC-SCNC: 10 MMOL/L — SIGNIFICANT CHANGE UP (ref 7–14)
AST SERPL-CCNC: 20 U/L — SIGNIFICANT CHANGE UP (ref 0–41)
BASOPHILS # BLD AUTO: 0.06 K/UL — SIGNIFICANT CHANGE UP (ref 0–0.2)
BASOPHILS NFR BLD AUTO: 0.7 % — SIGNIFICANT CHANGE UP (ref 0–1)
BILIRUB SERPL-MCNC: 0.8 MG/DL — SIGNIFICANT CHANGE UP (ref 0.2–1.2)
BUN SERPL-MCNC: 29 MG/DL — HIGH (ref 10–20)
CALCIUM SERPL-MCNC: 8.8 MG/DL — SIGNIFICANT CHANGE UP (ref 8.4–10.5)
CHLORIDE SERPL-SCNC: 105 MMOL/L — SIGNIFICANT CHANGE UP (ref 98–110)
CO2 SERPL-SCNC: 25 MMOL/L — SIGNIFICANT CHANGE UP (ref 17–32)
CREAT SERPL-MCNC: 1.6 MG/DL — HIGH (ref 0.7–1.5)
EGFR: 44 ML/MIN/1.73M2 — LOW
EOSINOPHIL # BLD AUTO: 0.37 K/UL — SIGNIFICANT CHANGE UP (ref 0–0.7)
EOSINOPHIL NFR BLD AUTO: 4.2 % — SIGNIFICANT CHANGE UP (ref 0–8)
GLUCOSE BLDC GLUCOMTR-MCNC: 187 MG/DL — HIGH (ref 70–99)
GLUCOSE BLDC GLUCOMTR-MCNC: 191 MG/DL — HIGH (ref 70–99)
GLUCOSE BLDC GLUCOMTR-MCNC: 59 MG/DL — LOW (ref 70–99)
GLUCOSE BLDC GLUCOMTR-MCNC: 99 MG/DL — SIGNIFICANT CHANGE UP (ref 70–99)
GLUCOSE SERPL-MCNC: 79 MG/DL — SIGNIFICANT CHANGE UP (ref 70–99)
HCT VFR BLD CALC: 41.8 % — LOW (ref 42–52)
HGB BLD-MCNC: 14 G/DL — SIGNIFICANT CHANGE UP (ref 14–18)
IMM GRANULOCYTES NFR BLD AUTO: 0.8 % — HIGH (ref 0.1–0.3)
LYMPHOCYTES # BLD AUTO: 2.89 K/UL — SIGNIFICANT CHANGE UP (ref 1.2–3.4)
LYMPHOCYTES # BLD AUTO: 32.6 % — SIGNIFICANT CHANGE UP (ref 20.5–51.1)
MAGNESIUM SERPL-MCNC: 2.1 MG/DL — SIGNIFICANT CHANGE UP (ref 1.8–2.4)
MCHC RBC-ENTMCNC: 32.6 PG — HIGH (ref 27–31)
MCHC RBC-ENTMCNC: 33.5 G/DL — SIGNIFICANT CHANGE UP (ref 32–37)
MCV RBC AUTO: 97.2 FL — HIGH (ref 80–94)
MONOCYTES # BLD AUTO: 0.74 K/UL — HIGH (ref 0.1–0.6)
MONOCYTES NFR BLD AUTO: 8.4 % — SIGNIFICANT CHANGE UP (ref 1.7–9.3)
NEUTROPHILS # BLD AUTO: 4.73 K/UL — SIGNIFICANT CHANGE UP (ref 1.4–6.5)
NEUTROPHILS NFR BLD AUTO: 53.3 % — SIGNIFICANT CHANGE UP (ref 42.2–75.2)
NRBC # BLD: 0 /100 WBCS — SIGNIFICANT CHANGE UP (ref 0–0)
PHOSPHATE SERPL-MCNC: 3.3 MG/DL — SIGNIFICANT CHANGE UP (ref 2.1–4.9)
PLATELET # BLD AUTO: 129 K/UL — LOW (ref 130–400)
PMV BLD: 13 FL — HIGH (ref 7.4–10.4)
POTASSIUM SERPL-MCNC: 4.9 MMOL/L — SIGNIFICANT CHANGE UP (ref 3.5–5)
POTASSIUM SERPL-SCNC: 4.9 MMOL/L — SIGNIFICANT CHANGE UP (ref 3.5–5)
PROT SERPL-MCNC: 5.9 G/DL — LOW (ref 6–8)
RBC # BLD: 4.3 M/UL — LOW (ref 4.7–6.1)
RBC # FLD: 13.2 % — SIGNIFICANT CHANGE UP (ref 11.5–14.5)
SODIUM SERPL-SCNC: 140 MMOL/L — SIGNIFICANT CHANGE UP (ref 135–146)
WBC # BLD: 8.86 K/UL — SIGNIFICANT CHANGE UP (ref 4.8–10.8)
WBC # FLD AUTO: 8.86 K/UL — SIGNIFICANT CHANGE UP (ref 4.8–10.8)

## 2023-04-16 PROCEDURE — 93306 TTE W/DOPPLER COMPLETE: CPT | Mod: 26

## 2023-04-16 PROCEDURE — 99231 SBSQ HOSP IP/OBS SF/LOW 25: CPT

## 2023-04-16 RX ADMIN — Medication 150 MILLIGRAM(S): at 18:24

## 2023-04-16 RX ADMIN — MAGNESIUM OXIDE 400 MG ORAL TABLET 400 MILLIGRAM(S): 241.3 TABLET ORAL at 11:54

## 2023-04-16 RX ADMIN — Medication 5 UNIT(S): at 17:25

## 2023-04-16 RX ADMIN — Medication 2: at 17:26

## 2023-04-16 RX ADMIN — TAMSULOSIN HYDROCHLORIDE 0.4 MILLIGRAM(S): 0.4 CAPSULE ORAL at 22:58

## 2023-04-16 RX ADMIN — Medication 2: at 11:56

## 2023-04-16 RX ADMIN — Medication 150 MILLIGRAM(S): at 05:40

## 2023-04-16 RX ADMIN — Medication 100 MILLIGRAM(S): at 18:24

## 2023-04-16 RX ADMIN — ATORVASTATIN CALCIUM 80 MILLIGRAM(S): 80 TABLET, FILM COATED ORAL at 22:59

## 2023-04-16 RX ADMIN — RANOLAZINE 500 MILLIGRAM(S): 500 TABLET, FILM COATED, EXTENDED RELEASE ORAL at 18:24

## 2023-04-16 RX ADMIN — Medication 81 MILLIGRAM(S): at 11:54

## 2023-04-16 RX ADMIN — Medication 5 UNIT(S): at 11:56

## 2023-04-16 RX ADMIN — RANOLAZINE 500 MILLIGRAM(S): 500 TABLET, FILM COATED, EXTENDED RELEASE ORAL at 05:39

## 2023-04-16 NOTE — PATIENT PROFILE ADULT - FALL HARM RISK - RISK INTERVENTIONS

## 2023-04-16 NOTE — ED ADULT NURSE REASSESSMENT NOTE - NS ED NURSE REASSESS COMMENT FT1
Pt alert and oriented x3. Airway patent with equal respirations. No distress noted. Arom x 4 extremities. Pt easily arousal. Pt on cardiac monitor with heart rate  ranging from 40-60. Pt non symptomatic, denies feeling dizzy, lightheaded, etc. Pt other vitals remain stable. Endorsed to provider.

## 2023-04-16 NOTE — PATIENT PROFILE ADULT - FUNCTIONAL ASSESSMENT - BASIC MOBILITY 6.
3-calculated by average/Not able to assess (calculate score using [facility] averaging method)

## 2023-04-16 NOTE — PATIENT PROFILE ADULT - STATED REASON FOR ADMISSION
as per daughter his bp has been low. yesterday 80/60 and was given 2 bags of iv fluid and today palpitations  fs 195  hypotension

## 2023-04-17 ENCOUNTER — TRANSCRIPTION ENCOUNTER (OUTPATIENT)
Age: 78
End: 2023-04-17

## 2023-04-17 LAB
ANION GAP SERPL CALC-SCNC: 7 MMOL/L — SIGNIFICANT CHANGE UP (ref 7–14)
BUN SERPL-MCNC: 29 MG/DL — HIGH (ref 10–20)
CALCIUM SERPL-MCNC: 9.2 MG/DL — SIGNIFICANT CHANGE UP (ref 8.4–10.4)
CHLORIDE SERPL-SCNC: 107 MMOL/L — SIGNIFICANT CHANGE UP (ref 98–110)
CO2 SERPL-SCNC: 26 MMOL/L — SIGNIFICANT CHANGE UP (ref 17–32)
CREAT SERPL-MCNC: 1.6 MG/DL — HIGH (ref 0.7–1.5)
EGFR: 44 ML/MIN/1.73M2 — LOW
GLUCOSE BLDC GLUCOMTR-MCNC: 128 MG/DL — HIGH (ref 70–99)
GLUCOSE BLDC GLUCOMTR-MCNC: 136 MG/DL — HIGH (ref 70–99)
GLUCOSE BLDC GLUCOMTR-MCNC: 162 MG/DL — HIGH (ref 70–99)
GLUCOSE BLDC GLUCOMTR-MCNC: 233 MG/DL — HIGH (ref 70–99)
GLUCOSE SERPL-MCNC: 185 MG/DL — HIGH (ref 70–99)
HCT VFR BLD CALC: 43.8 % — SIGNIFICANT CHANGE UP (ref 42–52)
HGB BLD-MCNC: 14.5 G/DL — SIGNIFICANT CHANGE UP (ref 14–18)
MCHC RBC-ENTMCNC: 32.6 PG — HIGH (ref 27–31)
MCHC RBC-ENTMCNC: 33.1 G/DL — SIGNIFICANT CHANGE UP (ref 32–37)
MCV RBC AUTO: 98.4 FL — HIGH (ref 80–94)
NRBC # BLD: 0 /100 WBCS — SIGNIFICANT CHANGE UP (ref 0–0)
PLATELET # BLD AUTO: 137 K/UL — SIGNIFICANT CHANGE UP (ref 130–400)
PMV BLD: 12.8 FL — HIGH (ref 7.4–10.4)
POTASSIUM SERPL-MCNC: 5.6 MMOL/L — HIGH (ref 3.5–5)
POTASSIUM SERPL-SCNC: 5.6 MMOL/L — HIGH (ref 3.5–5)
RBC # BLD: 4.45 M/UL — LOW (ref 4.7–6.1)
RBC # FLD: 13.2 % — SIGNIFICANT CHANGE UP (ref 11.5–14.5)
SODIUM SERPL-SCNC: 140 MMOL/L — SIGNIFICANT CHANGE UP (ref 135–146)
TROPONIN T SERPL-MCNC: <0.01 NG/ML — SIGNIFICANT CHANGE UP
TSH SERPL-MCNC: 1.4 UIU/ML — SIGNIFICANT CHANGE UP (ref 0.27–4.2)
WBC # BLD: 8.63 K/UL — SIGNIFICANT CHANGE UP (ref 4.8–10.8)
WBC # FLD AUTO: 8.63 K/UL — SIGNIFICANT CHANGE UP (ref 4.8–10.8)

## 2023-04-17 PROCEDURE — 99232 SBSQ HOSP IP/OBS MODERATE 35: CPT

## 2023-04-17 PROCEDURE — 99223 1ST HOSP IP/OBS HIGH 75: CPT

## 2023-04-17 RX ORDER — LISINOPRIL 2.5 MG/1
1 TABLET ORAL
Qty: 0 | Refills: 0 | DISCHARGE

## 2023-04-17 RX ORDER — METOPROLOL TARTRATE 50 MG
50 TABLET ORAL
Refills: 0 | Status: DISCONTINUED | OUTPATIENT
Start: 2023-04-17 | End: 2023-04-18

## 2023-04-17 RX ORDER — METOPROLOL TARTRATE 50 MG
1 TABLET ORAL
Qty: 180 | Refills: 0
Start: 2023-04-17 | End: 2023-07-15

## 2023-04-17 RX ORDER — ATROPINE SULFATE 0.1 MG/ML
5 SYRINGE (ML) INJECTION ONCE
Refills: 0 | Status: DISCONTINUED | OUTPATIENT
Start: 2023-04-17 | End: 2023-04-17

## 2023-04-17 RX ORDER — METOPROLOL TARTRATE 50 MG
1 TABLET ORAL
Qty: 180 | Refills: 0 | DISCHARGE
Start: 2023-04-17 | End: 2023-07-15

## 2023-04-17 RX ORDER — INSULIN LISPRO 100/ML
0 VIAL (ML) SUBCUTANEOUS
Qty: 0 | Refills: 0 | DISCHARGE

## 2023-04-17 RX ORDER — ATROPINE SULFATE 0.1 MG/ML
1 SYRINGE (ML) INJECTION ONCE
Refills: 0 | Status: DISCONTINUED | OUTPATIENT
Start: 2023-04-17 | End: 2023-04-21

## 2023-04-17 RX ORDER — SODIUM ZIRCONIUM CYCLOSILICATE 10 G/10G
10 POWDER, FOR SUSPENSION ORAL
Refills: 0 | Status: COMPLETED | OUTPATIENT
Start: 2023-04-17 | End: 2023-04-19

## 2023-04-17 RX ADMIN — Medication 5 UNIT(S): at 11:44

## 2023-04-17 RX ADMIN — Medication 4: at 11:44

## 2023-04-17 RX ADMIN — Medication 150 MILLIGRAM(S): at 05:47

## 2023-04-17 RX ADMIN — Medication 5 UNIT(S): at 18:03

## 2023-04-17 RX ADMIN — Medication 81 MILLIGRAM(S): at 11:44

## 2023-04-17 RX ADMIN — Medication 150 MILLIGRAM(S): at 18:10

## 2023-04-17 RX ADMIN — Medication 100 MILLIGRAM(S): at 05:47

## 2023-04-17 RX ADMIN — Medication 2: at 08:57

## 2023-04-17 RX ADMIN — SODIUM ZIRCONIUM CYCLOSILICATE 10 GRAM(S): 10 POWDER, FOR SUSPENSION ORAL at 18:03

## 2023-04-17 RX ADMIN — RANOLAZINE 500 MILLIGRAM(S): 500 TABLET, FILM COATED, EXTENDED RELEASE ORAL at 05:47

## 2023-04-17 RX ADMIN — INSULIN GLARGINE 45 UNIT(S): 100 INJECTION, SOLUTION SUBCUTANEOUS at 22:10

## 2023-04-17 RX ADMIN — RANOLAZINE 500 MILLIGRAM(S): 500 TABLET, FILM COATED, EXTENDED RELEASE ORAL at 18:03

## 2023-04-17 RX ADMIN — Medication 5 UNIT(S): at 08:56

## 2023-04-17 RX ADMIN — HEPARIN SODIUM 5000 UNIT(S): 5000 INJECTION INTRAVENOUS; SUBCUTANEOUS at 18:03

## 2023-04-17 RX ADMIN — TAMSULOSIN HYDROCHLORIDE 0.4 MILLIGRAM(S): 0.4 CAPSULE ORAL at 22:09

## 2023-04-17 RX ADMIN — MAGNESIUM OXIDE 400 MG ORAL TABLET 400 MILLIGRAM(S): 241.3 TABLET ORAL at 11:44

## 2023-04-17 RX ADMIN — SODIUM ZIRCONIUM CYCLOSILICATE 10 GRAM(S): 10 POWDER, FOR SUSPENSION ORAL at 11:43

## 2023-04-17 RX ADMIN — ATORVASTATIN CALCIUM 80 MILLIGRAM(S): 80 TABLET, FILM COATED ORAL at 22:09

## 2023-04-17 NOTE — DISCHARGE NOTE PROVIDER - NSDCPNSUBOBJ_GEN_ALL_CORE
Pt seen and examined at bedside. Case d/w pt, daughter, and EP. As per Ep ok for dc home witth metoprolol. Pt stable for dc.

## 2023-04-17 NOTE — DISCHARGE NOTE PROVIDER - HOSPITAL COURSE
78-year-old male with PMHx of CAD s/p PCI, DM, HTM, HLD, BPH and hemochromatosis (phlebotomy every 3 months) sent by PMD Dr. Merlos out of concern for possible A-fib/arrhythmia.  PAtient reports that since sunday he has noted some episodes of palpitations with diaphoresis but no chest pain,  unsure if he was having hypoglycemic episodes but wife reports that at time of event his FS is . Patient also reports 1 week of intermittent hypotension at home with yesterday an episode of BP 80/60 after phlebotomy felt better after IV fluids given.  Seen by PMD today who noted irregular heartbeat so sent patient to ED.  No chest pain.  Reports intermittent dyspnea on exertion which is worse in the last week.  No leg pain swelling.  No fever cough.  No LOC.  Follows with Dr. Freed cardiology.    Vital Signs Last 24 Hrs  T(C): 36.7 (15 Apr 2023 10:32), Max: 36.7 (15 Apr 2023 10:32)  T(F): 98.1 (15 Apr 2023 10:32), Max: 98.1 (15 Apr 2023 10:32)  HR: 101 (15 Apr 2023 10:32) (101 - 101)  BP: 117/62 (15 Apr 2023 10:32) (117/62 - 117/62)  BP(mean): --  RR: 18 (15 Apr 2023 10:32) (18 - 18)  SpO2: 96% (15 Apr 2023 10:32) (96% - 96%)    Parameters below as of 15 Apr 2023 10:32  Patient On (Oxygen Delivery Method): room air    Hospital Course:    Pt evaluated in ED in NSR. Monitored on tele with findings of PACs, per cardiologist episodes of sinus bradycardia with blocked APCs, rec to reduce BB dose. Per EP, _______________. Pt had echo with EF 62%, G1DD. Pt is otherwise stable for discharge with close cardiology follow up 78-year-old male with PMHx of CAD s/p PCI, DM, HTM, HLD, BPH and hemochromatosis (phlebotomy every 3 months) sent by PMD Dr. Merlos out of concern for possible A-fib/arrhythmia.  PAtient reports that since sunday he has noted some episodes of palpitations with diaphoresis but no chest pain,  unsure if he was having hypoglycemic episodes but wife reports that at time of event his FS is . Patient also reports 1 week of intermittent hypotension at home with an episode of BP 80/60 after phlebotomy. The patient felt better after IV fluids given.  Seen by PMD on the day of presentation, who noted irregular heartbeat so sent patient to ED.  No chest pain.  Reported intermittent dyspnea on exertion which is worse in the last week.  No leg pain swelling.  No fever cough.  No LOC.  Follows with Dr. Freed cardiology.    Vital Signs in the ED:  T(C): 36.7 (15 Apr 2023 10:32), Max: 36.7 (15 Apr 2023 10:32)  T(F): 98.1 (15 Apr 2023 10:32), Max: 98.1 (15 Apr 2023 10:32)  HR: 101 (15 Apr 2023 10:32) (101 - 101)  BP: 117/62 (15 Apr 2023 10:32) (117/62 - 117/62)  BP(mean): --  RR: 18 (15 Apr 2023 10:32) (18 - 18)  SpO2: 96% (15 Apr 2023 10:32) (96% - 96%)    The hospital course also included the following:  # Bradycardia  #Dizziness  # Palpitations/diaphoresis   -symptoms resolved  - Continue telemetry monitoring> might need outpatient holter   - Cardiology consulted and recommendations appreciated.  -PPM placed by EP, Follow up EP after the procedure     # CAD s/p PCI   - trops neg for now     # HTN  - If BP increased in hospital , resume home meds    # Hemochromatosis   - Gets recurrent phlebotomy [id number]   - currently stable     # BPH  Home med: Tamsulosin   - c/w with home meds    # DM   - Monitor FS   - Continue Lantus and lispro in hospital      #DVT - Heparin SQ  #GI - pantoprazole   #Diet - DASH/ CC  #Activity - IAT   #Code - Full code     The patient was followed by EP after the PPM placement. EP interrogated the device. The patient is clinically stable, tolerating diet and activity and can be discharged.

## 2023-04-17 NOTE — DISCHARGE NOTE PROVIDER - NSDCMRMEDTOKEN_GEN_ALL_CORE_FT
Aspir 81 oral delayed release tablet: 1 tab(s) orally once a day  atorvastatin 80 mg oral tablet: 1 tab(s) orally once a day (at bedtime)  glipiZIDE 5 mg oral tablet: 1 tab(s) orally once a day  HumaLOG 100 units/mL injectable solution: 10 unit(s) injectable 3 times a day  Jardiance 25 mg oral tablet: 1 tab(s) orally once a day (in the morning)  Lantus 100 units/mL subcutaneous solution: 45 unit(s) subcutaneous once a day (at bedtime)  magnesium oxide 400 mg oral tablet: 1 tab(s) orally once a day  metoprolol tartrate 50 mg oral tablet: 1 tab(s) orally 2 times a day  omeprazole 40 mg oral delayed release capsule: 1 cap(s) orally once a day  pregabalin 150 mg oral capsule: 1 cap(s) orally every 12 hours  ranolazine 500 mg oral tablet, extended release: 1 tab(s) orally 2 times a day  tamsulosin 0.4 mg oral capsule: 1 cap(s) orally once a day (at bedtime)   Aspir 81 oral delayed release tablet: 1 tab(s) orally once a day  atorvastatin 80 mg oral tablet: 1 tab(s) orally once a day (at bedtime)  glipiZIDE 5 mg oral tablet: 1 tab(s) orally once a day  HumaLOG 100 units/mL injectable solution: 10 unit(s) injectable 3 times a day  Jardiance 25 mg oral tablet: 1 tab(s) orally once a day (in the morning)  Lantus 100 units/mL subcutaneous solution: 45 unit(s) subcutaneous once a day (at bedtime)  magnesium oxide 400 mg oral tablet: 1 tab(s) orally once a day  metoprolol tartrate 50 mg oral tablet: 1 tab(s) orally 2 times a day  omeprazole 40 mg oral delayed release capsule: 1 cap(s) orally once a day  pregabalin 150 mg oral capsule: 1 cap(s) orally every 12 hours  ranolazine 500 mg oral tablet, extended release: 1 tab(s) orally 2 times a day  tamsulosin 0.4 mg oral capsule: 1 cap(s) orally once a day (at bedtime)  Zestril 5 mg oral tablet: 1 tab(s) orally once a day

## 2023-04-17 NOTE — DISCHARGE NOTE PROVIDER - CARE PROVIDER_API CALL
Raymundo Montano; SUGEY)  CardiologyElectrophyslgy [place] [id number] [address] [address]  Phone: [phone]  Fax: [phone]  Follow Up Time: Routine    Karen Ferris)  Medicine  [address] [place] [address]  Phone: [phone]  Fax: [phone]  Follow Up Time: 1-3 days    Ben Freed)  Cardiovascular Disease; Internal Medicine; Interventional Cardiology  [address] [place] [id number]  Phone: [phone]  Fax: [phone]  Follow Up Time: Routine

## 2023-04-17 NOTE — DISCHARGE NOTE PROVIDER - NSDCFUADDINST_GEN_ALL_CORE_FT
Please follow up with your PCP.  Please follow up with your cardiologist.  Please follow up with EP physician.  Please take your medications as prescribed.   Please follow up with your PCP.  Please follow up with your cardiologist.  Please follow up with EP physician.  Please take your medications as prescribed.  Please do not start aspirin until 48hours after the procedure.  Your home dose of lisinopril was stopped due te to low blood pressure. Please monitor your BP and consult your primary care provider/cardiologist if your blood pressure remains low.

## 2023-04-17 NOTE — DISCHARGE NOTE PROVIDER - NSDCFUSCHEDAPPT_GEN_ALL_CORE_FT
[facility]  [facility] [address] [facility]  Scheduled Appointment: 05/15/2023

## 2023-04-17 NOTE — DISCHARGE NOTE PROVIDER - NSDCCPCAREPLAN_GEN_ALL_CORE_FT
PRINCIPAL DISCHARGE DIAGNOSIS  Diagnosis: Irregular heartbeat  Assessment and Plan of Treatment: -Please reduce your beta blocker dose to metoprolol tartrate 50mg BID. Please return to the emergency department if you have repeat symptoms, dizziness, lightheadedness      SECONDARY DISCHARGE DIAGNOSES  Diagnosis: Hyperkalemia  Assessment and Plan of Treatment: -Please follow up with your primary care provider for repeat blood work to check on your potassium levels  -Please stop lisinopril until your potassium level is checked by your primary are provider     PRINCIPAL DISCHARGE DIAGNOSIS  Diagnosis: Bradycardia  Assessment and Plan of Treatment: Bradycardia is a slower than normal heart rate. The hearts of adults at rest usually beat between 60 and 100 times a minute. If you have bradycardia ([id number]), your heart beats fewer than 60 times a minute.  Bradycardia can be a serious problem if the heart doesn't pump enough oxygen-rich blood to the body. For some people, however, bradycardia doesn't cause symptoms or complications.  An implanted pacemaker can correct bradycardia and help your heart maintain an appropriate rate.  Bradycardia can also occur because electrical signals transmitted through the atria aren't transmitted to the ventricles (heart block, or atrioventricular block).  In the [facility], you underwent PPM placement by the EP team. The EP team followed after the procedure and the rcommedndations from EP ws followed. You are being discharged with the advise to follow with your PCP, cardiologist and EP outpatient.   Please seek medical attention if you have chest pain, shortness of breath, loss of consciousness or worsening of symptoms.        SECONDARY DISCHARGE DIAGNOSES  Diagnosis: Hyperkalemia  Assessment and Plan of Treatment: -Please follow up with your primary care provider for repeat blood work to check on your potassium levels  -Please stop lisinopril until your potassium level is checked by your primary are provider

## 2023-04-17 NOTE — CONSULT NOTE ADULT - SUBJECTIVE AND OBJECTIVE BOX
Patient is a 78y old  Male who presents with a chief complaint of abnormal rhythm (16 Apr 2023 13:58)    HPI: Patient is a 78-year-old male with PMHx of CAD s/p PCI, DM, HTM, HLD, BPH and hemochromatosis (phlebotomy every 3 months) sent by PMD Dr. Merlos out of concern for possible A-fib/arrhythmia.  PAtient reports that since sunday he has noted some episodes of palpitations with diaphoresis but no chest pain,  unsure if he was having hypoglycemic episodes but wife reports that at time of event his FS is . Patient also reports 1 week of intermittent hypotension at home with yesterday an episode of BP 80/60 after phlebotomy felt better after IV fluids given. Seen by PMD today who noted irregular heartbeat so sent patient to ED.  No chest pain.  Reports intermittent dyspnea on exertion which is worse in the last week.  No leg pain swelling.  No fever cough. Follows with Dr. Freed cardiology. Patient admits to feeling occasional palpitations and lightheadedness, no syncope. Last ischemic workup ~6 years ago when last stent placed.     PAST MEDICAL & SURGICAL HISTORY:  DM (diabetes mellitus)      CAD (coronary artery disease)      BPH (benign prostatic hyperplasia)      History of hematologic disorder      Total cataract      [place] (hard of hearing)      Acute myocardial infarction      H/O coronary angiogram      Stented coronary artery      History of ear surgery      H/O tonsillitis      PREVIOUS DIAGNOSTIC TESTING:      ECHO  FINDINGS:  < from: TTE Echo Complete w/o Contrast w/ Doppler (04.16.23 @ 08:50) >    Summary:   1. Left ventricular ejection fraction, by visual estimation, is 50 to   55%.   2. Technically difficult study.   3. LV Ejection Fraction by Woods's Method with a biplane EF of 62 %.   4. Elevated mean left atrial pressure.   5. Moderate concentric left ventricular hypertrophy.   6. Spectral Doppler shows impaired relaxation pattern of left   ventricular myocardial filling (Grade I diastolic dysfunction).   7. E/e': 15 TR alvina: 3.2 m/s.   8. Mildly enlarged left atrium.   9. Degenerative mitral valve.  10. Thickened aortic mitral curtain.  11. Mild tricuspid regurgitation.  12. Ao valve nwv but appears restricted and calcified Peak AV alvina: 1.64   m/s.    < end of copied text >    STRESS  FINDINGS:    CATHETERIZATION  FINDINGS:    ELECTROPHYSIOLOGY STUDY  FINDINGS:    CAROTID ULTRASOUND:  FINDINGS    VENOUS DUPLEX SCAN:  FINDINGS:    CHEST CT PULMONARY ANGIO with IV Contrast:  FINDINGS:    MEDICATIONS  (STANDING):  aspirin enteric coated 81 milliGRAM(s) Oral daily  atorvastatin 80 milliGRAM(s) Oral at bedtime  dextrose 5%. 1000 milliLiter(s) (50 mL/Hr) IV Continuous <Continuous>  dextrose 5%. 1000 milliLiter(s) (100 mL/Hr) IV Continuous <Continuous>  dextrose 50% Injectable 25 Gram(s) IV Push once  dextrose 50% Injectable 12.5 Gram(s) IV Push once  dextrose 50% Injectable 25 Gram(s) IV Push once  glucagon  Injectable 1 milliGRAM(s) IntraMuscular once  heparin   Injectable 5000 Unit(s) SubCutaneous every 12 hours  insulin glargine Injectable (LANTUS) 45 Unit(s) SubCutaneous at bedtime  insulin lispro (ADMELOG) corrective regimen sliding scale   SubCutaneous three times a day before meals  insulin lispro Injectable (ADMELOG) 5 Unit(s) SubCutaneous three times a day before meals  magnesium oxide 400 milliGRAM(s) Oral daily  metoprolol tartrate 100 milliGRAM(s) Oral two times a day  pantoprazole    Tablet 40 milliGRAM(s) Oral before breakfast  pregabalin 150 milliGRAM(s) Oral every 12 hours  ranolazine 500 milliGRAM(s) Oral two times a day  sodium zirconium cyclosilicate 10 Gram(s) Oral two times a day  tamsulosin 0.4 milliGRAM(s) Oral at bedtime    MEDICATIONS  (PRN):  dextrose Oral Gel 15 Gram(s) Oral once PRN Blood Glucose LESS THAN 70 milliGRAM(s)/deciliter      FAMILY HISTORY:  Family history of breast cancer in mother    FHx: heart disease    Family history of diabetes mellitus (DM)    SOCIAL HISTORY: No smoking, ETOH or illicit drug use    Past Surgical History: see above    Allergies:  Seafood (Anaphylaxis)  No Known Drug Allergies      REVIEW OF SYSTEMS:  CONSTITUTIONAL: No fever, weight loss, chills, shakes, or fatigue  RESPIRATORY: No cough, wheezing, hemoptysis, or shortness of breath  CARDIOVASCULAR: No chest pain, dyspnea, palpitations, dizziness, syncope, paroxysmal nocturnal dyspnea, orthopnea, or arm or leg swelling  GASTROINTESTINAL: No abdominal  or epigastric pain, nausea, vomiting, hematemesis, diarrhea, constipation, melena or bright red blood.  NEUROLOGICAL: +Occasional dizziness; No headaches, memory loss, slurred speech, limb weakness, loss of strength, numbness, or tremors  MUSCULOSKELETAL: No joint pain or swelling, muscle, back, or extremity pain      Vital Signs Last 24 Hrs  T(C): 36.4 (17 Apr 2023 07:59), Max: 36.5 (17 Apr 2023 00:24)  T(F): 97.5 (17 Apr 2023 07:59), Max: 97.7 (17 Apr 2023 00:24)  HR: 50 (17 Apr 2023 07:59) (50 - 79)  BP: 102/61 (17 Apr 2023 07:59) (102/61 - 129/75)  BP(mean): 67 (17 Apr 2023 07:59) (67 - 67)  RR: 18 (17 Apr 2023 07:59) (18 - 18)  SpO2: 98% (17 Apr 2023 07:59) (97% - 98%)    Parameters below as of 17 Apr 2023 07:59  Patient On (Oxygen Delivery Method): room air        PHYSICAL EXAM:  GENERAL: In no apparent distress, well nourished, and hydrated.  NECK: Supple, No JVD   HEART: Regular rate and rhythm; No murmurs, rubs, or gallops.  PULMONARY: Clear to auscultation and perfusion.  No rales, wheezing, or rhonchi bilaterally.  EXTREMITIES:  2+ Peripheral Pulses, no LE edema BL  NEUROLOGICAL: Grossly nonfocal      INTERPRETATION OF TELEMETRY: NSR 62 bpm    ECG:  < from: 12 Lead ECG (03.22.23 @ 16:25) >    Ventricular Rate 52 BPM    Atrial Rate 52 BPM    P-R Interval 158 ms    QRS Duration 82 ms    Q-T Interval 422 ms    QTC Calculation(Bazett) 392 ms    P Axis 29 degrees    R Axis -17 degrees    T Axis 45 degrees    Diagnosis Line Sinus bradycardia  Possible Inferior infarct , age undetermined    Confirmed by JONAS MACHUCA MD ([id number]) on 3/23/2023 10:31:13 AM    < end of copied text >      I&O's Detail      LABS:                        14.5   8.63  )-----------( 137      ( 17 Apr 2023 07:46 )             43.8     04-17    140  |  107  |  29<H>  ----------------------------<  185<H>  5.6<H>   |  26  |  1.6<H>    Ca    9.2      17 Apr 2023 07:46  Phos  3.3     04-16  Mg     2.1     04-16    TPro  5.9<L>  /  Alb  3.8  /  TBili  0.8  /  DBili  x   /  AST  20  /  ALT  16  /  AlkPhos  65  04-16    CARDIAC MARKERS ( 17 Apr 2023 07:46 )  x     / <0.01 ng/mL / x     / x     / x      CARDIAC MARKERS ( 15 Apr 2023 12:48 )  x     / <0.01 ng/mL / x     / x     / x              BNP  I&O's Detail    Daily     Daily     RADIOLOGY & ADDITIONAL STUDIES:
Patient is a 78y old  Male who presents with a chief complaint of abnormal rhythm (17 Apr 2023 15:07)      HPI:  78-year-old male with PMHx of CAD s/p PCI, DM, HTM, HLD, BPH and hemochromatosis (phlebotomy every 3 months) sent by PMD Dr. Merlos out of concern for possible A-fib/arrhythmia.  PAtient reports that since sunday he has noted some episodes of palpitations with diaphoresis but no chest pain,  unsure if he was having hypoglycemic episodes but wife reports that at time of event his FS is . Patient also reports 1 week of intermittent hypotension at home with yesterday an episode of BP 80/60 after phlebotomy felt better after IV fluids given.  Seen by PMD today who noted irregular heartbeat so sent patient to ED.  No chest pain.  Reports intermittent dyspnea on exertion which is worse in the last week.  No leg pain swelling.  No fever cough.  No LOC.  Follows with Dr. Freed cardiology.    Vital Signs Last 24 Hrs  T(C): 36.7 (15 Apr 2023 10:32), Max: 36.7 (15 Apr 2023 10:32)  T(F): 98.1 (15 Apr 2023 10:32), Max: 98.1 (15 Apr 2023 10:32)  HR: 101 (15 Apr 2023 10:32) (101 - 101)  BP: 117/62 (15 Apr 2023 10:32) (117/62 - 117/62)  BP(mean): --  RR: 18 (15 Apr 2023 10:32) (18 - 18)  SpO2: 96% (15 Apr 2023 10:32) (96% - 96%)    Parameters below as of 15 Apr 2023 10:32  Patient On (Oxygen Delivery Method): room air   (15 Apr 2023 15:20)      PAST MEDICAL & SURGICAL HISTORY:  DM (diabetes mellitus)      CAD (coronary artery disease)      BPH (benign prostatic hyperplasia)      History of hematologic disorder      Total cataract      [place] (hard of hearing)      Acute myocardial infarction      H/O coronary angiogram      Stented coronary artery      History of ear surgery      H/O tonsillitis                          PREVIOUS DIAGNOSTIC TESTING:      ECHO  FINDINGS:    STRESS  FINDINGS:    CATHETERIZATION  FINDINGS:    MEDICATIONS  (STANDING):  aspirin enteric coated 81 milliGRAM(s) Oral daily  atorvastatin 80 milliGRAM(s) Oral at bedtime  dextrose 5%. 1000 milliLiter(s) (100 mL/Hr) IV Continuous <Continuous>  dextrose 5%. 1000 milliLiter(s) (50 mL/Hr) IV Continuous <Continuous>  dextrose 50% Injectable 25 Gram(s) IV Push once  dextrose 50% Injectable 12.5 Gram(s) IV Push once  dextrose 50% Injectable 25 Gram(s) IV Push once  glucagon  Injectable 1 milliGRAM(s) IntraMuscular once  heparin   Injectable 5000 Unit(s) SubCutaneous every 12 hours  insulin glargine Injectable (LANTUS) 45 Unit(s) SubCutaneous at bedtime  insulin lispro (ADMELOG) corrective regimen sliding scale   SubCutaneous three times a day before meals  insulin lispro Injectable (ADMELOG) 5 Unit(s) SubCutaneous three times a day before meals  magnesium oxide 400 milliGRAM(s) Oral daily  metoprolol tartrate 50 milliGRAM(s) Oral two times a day  pantoprazole    Tablet 40 milliGRAM(s) Oral before breakfast  pregabalin 150 milliGRAM(s) Oral every 12 hours  ranolazine 500 milliGRAM(s) Oral two times a day  sodium zirconium cyclosilicate 10 Gram(s) Oral two times a day  tamsulosin 0.4 milliGRAM(s) Oral at bedtime    MEDICATIONS  (PRN):  atropine Injectable 1 milliGRAM(s) IV Push once PRN HR < 30  dextrose Oral Gel 15 Gram(s) Oral once PRN Blood Glucose LESS THAN 70 milliGRAM(s)/deciliter      FAMILY HISTORY:  Family history of breast cancer in mother    FHx: heart disease    Family history of diabetes mellitus (DM)        SOCIAL HISTORY:    CIGARETTES:    ALCOHOL:        Vital Signs Last 24 Hrs  T(C): 37 (17 Apr 2023 15:32), Max: 37 (17 Apr 2023 15:32)  T(F): 98.6 (17 Apr 2023 15:32), Max: 98.6 (17 Apr 2023 15:32)  HR: 62 (17 Apr 2023 15:32) (50 - 64)  BP: 118/55 (17 Apr 2023 15:32) (102/61 - 118/55)  BP(mean): 78 (17 Apr 2023 15:32) (67 - 78)  RR: 18 (17 Apr 2023 15:32) (18 - 18)  SpO2: 97% (17 Apr 2023 15:32) (97% - 98%)    Parameters below as of 17 Apr 2023 15:32  Patient On (Oxygen Delivery Method): room air                INTERPRETATION OF TELEMETRY:    ECG:    I&O's Detail      LABS:                        14.5   8.63  )-----------( 137      ( 17 Apr 2023 07:46 )             43.8     04-17    140  |  107  |  29<H>  ----------------------------<  185<H>  5.6<H>   |  26  |  1.6<H>    Ca    9.2      17 Apr 2023 07:46  Phos  3.3     04-16  Mg     2.1     04-16    TPro  5.9<L>  /  Alb  3.8  /  TBili  0.8  /  DBili  x   /  AST  20  /  ALT  16  /  AlkPhos  65  04-16    CARDIAC MARKERS ( 17 Apr 2023 07:46 )  x     / <0.01 ng/mL / x     / x     / x              I&O's Summary    BNP  RADIOLOGY & ADDITIONAL STUDIES:

## 2023-04-17 NOTE — CONSULT NOTE ADULT - ASSESSMENT
Cardiologist: Dr Freed    Assessment: 78-year-old male with PMHx of CAD s/p PCI, DM, HTM, HLD, BPH and hemochromatosis (phlebotomy every 3 months) sent by PMD Dr. Merlos out of concern for possible A-fib/arrhythmia. Here in the hospital patient with transient episodes of bradycardia, asymptomatic.    Impression:  Intermittent bradycardia  Palpitations  Lightheadedness  CAD sp PCI  DM  HTN  Hemochromatosis    Plan:  - Will discuss with attending
pt w/o cp. pt w/ some bradycardia. decrease metoprolol to 50mg po bid. monitor. eps pending.

## 2023-04-17 NOTE — DISCHARGE NOTE PROVIDER - CARE PROVIDERS DIRECT ADDRESSES
[email or web address]

## 2023-04-17 NOTE — DISCHARGE NOTE PROVIDER - PROVIDER TOKENS
PROVIDER:[TOKEN:[[id number]],FOLLOWUP:[Routine]],PROVIDER:[TOKEN:[[id number]],FOLLOWUP:[1-3 days]],PROVIDER:[TOKEN:[[id number]],FOLLOWUP:[Routine]]

## 2023-04-18 LAB
ALBUMIN SERPL ELPH-MCNC: 4 G/DL — SIGNIFICANT CHANGE UP (ref 3.5–5.2)
ALP SERPL-CCNC: 81 U/L — SIGNIFICANT CHANGE UP (ref 30–115)
ALT FLD-CCNC: 17 U/L — SIGNIFICANT CHANGE UP (ref 0–41)
ANION GAP SERPL CALC-SCNC: 9 MMOL/L — SIGNIFICANT CHANGE UP (ref 7–14)
AST SERPL-CCNC: 18 U/L — SIGNIFICANT CHANGE UP (ref 0–41)
BASOPHILS # BLD AUTO: 0.03 K/UL — SIGNIFICANT CHANGE UP (ref 0–0.2)
BASOPHILS NFR BLD AUTO: 0.3 % — SIGNIFICANT CHANGE UP (ref 0–1)
BILIRUB SERPL-MCNC: 0.9 MG/DL — SIGNIFICANT CHANGE UP (ref 0.2–1.2)
BUN SERPL-MCNC: 21 MG/DL — HIGH (ref 10–20)
CALCIUM SERPL-MCNC: 8.7 MG/DL — SIGNIFICANT CHANGE UP (ref 8.4–10.5)
CHLORIDE SERPL-SCNC: 107 MMOL/L — SIGNIFICANT CHANGE UP (ref 98–110)
CO2 SERPL-SCNC: 25 MMOL/L — SIGNIFICANT CHANGE UP (ref 17–32)
CREAT SERPL-MCNC: 1.5 MG/DL — SIGNIFICANT CHANGE UP (ref 0.7–1.5)
EGFR: 47 ML/MIN/1.73M2 — LOW
EOSINOPHIL # BLD AUTO: 0.34 K/UL — SIGNIFICANT CHANGE UP (ref 0–0.7)
EOSINOPHIL NFR BLD AUTO: 3.7 % — SIGNIFICANT CHANGE UP (ref 0–8)
GLUCOSE BLDC GLUCOMTR-MCNC: 136 MG/DL — HIGH (ref 70–99)
GLUCOSE BLDC GLUCOMTR-MCNC: 143 MG/DL — HIGH (ref 70–99)
GLUCOSE BLDC GLUCOMTR-MCNC: 184 MG/DL — HIGH (ref 70–99)
GLUCOSE BLDC GLUCOMTR-MCNC: 273 MG/DL — HIGH (ref 70–99)
GLUCOSE SERPL-MCNC: 171 MG/DL — HIGH (ref 70–99)
HCT VFR BLD CALC: 44.9 % — SIGNIFICANT CHANGE UP (ref 42–52)
HGB BLD-MCNC: 15 G/DL — SIGNIFICANT CHANGE UP (ref 14–18)
IMM GRANULOCYTES NFR BLD AUTO: 0.9 % — HIGH (ref 0.1–0.3)
LYMPHOCYTES # BLD AUTO: 2.16 K/UL — SIGNIFICANT CHANGE UP (ref 1.2–3.4)
LYMPHOCYTES # BLD AUTO: 23.5 % — SIGNIFICANT CHANGE UP (ref 20.5–51.1)
MAGNESIUM SERPL-MCNC: 2.3 MG/DL — SIGNIFICANT CHANGE UP (ref 1.8–2.4)
MCHC RBC-ENTMCNC: 33 PG — HIGH (ref 27–31)
MCHC RBC-ENTMCNC: 33.4 G/DL — SIGNIFICANT CHANGE UP (ref 32–37)
MCV RBC AUTO: 98.7 FL — HIGH (ref 80–94)
MONOCYTES # BLD AUTO: 0.65 K/UL — HIGH (ref 0.1–0.6)
MONOCYTES NFR BLD AUTO: 7.1 % — SIGNIFICANT CHANGE UP (ref 1.7–9.3)
NEUTROPHILS # BLD AUTO: 5.95 K/UL — SIGNIFICANT CHANGE UP (ref 1.4–6.5)
NEUTROPHILS NFR BLD AUTO: 64.5 % — SIGNIFICANT CHANGE UP (ref 42.2–75.2)
NRBC # BLD: 0 /100 WBCS — SIGNIFICANT CHANGE UP (ref 0–0)
PLATELET # BLD AUTO: 131 K/UL — SIGNIFICANT CHANGE UP (ref 130–400)
PMV BLD: 12.8 FL — HIGH (ref 7.4–10.4)
POTASSIUM SERPL-MCNC: 4.5 MMOL/L — SIGNIFICANT CHANGE UP (ref 3.5–5)
POTASSIUM SERPL-SCNC: 4.5 MMOL/L — SIGNIFICANT CHANGE UP (ref 3.5–5)
PROT SERPL-MCNC: 6.2 G/DL — SIGNIFICANT CHANGE UP (ref 6–8)
RBC # BLD: 4.55 M/UL — LOW (ref 4.7–6.1)
RBC # FLD: 13.2 % — SIGNIFICANT CHANGE UP (ref 11.5–14.5)
SODIUM SERPL-SCNC: 141 MMOL/L — SIGNIFICANT CHANGE UP (ref 135–146)
WBC # BLD: 9.21 K/UL — SIGNIFICANT CHANGE UP (ref 4.8–10.8)
WBC # FLD AUTO: 9.21 K/UL — SIGNIFICANT CHANGE UP (ref 4.8–10.8)

## 2023-04-18 PROCEDURE — 99233 SBSQ HOSP IP/OBS HIGH 50: CPT

## 2023-04-18 PROCEDURE — 99233 SBSQ HOSP IP/OBS HIGH 50: CPT | Mod: 57

## 2023-04-18 RX ORDER — METOPROLOL TARTRATE 50 MG
50 TABLET ORAL ONCE
Refills: 0 | Status: DISCONTINUED | OUTPATIENT
Start: 2023-04-18 | End: 2023-04-18

## 2023-04-18 RX ADMIN — Medication 5 UNIT(S): at 17:24

## 2023-04-18 RX ADMIN — RANOLAZINE 500 MILLIGRAM(S): 500 TABLET, FILM COATED, EXTENDED RELEASE ORAL at 17:17

## 2023-04-18 RX ADMIN — INSULIN GLARGINE 45 UNIT(S): 100 INJECTION, SOLUTION SUBCUTANEOUS at 22:04

## 2023-04-18 RX ADMIN — TAMSULOSIN HYDROCHLORIDE 0.4 MILLIGRAM(S): 0.4 CAPSULE ORAL at 21:59

## 2023-04-18 RX ADMIN — MAGNESIUM OXIDE 400 MG ORAL TABLET 400 MILLIGRAM(S): 241.3 TABLET ORAL at 11:58

## 2023-04-18 RX ADMIN — HEPARIN SODIUM 5000 UNIT(S): 5000 INJECTION INTRAVENOUS; SUBCUTANEOUS at 17:17

## 2023-04-18 RX ADMIN — SODIUM ZIRCONIUM CYCLOSILICATE 10 GRAM(S): 10 POWDER, FOR SUSPENSION ORAL at 17:17

## 2023-04-18 RX ADMIN — ATORVASTATIN CALCIUM 80 MILLIGRAM(S): 80 TABLET, FILM COATED ORAL at 21:59

## 2023-04-18 RX ADMIN — Medication 150 MILLIGRAM(S): at 17:19

## 2023-04-18 RX ADMIN — Medication 5 UNIT(S): at 08:36

## 2023-04-18 RX ADMIN — Medication 81 MILLIGRAM(S): at 11:58

## 2023-04-19 LAB
ALBUMIN SERPL ELPH-MCNC: 3.7 G/DL — SIGNIFICANT CHANGE UP (ref 3.5–5.2)
ALP SERPL-CCNC: 73 U/L — SIGNIFICANT CHANGE UP (ref 30–115)
ALT FLD-CCNC: 16 U/L — SIGNIFICANT CHANGE UP (ref 0–41)
ANION GAP SERPL CALC-SCNC: 11 MMOL/L — SIGNIFICANT CHANGE UP (ref 7–14)
AST SERPL-CCNC: 17 U/L — SIGNIFICANT CHANGE UP (ref 0–41)
BASOPHILS # BLD AUTO: 0.05 K/UL — SIGNIFICANT CHANGE UP (ref 0–0.2)
BASOPHILS NFR BLD AUTO: 0.6 % — SIGNIFICANT CHANGE UP (ref 0–1)
BILIRUB SERPL-MCNC: 0.8 MG/DL — SIGNIFICANT CHANGE UP (ref 0.2–1.2)
BUN SERPL-MCNC: 20 MG/DL — SIGNIFICANT CHANGE UP (ref 10–20)
CALCIUM SERPL-MCNC: 8.8 MG/DL — SIGNIFICANT CHANGE UP (ref 8.4–10.5)
CHLORIDE SERPL-SCNC: 106 MMOL/L — SIGNIFICANT CHANGE UP (ref 98–110)
CO2 SERPL-SCNC: 24 MMOL/L — SIGNIFICANT CHANGE UP (ref 17–32)
CREAT SERPL-MCNC: 1.4 MG/DL — SIGNIFICANT CHANGE UP (ref 0.7–1.5)
EGFR: 51 ML/MIN/1.73M2 — LOW
EOSINOPHIL # BLD AUTO: 0.47 K/UL — SIGNIFICANT CHANGE UP (ref 0–0.7)
EOSINOPHIL NFR BLD AUTO: 5.3 % — SIGNIFICANT CHANGE UP (ref 0–8)
GLUCOSE BLDC GLUCOMTR-MCNC: 115 MG/DL — HIGH (ref 70–99)
GLUCOSE BLDC GLUCOMTR-MCNC: 130 MG/DL — HIGH (ref 70–99)
GLUCOSE BLDC GLUCOMTR-MCNC: 135 MG/DL — HIGH (ref 70–99)
GLUCOSE BLDC GLUCOMTR-MCNC: 143 MG/DL — HIGH (ref 70–99)
GLUCOSE SERPL-MCNC: 110 MG/DL — HIGH (ref 70–99)
HCT VFR BLD CALC: 43.2 % — SIGNIFICANT CHANGE UP (ref 42–52)
HGB BLD-MCNC: 14.4 G/DL — SIGNIFICANT CHANGE UP (ref 14–18)
IMM GRANULOCYTES NFR BLD AUTO: 0.8 % — HIGH (ref 0.1–0.3)
LYMPHOCYTES # BLD AUTO: 2.49 K/UL — SIGNIFICANT CHANGE UP (ref 1.2–3.4)
LYMPHOCYTES # BLD AUTO: 27.8 % — SIGNIFICANT CHANGE UP (ref 20.5–51.1)
MAGNESIUM SERPL-MCNC: 2.2 MG/DL — SIGNIFICANT CHANGE UP (ref 1.8–2.4)
MCHC RBC-ENTMCNC: 32.9 PG — HIGH (ref 27–31)
MCHC RBC-ENTMCNC: 33.3 G/DL — SIGNIFICANT CHANGE UP (ref 32–37)
MCV RBC AUTO: 98.6 FL — HIGH (ref 80–94)
MONOCYTES # BLD AUTO: 0.72 K/UL — HIGH (ref 0.1–0.6)
MONOCYTES NFR BLD AUTO: 8 % — SIGNIFICANT CHANGE UP (ref 1.7–9.3)
NEUTROPHILS # BLD AUTO: 5.15 K/UL — SIGNIFICANT CHANGE UP (ref 1.4–6.5)
NEUTROPHILS NFR BLD AUTO: 57.5 % — SIGNIFICANT CHANGE UP (ref 42.2–75.2)
NRBC # BLD: 0 /100 WBCS — SIGNIFICANT CHANGE UP (ref 0–0)
PLATELET # BLD AUTO: 119 K/UL — LOW (ref 130–400)
PMV BLD: 12.2 FL — HIGH (ref 7.4–10.4)
POTASSIUM SERPL-MCNC: 4.1 MMOL/L — SIGNIFICANT CHANGE UP (ref 3.5–5)
POTASSIUM SERPL-SCNC: 4.1 MMOL/L — SIGNIFICANT CHANGE UP (ref 3.5–5)
PROT SERPL-MCNC: 5.7 G/DL — LOW (ref 6–8)
RBC # BLD: 4.38 M/UL — LOW (ref 4.7–6.1)
RBC # FLD: 13.3 % — SIGNIFICANT CHANGE UP (ref 11.5–14.5)
SODIUM SERPL-SCNC: 141 MMOL/L — SIGNIFICANT CHANGE UP (ref 135–146)
WBC # BLD: 8.95 K/UL — SIGNIFICANT CHANGE UP (ref 4.8–10.8)
WBC # FLD AUTO: 8.95 K/UL — SIGNIFICANT CHANGE UP (ref 4.8–10.8)

## 2023-04-19 PROCEDURE — 99233 SBSQ HOSP IP/OBS HIGH 50: CPT

## 2023-04-19 RX ADMIN — SODIUM ZIRCONIUM CYCLOSILICATE 10 GRAM(S): 10 POWDER, FOR SUSPENSION ORAL at 06:54

## 2023-04-19 RX ADMIN — RANOLAZINE 500 MILLIGRAM(S): 500 TABLET, FILM COATED, EXTENDED RELEASE ORAL at 17:24

## 2023-04-19 RX ADMIN — PANTOPRAZOLE SODIUM 40 MILLIGRAM(S): 20 TABLET, DELAYED RELEASE ORAL at 06:53

## 2023-04-19 RX ADMIN — Medication 150 MILLIGRAM(S): at 17:25

## 2023-04-19 RX ADMIN — ATORVASTATIN CALCIUM 80 MILLIGRAM(S): 80 TABLET, FILM COATED ORAL at 23:26

## 2023-04-19 RX ADMIN — HEPARIN SODIUM 5000 UNIT(S): 5000 INJECTION INTRAVENOUS; SUBCUTANEOUS at 17:26

## 2023-04-19 RX ADMIN — HEPARIN SODIUM 5000 UNIT(S): 5000 INJECTION INTRAVENOUS; SUBCUTANEOUS at 06:53

## 2023-04-19 RX ADMIN — MAGNESIUM OXIDE 400 MG ORAL TABLET 400 MILLIGRAM(S): 241.3 TABLET ORAL at 11:36

## 2023-04-19 RX ADMIN — RANOLAZINE 500 MILLIGRAM(S): 500 TABLET, FILM COATED, EXTENDED RELEASE ORAL at 06:53

## 2023-04-19 RX ADMIN — TAMSULOSIN HYDROCHLORIDE 0.4 MILLIGRAM(S): 0.4 CAPSULE ORAL at 23:26

## 2023-04-19 RX ADMIN — Medication 150 MILLIGRAM(S): at 06:53

## 2023-04-20 LAB
ALBUMIN SERPL ELPH-MCNC: 3.6 G/DL — SIGNIFICANT CHANGE UP (ref 3.5–5.2)
ALP SERPL-CCNC: 78 U/L — SIGNIFICANT CHANGE UP (ref 30–115)
ALT FLD-CCNC: 21 U/L — SIGNIFICANT CHANGE UP (ref 0–41)
ANION GAP SERPL CALC-SCNC: 11 MMOL/L — SIGNIFICANT CHANGE UP (ref 7–14)
AST SERPL-CCNC: 20 U/L — SIGNIFICANT CHANGE UP (ref 0–41)
BASOPHILS # BLD AUTO: 0.05 K/UL — SIGNIFICANT CHANGE UP (ref 0–0.2)
BASOPHILS NFR BLD AUTO: 0.6 % — SIGNIFICANT CHANGE UP (ref 0–1)
BILIRUB SERPL-MCNC: 0.8 MG/DL — SIGNIFICANT CHANGE UP (ref 0.2–1.2)
BUN SERPL-MCNC: 20 MG/DL — SIGNIFICANT CHANGE UP (ref 10–20)
CALCIUM SERPL-MCNC: 8.7 MG/DL — SIGNIFICANT CHANGE UP (ref 8.4–10.5)
CHLORIDE SERPL-SCNC: 106 MMOL/L — SIGNIFICANT CHANGE UP (ref 98–110)
CO2 SERPL-SCNC: 22 MMOL/L — SIGNIFICANT CHANGE UP (ref 17–32)
CREAT SERPL-MCNC: 1.5 MG/DL — SIGNIFICANT CHANGE UP (ref 0.7–1.5)
EGFR: 47 ML/MIN/1.73M2 — LOW
EOSINOPHIL # BLD AUTO: 0.35 K/UL — SIGNIFICANT CHANGE UP (ref 0–0.7)
EOSINOPHIL NFR BLD AUTO: 4.4 % — SIGNIFICANT CHANGE UP (ref 0–8)
GLUCOSE BLDC GLUCOMTR-MCNC: 117 MG/DL — HIGH (ref 70–99)
GLUCOSE BLDC GLUCOMTR-MCNC: 118 MG/DL — HIGH (ref 70–99)
GLUCOSE BLDC GLUCOMTR-MCNC: 147 MG/DL — HIGH (ref 70–99)
GLUCOSE BLDC GLUCOMTR-MCNC: 152 MG/DL — HIGH (ref 70–99)
GLUCOSE BLDC GLUCOMTR-MCNC: 156 MG/DL — HIGH (ref 70–99)
GLUCOSE SERPL-MCNC: 160 MG/DL — HIGH (ref 70–99)
HCT VFR BLD CALC: 42.1 % — SIGNIFICANT CHANGE UP (ref 42–52)
HGB BLD-MCNC: 14.4 G/DL — SIGNIFICANT CHANGE UP (ref 14–18)
IMM GRANULOCYTES NFR BLD AUTO: 0.5 % — HIGH (ref 0.1–0.3)
LYMPHOCYTES # BLD AUTO: 2.19 K/UL — SIGNIFICANT CHANGE UP (ref 1.2–3.4)
LYMPHOCYTES # BLD AUTO: 27.5 % — SIGNIFICANT CHANGE UP (ref 20.5–51.1)
MAGNESIUM SERPL-MCNC: 2.1 MG/DL — SIGNIFICANT CHANGE UP (ref 1.8–2.4)
MCHC RBC-ENTMCNC: 33.5 PG — HIGH (ref 27–31)
MCHC RBC-ENTMCNC: 34.2 G/DL — SIGNIFICANT CHANGE UP (ref 32–37)
MCV RBC AUTO: 97.9 FL — HIGH (ref 80–94)
MONOCYTES # BLD AUTO: 0.59 K/UL — SIGNIFICANT CHANGE UP (ref 0.1–0.6)
MONOCYTES NFR BLD AUTO: 7.4 % — SIGNIFICANT CHANGE UP (ref 1.7–9.3)
NEUTROPHILS # BLD AUTO: 4.73 K/UL — SIGNIFICANT CHANGE UP (ref 1.4–6.5)
NEUTROPHILS NFR BLD AUTO: 59.6 % — SIGNIFICANT CHANGE UP (ref 42.2–75.2)
NRBC # BLD: 0 /100 WBCS — SIGNIFICANT CHANGE UP (ref 0–0)
PLATELET # BLD AUTO: 131 K/UL — SIGNIFICANT CHANGE UP (ref 130–400)
PMV BLD: 12.5 FL — HIGH (ref 7.4–10.4)
POTASSIUM SERPL-MCNC: 4 MMOL/L — SIGNIFICANT CHANGE UP (ref 3.5–5)
POTASSIUM SERPL-SCNC: 4 MMOL/L — SIGNIFICANT CHANGE UP (ref 3.5–5)
PROT SERPL-MCNC: 5.7 G/DL — LOW (ref 6–8)
RBC # BLD: 4.3 M/UL — LOW (ref 4.7–6.1)
RBC # FLD: 13.6 % — SIGNIFICANT CHANGE UP (ref 11.5–14.5)
SODIUM SERPL-SCNC: 139 MMOL/L — SIGNIFICANT CHANGE UP (ref 135–146)
WBC # BLD: 7.95 K/UL — SIGNIFICANT CHANGE UP (ref 4.8–10.8)
WBC # FLD AUTO: 7.95 K/UL — SIGNIFICANT CHANGE UP (ref 4.8–10.8)

## 2023-04-20 PROCEDURE — 71045 X-RAY EXAM CHEST 1 VIEW: CPT | Mod: 26

## 2023-04-20 PROCEDURE — 99232 SBSQ HOSP IP/OBS MODERATE 35: CPT

## 2023-04-20 PROCEDURE — 33208 INSRT HEART PM ATRIAL & VENT: CPT | Mod: KX

## 2023-04-20 RX ORDER — ACETAMINOPHEN 500 MG
650 TABLET ORAL EVERY 6 HOURS
Refills: 0 | Status: DISCONTINUED | OUTPATIENT
Start: 2023-04-20 | End: 2023-04-21

## 2023-04-20 RX ORDER — VANCOMYCIN HCL 1 G
1000 VIAL (EA) INTRAVENOUS ONCE
Refills: 0 | Status: COMPLETED | OUTPATIENT
Start: 2023-04-20 | End: 2023-04-20

## 2023-04-20 RX ORDER — ACETAMINOPHEN 500 MG
650 TABLET ORAL ONCE
Refills: 0 | Status: COMPLETED | OUTPATIENT
Start: 2023-04-20 | End: 2023-04-20

## 2023-04-20 RX ORDER — VANCOMYCIN HCL 1 G
1000 VIAL (EA) INTRAVENOUS EVERY 12 HOURS
Refills: 0 | Status: DISCONTINUED | OUTPATIENT
Start: 2023-04-21 | End: 2023-04-21

## 2023-04-20 RX ORDER — METOPROLOL TARTRATE 50 MG
50 TABLET ORAL
Refills: 0 | Status: DISCONTINUED | OUTPATIENT
Start: 2023-04-20 | End: 2023-04-21

## 2023-04-20 RX ADMIN — ATORVASTATIN CALCIUM 80 MILLIGRAM(S): 80 TABLET, FILM COATED ORAL at 22:34

## 2023-04-20 RX ADMIN — RANOLAZINE 500 MILLIGRAM(S): 500 TABLET, FILM COATED, EXTENDED RELEASE ORAL at 05:19

## 2023-04-20 RX ADMIN — Medication 650 MILLIGRAM(S): at 23:30

## 2023-04-20 RX ADMIN — Medication 250 MILLIGRAM(S): at 18:50

## 2023-04-20 RX ADMIN — Medication 250 MILLIGRAM(S): at 16:39

## 2023-04-20 RX ADMIN — Medication 150 MILLIGRAM(S): at 05:18

## 2023-04-20 RX ADMIN — Medication 81 MILLIGRAM(S): at 11:09

## 2023-04-20 RX ADMIN — HEPARIN SODIUM 5000 UNIT(S): 5000 INJECTION INTRAVENOUS; SUBCUTANEOUS at 05:18

## 2023-04-20 RX ADMIN — TAMSULOSIN HYDROCHLORIDE 0.4 MILLIGRAM(S): 0.4 CAPSULE ORAL at 22:34

## 2023-04-20 RX ADMIN — PANTOPRAZOLE SODIUM 40 MILLIGRAM(S): 20 TABLET, DELAYED RELEASE ORAL at 06:11

## 2023-04-20 RX ADMIN — MAGNESIUM OXIDE 400 MG ORAL TABLET 400 MILLIGRAM(S): 241.3 TABLET ORAL at 11:09

## 2023-04-20 NOTE — CHART NOTE - NSCHARTNOTEFT_GEN_A_CORE
Electrophysiology Brief Post-Operative Note    I have personally seen and examined the patient.  I agree with the history and physical which I have reviewed and noted any changes below.      DEVICE COMPANY:  -[employer]    PRE-OP DIAGNOSIS:   Sick Sinus Syndrome/sinus pauses    POST-OP DIAGNOSIS:   Sick Sinus Syndrome/sinus pauses    PROCEDURE:  Permanent Pacemaker Implant    Physician: LISA Deleon MD  Assistant: None    ANESTHESIA TYPE:  [  ]General Anesthesia  [X] Sedation  [X] Local/Regional    CONDITION  [  ] Critical  [  ] Serious  [  ]Fair  [X]Good    SPECIMENS REMOVED (IF APPLICABLE): None    IMPLANTS (IF APPLICABLE)  Dual Chamber Pacemaker Implant    FINDINGS (see below)   -Successful Pacemaker implant   -No immediate complications   -Estimated Blood Loss: 20  mL   -Contrast used: none    PLAN OF CARE  - Vancomycin 1g IV q12 h for 2 doses  - Chest X-ray portable now  - Chest X-ray PA/Lat tomorrow at 6am  - Device interrogation tomorrow in am  - Discontinue Heparin, Lovenox, and NOACS for 48 hours  - No anticoagulation unless discussed with EP attending    FOLLOW-UP  -Outpatient follow-up with Electrophysiology in 3-4 weeks     [address] [place] [address] ([phone])[facility]     [phone]
pt tx to pacu  vss  report given   no apparent complications of anesthesia
I saw and examined patient and I reviewed his chart and blood work. I attest that there has been no clinical change in patient's condition since last assessment documented in H&P, consult, or last office visit.

## 2023-04-21 ENCOUNTER — TRANSCRIPTION ENCOUNTER (OUTPATIENT)
Age: 78
End: 2023-04-21

## 2023-04-21 VITALS
DIASTOLIC BLOOD PRESSURE: 73 MMHG | RESPIRATION RATE: 18 BRPM | WEIGHT: 193.79 LBS | TEMPERATURE: 96 F | SYSTOLIC BLOOD PRESSURE: 131 MMHG | HEART RATE: 66 BPM

## 2023-04-21 LAB
ALBUMIN SERPL ELPH-MCNC: 3.4 G/DL — LOW (ref 3.5–5.2)
ALP SERPL-CCNC: 75 U/L — SIGNIFICANT CHANGE UP (ref 30–115)
ALT FLD-CCNC: 23 U/L — SIGNIFICANT CHANGE UP (ref 0–41)
ANION GAP SERPL CALC-SCNC: 12 MMOL/L — SIGNIFICANT CHANGE UP (ref 7–14)
AST SERPL-CCNC: 24 U/L — SIGNIFICANT CHANGE UP (ref 0–41)
BASOPHILS # BLD AUTO: 0.04 K/UL — SIGNIFICANT CHANGE UP (ref 0–0.2)
BASOPHILS NFR BLD AUTO: 0.4 % — SIGNIFICANT CHANGE UP (ref 0–1)
BILIRUB SERPL-MCNC: 1.3 MG/DL — HIGH (ref 0.2–1.2)
BUN SERPL-MCNC: 20 MG/DL — SIGNIFICANT CHANGE UP (ref 10–20)
CALCIUM SERPL-MCNC: 8 MG/DL — LOW (ref 8.4–10.5)
CHLORIDE SERPL-SCNC: 102 MMOL/L — SIGNIFICANT CHANGE UP (ref 98–110)
CO2 SERPL-SCNC: 21 MMOL/L — SIGNIFICANT CHANGE UP (ref 17–32)
CREAT SERPL-MCNC: 1.4 MG/DL — SIGNIFICANT CHANGE UP (ref 0.7–1.5)
EGFR: 51 ML/MIN/1.73M2 — LOW
EOSINOPHIL # BLD AUTO: 0.38 K/UL — SIGNIFICANT CHANGE UP (ref 0–0.7)
EOSINOPHIL NFR BLD AUTO: 4.2 % — SIGNIFICANT CHANGE UP (ref 0–8)
GLUCOSE BLDC GLUCOMTR-MCNC: 150 MG/DL — HIGH (ref 70–99)
GLUCOSE BLDC GLUCOMTR-MCNC: 199 MG/DL — HIGH (ref 70–99)
GLUCOSE SERPL-MCNC: 205 MG/DL — HIGH (ref 70–99)
HCT VFR BLD CALC: 42.3 % — SIGNIFICANT CHANGE UP (ref 42–52)
HGB BLD-MCNC: 14.1 G/DL — SIGNIFICANT CHANGE UP (ref 14–18)
IMM GRANULOCYTES NFR BLD AUTO: 0.4 % — HIGH (ref 0.1–0.3)
LYMPHOCYTES # BLD AUTO: 1.98 K/UL — SIGNIFICANT CHANGE UP (ref 1.2–3.4)
LYMPHOCYTES # BLD AUTO: 21.8 % — SIGNIFICANT CHANGE UP (ref 20.5–51.1)
MAGNESIUM SERPL-MCNC: 2 MG/DL — SIGNIFICANT CHANGE UP (ref 1.8–2.4)
MCHC RBC-ENTMCNC: 32.9 PG — HIGH (ref 27–31)
MCHC RBC-ENTMCNC: 33.3 G/DL — SIGNIFICANT CHANGE UP (ref 32–37)
MCV RBC AUTO: 98.8 FL — HIGH (ref 80–94)
MONOCYTES # BLD AUTO: 0.82 K/UL — HIGH (ref 0.1–0.6)
MONOCYTES NFR BLD AUTO: 9 % — SIGNIFICANT CHANGE UP (ref 1.7–9.3)
NEUTROPHILS # BLD AUTO: 5.84 K/UL — SIGNIFICANT CHANGE UP (ref 1.4–6.5)
NEUTROPHILS NFR BLD AUTO: 64.2 % — SIGNIFICANT CHANGE UP (ref 42.2–75.2)
NRBC # BLD: 0 /100 WBCS — SIGNIFICANT CHANGE UP (ref 0–0)
PLATELET # BLD AUTO: 111 K/UL — LOW (ref 130–400)
PMV BLD: 12.4 FL — HIGH (ref 7.4–10.4)
POTASSIUM SERPL-MCNC: 4 MMOL/L — SIGNIFICANT CHANGE UP (ref 3.5–5)
POTASSIUM SERPL-SCNC: 4 MMOL/L — SIGNIFICANT CHANGE UP (ref 3.5–5)
PROT SERPL-MCNC: 5.3 G/DL — LOW (ref 6–8)
RBC # BLD: 4.28 M/UL — LOW (ref 4.7–6.1)
RBC # FLD: 13.4 % — SIGNIFICANT CHANGE UP (ref 11.5–14.5)
SODIUM SERPL-SCNC: 135 MMOL/L — SIGNIFICANT CHANGE UP (ref 135–146)
WBC # BLD: 9.1 K/UL — SIGNIFICANT CHANGE UP (ref 4.8–10.8)
WBC # FLD AUTO: 9.1 K/UL — SIGNIFICANT CHANGE UP (ref 4.8–10.8)

## 2023-04-21 PROCEDURE — 71046 X-RAY EXAM CHEST 2 VIEWS: CPT | Mod: 26

## 2023-04-21 PROCEDURE — 99239 HOSP IP/OBS DSCHRG MGMT >30: CPT

## 2023-04-21 PROCEDURE — 99233 SBSQ HOSP IP/OBS HIGH 50: CPT | Mod: 24

## 2023-04-21 PROCEDURE — 93280 PM DEVICE PROGR EVAL DUAL: CPT | Mod: 26

## 2023-04-21 RX ORDER — OXYCODONE HYDROCHLORIDE 5 MG/1
5 TABLET ORAL ONCE
Refills: 0 | Status: DISCONTINUED | OUTPATIENT
Start: 2023-04-21 | End: 2023-04-21

## 2023-04-21 RX ORDER — LISINOPRIL 2.5 MG/1
1 TABLET ORAL
Qty: 0 | Refills: 0 | DISCHARGE
Start: 2023-04-21

## 2023-04-21 RX ADMIN — MAGNESIUM OXIDE 400 MG ORAL TABLET 400 MILLIGRAM(S): 241.3 TABLET ORAL at 11:30

## 2023-04-21 RX ADMIN — Medication 150 MILLIGRAM(S): at 05:08

## 2023-04-21 RX ADMIN — OXYCODONE HYDROCHLORIDE 5 MILLIGRAM(S): 5 TABLET ORAL at 05:08

## 2023-04-21 RX ADMIN — Medication 2: at 11:31

## 2023-04-21 RX ADMIN — Medication 650 MILLIGRAM(S): at 00:11

## 2023-04-21 RX ADMIN — Medication 5 UNIT(S): at 08:24

## 2023-04-21 RX ADMIN — OXYCODONE HYDROCHLORIDE 5 MILLIGRAM(S): 5 TABLET ORAL at 06:19

## 2023-04-21 RX ADMIN — Medication 5 UNIT(S): at 11:30

## 2023-04-21 RX ADMIN — PANTOPRAZOLE SODIUM 40 MILLIGRAM(S): 20 TABLET, DELAYED RELEASE ORAL at 06:20

## 2023-04-21 RX ADMIN — Medication 250 MILLIGRAM(S): at 04:37

## 2023-04-21 RX ADMIN — Medication 50 MILLIGRAM(S): at 05:08

## 2023-04-21 RX ADMIN — RANOLAZINE 500 MILLIGRAM(S): 500 TABLET, FILM COATED, EXTENDED RELEASE ORAL at 05:07

## 2023-04-21 NOTE — PROGRESS NOTE ADULT - ASSESSMENT
78-year-old male with PMHx of CAD s/p PCI, DM, HTM, HLD, BPH and hemochromatosis (phlebotomy every 3 months) sent by PMD Dr. Merlos out of concern for possible A-fib/arrhythmia.    # Bradycardia  #Dizziness  # Palpitations/diaphoresis   -symptoms resolved  - Continue telemetry monitoring> might need outpatient holter   - Cardiology consulted and recommendations appreciated.  -EP to place PPM today, Follow up EP after the procedure       # CAD s/p PCI   - trops neg for now       # HTN  - If BP increased in hospital , resume home meds    # Hemochromatosis   - Gets recurrent phlebotomy [id number]   - currently stable     # BPH  Home med: Tamsulosin   - c/w with home meds    # DM   - Monitor FS   - Continue Lantus and lispro in hospital        #DVT - Heparin SQ  #GI - pantoprazole   #Diet - DASH/ CC  #Activity - IAT   #Code - Full code   
Cardiologist: Dr Freed  EP Dr Rose    Assessment: 78-year-old male with PMHx of CAD s/p PCI, DM, HTM, HLD, BPH and hemochromatosis (phlebotomy every 3 months) sent by PMD Dr. Merlos out of concern for possible A-fib/arrhythmia. Here in the hospital patient with transient episodes of bradycardia, asymptomatic.  EF 50-55%  However pt is require beta blockers for CAD  s/p DC PPM ([employer]    Impression:  Intermittent bradycardia  SND  Palpitations  Lightheadedness  CAD sp PCI  DM  HTN  Hemochromatosis    - CXR as above  - Device interrogation done, properly working device, awaiting review by attending  -resume beta blockers  - FU in the EP office for wound check with ___NP in 1 month  Dr Rose office  [facility], [address]  [phone]     No Heavy lifting >5 lbs. Do not raise your arm above shoulder level for 4-6 weeks.   No driving for 2weeks  No shower, bathtub, no wetting device site for 5 days.  Can take a shower on _ Wed _ , remove dressing at that time, leave Steri-strips in place    
a/p:  #Symptomatic bradycardia  #Dizziness  -continue tele monitoring  -cardiology following  -f/u EPS recomm---may need ppm  -bblocker on hold 2/2 again overnight bradycardia (hr 30-40)--restart bblocker slowly   -check TSH, hba1c    #DMII  -check hba1c  -monitor fs qac/qhs    #CAD s/p pci  -continue asa, statin  -cardiology following  -trop negative x 3  -holding bblokcer 2/2 bradycardia    DVT/GI ppx  guarded prognosis    FULL CODE    #Progress Note Handoff  Pending (specify):  tele monitoring, EPS for possible ppm    Family discussion: d/w patient and wife bedside at length bedisde    Disposition: Home_x (within next 24-48 hrs pending EPS recomm)
pt w/o cp s/p ppm. follow on meds.
77 yo M PMHx CAD s/p PCI, DM II, HTN, HLD, BPH, and hemochromatosis (phlebotomy every 3 months) presented at the behest of Chyna Jones out of concern for possible A-fib/arrhythmia.  Patient reports that for several dayshe has noted some episodes of palpitations with diaphoresis but no chest pain,  unsure if he was having hypoglycemic episodes but wife reports that at time of event his FS is . Patient also reports 1 week of intermittent hypotension at home with day prior to admission with an episode of BP 80/60 after phlebotomy felt better after IV fluids given.  Seen by PMD today who noted irregular heartbeat so sent patient to ED.  No chest pain.  Reports intermittent dyspnea on exertion which is worse in the last week.      Symptomatic bradycardia  Dizziness  -continue tele monitoring  -cardiology following  - PPM planned for yesterday but cardiology disagreed with EP. PPM was post poned until today.   - beta blocker held    DM II  -check hba1c  -monitor fs qac/qhs    CAD s/p PCI  -continue asa, statin  -cardiology following  -trop negative x 3  -holding bblocker 2/2 bradycardia    DVT/GI ppx  guarded prognosis    FULL CODE    #Progress Note Handoff  Pending (specify):  PPM  Family discussion: d/w patient and wife bedside at length bedside  Disposition: Home_x (within next 24-48 hrs pending EPS recomm)  
78-year-old male with PMHx of CAD s/p PCI, DM, HTM, HLD, BPH and hemochromatosis (phlebotomy every 3 months) sent by PMD Dr. Merlos out of concern for possible A-fib/arrhythmia.    # Bradycardia  #Dizziness  # Palpitations/diaphoresis   -symptoms resolved  - Continue telemetry monitoring> might need outpatient holter   - Cardiology consulted and recommendations appreciated.  -EP considering PPM placement tomorrow, NPO from midnight       # CAD s/p PCI   - trops neg for now       # HTN  - If BP increased in hospital , resume home meds    # Hemochromatosis   - Gets recurrent phlebotomy [id number]   - currently stable     # BPH  Home med: Tamsulosin   - c/w with home meds    # DM   - Monitor FS   - Continue Lantus and lispro in hospital        #DVT - Heparin SQ  #GI - pantoprazole   #Diet - DASH/ CC  #Activity - IAT   #Code - Full code   
79 yo M with hx of CAD s/p PCI, DM, HTM, HLD, BPH and hemochromatosis (phlebotomy every 3 months) sent by PMD Dr. Merlos out of concern for possible A-fib/arrhythmia.    #Palpitations  Echo 04/16: 50-55%EF  EKG shows NSR.   - Continue telemetry monitoring  - monitor electrolytes and correct prn.   - check TSH   - Cardiology consult with Dr. Freed pending, likely needs MCOT on DC if no episodes noted on tele    #CAD s/p PCI   #HTN/HLD  - Cont metoprolol tartrate 100 BID  - Cont lipitor 80mg qHs  - Cont ASA 81mg  - Cont ranolazine 500 BID    #Hemochromatosis - on phlebotomy q3mo    #BPH - on flomax     #DM II - monitor FS AC HS. Insulin regimen.     DVT PPX, heparin SC    #Progress Note Handoff  Pending (specify): Tele monitoring, cardio f/u  Family discussion: odalys pt and family regarding monitoring for arrhythmia  Disposition: Home
77 yo M PMHx CAD s/p PCI, DM II, HTN, HLD, BPH, and hemochromatosis (phlebotomy every 3 months) presented at the behest of Chyna Jones out of concern for possible A-fib/arrhythmia.  Patient reports that for several dayshe has noted some episodes of palpitations with diaphoresis but no chest pain,  unsure if he was having hypoglycemic episodes but wife reports that at time of event his FS is . Patient also reports 1 week of intermittent hypotension at home with day prior to admission with an episode of BP 80/60 after phlebotomy felt better after IV fluids given.  Seen by PMD today who noted irregular heartbeat so sent patient to ED.  No chest pain.  Reports intermittent dyspnea on exertion which is worse in the last week.      Symptomatic bradycardia  Dizziness  -continue tele monitoring  -cardiology following  - PPM planned for today  - beta blocker held    DM II  -check hba1c  -monitor fs qac/qhs    CAD s/p PCI  -continue asa, statin  -cardiology following  -trop negative x 3  -holding bblocker 2/2 bradycardia    DVT/GI ppx  guarded prognosis    FULL CODE    #Progress Note Handoff  Pending (specify):  PPM  Family discussion: d/w patient and wife bedside at length bedside  Disposition: Home_x (within next 24-48 hrs pending EPS recomm)  
79 yo M with hx of CAD s/p PCI, DM, HTM, HLD, BPH and hemochromatosis (phlebotomy every 3 months) sent by PMD Dr. Merlos out of concern for possible A-fib/arrhythmia.    #Palpitations  Echo 04/16: 50-55%EF  EKG shows NSR.   - Continue telemetry monitoring  - monitor electrolytes and correct prn.   - check TSH   - Cardiology consult with Dr. Freed pending, likely needs MCOT on DC if no episodes noted on tele    #CAD s/p PCI   #HTN/HLD  - metoprolol tartrate 100 BID reduced to 50mg  - lipitor 80mg qHs  - ASA 81mg  - ranolazine 500 BID    #Hemochromatosis   - phlebotomy as outpt    #DM II   -Insulin regimen.     awaiting shree f/u and EP, then stable for d/c
Cardiologist: Dr Freed    Assessment: 78-year-old male with PMHx of CAD s/p PCI, DM, HTM, HLD, BPH and hemochromatosis (phlebotomy every 3 months) sent by PMD Dr. Merlos out of concern for possible A-fib/arrhythmia. Here in the hospital patient with transient episodes of bradycardia, asymptomatic.  EF 50-55%    Impression:  Intermittent bradycardia  Palpitations  Lightheadedness  CAD sp PCI  DM  HTN  Hemochromatosis    Plan:  - NPO after midnight for PPM tomorrow  - Hold AVN blocking agents  - Hold anticoagulation tonight and tomorrow AM  - Cont tele monitoring  - Monitor electrolytes, maintain WNL  - Will follow
cont meds w/ ana m as per eps. monitor.
pt w/o cp. pt w/ ppm.woud give metoprolol 50 bid.

## 2023-04-21 NOTE — DISCHARGE NOTE NURSING/CASE MANAGEMENT/SOCIAL WORK - NSDCPEFALRISK_GEN_ALL_CORE
For information on Fall & Injury Prevention, visit: [email or web address] OR  [email or web address] OR  https://www.cdc.gov/steadi/patient.html

## 2023-04-21 NOTE — DISCHARGE NOTE NURSING/CASE MANAGEMENT/SOCIAL WORK - PATIENT PORTAL LINK FT
You can access the FollowMyHealth Patient Portal offered by [facility] by registering at the following website: [email or web address]. By joining [facility]’s FollowMyHealth portal, you will also be able to view your health information using other applications (apps) compatible with our system.

## 2023-04-21 NOTE — PROGRESS NOTE ADULT - PROVIDER SPECIALTY LIST ADULT
Internal Medicine
Internal Medicine
Hospitalist
Internal Medicine
Cardiology
Electrophysiology
Electrophysiology
Hospitalist
Hospitalist
Cardiology
Internal Medicine
Cardiology

## 2023-04-21 NOTE — PROGRESS NOTE ADULT - SUBJECTIVE AND OBJECTIVE BOX
CASEY BENÍTEZ 78y Male  MRN#: [id number]     Hospital Day: 4d    Pt is currently admitted with the primary diagnosis of  Cardiac arrhythmia        SUBJECTIVE     Overnight events  None    Subjective complaints  Pt was evaluated this am. Patient denied any active complaints .                                            ----------------------------------------------------------  OBJECTIVE  PAST MEDICAL & SURGICAL HISTORY  DM (diabetes mellitus)    CAD (coronary artery disease)    BPH (benign prostatic hyperplasia)    History of hematologic disorder    Total cataract    [place] (hard of hearing)    Acute myocardial infarction    H/O coronary angiogram    Stented coronary artery    History of ear surgery    H/O tonsillitis                                              -----------------------------------------------------------  ALLERGIES:  Seafood (Anaphylaxis)  No Known Drug Allergies                                            ------------------------------------------------------------    HOME MEDICATIONS  Home Medications:  Aspir 81 oral delayed release tablet: 1 tab(s) orally once a day (15 Apr 2023 16:15)  atorvastatin 80 mg oral tablet: 1 tab(s) orally once a day (at bedtime) (15 Apr 2023 16:15)  glipiZIDE 5 mg oral tablet: 1 tab(s) orally once a day (15 Apr 2023 16:15)  HumaLOG 100 units/mL injectable solution: 10 unit(s) injectable 3 times a day (15 Apr 2023 16:15)  Jardiance 25 mg oral tablet: 1 tab(s) orally once a day (in the morning) (15 Apr 2023 16:15)  Lantus 100 units/mL subcutaneous solution: 45 unit(s) subcutaneous once a day (at bedtime) (15 Apr 2023 16:15)  magnesium oxide 400 mg oral tablet: 1 tab(s) orally once a day (15 Apr 2023 16:15)  omeprazole 40 mg oral delayed release capsule: 1 cap(s) orally once a day (15 Apr 2023 16:15)  pregabalin 150 mg oral capsule: 1 cap(s) orally every 12 hours (15 Apr 2023 16:15)  ranolazine 500 mg oral tablet, extended release: 1 tab(s) orally 2 times a day (15 Apr 2023 16:15)  tamsulosin 0.4 mg oral capsule: 1 cap(s) orally once a day (at bedtime) (15 Apr 2023 16:15)                           MEDICATIONS:  STANDING MEDICATIONS  aspirin enteric coated 81 milliGRAM(s) Oral daily  atorvastatin 80 milliGRAM(s) Oral at bedtime  dextrose 5%. 1000 milliLiter(s) IV Continuous <Continuous>  dextrose 5%. 1000 milliLiter(s) IV Continuous <Continuous>  dextrose 50% Injectable 25 Gram(s) IV Push once  dextrose 50% Injectable 12.5 Gram(s) IV Push once  dextrose 50% Injectable 25 Gram(s) IV Push once  glucagon  Injectable 1 milliGRAM(s) IntraMuscular once  heparin   Injectable 5000 Unit(s) SubCutaneous every 12 hours  insulin glargine Injectable (LANTUS) 45 Unit(s) SubCutaneous at bedtime  insulin lispro (ADMELOG) corrective regimen sliding scale   SubCutaneous three times a day before meals  insulin lispro Injectable (ADMELOG) 5 Unit(s) SubCutaneous three times a day before meals  magnesium oxide 400 milliGRAM(s) Oral daily  pantoprazole    Tablet 40 milliGRAM(s) Oral before breakfast  pregabalin 150 milliGRAM(s) Oral every 12 hours  ranolazine 500 milliGRAM(s) Oral two times a day  tamsulosin 0.4 milliGRAM(s) Oral at bedtime    PRN MEDICATIONS  atropine Injectable 1 milliGRAM(s) IV Push once PRN  dextrose Oral Gel 15 Gram(s) Oral once PRN                                            ------------------------------------------------------------  VITAL SIGNS: Last 24 Hours  T(C): 36.4 (19 Apr 2023 13:44), Max: 36.4 (19 Apr 2023 13:44)  T(F): 97.5 (19 Apr 2023 13:44), Max: 97.5 (19 Apr 2023 13:44)  HR: 69 (19 Apr 2023 13:44) (58 - 69)  BP: 143/67 (19 Apr 2023 13:44) (112/68 - 143/67)  BP(mean): --  RR: 18 (19 Apr 2023 13:44) (18 - 18)  SpO2: --      04-18-23 @ 07:01  -  04-19-23 @ 07:00  --------------------------------------------------------  IN: 0 mL / OUT: 250 mL / NET: -250 mL                                             --------------------------------------------------------------  LABS:                        14.4   8.95  )-----------( 119      ( 19 Apr 2023 04:58 )             43.2     04-19    141  |  106  |  20  ----------------------------<  110<H>  4.1   |  24  |  1.4    Ca    8.8      19 Apr 2023 04:58  Mg     2.2     04-19    TPro  5.7<L>  /  Alb  3.7  /  TBili  0.8  /  DBili  x   /  AST  17  /  ALT  16  /  AlkPhos  73  04-19                                                              -------------------------------------------------------------  RADIOLOGY:  < from: Xray Chest 1 View-PORTABLE IMMEDIATE (04.15.23 @ 12:02) >  Impression:    Unchanged interstitial opacities.    < end of copied text >                                            --------------------------------------------------------------    PHYSICAL EXAM:  GENERAL: NAD, lying in bed comfortably  HEAD:  Atraumatic, Normocephalic  EYES: EOMI, conjunctiva and sclera clear  ENT: Moist mucous membranes  NECK: Supple, No JVD  CHEST/LUNG: Clear to auscultation bilaterally; No rales, rhonchi, wheezing, or rubs. Unlabored respirations  HEART: regular rate and rhythm; No murmurs, rubs, or gallops  ABDOMEN: Bowel sounds present; Soft, Nontender, Nondistended.    EXTREMITIES: Warm. No clubbing, cyanosis, or edema  NERVOUS SYSTEM:  Alert & Oriented X3. No focal deficits   SKIN: No rashes or lesions                                           --------------------------------------------------------------                
Pt seen and examined. Pt feels better , denies palpitations, wife at bedside    T(F): , Max: 98.6 (04-17-23 @ 15:32)  HR: 62 (04-17-23 @ 15:32) (50 - 64)  BP: 118/55 (04-17-23 @ 15:32)  RR: 18 (04-17-23 @ 15:32)  SpO2: 97% (04-17-23 @ 15:32)  General: No apparent distress  Cardiovascular: S1, S2  Gastrointestinal: Soft, Non-tender, Non-distended  Respiratory: Good air entry bilaterally  Musculoskeletal: Moves all extremities  Lymphatic: No edema  Neurologic: No gross motor deficit  Dermatologic: Skin dry                          14.5   8.63  )-----------( 137      ( 17 Apr 2023 07:46 )             43.8     04-17    140  |  107  |  29<H>  ----------------------------<  185<H>  5.6<H>   |  26  |  1.6<H>    Ca    9.2      17 Apr 2023 07:46  Phos  3.3     04-16  Mg     2.1     04-16    TPro  5.9<L>  /  Alb  3.8  /  TBili  0.8  /  DBili  x   /  AST  20  /  ALT  16  /  AlkPhos  65  04-16    
INTERVAL HPI/OVERNIGHT EVENTS:  Patient still [place] 30s-40s, asymptomatic while sitting  Last dose of Lopressor 100mg yesterday AM, no BB's since    MEDICATIONS  (STANDING):  aspirin enteric coated 81 milliGRAM(s) Oral daily  atorvastatin 80 milliGRAM(s) Oral at bedtime  dextrose 5%. 1000 milliLiter(s) (100 mL/Hr) IV Continuous <Continuous>  dextrose 5%. 1000 milliLiter(s) (50 mL/Hr) IV Continuous <Continuous>  dextrose 50% Injectable 25 Gram(s) IV Push once  dextrose 50% Injectable 25 Gram(s) IV Push once  dextrose 50% Injectable 12.5 Gram(s) IV Push once  glucagon  Injectable 1 milliGRAM(s) IntraMuscular once  heparin   Injectable 5000 Unit(s) SubCutaneous every 12 hours  insulin glargine Injectable (LANTUS) 45 Unit(s) SubCutaneous at bedtime  insulin lispro (ADMELOG) corrective regimen sliding scale   SubCutaneous three times a day before meals  insulin lispro Injectable (ADMELOG) 5 Unit(s) SubCutaneous three times a day before meals  magnesium oxide 400 milliGRAM(s) Oral daily  pantoprazole    Tablet 40 milliGRAM(s) Oral before breakfast  pregabalin 150 milliGRAM(s) Oral every 12 hours  ranolazine 500 milliGRAM(s) Oral two times a day  sodium zirconium cyclosilicate 10 Gram(s) Oral two times a day  tamsulosin 0.4 milliGRAM(s) Oral at bedtime    MEDICATIONS  (PRN):  atropine Injectable 1 milliGRAM(s) IV Push once PRN HR < 30  dextrose Oral Gel 15 Gram(s) Oral once PRN Blood Glucose LESS THAN 70 milliGRAM(s)/deciliter      Allergies    Seafood (Anaphylaxis)  No Known Drug Allergies    Intolerances        REVIEW OF SYSTEMS: No CP, palpitations, dizziness or SOB    Vital Signs Last 24 Hrs  T(C): 36.3 (18 Apr 2023 08:03), Max: 36.3 (18 Apr 2023 08:03)  T(F): 97.4 (18 Apr 2023 08:03), Max: 97.4 (18 Apr 2023 08:03)  HR: 61 (18 Apr 2023 08:03) (61 - 80)  BP: 116/63 (18 Apr 2023 08:03) (107/60 - 116/63)  BP(mean): --  RR: 18 (18 Apr 2023 08:03) (18 - 18)  SpO2: 96% (18 Apr 2023 08:03) (96% - 96%)    Parameters below as of 18 Apr 2023 08:03  Patient On (Oxygen Delivery Method): room air          Physical Exam  GENERAL: In no apparent distress, well nourished, and hydrated.  EYES: EOMI, PERRLA, conjunctiva and sclera clear  NECK: Supple  HEART: Regular rate and rhythm; No murmurs, rubs, or gallops.  PULMONARY: Clear to auscultation and perfusion.  No rales, wheezing, or rhonchi bilaterally.  EXTREMITIES:  2+ Peripheral Pulses, No clubbing, cyanosis, or edema  NEUROLOGICAL: Grossly nonfocal    LABS:                        15.0   9.21  )-----------( 131      ( 18 Apr 2023 08:55 )             44.9     04-18    141  |  107  |  21<H>  ----------------------------<  171<H>  4.5   |  25  |  1.5    Ca    8.7      18 Apr 2023 08:55  Mg     2.3     04-18    TPro  6.2  /  Alb  4.0  /  TBili  0.9  /  DBili  x   /  AST  18  /  ALT  17  /  AlkPhos  81  04-18          RADIOLOGY & ADDITIONAL TESTS:  
SUBJ:No chest pain or shortness of breath      MEDICATIONS  (STANDING):  aspirin enteric coated 81 milliGRAM(s) Oral daily  atorvastatin 80 milliGRAM(s) Oral at bedtime  dextrose 5%. 1000 milliLiter(s) (100 mL/Hr) IV Continuous <Continuous>  dextrose 5%. 1000 milliLiter(s) (50 mL/Hr) IV Continuous <Continuous>  dextrose 50% Injectable 25 Gram(s) IV Push once  dextrose 50% Injectable 12.5 Gram(s) IV Push once  dextrose 50% Injectable 25 Gram(s) IV Push once  glucagon  Injectable 1 milliGRAM(s) IntraMuscular once  heparin   Injectable 5000 Unit(s) SubCutaneous every 12 hours  insulin glargine Injectable (LANTUS) 45 Unit(s) SubCutaneous at bedtime  insulin lispro (ADMELOG) corrective regimen sliding scale   SubCutaneous three times a day before meals  insulin lispro Injectable (ADMELOG) 5 Unit(s) SubCutaneous three times a day before meals  magnesium oxide 400 milliGRAM(s) Oral daily  pantoprazole    Tablet 40 milliGRAM(s) Oral before breakfast  pregabalin 150 milliGRAM(s) Oral every 12 hours  ranolazine 500 milliGRAM(s) Oral two times a day  tamsulosin 0.4 milliGRAM(s) Oral at bedtime    MEDICATIONS  (PRN):  atropine Injectable 1 milliGRAM(s) IV Push once PRN HR < 30  dextrose Oral Gel 15 Gram(s) Oral once PRN Blood Glucose LESS THAN 70 milliGRAM(s)/deciliter            Vital Signs Last 24 Hrs  T(C): 36.2 (19 Apr 2023 20:51), Max: 36.4 (19 Apr 2023 13:44)  T(F): 97.1 (19 Apr 2023 20:51), Max: 97.5 (19 Apr 2023 13:44)  HR: 83 (19 Apr 2023 20:51) (58 - 83)  BP: 164/72 (19 Apr 2023 20:51) (112/68 - 164/72)  BP(mean): --  RR: 18 (19 Apr 2023 20:51) (18 - 18)  SpO2: 96% (19 Apr 2023 20:06) (96% - 96%)    Parameters below as of 19 Apr 2023 20:06  Patient On (Oxygen Delivery Method): room air          ECG:NML    TTE:    LABS:                        14.4   8.95  )-----------( 119      ( 19 Apr 2023 04:58 )             43.2     04-19    141  |  106  |  20  ----------------------------<  110<H>  4.1   |  24  |  1.4    Ca    8.8      19 Apr 2023 04:58  Mg     2.2     04-19    TPro  5.7<L>  /  Alb  3.7  /  TBili  0.8  /  DBili  x   /  AST  17  /  ALT  16  /  AlkPhos  73  04-19            I&O's Summary    18 Apr 2023 07:01  -  19 Apr 2023 07:00  --------------------------------------------------------  IN: 0 mL / OUT: 250 mL / NET: -250 mL    19 Apr 2023 07:01  -  19 Apr 2023 23:57  --------------------------------------------------------  IN: 0 mL / OUT: 500 mL / NET: -500 mL      BNP          
SUBJ:No chest pain or shortness of breath      MEDICATIONS  (STANDING):  atorvastatin 80 milliGRAM(s) Oral at bedtime  dextrose 5%. 1000 milliLiter(s) (100 mL/Hr) IV Continuous <Continuous>  dextrose 5%. 1000 milliLiter(s) (50 mL/Hr) IV Continuous <Continuous>  dextrose 50% Injectable 25 Gram(s) IV Push once  dextrose 50% Injectable 12.5 Gram(s) IV Push once  dextrose 50% Injectable 25 Gram(s) IV Push once  glucagon  Injectable 1 milliGRAM(s) IntraMuscular once  insulin glargine Injectable (LANTUS) 45 Unit(s) SubCutaneous at bedtime  insulin lispro (ADMELOG) corrective regimen sliding scale   SubCutaneous three times a day before meals  insulin lispro Injectable (ADMELOG) 5 Unit(s) SubCutaneous three times a day before meals  magnesium oxide 400 milliGRAM(s) Oral daily  metoprolol tartrate 50 milliGRAM(s) Oral two times a day  pantoprazole    Tablet 40 milliGRAM(s) Oral before breakfast  pregabalin 150 milliGRAM(s) Oral every 12 hours  ranolazine 500 milliGRAM(s) Oral two times a day  tamsulosin 0.4 milliGRAM(s) Oral at bedtime  vancomycin  IVPB 1000 milliGRAM(s) IV Intermittent every 12 hours    MEDICATIONS  (PRN):  acetaminophen     Tablet .. 650 milliGRAM(s) Oral every 6 hours PRN Temp greater or equal to 38C (100.4F), Mild Pain (1 - 3)  atropine Injectable 1 milliGRAM(s) IV Push once PRN HR < 30  dextrose Oral Gel 15 Gram(s) Oral once PRN Blood Glucose LESS THAN 70 milliGRAM(s)/deciliter            Vital Signs Last 24 Hrs  T(C): 35.7 (21 Apr 2023 05:24), Max: 35.7 (21 Apr 2023 05:24)  T(F): 96.3 (21 Apr 2023 05:24), Max: 96.3 (21 Apr 2023 05:24)  HR: 66 (21 Apr 2023 05:24) (66 - 78)  BP: 131/73 (21 Apr 2023 05:24) (131/73 - 144/77)  BP(mean): --  RR: 18 (21 Apr 2023 05:24) (18 - 18)  SpO2: 97% (20 Apr 2023 21:29) (97% - 97%)    Parameters below as of 20 Apr 2023 21:29  Patient On (Oxygen Delivery Method): room air          ECG:NML    TTE:    LABS:                        14.1   9.10  )-----------( 111      ( 21 Apr 2023 05:50 )             42.3     04-21    135  |  102  |  20  ----------------------------<  205<H>  4.0   |  21  |  1.4    Ca    8.0<L>      21 Apr 2023 05:50  Mg     2.0     04-21    TPro  5.3<L>  /  Alb  3.4<L>  /  TBili  1.3<H>  /  DBili  x   /  AST  24  /  ALT  23  /  AlkPhos  75  04-21            I&O's Summary    20 Apr 2023 07:01  -  21 Apr 2023 07:00  --------------------------------------------------------  IN: 0 mL / OUT: 850 mL / NET: -850 mL    21 Apr 2023 07:01  -  21 Apr 2023 16:42  --------------------------------------------------------  IN: 560 mL / OUT: 500 mL / NET: 60 mL      BNP          
CASEY BENÍTEZ 78y Male  MRN#: [id number]     Hospital Day: 5d    Pt is currently admitted with the primary diagnosis of  Cardiac arrhythmia        SUBJECTIVE     Overnight events  None    Subjective complaints  Pt was evaluated this am. Patient denied any active complaints .                                            ----------------------------------------------------------  OBJECTIVE  PAST MEDICAL & SURGICAL HISTORY  DM (diabetes mellitus)    CAD (coronary artery disease)    BPH (benign prostatic hyperplasia)    History of hematologic disorder    Total cataract    [place] (hard of hearing)    Acute myocardial infarction    H/O coronary angiogram    Stented coronary artery    History of ear surgery    H/O tonsillitis                                              -----------------------------------------------------------  ALLERGIES:  Seafood (Anaphylaxis)  No Known Drug Allergies                                            ------------------------------------------------------------    HOME MEDICATIONS  Home Medications:  Aspir 81 oral delayed release tablet: 1 tab(s) orally once a day (15 Apr 2023 16:15)  atorvastatin 80 mg oral tablet: 1 tab(s) orally once a day (at bedtime) (15 Apr 2023 16:15)  glipiZIDE 5 mg oral tablet: 1 tab(s) orally once a day (15 Apr 2023 16:15)  HumaLOG 100 units/mL injectable solution: 10 unit(s) injectable 3 times a day (15 Apr 2023 16:15)  Jardiance 25 mg oral tablet: 1 tab(s) orally once a day (in the morning) (15 Apr 2023 16:15)  Lantus 100 units/mL subcutaneous solution: 45 unit(s) subcutaneous once a day (at bedtime) (15 Apr 2023 16:15)  magnesium oxide 400 mg oral tablet: 1 tab(s) orally once a day (15 Apr 2023 16:15)  omeprazole 40 mg oral delayed release capsule: 1 cap(s) orally once a day (15 Apr 2023 16:15)  pregabalin 150 mg oral capsule: 1 cap(s) orally every 12 hours (15 Apr 2023 16:15)  ranolazine 500 mg oral tablet, extended release: 1 tab(s) orally 2 times a day (15 Apr 2023 16:15)  tamsulosin 0.4 mg oral capsule: 1 cap(s) orally once a day (at bedtime) (15 Apr 2023 16:15)                           MEDICATIONS:  STANDING MEDICATIONS  aspirin enteric coated 81 milliGRAM(s) Oral daily  atorvastatin 80 milliGRAM(s) Oral at bedtime  dextrose 5%. 1000 milliLiter(s) IV Continuous <Continuous>  dextrose 5%. 1000 milliLiter(s) IV Continuous <Continuous>  dextrose 50% Injectable 25 Gram(s) IV Push once  dextrose 50% Injectable 12.5 Gram(s) IV Push once  dextrose 50% Injectable 25 Gram(s) IV Push once  glucagon  Injectable 1 milliGRAM(s) IntraMuscular once  heparin   Injectable 5000 Unit(s) SubCutaneous every 12 hours  insulin glargine Injectable (LANTUS) 45 Unit(s) SubCutaneous at bedtime  insulin lispro (ADMELOG) corrective regimen sliding scale   SubCutaneous three times a day before meals  insulin lispro Injectable (ADMELOG) 5 Unit(s) SubCutaneous three times a day before meals  magnesium oxide 400 milliGRAM(s) Oral daily  pantoprazole    Tablet 40 milliGRAM(s) Oral before breakfast  pregabalin 150 milliGRAM(s) Oral every 12 hours  ranolazine 500 milliGRAM(s) Oral two times a day  tamsulosin 0.4 milliGRAM(s) Oral at bedtime    PRN MEDICATIONS  atropine Injectable 1 milliGRAM(s) IV Push once PRN  dextrose Oral Gel 15 Gram(s) Oral once PRN                                            ------------------------------------------------------------  VITAL SIGNS: Last 24 Hours  T(C): 35.7 (20 Apr 2023 04:36), Max: 36.4 (19 Apr 2023 13:44)  T(F): 96.3 (20 Apr 2023 04:36), Max: 97.5 (19 Apr 2023 13:44)  HR: 66 (20 Apr 2023 04:36) (66 - 83)  BP: 112/62 (20 Apr 2023 04:36) (112/62 - 164/72)  BP(mean): --  RR: 18 (20 Apr 2023 04:36) (18 - 18)  SpO2: 96% (19 Apr 2023 20:06) (96% - 96%)      04-19-23 @ 07:01  -  04-20-23 @ 07:00  --------------------------------------------------------  IN: 0 mL / OUT: 875 mL / NET: -875 mL                                             --------------------------------------------------------------  LABS:                        14.4   7.95  )-----------( 131      ( 20 Apr 2023 05:05 )             42.1     04-20    139  |  106  |  20  ----------------------------<  160<H>  4.0   |  22  |  1.5    Ca    8.7      20 Apr 2023 05:05  Mg     2.1     04-20    TPro  5.7<L>  /  Alb  3.6  /  TBili  0.8  /  DBili  x   /  AST  20  /  ALT  21  /  AlkPhos  78  04-20                                                              -------------------------------------------------------------  RADIOLOGY:  < from: Xray Chest 1 View-PORTABLE IMMEDIATE (04.15.23 @ 12:02) >  Impression:    Unchanged interstitial opacities.    < end of copied text >                                            --------------------------------------------------------------    PHYSICAL EXAM:  GENERAL: NAD, lying in bed comfortably  HEAD:  Atraumatic, Normocephalic  EYES: EOMI, conjunctiva and sclera clear  ENT: Moist mucous membranes  NECK: Supple, No JVD  CHEST/LUNG: Clear to auscultation bilaterally; No rales, rhonchi, wheezing, or rubs. Unlabored respirations  HEART: regular rate and rhythm; No murmurs, rubs, or gallops  ABDOMEN: Bowel sounds present; Soft, Nontender, Nondistended.    EXTREMITIES: Warm. No clubbing, cyanosis, or edema  NERVOUS SYSTEM:  Alert & Oriented X3. No focal deficits   SKIN: No rashes or lesions                                           --------------------------------------------------------------                
CHIEF COMPLAINT:    Patient is a 78y old  Male who presents with a chief complaint of abnormal rhythm     INTERVAL HPI/OVERNIGHT EVENTS:    Patient seen and examined at bedside. No acute overnight events occurred.    ROS: Denies SOB, chest pain. All other systems are negative.    Medications:  Standing  aspirin enteric coated 81 milliGRAM(s) Oral daily  atorvastatin 80 milliGRAM(s) Oral at bedtime  dextrose 5%. 1000 milliLiter(s) IV Continuous <Continuous>  dextrose 5%. 1000 milliLiter(s) IV Continuous <Continuous>  dextrose 50% Injectable 25 Gram(s) IV Push once  dextrose 50% Injectable 12.5 Gram(s) IV Push once  dextrose 50% Injectable 25 Gram(s) IV Push once  glucagon  Injectable 1 milliGRAM(s) IntraMuscular once  heparin   Injectable 5000 Unit(s) SubCutaneous every 12 hours  insulin glargine Injectable (LANTUS) 45 Unit(s) SubCutaneous at bedtime  insulin lispro (ADMELOG) corrective regimen sliding scale   SubCutaneous three times a day before meals  insulin lispro Injectable (ADMELOG) 5 Unit(s) SubCutaneous three times a day before meals  magnesium oxide 400 milliGRAM(s) Oral daily  pantoprazole    Tablet 40 milliGRAM(s) Oral before breakfast  pregabalin 150 milliGRAM(s) Oral every 12 hours  ranolazine 500 milliGRAM(s) Oral two times a day  tamsulosin 0.4 milliGRAM(s) Oral at bedtime    PRN Meds  atropine Injectable 1 milliGRAM(s) IV Push once PRN  dextrose Oral Gel 15 Gram(s) Oral once PRN        Vital Signs:    T(F): 96.8 (23 @ 14:07), Max: 97.1 (23 @ 20:51)  HR: 79 (23 @ 14:10) (66 - 83)  BP: 129/69 (23 @ 14:10) (112/62 - 164/72)  RR: 18 (23 @ 14:10) (18 - 18)  SpO2: 96% (23 @ 14:10) (96% - 96%)  I&O's Summary    2023 07:01  -  2023 07:00  --------------------------------------------------------  IN: 0 mL / OUT: 875 mL / NET: -875 mL    2023 07:01  -  2023 15:44  --------------------------------------------------------  IN: 0 mL / OUT: 500 mL / NET: -500 mL      Daily Height in cm: 157.48 (2023 14:10)    Daily Weight in k.2 (2023 04:36)  CAPILLARY BLOOD GLUCOSE      POCT Blood Glucose.: 147 mg/dL (2023 11:47)  POCT Blood Glucose.: 152 mg/dL (2023 08:03)  POCT Blood Glucose.: 135 mg/dL (2023 21:56)  POCT Blood Glucose.: 115 mg/dL (2023 16:33)      PHYSICAL EXAM:  GENERAL:  NAD  SKIN: No rashes or lesions  HEENT: Atraumatic. Normocephalic. Anicteric  NECK:  No JVD.   PULMONARY: Clear to ausculation bilaterally. No wheezing. No rales  CVS: Normal S1, S2. Regular rate and rhythm. No murmurs.  ABDOMEN/GI: Soft, Nontender, Nondistended; Bowel sounds are present  EXTREMITIES:  No edema B/L LE.  NEUROLOGIC:  No motor deficit.  PSYCH: Alert & oriented x 3, normal affect      LABS:                        14.4   7.95  )-----------( 131      ( 2023 05:05 )             42.1         139  |  106  |  20  ----------------------------<  160<H>  4.0   |  22  |  1.5    Ca    8.7      2023 05:05  Mg     2.1         TPro  5.7<L>  /  Alb  3.6  /  TBili  0.8  /  DBili  x   /  AST  20  /  ALT  21  /  AlkPhos  78        RADIOLOGY & ADDITIONAL TESTS:  Imaging or report Personally Reviewed:  [ ] YES  [ ] NO -->no new images    Telemetry reviewed independently - NSR, no acute events  EKG reviewed independently -->no new EKGs    Consultant(s) Notes Reviewed:  [ ] YES  [ ] NO  Care Discussed with Consultants/Other Providers [ ] YES  [ ] NO    Case discussed with resident  Care discussed with pt        
SUBJ:No chest pain or shortness of breath      MEDICATIONS  (STANDING):  aspirin enteric coated 81 milliGRAM(s) Oral daily  atorvastatin 80 milliGRAM(s) Oral at bedtime  dextrose 5%. 1000 milliLiter(s) (100 mL/Hr) IV Continuous <Continuous>  dextrose 5%. 1000 milliLiter(s) (50 mL/Hr) IV Continuous <Continuous>  dextrose 50% Injectable 25 Gram(s) IV Push once  dextrose 50% Injectable 12.5 Gram(s) IV Push once  dextrose 50% Injectable 25 Gram(s) IV Push once  glucagon  Injectable 1 milliGRAM(s) IntraMuscular once  heparin   Injectable 5000 Unit(s) SubCutaneous every 12 hours  insulin glargine Injectable (LANTUS) 45 Unit(s) SubCutaneous at bedtime  insulin lispro (ADMELOG) corrective regimen sliding scale   SubCutaneous three times a day before meals  insulin lispro Injectable (ADMELOG) 5 Unit(s) SubCutaneous three times a day before meals  magnesium oxide 400 milliGRAM(s) Oral daily  pantoprazole    Tablet 40 milliGRAM(s) Oral before breakfast  pregabalin 150 milliGRAM(s) Oral every 12 hours  ranolazine 500 milliGRAM(s) Oral two times a day  tamsulosin 0.4 milliGRAM(s) Oral at bedtime    MEDICATIONS  (PRN):  atropine Injectable 1 milliGRAM(s) IV Push once PRN HR < 30  dextrose Oral Gel 15 Gram(s) Oral once PRN Blood Glucose LESS THAN 70 milliGRAM(s)/deciliter            Vital Signs Last 24 Hrs  T(C): 36 (20 Apr 2023 14:10), Max: 36 (20 Apr 2023 14:07)  T(F): 96.8 (20 Apr 2023 14:07), Max: 96.8 (20 Apr 2023 14:07)  HR: 79 (20 Apr 2023 14:10) (66 - 79)  BP: 129/69 (20 Apr 2023 14:10) (112/62 - 129/69)  BP(mean): 78 (20 Apr 2023 14:10) (78 - 78)  RR: 18 (20 Apr 2023 14:10) (18 - 18)  SpO2: 96% (20 Apr 2023 14:10) (96% - 96%)          ECG:NML    TTE:    LABS:                        14.4   7.95  )-----------( 131      ( 20 Apr 2023 05:05 )             42.1     04-20    139  |  106  |  20  ----------------------------<  160<H>  4.0   |  22  |  1.5    Ca    8.7      20 Apr 2023 05:05  Mg     2.1     04-20    TPro  5.7<L>  /  Alb  3.6  /  TBili  0.8  /  DBili  x   /  AST  20  /  ALT  21  /  AlkPhos  78  04-20            I&O's Summary    19 Apr 2023 07:01  -  20 Apr 2023 07:00  --------------------------------------------------------  IN: 0 mL / OUT: 875 mL / NET: -875 mL    20 Apr 2023 07:01  -  20 Apr 2023 21:56  --------------------------------------------------------  IN: 0 mL / OUT: 500 mL / NET: -500 mL      BNP          
  CASEY BENÍTEZ  78y, Male  Allergy: Seafood (Anaphylaxis)  No Known Drug Allergies    Hospital Day: 1d    Patient seen and examined. No acute events overnight    PMH/PSH:  PAST MEDICAL & SURGICAL HISTORY:  DM (diabetes mellitus)      CAD (coronary artery disease)      BPH (benign prostatic hyperplasia)      History of hematologic disorder      Total cataract      [place] (hard of hearing)      Acute myocardial infarction      H/O coronary angiogram      Stented coronary artery      History of ear surgery      H/O tonsillitis          VITALS:  T(F): 97.6 (04-16-23 @ 08:09), Max: 97.6 (04-16-23 @ 08:09)  HR: 56 (04-16-23 @ 08:09)  BP: 116/56 (04-16-23 @ 08:09) (98/60 - 116/56)  RR: 18 (04-16-23 @ 08:09)  SpO2: 97% (04-16-23 @ 08:09)    TESTS & MEASUREMENTS:  Weight (Kg): 89.8 (04-15-23 @ 10:32)  BMI (kg/m2): 32.9 (04-15)                          14.0   8.86  )-----------( 129      ( 16 Apr 2023 05:34 )             41.8       04-16    140  |  105  |  29<H>  ----------------------------<  79  4.9   |  25  |  1.6<H>    Ca    8.8      16 Apr 2023 05:34  Phos  3.3     04-16  Mg     2.1     04-16    TPro  5.9<L>  /  Alb  3.8  /  TBili  0.8  /  DBili  x   /  AST  20  /  ALT  16  /  AlkPhos  65  04-16    LIVER FUNCTIONS - ( 16 Apr 2023 05:34 )  Alb: 3.8 g/dL / Pro: 5.9 g/dL / ALK PHOS: 65 U/L / ALT: 16 U/L / AST: 20 U/L / GGT: x           CARDIAC MARKERS ( 15 Apr 2023 12:48 )  x     / <0.01 ng/mL / x     / x     / x                RADIOLOGY & ADDITIONAL TESTS:    RECENT DIAGNOSTIC ORDERS:  Thyroid Stimulating Hormone, Serum: AM Sched. Collection: 16-Apr-2023 04:30 (04-15-23 @ 19:59)  Diet, DASH/TLC:   Sodium & Cholesterol Restricted  Consistent Carbohydrate No Snacks (04-15-23 @ 16:24)      MEDICATIONS:  MEDICATIONS  (STANDING):  aspirin enteric coated 81 milliGRAM(s) Oral daily  atorvastatin 80 milliGRAM(s) Oral at bedtime  dextrose 5%. 1000 milliLiter(s) (50 mL/Hr) IV Continuous <Continuous>  dextrose 5%. 1000 milliLiter(s) (100 mL/Hr) IV Continuous <Continuous>  dextrose 50% Injectable 25 Gram(s) IV Push once  dextrose 50% Injectable 12.5 Gram(s) IV Push once  dextrose 50% Injectable 25 Gram(s) IV Push once  glucagon  Injectable 1 milliGRAM(s) IntraMuscular once  heparin   Injectable 5000 Unit(s) SubCutaneous every 12 hours  insulin glargine Injectable (LANTUS) 45 Unit(s) SubCutaneous at bedtime  insulin lispro (ADMELOG) corrective regimen sliding scale   SubCutaneous three times a day before meals  insulin lispro Injectable (ADMELOG) 5 Unit(s) SubCutaneous three times a day before meals  magnesium oxide 400 milliGRAM(s) Oral daily  metoprolol tartrate 100 milliGRAM(s) Oral two times a day  pantoprazole    Tablet 40 milliGRAM(s) Oral before breakfast  pregabalin 150 milliGRAM(s) Oral every 12 hours  ranolazine 500 milliGRAM(s) Oral two times a day  tamsulosin 0.4 milliGRAM(s) Oral at bedtime    MEDICATIONS  (PRN):  dextrose Oral Gel 15 Gram(s) Oral once PRN Blood Glucose LESS THAN 70 milliGRAM(s)/deciliter      HOME MEDICATIONS:  Aspir 81 oral delayed release tablet (04-15)  atorvastatin 80 mg oral tablet (04-15)  glipiZIDE 5 mg oral tablet (04-15)  glipiZIDE 5 mg oral tablet (04-15)  HumaLOG 100 units/mL injectable solution (04-15)  HumaLOG 100 units/mL subcutaneous solution (04-15)  Jardiance 25 mg oral tablet (04-15)  Lantus 100 units/mL subcutaneous solution (04-15)  lisinopril 5 mg oral tablet (04-15)  magnesium oxide 400 mg oral tablet (04-15)  metoprolol succinate 100 mg oral tablet, extended release (04-15)  omeprazole 40 mg oral delayed release capsule (04-15)  pregabalin 150 mg oral capsule (04-15)  ranolazine 500 mg oral tablet, extended release (04-15)  tamsulosin 0.4 mg oral capsule (04-15)      REVIEW OF SYSTEMS:  All other review of systems is negative unless indicated above.     PHYSICAL EXAM:  PHYSICAL EXAM:  GENERAL: NAD, well-developed  HEAD:  Atraumatic, Normocephalic  NECK: Supple, No JVD  CHEST/LUNG: Clear to auscultation bilaterally; No wheeze  HEART: Regular rate and rhythm; No murmurs, rubs, or gallops  ABDOMEN: Soft, Nontender, Nondistended; Bowel sounds present  EXTREMITIES:  2+ Peripheral Pulses, No clubbing, cyanosis, or edema  SKIN: No rashes or lesions      
  Patient is a 78y old  Male who presents with a chief complaint of abnormal rhythm (17 Apr 2023 22:30)    HPI:  78-year-old male with PMHx of CAD s/p PCI, DM, HTM, HLD, BPH and hemochromatosis (phlebotomy every 3 months) sent by PMD Dr. Jones out of concern for possible A-fib/arrhythmia.  PAtient reports that since sunday he has noted some episodes of palpitations with diaphoresis but no chest pain,  unsure if he was having hypoglycemic episodes but wife reports that at time of event his FS is . Patient also reports 1 week of intermittent hypotension at home with yesterday an episode of BP 80/60 after phlebotomy felt better after IV fluids given.  Seen by PMD today who noted irregular heartbeat so sent patient to ED.  No chest pain.  Reports intermittent dyspnea on exertion which is worse in the last week.  No leg pain swelling.  No fever cough.  No LOC.  Follows with Dr. Freed cardiology.   (15 Apr 2023 15:20)    PAST MEDICAL & SURGICAL HISTORY:  DM (diabetes mellitus)  CAD (coronary artery disease)  BPH (benign prostatic hyperplasia)  Total cataract  [place] (hard of hearing)  Acute myocardial infarction  H/O coronary angiogram  Stented coronary artery  History of ear surgery  H/O tonsillitis    on tele review again bradycardic (down to the 30's)- bblocker held yesterday pm and again this morning- awaiting EPS eval  d/w paitent and wife bedside and understand may need ppm     pcp- Dr. Jones  Cardio- Dr. Freed    Vital Signs Last 24 Hrs  T(C): 36.3 (18 Apr 2023 08:03), Max: 37 (17 Apr 2023 15:32)  T(F): 97.4 (18 Apr 2023 08:03), Max: 98.6 (17 Apr 2023 15:32)  HR: 61 (18 Apr 2023 08:03) (61 - 80)  BP: 116/63 (18 Apr 2023 08:03) (107/60 - 118/55)  BP(mean): 78 (17 Apr 2023 15:32) (78 - 78)  RR: 18 (18 Apr 2023 08:03) (18 - 18)  SpO2: 96% (18 Apr 2023 08:03) (96% - 97%)      PE:  GEN-NAD, AAOx3  PULM- Clear to auscultation bilaterally, fair air entry  CVS- +s1/s2 RRR   GI- soft NT ND +bs, no rebound, no guarding  EXT- no edema                          15.0   9.21  )-----------( 131      ( 18 Apr 2023 08:55 )             44.9     04-18    141  |  107  |  21<H>  ----------------------------<  171<H>  4.5   |  25  |  1.5    Ca    8.7      18 Apr 2023 08:55  Mg     2.3     04-18    TPro  6.2  /  Alb  4.0  /  TBili  0.9  /  DBili  x   /  AST  18  /  ALT  17  /  AlkPhos  81  04-18    CARDIAC MARKERS ( 17 Apr 2023 07:46 )  x     / <0.01 ng/mL / x     / x     / x                MEDICATIONS  (STANDING):  aspirin enteric coated 81 milliGRAM(s) Oral daily  atorvastatin 80 milliGRAM(s) Oral at bedtime  dextrose 5%. 1000 milliLiter(s) (100 mL/Hr) IV Continuous <Continuous>  dextrose 5%. 1000 milliLiter(s) (50 mL/Hr) IV Continuous <Continuous>  dextrose 50% Injectable 25 Gram(s) IV Push once  dextrose 50% Injectable 25 Gram(s) IV Push once  dextrose 50% Injectable 12.5 Gram(s) IV Push once  glucagon  Injectable 1 milliGRAM(s) IntraMuscular once  heparin   Injectable 5000 Unit(s) SubCutaneous every 12 hours  insulin glargine Injectable (LANTUS) 45 Unit(s) SubCutaneous at bedtime  insulin lispro (ADMELOG) corrective regimen sliding scale   SubCutaneous three times a day before meals  insulin lispro Injectable (ADMELOG) 5 Unit(s) SubCutaneous three times a day before meals  magnesium oxide 400 milliGRAM(s) Oral daily  pantoprazole    Tablet 40 milliGRAM(s) Oral before breakfast  pregabalin 150 milliGRAM(s) Oral every 12 hours  ranolazine 500 milliGRAM(s) Oral two times a day  sodium zirconium cyclosilicate 10 Gram(s) Oral two times a day  tamsulosin 0.4 milliGRAM(s) Oral at bedtime  
CHIEF COMPLAINT:    Patient is a 78y old  Male who presents with a chief complaint of abnormal rhythm     INTERVAL HPI/OVERNIGHT EVENTS:    Patient seen and examined at bedside. No acute overnight events occurred.    ROS: Denies SOB, chest pain, dizziness. All other systems are negative.    Medications:  Standing  aspirin enteric coated 81 milliGRAM(s) Oral daily  atorvastatin 80 milliGRAM(s) Oral at bedtime  dextrose 5%. 1000 milliLiter(s) IV Continuous <Continuous>  dextrose 5%. 1000 milliLiter(s) IV Continuous <Continuous>  dextrose 50% Injectable 25 Gram(s) IV Push once  dextrose 50% Injectable 12.5 Gram(s) IV Push once  dextrose 50% Injectable 25 Gram(s) IV Push once  glucagon  Injectable 1 milliGRAM(s) IntraMuscular once  heparin   Injectable 5000 Unit(s) SubCutaneous every 12 hours  insulin glargine Injectable (LANTUS) 45 Unit(s) SubCutaneous at bedtime  insulin lispro (ADMELOG) corrective regimen sliding scale   SubCutaneous three times a day before meals  insulin lispro Injectable (ADMELOG) 5 Unit(s) SubCutaneous three times a day before meals  magnesium oxide 400 milliGRAM(s) Oral daily  pantoprazole    Tablet 40 milliGRAM(s) Oral before breakfast  pregabalin 150 milliGRAM(s) Oral every 12 hours  ranolazine 500 milliGRAM(s) Oral two times a day  tamsulosin 0.4 milliGRAM(s) Oral at bedtime    PRN Meds  atropine Injectable 1 milliGRAM(s) IV Push once PRN  dextrose Oral Gel 15 Gram(s) Oral once PRN        Vital Signs:    T(F): 96.5 (23 @ 05:03), Max: 98.2 (23 @ 16:07)  HR: 58 (23 @ 05:03) (58 - 84)  BP: 112/68 (23 @ 05:03) (112/68 - 125/70)  RR: 18 (23 @ 05:03) (18 - 18)  SpO2: 98% (23 @ 16:07) (98% - 98%)  I&O's Summary    2023 07:01  -  2023 07:00  --------------------------------------------------------  IN: 0 mL / OUT: 250 mL / NET: -250 mL      Daily Height in cm: 157.48 (2023 19:57)    Daily Weight in k.6 (2023 05:03)  CAPILLARY BLOOD GLUCOSE      POCT Blood Glucose.: 143 mg/dL (2023 11:29)  POCT Blood Glucose.: 130 mg/dL (2023 08:03)  POCT Blood Glucose.: 184 mg/dL (2023 21:10)  POCT Blood Glucose.: 136 mg/dL (2023 16:17)      PHYSICAL EXAM:  GENERAL:  NAD  SKIN: No rashes or lesions  HEENT: Atraumatic. Normocephalic. Anicteric  NECK:  No JVD.   PULMONARY: Clear to ausculation bilaterally. No wheezing. No rales  CVS: Normal S1, S2. Regular rate and rhythm. No murmurs.  ABDOMEN/GI: Soft, Nontender, Nondistended; Bowel sounds are present  EXTREMITIES:  No edema B/L LE.  NEUROLOGIC:  No motor deficit.  PSYCH: Alert & oriented x 3, normal affect      LABS:                        14.4   8.95  )-----------( 119      ( 2023 04:58 )             43.2         141  |  106  |  20  ----------------------------<  110<H>  4.1   |  24  |  1.4    Ca    8.8      2023 04:58  Mg     2.2         TPro  5.7<L>  /  Alb  3.7  /  TBili  0.8  /  DBili  x   /  AST  17  /  ALT  16  /  AlkPhos  73          Trop <0.01, CKMB --, CK --, 23 @ 07:46        RADIOLOGY & ADDITIONAL TESTS:  Imaging or report Personally Reviewed:  [ ] YES  [ ] NO -->no new images    Telemetry reviewed independently - sinus bradycardia  EKG reviewed independently -->no new EKGs    Consultant(s) Notes Reviewed:  [ ] YES  [ ] NO  Care Discussed with Consultants/Other Providers [ ] YES  [ ] NO    Case discussed with resident  Care discussed with pt        
INTERVAL HPI/OVERNIGHT EVENTS:    Patient s/p DC PPM implant  No event over night. Pt without complains    MEDICATIONS  (STANDING):  atorvastatin 80 milliGRAM(s) Oral at bedtime  dextrose 5%. 1000 milliLiter(s) (100 mL/Hr) IV Continuous <Continuous>  dextrose 5%. 1000 milliLiter(s) (50 mL/Hr) IV Continuous <Continuous>  dextrose 50% Injectable 25 Gram(s) IV Push once  dextrose 50% Injectable 12.5 Gram(s) IV Push once  dextrose 50% Injectable 25 Gram(s) IV Push once  glucagon  Injectable 1 milliGRAM(s) IntraMuscular once  insulin glargine Injectable (LANTUS) 45 Unit(s) SubCutaneous at bedtime  insulin lispro (ADMELOG) corrective regimen sliding scale   SubCutaneous three times a day before meals  insulin lispro Injectable (ADMELOG) 5 Unit(s) SubCutaneous three times a day before meals  magnesium oxide 400 milliGRAM(s) Oral daily  metoprolol tartrate 50 milliGRAM(s) Oral two times a day  pantoprazole    Tablet 40 milliGRAM(s) Oral before breakfast  pregabalin 150 milliGRAM(s) Oral every 12 hours  ranolazine 500 milliGRAM(s) Oral two times a day  tamsulosin 0.4 milliGRAM(s) Oral at bedtime  vancomycin  IVPB 1000 milliGRAM(s) IV Intermittent every 12 hours    MEDICATIONS  (PRN):  acetaminophen     Tablet .. 650 milliGRAM(s) Oral every 6 hours PRN Temp greater or equal to 38C (100.4F), Mild Pain (1 - 3)  atropine Injectable 1 milliGRAM(s) IV Push once PRN HR < 30  dextrose Oral Gel 15 Gram(s) Oral once PRN Blood Glucose LESS THAN 70 milliGRAM(s)/deciliter      Allergies    Seafood (Anaphylaxis)  No Known Drug Allergies    Intolerances          Vital Signs Last 24 Hrs  T(C): 35.7 (21 Apr 2023 05:24), Max: 36 (20 Apr 2023 14:07)  T(F): 96.3 (21 Apr 2023 05:24), Max: 96.8 (20 Apr 2023 14:07)  HR: 66 (21 Apr 2023 05:24) (66 - 79)  BP: 131/73 (21 Apr 2023 05:24) (129/69 - 144/77)  BP(mean): 78 (20 Apr 2023 14:10) (78 - 78)  RR: 18 (21 Apr 2023 05:24) (18 - 18)  SpO2: 97% (20 Apr 2023 21:29) (96% - 97%)    Parameters below as of 20 Apr 2023 21:29  Patient On (Oxygen Delivery Method): room air        GENERAL: In no apparent distress, well nourished, and hydrated.  HEART: Regular rate and rhythm; No murmurs, rubs, or gallops.  	Wound healing well; No hematoma; no bleeding  PULMONARY: Clear to auscultation and perfusion.  No rales, wheezing, or rhonchi bilaterally.  ABDOMEN: Soft, Nontender, Nondistended; Bowel sounds present  EXTREMITIES:  2+ Peripheral Pulses, No clubbing, cyanosis, or edema  NEUROLOGICAL: Grossly nonfocal        RADIOLOGY & ADDITIONAL TESTS:  Xray Chest 2 Views PA/Lat:   ACC: [id number] EXAM:  XR CHEST PA LAT 2V   ORDERED BY: CHIVO HAYNES     PROCEDURE DATE:  04/21/2023          INTERPRETATION:  Clinical History / Reason for exam: Cardiac dysrhythmia    Comparison : Chest radiograph prior day.    Technique/Positioning: Frontal and lateral, low lung volumes.    Findings:    Support devices: Dual-chamber left-sided pacemaker. Telemetry leads.    Cardiac/mediastinum/hilum: Unremarkable.    Lung parenchyma/Pleura: Within normal limits.    Skeleton/soft tissues: Unremarkable.    Impression:    No radiographic evidence of acute cardiopulmonary disease.    Support devices as described. Low lung volumes leads to magnification of   the pulmonary interstitium.    --- End of Report ---            MELQUIADES MCKEON MD; Attending Interventional Radiologist  This document has been electronically signed. Apr 21 2023  7:31AM (04-21-23 @ 07:27)

## 2023-04-21 NOTE — PROGRESS NOTE ADULT - REASON FOR ADMISSION
abnormal rhythm

## 2023-04-26 DIAGNOSIS — E11.9 TYPE 2 DIABETES MELLITUS WITHOUT COMPLICATIONS: ICD-10-CM

## 2023-04-26 DIAGNOSIS — Z83.3 FAMILY HISTORY OF DIABETES MELLITUS: ICD-10-CM

## 2023-04-26 DIAGNOSIS — Z91.013 ALLERGY TO SEAFOOD: ICD-10-CM

## 2023-04-26 DIAGNOSIS — E78.5 HYPERLIPIDEMIA, UNSPECIFIED: ICD-10-CM

## 2023-04-26 DIAGNOSIS — I25.10 ATHEROSCLEROTIC HEART DISEASE OF NATIVE CORONARY ARTERY WITHOUT ANGINA PECTORIS: ICD-10-CM

## 2023-04-26 DIAGNOSIS — Z79.4 LONG TERM (CURRENT) USE OF INSULIN: ICD-10-CM

## 2023-04-26 DIAGNOSIS — E87.5 HYPERKALEMIA: ICD-10-CM

## 2023-04-26 DIAGNOSIS — Z82.49 FAMILY HISTORY OF ISCHEMIC HEART DISEASE AND OTHER DISEASES OF THE CIRCULATORY SYSTEM: ICD-10-CM

## 2023-04-26 DIAGNOSIS — Z79.84 LONG TERM (CURRENT) USE OF ORAL HYPOGLYCEMIC DRUGS: ICD-10-CM

## 2023-04-26 DIAGNOSIS — N40.0 BENIGN PROSTATIC HYPERPLASIA WITHOUT LOWER URINARY TRACT SYMPTOMS: ICD-10-CM

## 2023-04-26 DIAGNOSIS — I10 ESSENTIAL (PRIMARY) HYPERTENSION: ICD-10-CM

## 2023-04-26 DIAGNOSIS — Z95.5 PRESENCE OF CORONARY ANGIOPLASTY IMPLANT AND GRAFT: ICD-10-CM

## 2023-04-26 DIAGNOSIS — I49.5 SICK SINUS SYNDROME: ICD-10-CM

## 2023-04-26 DIAGNOSIS — H91.90 UNSPECIFIED HEARING LOSS, UNSPECIFIED EAR: ICD-10-CM

## 2023-04-26 DIAGNOSIS — Z79.82 LONG TERM (CURRENT) USE OF ASPIRIN: ICD-10-CM

## 2023-04-26 DIAGNOSIS — E83.119 HEMOCHROMATOSIS, UNSPECIFIED: ICD-10-CM

## 2023-04-26 DIAGNOSIS — H40.9 UNSPECIFIED GLAUCOMA: ICD-10-CM

## 2023-04-26 DIAGNOSIS — R00.1 BRADYCARDIA, UNSPECIFIED: ICD-10-CM

## 2023-04-26 DIAGNOSIS — I25.2 OLD MYOCARDIAL INFARCTION: ICD-10-CM

## 2023-04-26 NOTE — PHYSICAL THERAPY INITIAL EVALUATION ADULT - GENERAL OBSERVATIONS, REHAB EVAL
Pt was approach again today, Pt desaturate on 5 L to 85%, SOBWE, Pt unable to participate at this time. Nurse made aware.
Pt was approached for PT initial evaluation. Pt found sitting at EOB, + O2 5 L via NC, SPO2 85%, SOB during rest. chest PT was provided with deep breathing exercises. Nurse/Resident called placed Pt in Non rebreather. PT will follow up when appropriate.
Thank you for your confidence in our team    We appreciate you and welcome your feedback  If you receive a survey from us, please take a few moments to let us know how we are doing     Sincerely,  Nissa Don, DO
9:20-9:45; Pt encountered in bed, 4L/min O2. Agreeable to PT.

## 2023-05-15 ENCOUNTER — APPOINTMENT (OUTPATIENT)
Dept: ELECTROPHYSIOLOGY | Facility: CLINIC | Age: 78
End: 2023-05-15
Payer: MEDICARE

## 2023-05-15 VITALS
SYSTOLIC BLOOD PRESSURE: 119 MMHG | HEIGHT: 62 IN | WEIGHT: 190 LBS | BODY MASS INDEX: 34.96 KG/M2 | HEART RATE: 60 BPM | DIASTOLIC BLOOD PRESSURE: 71 MMHG | TEMPERATURE: 98 F

## 2023-05-15 DIAGNOSIS — Z51.89 ENCOUNTER FOR OTHER SPECIFIED AFTERCARE: ICD-10-CM

## 2023-05-15 PROCEDURE — 93280 PM DEVICE PROGR EVAL DUAL: CPT

## 2023-05-15 PROCEDURE — 99024 POSTOP FOLLOW-UP VISIT: CPT

## 2023-05-15 PROCEDURE — 93000 ELECTROCARDIOGRAM COMPLETE: CPT | Mod: 59

## 2023-05-15 NOTE — PHYSICAL EXAM
[General Appearance - Well Developed] : well developed [Normal Appearance] : normal appearance [Well Groomed] : well groomed [General Appearance - Well Nourished] : well nourished [No Deformities] : no deformities [General Appearance - In No Acute Distress] : no acute distress [Heart Rate And Rhythm] : heart rate and rhythm were normal [Heart Sounds] : normal S1 and S2 [] : no respiratory distress [Respiration, Rhythm And Depth] : normal respiratory rhythm and effort [Exaggerated Use Of Accessory Muscles For Inspiration] : no accessory muscle use [Left Infraclavicular] : left infraclavicular area [Clean] : clean [Dry] : dry [Healing Well] : healing well [Abdomen Tenderness] : non-tender

## 2023-05-15 NOTE — HISTORY OF PRESENT ILLNESS
[de-identified] : PCP: Dr. Jones\par Cards: Dr. Freed // Dr. Murrieta\par EP: Dr. Rose\par \par 78 Y.O Male w/ a PMHx of CAD s/p PCI, DM, HTN, HLD, BPH and hemochromatosis (phlebotomy every 3 months).\par 04/2023: Pt was sent to the ER by Dr. Jones for possible a-fib/arrhythmia. Pt was c/o palpitations w/ diaphoresis, hypotension. Pt was found to have intermittent bradycardia now s/p DC-PPM (MDT).

## 2023-05-15 NOTE — ASSESSMENT
[FreeTextEntry1] : 78 Y.O Male w/ a PMHx of CAD s/p PCI, DM, HTN, HLD, BPH and hemochromatosis (phlebotomy every 3 months), intermittent bradycardia s/p DC-PPM presents for initial wound check and device interrogation.\par \par # Bradycardia s/p DC-PPM\par - Incision site healing well, clean, dry, no drainage, no redness, no bruising. I discussed with patient post-operative care in great details. I answered all questions to their satisfaction. Patient was pleased with the result of their procedure. Advised pt to avoid carrying more than 5 lb and lifting his left arm above his shoulder for a total of 6 weeks from procedure date. Also advised pt to avoid fishing, swimming and golfing for a total of 3 mo to avoid lead dislodgment.\par - Device function normal. All parameters stable. No events. See Device Printout\par \par # Remote monitoring - patient is enrolled\par - Remote monitoring was discussed with patient and spouse, schedule, process, as well as associated co-pay that may not be covered by their insurance.\par \par # CAD\par - Pt states he is going to stress test soon.\par - Continue metoprolol 50mg BID\par - Continue atorvastatin 80mg QD and ASA 81mg QD\par \par F/U with PCP for DM management\par \par \par RTO 5-6 months/PRN\par \par \par I have also advised the patient to go to the nearest emergency room if he experiences any chest pain, dyspnea, syncope, or has any other compelling symptoms.\par

## 2023-05-15 NOTE — PROCEDURE
[No] : not [See Device Printout] : See device printout [DDD] : DDD [Threshold Testing Performed] : Threshold testing was performed [Lead Imp:  ___ohms] : lead impedance was [unfilled] ohms [Sensing Amplitude ___mv] : sensing amplitude was [unfilled] mv [___V @] : [unfilled] V [___ ms] : [unfilled] ms [None] : none [de-identified] : Medtronic [de-identified] : Tiny MORAN DR MRI [id number] [de-identified] : [id number] [de-identified] : 04/20/2023 [de-identified] : 60 [de-identified] : 13.4 YEARS [de-identified] : No Events\par  0.2%\par AP 50%

## 2023-05-15 NOTE — CARDIOLOGY SUMMARY
[de-identified] : 05/15/2023: SR, inferior T-waves     HR 60bpm [de-identified] : TTE Echo (04.16.23 @ 08:50) > Summary:\par  1. Left ventricular ejection fraction, by visual estimation, is 50 to 55%.\par  2. Technically difficult study.\par  3. LV Ejection Fraction by Woods's Method with a biplane EF of 62 %.\par  4. Elevated mean left atrial pressure.\par  5. Moderate concentric left ventricular hypertrophy.\par  6. Spectral Doppler shows impaired relaxation pattern of left ventricular myocardial filling (Grade I diastolic dysfunction).\par  7. E/e': 15 TR alvina: 3.2 m/s.\par  8. Mildly enlarged left atrium.\par  9. Degenerative mitral valve.\par 10. Thickened aortic mitral curtain.\par 11. Mild tricuspid regurgitation.\par 12. Ao valve nwv but appears restricted and calcified Peak AV alvina: 1.64 m/s. [de-identified] : 04/20/2023: DC-PPM (NELL)

## 2023-06-16 ENCOUNTER — APPOINTMENT (OUTPATIENT)
Dept: ELECTROPHYSIOLOGY | Facility: CLINIC | Age: 78
End: 2023-06-16

## 2023-08-14 ENCOUNTER — APPOINTMENT (OUTPATIENT)
Dept: CARDIOLOGY | Facility: CLINIC | Age: 78
End: 2023-08-14
Payer: MEDICARE

## 2023-08-14 ENCOUNTER — NON-APPOINTMENT (OUTPATIENT)
Age: 78
End: 2023-08-14

## 2023-08-14 PROCEDURE — 93294 REM INTERROG EVL PM/LDLS PM: CPT

## 2023-08-14 PROCEDURE — 93296 REM INTERROG EVL PM/IDS: CPT

## 2023-08-31 NOTE — ED ADULT TRIAGE NOTE - MODE OF ARRIVAL
[facility] [employer]

## 2023-10-20 NOTE — PRE-ANESTHESIA EVALUATION ADULT - NSANTHRISKNONERD_GEN_ALL_CORE
Wegovy 2.4mg approved by insurance. Pharmacy informed. Patient informed as well via [address] [place] [address]. Approval 10/19/2023 - 4/16/2024.
No risk alerts present

## 2023-11-13 ENCOUNTER — APPOINTMENT (OUTPATIENT)
Dept: ELECTROPHYSIOLOGY | Facility: CLINIC | Age: 78
End: 2023-11-13
Payer: MEDICARE

## 2023-11-13 VITALS
HEIGHT: 62 IN | DIASTOLIC BLOOD PRESSURE: 60 MMHG | TEMPERATURE: 97.1 F | BODY MASS INDEX: 36.44 KG/M2 | SYSTOLIC BLOOD PRESSURE: 100 MMHG | RESPIRATION RATE: 18 BRPM | WEIGHT: 198 LBS | HEART RATE: 77 BPM

## 2023-11-13 DIAGNOSIS — Z45.018 ENCOUNTER FOR ADJUSTMENT AND MANAGEMENT OF OTHER PART OF CARDIAC PACEMAKER: ICD-10-CM

## 2023-11-13 PROCEDURE — 93280 PM DEVICE PROGR EVAL DUAL: CPT

## 2023-11-13 PROCEDURE — 99214 OFFICE O/P EST MOD 30 MIN: CPT

## 2024-02-12 ENCOUNTER — APPOINTMENT (OUTPATIENT)
Dept: CARDIOLOGY | Facility: CLINIC | Age: 79
End: 2024-02-12
Payer: MEDICARE

## 2024-02-12 ENCOUNTER — NON-APPOINTMENT (OUTPATIENT)
Age: 79
End: 2024-02-12

## 2024-02-12 PROCEDURE — 93294 REM INTERROG EVL PM/LDLS PM: CPT

## 2024-02-12 PROCEDURE — 93296 REM INTERROG EVL PM/IDS: CPT

## 2024-04-08 NOTE — H&P PST ADULT - NSALCOHOLTYPE_GEN__A_CORE_SD
Medication: venlafaxine XR  Last office visit date: 10/20/22  Medication Refill Protocol Failed.  eGFR greater than 29 within last 12 months looking at last value     
wine

## 2024-04-29 ENCOUNTER — APPOINTMENT (OUTPATIENT)
Dept: ELECTROPHYSIOLOGY | Facility: CLINIC | Age: 79
End: 2024-04-29
Payer: MEDICARE

## 2024-04-29 VITALS
HEIGHT: 62 IN | RESPIRATION RATE: 17 BRPM | TEMPERATURE: 97.8 F | WEIGHT: 195 LBS | SYSTOLIC BLOOD PRESSURE: 127 MMHG | HEART RATE: 66 BPM | BODY MASS INDEX: 35.88 KG/M2 | DIASTOLIC BLOOD PRESSURE: 82 MMHG

## 2024-04-29 DIAGNOSIS — Z87.891 PERSONAL HISTORY OF NICOTINE DEPENDENCE: ICD-10-CM

## 2024-04-29 DIAGNOSIS — R00.1 BRADYCARDIA, UNSPECIFIED: ICD-10-CM

## 2024-04-29 DIAGNOSIS — Z78.9 OTHER SPECIFIED HEALTH STATUS: ICD-10-CM

## 2024-04-29 PROCEDURE — 93000 ELECTROCARDIOGRAM COMPLETE: CPT | Mod: 59

## 2024-04-29 PROCEDURE — 99214 OFFICE O/P EST MOD 30 MIN: CPT

## 2024-04-29 PROCEDURE — 93280 PM DEVICE PROGR EVAL DUAL: CPT

## 2024-04-29 RX ORDER — METFORMIN ER 500 MG 500 MG/1
500 TABLET ORAL
Qty: 180 | Refills: 0 | Status: ACTIVE | COMMUNITY
Start: 2023-02-24

## 2024-04-29 RX ORDER — ATORVASTATIN CALCIUM 80 MG/1
80 TABLET, FILM COATED ORAL
Qty: 30 | Refills: 5 | Status: ACTIVE | COMMUNITY

## 2024-04-29 RX ORDER — OMEPRAZOLE 40 MG/1
40 CAPSULE, DELAYED RELEASE ORAL
Qty: 90 | Refills: 0 | Status: ACTIVE | COMMUNITY
Start: 2023-02-16

## 2024-04-29 RX ORDER — PREGABALIN 150 MG/1
150 CAPSULE ORAL
Qty: 180 | Refills: 0 | Status: ACTIVE | COMMUNITY

## 2024-04-29 RX ORDER — LISINOPRIL 5 MG/1
5 TABLET ORAL DAILY
Refills: 0 | Status: ACTIVE | COMMUNITY

## 2024-04-29 RX ORDER — INSULIN GLARGINE 100 [IU]/ML
100 INJECTION, SOLUTION SUBCUTANEOUS
Qty: 45 | Refills: 0 | Status: ACTIVE | COMMUNITY
Start: 2022-10-31

## 2024-04-29 RX ORDER — INSULIN LISPRO 100 [IU]/ML
100 INJECTION, SOLUTION INTRAVENOUS; SUBCUTANEOUS 3 TIMES DAILY
Refills: 0 | Status: ACTIVE | COMMUNITY

## 2024-04-29 RX ORDER — EMPAGLIFLOZIN 25 MG/1
25 TABLET, FILM COATED ORAL DAILY
Refills: 0 | Status: ACTIVE | COMMUNITY

## 2024-04-29 RX ORDER — TAMSULOSIN HYDROCHLORIDE 0.4 MG/1
0.4 CAPSULE ORAL
Qty: 90 | Refills: 3 | Status: ACTIVE | COMMUNITY

## 2024-04-29 RX ORDER — METOPROLOL TARTRATE 50 MG/1
50 TABLET, FILM COATED ORAL
Qty: 2 | Refills: 0 | Status: ACTIVE | COMMUNITY

## 2024-04-29 RX ORDER — PEN NEEDLE, DIABETIC 29 G X1/2"
32G X 4 MM NEEDLE, DISPOSABLE MISCELLANEOUS
Qty: 360 | Refills: 0 | Status: ACTIVE | COMMUNITY
Start: 2023-02-16

## 2024-04-29 RX ORDER — GLIPIZIDE 5 MG/1
5 TABLET ORAL DAILY
Refills: 0 | Status: ACTIVE | COMMUNITY

## 2024-04-29 RX ORDER — RANOLAZINE 500 MG/1
500 TABLET, EXTENDED RELEASE ORAL
Qty: 180 | Refills: 1 | Status: ACTIVE | COMMUNITY

## 2024-04-29 NOTE — PROCEDURE
[No] : not [NSR] : normal sinus rhythm [Pacemaker] : pacemaker [DDDR] : DDDR [Voltage: ___ volts] : Voltage was [unfilled] volts [Magnet Rate: ___ Ppm] : magnet rate was [unfilled] Ppm [Longevity: ___ months] : The estimated remaining battery life is [unfilled] months [Lead Imp:  ___ohms] : lead impedance was [unfilled] ohms [Sensing Amplitude ___mv] : sensing amplitude was [unfilled] mv [___V @] : [unfilled] V [___ ms] : [unfilled] ms [Asense-Vsense ___ %] : Asense-Vsense [unfilled]% [Asense-Vpace ___ %] : Asense-Vpace [unfilled]% [Apace-Vsense ___ %] : Apace-Vsense [unfilled]% [Apace-Vpace ___ %] : Apace-Vpace [unfilled]% [Programmed for Longevity] : output reprogrammed for improved battery longevity [de-identified] : Medtronic [de-identified] : [id number] [de-identified] : [id number] [de-identified] : 04/20/2023 [de-identified] :  [de-identified] : 1 NSVT episode on 11/13/2023 lasting 3 seconds AP 60%  1.3% I reprogrammed Atrial Rate stabilization  I reprogrammed rate response

## 2024-04-29 NOTE — PHYSICAL EXAM
Sutures placed to left hand here about a week ago. He noticed the sutures opened up a few days ago. Oozing small amount serous drainage. Pain to area. No redness.    [General Appearance - Well Developed] : well developed [Normal Appearance] : normal appearance [Well Groomed] : well groomed [General Appearance - Well Nourished] : well nourished [No Deformities] : no deformities [General Appearance - In No Acute Distress] : no acute distress [Heart Rate And Rhythm] : heart rate and rhythm were normal [Heart Sounds] : normal S1 and S2 [Respiration, Rhythm And Depth] : normal respiratory rhythm and effort [Exaggerated Use Of Accessory Muscles For Inspiration] : no accessory muscle use [Left Infraclavicular] : left infraclavicular area [Clean] : clean [Dry] : dry [Healing Well] : healing well [Abdomen Tenderness] : non-tender [Nail Clubbing] : no clubbing of the fingernails [Cyanosis, Localized] : no localized cyanosis [Petechial Hemorrhages (___cm)] : no petechial hemorrhages [] : no ischemic changes

## 2024-04-29 NOTE — CARDIOLOGY SUMMARY
[de-identified] : 04/29/2024: sinus rhythm at 66 bpm. non-sp T wave abnormalities. 05/15/2023: SR, inferior T-waves     HR 60bpm [de-identified] : TTE Echo (04.16.23 @ 08:50) > Summary:\par   1. Left ventricular ejection fraction, by visual estimation, is 50 to 55%.\par   2. Technically difficult study.\par   3. LV Ejection Fraction by Woods's Method with a biplane EF of 62 %.\par   4. Elevated mean left atrial pressure.\par   5. Moderate concentric left ventricular hypertrophy.\par   6. Spectral Doppler shows impaired relaxation pattern of left ventricular myocardial filling (Grade I diastolic dysfunction).\par   7. E/e': 15 TR alvina: 3.2 m/s.\par   8. Mildly enlarged left atrium.\par   9. Degenerative mitral valve.\par  10. Thickened aortic mitral curtain.\par  11. Mild tricuspid regurgitation.\par  12. Ao valve nwv but appears restricted and calcified Peak AV alvina: 1.64 m/s. [de-identified] : 04/20/2023: DC-PPM (NELL)

## 2024-04-29 NOTE — HISTORY OF PRESENT ILLNESS
[de-identified] : PCP: Dr. Jones Cards: Dr. Murrieta EP: Dr. Deleon  CAD s/p PCI, DM, HTN, HLD, BPH and hemochromatosis (phlebotomy every 3 months).  04/2023: Pt was sent to the ER by Dr. Jones for possible a-fib/arrhythmia. Pt was c/o palpitations w/ diaphoresis, hypotension. Pt was found to have intermittent bradycardia now s/p DC-PPM (MDT).  4/29/2024: doing well. no symptoms. no arrhythmias. No AF since 4/20/2023. He has no chest pain, no shortness of breath, no dyspnea on exertion, no orthopnea, no PND. He denies dizziness, lightheadedness and syncope. He has no exertional symptoms. He presents for evaluation.

## 2024-04-29 NOTE — DISCUSSION/SUMMARY
[FreeTextEntry1] : Pt presents today for routine device interrogation. Today the patient is feeling generally well with no acute complaints. Denies CP, palpitations, dizziness, syncope. He c/o BARGER at times, but states "this is not new".  # Bradycardia s/p DC-PPM - Incision site well-healed with no s/s of infections.  - Device function normal. All parameters stable. No events. Rate response turned on. See Device Printout.  # Remote monitoring - patient is enrolled  # CAD - Stress test performed this year "normal" as per patient. Will request records. - Continue metoprolol 50mg BID - Continue atorvastatin 80mg QD and ASA 81mg QD  F/U with PCP for DM management F/U with pulm for BARGER  I have also advised the patient to go to the nearest emergency room if he experiences any chest pain, dyspnea, syncope, or has any other compelling symptoms.  I interrogated and reprogrammed her device as described in procedure. Her wound is healed properly, with no signs of inflammation, infection or bleeding. I discussed with patient plan of care in great details. I discussed remote monitoring with her, and need to call office if she does manual transmission I answered all her questions to her satisfaction. Patient was pleased with the visit.    Patient will follow with me in 6 months time. Please do not hesitate to contact me at [phone] if you have any further questions regarding this patient care.

## 2024-05-14 ENCOUNTER — APPOINTMENT (OUTPATIENT)
Dept: PULMONOLOGY | Facility: CLINIC | Age: 79
End: 2024-05-14
Payer: MEDICARE

## 2024-05-14 VITALS
BODY MASS INDEX: 32.49 KG/M2 | DIASTOLIC BLOOD PRESSURE: 70 MMHG | HEIGHT: 65 IN | HEART RATE: 89 BPM | WEIGHT: 195 LBS | SYSTOLIC BLOOD PRESSURE: 110 MMHG | OXYGEN SATURATION: 96 %

## 2024-05-14 DIAGNOSIS — R09.82 POSTNASAL DRIP: ICD-10-CM

## 2024-05-14 DIAGNOSIS — R06.02 SHORTNESS OF BREATH: ICD-10-CM

## 2024-05-14 DIAGNOSIS — R29.818 OTHER SYMPTOMS AND SIGNS INVOLVING THE NERVOUS SYSTEM: ICD-10-CM

## 2024-05-14 DIAGNOSIS — Z86.39 PERSONAL HISTORY OF OTHER ENDOCRINE, NUTRITIONAL AND METABOLIC DISEASE: ICD-10-CM

## 2024-05-14 PROCEDURE — G2211 COMPLEX E/M VISIT ADD ON: CPT

## 2024-05-14 PROCEDURE — 99204 OFFICE O/P NEW MOD 45 MIN: CPT

## 2024-05-14 RX ORDER — FLUTICASONE FUROATE AND VILANTEROL TRIFENATATE 100; 25 UG/1; UG/1
100-25 POWDER RESPIRATORY (INHALATION)
Qty: 30 | Refills: 3 | Status: ACTIVE | COMMUNITY
Start: 2024-05-14 | End: 1900-01-01

## 2024-05-14 RX ORDER — AZELASTINE HYDROCHLORIDE 137 UG/1
0.1 SPRAY, METERED NASAL
Qty: 1 | Refills: 3 | Status: ACTIVE | COMMUNITY
Start: 2024-05-14 | End: 1900-01-01

## 2024-05-14 NOTE — HISTORY OF PRESENT ILLNESS
[Initial Evaluation] : an initial evaluation of [Excessive Daytime Sleepiness] : excessive daytime sleepiness [Witnessed Apnea During Sleep] : witnessed apnea during sleep [Snoring] : snoring [Unrefreshing Sleep] : unrefreshing sleep [Sleepy When Sedentary] : sleepy when sedentary [Irritability] : irritability [Currently Experiencing] : The patient is currently experiencing symptoms. [Shortness of Breath] : shortness of breath [Obesity] : obesity [Cardiovascular Disease] : cardiovascular disease [TextBox_4] : Patient is here for evluation of SOB on exertion since 2 months. He reports that he has SOB and dizziness when he leans forward, and feels weak when he does some activity. Patient had a URTI two weeks agoo and is now bringing up phlegm with the cough but feeling better. He smoked 2 ppd for 20 years and quit 30 years ago. Also reports snoring at night, going to urinate 3 times a night. He falls asleep during watching TV or reading a magazine.

## 2024-05-14 NOTE — END OF VISIT
[] : Fellow [FreeTextEntry3] : Seen and examined with the fellow agree with above note -sob Will start the patient on Breo for possible underlying asthma PFT ordered Chest x-ray ordered Patient quit smoking more than 30 years ago His shortness of breath can be multifactorial secondary to cardiac weight and deconditioning -Postnasal drip Will start azelastine -AUDIE will proceed with sleep study  counseled for weight reduction  told not to drive if feel tired  counseled for sleep hygiene

## 2024-05-14 NOTE — PHYSICAL EXAM
[No Acute Distress] : no acute distress [Normal Appearance] : normal appearance [No Neck Mass] : no neck mass [Normal Rate/Rhythm] : normal rate/rhythm [Normal S1, S2] : normal s1, s2 [No Murmurs] : no murmurs [No Resp Distress] : no resp distress [Clear to Auscultation Bilaterally] : clear to auscultation bilaterally [No Abnormalities] : no abnormalities [IV] : Mallampati Class: IV

## 2024-05-14 NOTE — REVIEW OF SYSTEMS
[Postnasal Drip] : postnasal drip [Sinus Problems] : sinus problems [Cough] : cough [Sputum] : sputum [Dyspnea] : dyspnea [Negative] : Allergy/Immunology [Hemoptysis] : no hemoptysis [Chest Tightness] : no chest tightness [Pleuritic Pain] : no pleuritic pain [Chest Discomfort] : no chest discomfort [Orthopnea] : no orthopnea [Syncope] : no syncope

## 2024-05-15 NOTE — END OF VISIT
[Time Spent: ___ minutes] : I have spent [unfilled] minutes of time on the encounter.
How Did The Hair Loss Occur?: gradual in onset
How Severe Is Your Hair Loss?: moderate

## 2024-05-20 DIAGNOSIS — R93.89 ABNORMAL FINDINGS ON DIAGNOSTIC IMAGING OF OTHER SPECIFIED BODY STRUCTURES: ICD-10-CM

## 2024-05-25 ENCOUNTER — OUTPATIENT (OUTPATIENT)
Dept: OUTPATIENT SERVICES | Facility: HOSPITAL | Age: 79
LOS: 1 days | Discharge: HOME | End: 2024-05-25
Payer: MEDICARE

## 2024-05-25 ENCOUNTER — APPOINTMENT (OUTPATIENT)
Dept: SLEEP CENTER | Facility: HOSPITAL | Age: 79
End: 2024-05-25
Payer: MEDICARE

## 2024-05-25 DIAGNOSIS — Z87.09 PERSONAL HISTORY OF OTHER DISEASES OF THE RESPIRATORY SYSTEM: Chronic | ICD-10-CM

## 2024-05-25 DIAGNOSIS — Z98.890 OTHER SPECIFIED POSTPROCEDURAL STATES: Chronic | ICD-10-CM

## 2024-05-25 DIAGNOSIS — Z95.5 PRESENCE OF CORONARY ANGIOPLASTY IMPLANT AND GRAFT: Chronic | ICD-10-CM

## 2024-05-25 DIAGNOSIS — G47.33 OBSTRUCTIVE SLEEP APNEA (ADULT) (PEDIATRIC): ICD-10-CM

## 2024-05-25 PROCEDURE — 95810 POLYSOM 6/> YRS 4/> PARAM: CPT | Mod: 26

## 2024-05-25 PROCEDURE — 95810 POLYSOM 6/> YRS 4/> PARAM: CPT

## 2024-05-28 ENCOUNTER — APPOINTMENT (OUTPATIENT)
Dept: PULMONOLOGY | Facility: HOSPITAL | Age: 79
End: 2024-05-28
Payer: MEDICARE

## 2024-05-28 ENCOUNTER — OUTPATIENT (OUTPATIENT)
Dept: OUTPATIENT SERVICES | Facility: HOSPITAL | Age: 79
LOS: 1 days | End: 2024-05-28
Payer: MEDICARE

## 2024-05-28 DIAGNOSIS — Z95.5 PRESENCE OF CORONARY ANGIOPLASTY IMPLANT AND GRAFT: Chronic | ICD-10-CM

## 2024-05-28 DIAGNOSIS — R06.02 SHORTNESS OF BREATH: ICD-10-CM

## 2024-05-28 DIAGNOSIS — Z87.09 PERSONAL HISTORY OF OTHER DISEASES OF THE RESPIRATORY SYSTEM: Chronic | ICD-10-CM

## 2024-05-28 DIAGNOSIS — Z98.890 OTHER SPECIFIED POSTPROCEDURAL STATES: Chronic | ICD-10-CM

## 2024-05-28 PROCEDURE — 94727 GAS DIL/WSHOT DETER LNG VOL: CPT | Mod: 26

## 2024-05-28 PROCEDURE — 94729 DIFFUSING CAPACITY: CPT

## 2024-05-28 PROCEDURE — 94664 DEMO&/EVAL PT USE INHALER: CPT

## 2024-05-28 PROCEDURE — 94727 GAS DIL/WSHOT DETER LNG VOL: CPT

## 2024-05-28 PROCEDURE — 94060 EVALUATION OF WHEEZING: CPT | Mod: 26

## 2024-05-28 PROCEDURE — 94729 DIFFUSING CAPACITY: CPT | Mod: 26

## 2024-05-28 PROCEDURE — 94070 EVALUATION OF WHEEZING: CPT

## 2024-05-29 DIAGNOSIS — G47.33 OBSTRUCTIVE SLEEP APNEA (ADULT) (PEDIATRIC): ICD-10-CM

## 2024-05-29 DIAGNOSIS — R06.02 SHORTNESS OF BREATH: ICD-10-CM

## 2024-06-03 DIAGNOSIS — G47.33 OBSTRUCTIVE SLEEP APNEA (ADULT) (PEDIATRIC): ICD-10-CM

## 2024-06-21 ENCOUNTER — OUTPATIENT (OUTPATIENT)
Dept: OUTPATIENT SERVICES | Facility: HOSPITAL | Age: 79
LOS: 1 days | End: 2024-06-21
Payer: MEDICARE

## 2024-06-21 DIAGNOSIS — Z87.09 PERSONAL HISTORY OF OTHER DISEASES OF THE RESPIRATORY SYSTEM: Chronic | ICD-10-CM

## 2024-06-21 DIAGNOSIS — Z95.5 PRESENCE OF CORONARY ANGIOPLASTY IMPLANT AND GRAFT: Chronic | ICD-10-CM

## 2024-06-21 DIAGNOSIS — Z00.8 ENCOUNTER FOR OTHER GENERAL EXAMINATION: ICD-10-CM

## 2024-06-21 DIAGNOSIS — I25.10 ATHEROSCLEROTIC HEART DISEASE OF NATIVE CORONARY ARTERY WITHOUT ANGINA PECTORIS: ICD-10-CM

## 2024-06-21 DIAGNOSIS — Z98.890 OTHER SPECIFIED POSTPROCEDURAL STATES: Chronic | ICD-10-CM

## 2024-06-21 PROCEDURE — 75574 CT ANGIO HRT W/3D IMAGE: CPT | Mod: 26,MH

## 2024-06-21 PROCEDURE — 75574 CT ANGIO HRT W/3D IMAGE: CPT

## 2024-06-22 DIAGNOSIS — I25.10 ATHEROSCLEROTIC HEART DISEASE OF NATIVE CORONARY ARTERY WITHOUT ANGINA PECTORIS: ICD-10-CM

## 2024-06-23 ENCOUNTER — INPATIENT (INPATIENT)
Facility: HOSPITAL | Age: 79
LOS: 2 days | Discharge: HOME | DRG: 197 | End: 2024-06-26
Attending: INTERNAL MEDICINE | Admitting: STUDENT IN AN ORGANIZED HEALTH CARE EDUCATION/TRAINING PROGRAM
Payer: MEDICARE

## 2024-06-23 VITALS
OXYGEN SATURATION: 95 % | WEIGHT: 190.04 LBS | TEMPERATURE: 98 F | RESPIRATION RATE: 20 BRPM | HEART RATE: 62 BPM | SYSTOLIC BLOOD PRESSURE: 111 MMHG | DIASTOLIC BLOOD PRESSURE: 69 MMHG

## 2024-06-23 DIAGNOSIS — Z87.09 PERSONAL HISTORY OF OTHER DISEASES OF THE RESPIRATORY SYSTEM: Chronic | ICD-10-CM

## 2024-06-23 DIAGNOSIS — Z98.890 OTHER SPECIFIED POSTPROCEDURAL STATES: Chronic | ICD-10-CM

## 2024-06-23 DIAGNOSIS — R06.09 OTHER FORMS OF DYSPNEA: ICD-10-CM

## 2024-06-23 DIAGNOSIS — Z95.5 PRESENCE OF CORONARY ANGIOPLASTY IMPLANT AND GRAFT: Chronic | ICD-10-CM

## 2024-06-23 LAB
ALBUMIN SERPL ELPH-MCNC: 4.4 G/DL — SIGNIFICANT CHANGE UP (ref 3.5–5.2)
ALP SERPL-CCNC: 83 U/L — SIGNIFICANT CHANGE UP (ref 30–115)
ALT FLD-CCNC: 18 U/L — SIGNIFICANT CHANGE UP (ref 0–41)
ANION GAP SERPL CALC-SCNC: 13 MMOL/L — SIGNIFICANT CHANGE UP (ref 7–14)
AST SERPL-CCNC: 24 U/L — SIGNIFICANT CHANGE UP (ref 0–41)
BASE EXCESS BLDV CALC-SCNC: -6 MMOL/L — LOW (ref -2–3)
BASOPHILS # BLD AUTO: 0.05 K/UL — SIGNIFICANT CHANGE UP (ref 0–0.2)
BASOPHILS NFR BLD AUTO: 0.5 % — SIGNIFICANT CHANGE UP (ref 0–1)
BILIRUB SERPL-MCNC: 1 MG/DL — SIGNIFICANT CHANGE UP (ref 0.2–1.2)
BUN SERPL-MCNC: 23 MG/DL — HIGH (ref 10–20)
CA-I SERPL-SCNC: 1.05 MMOL/L — LOW (ref 1.15–1.33)
CALCIUM SERPL-MCNC: 9.3 MG/DL — SIGNIFICANT CHANGE UP (ref 8.4–10.5)
CHLORIDE SERPL-SCNC: 103 MMOL/L — SIGNIFICANT CHANGE UP (ref 98–110)
CO2 SERPL-SCNC: 22 MMOL/L — SIGNIFICANT CHANGE UP (ref 17–32)
CREAT SERPL-MCNC: 2.1 MG/DL — HIGH (ref 0.7–1.5)
D DIMER BLD IA.RAPID-MCNC: 151 NG/ML DDU — SIGNIFICANT CHANGE UP
EGFR: 31 ML/MIN/1.73M2 — LOW
EOSINOPHIL # BLD AUTO: 0.25 K/UL — SIGNIFICANT CHANGE UP (ref 0–0.7)
EOSINOPHIL NFR BLD AUTO: 2.6 % — SIGNIFICANT CHANGE UP (ref 0–8)
GAS PNL BLDV: 134 MMOL/L — LOW (ref 136–145)
GAS PNL BLDV: SIGNIFICANT CHANGE UP
GLUCOSE BLDC GLUCOMTR-MCNC: 161 MG/DL — HIGH (ref 70–99)
GLUCOSE BLDC GLUCOMTR-MCNC: 165 MG/DL — HIGH (ref 70–99)
GLUCOSE SERPL-MCNC: 204 MG/DL — HIGH (ref 70–99)
HCO3 BLDV-SCNC: 20 MMOL/L — LOW (ref 22–29)
HCT VFR BLD CALC: 42.3 % — SIGNIFICANT CHANGE UP (ref 42–52)
HCT VFR BLDA CALC: 43 % — SIGNIFICANT CHANGE UP (ref 39–51)
HGB BLD CALC-MCNC: 14.4 G/DL — SIGNIFICANT CHANGE UP (ref 12.6–17.4)
HGB BLD-MCNC: 13.9 G/DL — LOW (ref 14–18)
IMM GRANULOCYTES NFR BLD AUTO: 0.8 % — HIGH (ref 0.1–0.3)
LACTATE BLDV-MCNC: 2.9 MMOL/L — HIGH (ref 0.5–2)
LYMPHOCYTES # BLD AUTO: 2.13 K/UL — SIGNIFICANT CHANGE UP (ref 1.2–3.4)
LYMPHOCYTES # BLD AUTO: 22.3 % — SIGNIFICANT CHANGE UP (ref 20.5–51.1)
MCHC RBC-ENTMCNC: 30.8 PG — SIGNIFICANT CHANGE UP (ref 27–31)
MCHC RBC-ENTMCNC: 32.9 G/DL — SIGNIFICANT CHANGE UP (ref 32–37)
MCV RBC AUTO: 93.6 FL — SIGNIFICANT CHANGE UP (ref 80–94)
MONOCYTES # BLD AUTO: 0.91 K/UL — HIGH (ref 0.1–0.6)
MONOCYTES NFR BLD AUTO: 9.5 % — HIGH (ref 1.7–9.3)
NEUTROPHILS # BLD AUTO: 6.13 K/UL — SIGNIFICANT CHANGE UP (ref 1.4–6.5)
NEUTROPHILS NFR BLD AUTO: 64.3 % — SIGNIFICANT CHANGE UP (ref 42.2–75.2)
NRBC # BLD: 0 /100 WBCS — SIGNIFICANT CHANGE UP (ref 0–0)
NT-PROBNP SERPL-SCNC: 212 PG/ML — SIGNIFICANT CHANGE UP (ref 0–300)
PCO2 BLDV: 38 MMHG — LOW (ref 42–55)
PH BLDV: 7.32 — SIGNIFICANT CHANGE UP (ref 7.32–7.43)
PLATELET # BLD AUTO: 155 K/UL — SIGNIFICANT CHANGE UP (ref 130–400)
PMV BLD: 12.3 FL — HIGH (ref 7.4–10.4)
PO2 BLDV: 31 MMHG — SIGNIFICANT CHANGE UP (ref 25–45)
POTASSIUM BLDV-SCNC: 4.6 MMOL/L — SIGNIFICANT CHANGE UP (ref 3.5–5.1)
POTASSIUM SERPL-MCNC: 5.4 MMOL/L — HIGH (ref 3.5–5)
POTASSIUM SERPL-SCNC: 5.4 MMOL/L — HIGH (ref 3.5–5)
PROT SERPL-MCNC: 6.4 G/DL — SIGNIFICANT CHANGE UP (ref 6–8)
RBC # BLD: 4.52 M/UL — LOW (ref 4.7–6.1)
RBC # FLD: 13.2 % — SIGNIFICANT CHANGE UP (ref 11.5–14.5)
SAO2 % BLDV: 46.3 % — LOW (ref 67–88)
SODIUM SERPL-SCNC: 138 MMOL/L — SIGNIFICANT CHANGE UP (ref 135–146)
TROPONIN T, HIGH SENSITIVITY RESULT: 22 NG/L — HIGH (ref 6–21)
TROPONIN T, HIGH SENSITIVITY RESULT: 31 NG/L — HIGH (ref 6–21)
WBC # BLD: 9.55 K/UL — SIGNIFICANT CHANGE UP (ref 4.8–10.8)
WBC # FLD AUTO: 9.55 K/UL — SIGNIFICANT CHANGE UP (ref 4.8–10.8)

## 2024-06-23 PROCEDURE — 71045 X-RAY EXAM CHEST 1 VIEW: CPT | Mod: 26

## 2024-06-23 PROCEDURE — 36415 COLL VENOUS BLD VENIPUNCTURE: CPT

## 2024-06-23 PROCEDURE — 80048 BASIC METABOLIC PNL TOTAL CA: CPT

## 2024-06-23 PROCEDURE — 83036 HEMOGLOBIN GLYCOSYLATED A1C: CPT

## 2024-06-23 PROCEDURE — 93306 TTE W/DOPPLER COMPLETE: CPT

## 2024-06-23 PROCEDURE — 93010 ELECTROCARDIOGRAM REPORT: CPT | Mod: 76

## 2024-06-23 PROCEDURE — 99285 EMERGENCY DEPT VISIT HI MDM: CPT | Mod: FS

## 2024-06-23 PROCEDURE — 86038 ANTINUCLEAR ANTIBODIES: CPT

## 2024-06-23 PROCEDURE — 80053 COMPREHEN METABOLIC PANEL: CPT

## 2024-06-23 PROCEDURE — 85652 RBC SED RATE AUTOMATED: CPT

## 2024-06-23 PROCEDURE — 86431 RHEUMATOID FACTOR QUANT: CPT

## 2024-06-23 PROCEDURE — 86036 ANCA SCREEN EACH ANTIBODY: CPT

## 2024-06-23 PROCEDURE — 85025 COMPLETE CBC W/AUTO DIFF WBC: CPT

## 2024-06-23 PROCEDURE — 71250 CT THORAX DX C-: CPT | Mod: 26

## 2024-06-23 PROCEDURE — 86200 CCP ANTIBODY: CPT

## 2024-06-23 PROCEDURE — 94640 AIRWAY INHALATION TREATMENT: CPT

## 2024-06-23 PROCEDURE — 86160 COMPLEMENT ANTIGEN: CPT

## 2024-06-23 PROCEDURE — 99222 1ST HOSP IP/OBS MODERATE 55: CPT

## 2024-06-23 PROCEDURE — 83605 ASSAY OF LACTIC ACID: CPT

## 2024-06-23 PROCEDURE — 86140 C-REACTIVE PROTEIN: CPT

## 2024-06-23 PROCEDURE — 82962 GLUCOSE BLOOD TEST: CPT

## 2024-06-23 PROCEDURE — 71250 CT THORAX DX C-: CPT | Mod: MC

## 2024-06-23 RX ORDER — DEXTROSE 30 % IN WATER 30 %
12.5 VIAL (ML) INTRAVENOUS ONCE
Refills: 0 | Status: DISCONTINUED | OUTPATIENT
Start: 2024-06-23 | End: 2024-06-26

## 2024-06-23 RX ORDER — INSULIN LISPRO 100/ML
10 VIAL (ML) SUBCUTANEOUS
Refills: 0 | DISCHARGE

## 2024-06-23 RX ORDER — HEPARIN SODIUM 50 [USP'U]/ML
5000 INJECTION, SOLUTION INTRAVENOUS EVERY 12 HOURS
Refills: 0 | Status: DISCONTINUED | OUTPATIENT
Start: 2024-06-23 | End: 2024-06-26

## 2024-06-23 RX ORDER — RANOLAZINE 500 MG/1
500 TABLET, EXTENDED RELEASE ORAL
Refills: 0 | Status: DISCONTINUED | OUTPATIENT
Start: 2024-06-23 | End: 2024-06-26

## 2024-06-23 RX ORDER — TAMSULOSIN HYDROCHLORIDE 0.4 MG/1
0.4 CAPSULE ORAL AT BEDTIME
Refills: 0 | Status: DISCONTINUED | OUTPATIENT
Start: 2024-06-23 | End: 2024-06-26

## 2024-06-23 RX ORDER — DEXTROSE MONOHYDRATE 100 MG/ML
125 INJECTION, SOLUTION INTRAVENOUS ONCE
Refills: 0 | Status: DISCONTINUED | OUTPATIENT
Start: 2024-06-23 | End: 2024-06-26

## 2024-06-23 RX ORDER — FUROSEMIDE 10 MG/ML
20 INJECTION, SOLUTION INTRAMUSCULAR; INTRAVENOUS EVERY 12 HOURS
Refills: 0 | Status: DISCONTINUED | OUTPATIENT
Start: 2024-06-24 | End: 2024-06-24

## 2024-06-23 RX ORDER — MAGNESIUM OXIDE 400 MG ORAL TABLET 241.3 MG
1 TABLET ORAL
Qty: 0 | Refills: 0 | DISCHARGE

## 2024-06-23 RX ORDER — INSULIN LISPRO 100 [IU]/ML
INJECTION, SOLUTION SUBCUTANEOUS
Refills: 0 | Status: DISCONTINUED | OUTPATIENT
Start: 2024-06-23 | End: 2024-06-26

## 2024-06-23 RX ORDER — IPRATROPIUM BROMIDE AND ALBUTEROL SULFATE .5; 3 MG/3ML; MG/3ML
3 SOLUTION RESPIRATORY (INHALATION) EVERY 6 HOURS
Refills: 0 | Status: DISCONTINUED | OUTPATIENT
Start: 2024-06-23 | End: 2024-06-24

## 2024-06-23 RX ORDER — DEXTROSE MONOHYDRATE AND SODIUM CHLORIDE 5; .3 G/100ML; G/100ML
1000 INJECTION, SOLUTION INTRAVENOUS
Refills: 0 | Status: DISCONTINUED | OUTPATIENT
Start: 2024-06-23 | End: 2024-06-26

## 2024-06-23 RX ORDER — ONDANSETRON HYDROCHLORIDE 2 MG/ML
4 INJECTION INTRAMUSCULAR; INTRAVENOUS EVERY 8 HOURS
Refills: 0 | Status: DISCONTINUED | OUTPATIENT
Start: 2024-06-23 | End: 2024-06-26

## 2024-06-23 RX ORDER — GLUCAGON HYDROCHLORIDE 1 MG/ML
1 INJECTION, POWDER, FOR SOLUTION INTRAMUSCULAR; INTRAVENOUS; SUBCUTANEOUS ONCE
Refills: 0 | Status: DISCONTINUED | OUTPATIENT
Start: 2024-06-23 | End: 2024-06-26

## 2024-06-23 RX ORDER — DEXTROSE 30 % IN WATER 30 %
15 VIAL (ML) INTRAVENOUS ONCE
Refills: 0 | Status: DISCONTINUED | OUTPATIENT
Start: 2024-06-23 | End: 2024-06-26

## 2024-06-23 RX ORDER — MAGNESIUM, ALUMINUM HYDROXIDE 400-400
30 TABLET,CHEWABLE ORAL EVERY 4 HOURS
Refills: 0 | Status: DISCONTINUED | OUTPATIENT
Start: 2024-06-23 | End: 2024-06-26

## 2024-06-23 RX ORDER — ASPIRIN 325 MG/1
81 TABLET, FILM COATED ORAL DAILY
Refills: 0 | Status: DISCONTINUED | OUTPATIENT
Start: 2024-06-24 | End: 2024-06-26

## 2024-06-23 RX ORDER — PREGABALIN 50 MG/1
100 CAPSULE ORAL EVERY 12 HOURS
Refills: 0 | Status: DISCONTINUED | OUTPATIENT
Start: 2024-06-23 | End: 2024-06-26

## 2024-06-23 RX ORDER — EMPAGLIFLOZIN 10 MG/1
1 TABLET, FILM COATED ORAL
Qty: 0 | Refills: 0 | DISCHARGE

## 2024-06-23 RX ORDER — METOPROLOL TARTRATE 50 MG
25 TABLET ORAL
Refills: 0 | Status: DISCONTINUED | OUTPATIENT
Start: 2024-06-23 | End: 2024-06-26

## 2024-06-23 RX ORDER — ASPIRIN/CALCIUM CARB/MAGNESIUM 324 MG
1 TABLET ORAL
Qty: 0 | Refills: 0 | DISCHARGE

## 2024-06-23 RX ORDER — DEXTROSE 30 % IN WATER 30 %
25 VIAL (ML) INTRAVENOUS ONCE
Refills: 0 | Status: DISCONTINUED | OUTPATIENT
Start: 2024-06-23 | End: 2024-06-26

## 2024-06-23 RX ORDER — FUROSEMIDE 10 MG/ML
40 INJECTION, SOLUTION INTRAMUSCULAR; INTRAVENOUS ONCE
Refills: 0 | Status: COMPLETED | OUTPATIENT
Start: 2024-06-23 | End: 2024-06-23

## 2024-06-23 RX ORDER — PANTOPRAZOLE SODIUM 40 MG/10ML
40 INJECTION, POWDER, FOR SOLUTION INTRAVENOUS
Refills: 0 | Status: DISCONTINUED | OUTPATIENT
Start: 2024-06-23 | End: 2024-06-26

## 2024-06-23 RX ORDER — ALBUTEROL 90 MCG
2 AEROSOL REFILL (GRAM) INHALATION EVERY 6 HOURS
Refills: 0 | Status: DISCONTINUED | OUTPATIENT
Start: 2024-06-23 | End: 2024-06-26

## 2024-06-23 RX ORDER — ACETAMINOPHEN 325 MG
650 TABLET ORAL EVERY 6 HOURS
Refills: 0 | Status: DISCONTINUED | OUTPATIENT
Start: 2024-06-23 | End: 2024-06-26

## 2024-06-23 RX ORDER — OMEPRAZOLE 10 MG/1
1 CAPSULE, DELAYED RELEASE ORAL
Qty: 0 | Refills: 0 | DISCHARGE

## 2024-06-23 RX ORDER — INSULIN GLARGINE 100 [IU]/ML
45 INJECTION, SOLUTION SUBCUTANEOUS
Qty: 0 | Refills: 0 | DISCHARGE

## 2024-06-23 RX ADMIN — INSULIN LISPRO 1: 100 INJECTION, SOLUTION SUBCUTANEOUS at 17:33

## 2024-06-23 RX ADMIN — RANOLAZINE 500 MILLIGRAM(S): 500 TABLET, EXTENDED RELEASE ORAL at 18:29

## 2024-06-23 RX ADMIN — HEPARIN SODIUM 5000 UNIT(S): 50 INJECTION, SOLUTION INTRAVENOUS at 18:30

## 2024-06-23 RX ADMIN — FUROSEMIDE 40 MILLIGRAM(S): 10 INJECTION, SOLUTION INTRAMUSCULAR; INTRAVENOUS at 16:26

## 2024-06-23 RX ADMIN — PREGABALIN 100 MILLIGRAM(S): 50 CAPSULE ORAL at 18:29

## 2024-06-23 RX ADMIN — Medication 25 MILLIGRAM(S): at 18:29

## 2024-06-23 RX ADMIN — TAMSULOSIN HYDROCHLORIDE 0.4 MILLIGRAM(S): 0.4 CAPSULE ORAL at 21:05

## 2024-06-24 ENCOUNTER — RESULT REVIEW (OUTPATIENT)
Age: 79
End: 2024-06-24

## 2024-06-24 LAB
A1C WITH ESTIMATED AVERAGE GLUCOSE RESULT: 8.3 % — HIGH (ref 4–5.6)
ALBUMIN SERPL ELPH-MCNC: 4.3 G/DL — SIGNIFICANT CHANGE UP (ref 3.5–5.2)
ALP SERPL-CCNC: 88 U/L — SIGNIFICANT CHANGE UP (ref 30–115)
ALT FLD-CCNC: 19 U/L — SIGNIFICANT CHANGE UP (ref 0–41)
ANION GAP SERPL CALC-SCNC: 12 MMOL/L — SIGNIFICANT CHANGE UP (ref 7–14)
AST SERPL-CCNC: 21 U/L — SIGNIFICANT CHANGE UP (ref 0–41)
BASOPHILS # BLD AUTO: 0.05 K/UL — SIGNIFICANT CHANGE UP (ref 0–0.2)
BASOPHILS NFR BLD AUTO: 0.6 % — SIGNIFICANT CHANGE UP (ref 0–1)
BILIRUB SERPL-MCNC: 0.9 MG/DL — SIGNIFICANT CHANGE UP (ref 0.2–1.2)
BUN SERPL-MCNC: 25 MG/DL — HIGH (ref 10–20)
CALCIUM SERPL-MCNC: 9.3 MG/DL — SIGNIFICANT CHANGE UP (ref 8.4–10.5)
CHLORIDE SERPL-SCNC: 102 MMOL/L — SIGNIFICANT CHANGE UP (ref 98–110)
CO2 SERPL-SCNC: 26 MMOL/L — SIGNIFICANT CHANGE UP (ref 17–32)
CREAT SERPL-MCNC: 2.1 MG/DL — HIGH (ref 0.7–1.5)
EGFR: 31 ML/MIN/1.73M2 — LOW
EOSINOPHIL # BLD AUTO: 0.24 K/UL — SIGNIFICANT CHANGE UP (ref 0–0.7)
EOSINOPHIL NFR BLD AUTO: 3 % — SIGNIFICANT CHANGE UP (ref 0–8)
ERYTHROCYTE [SEDIMENTATION RATE] IN BLOOD: 3 MM/HR — SIGNIFICANT CHANGE UP (ref 0–10)
ESTIMATED AVERAGE GLUCOSE: 192 MG/DL — HIGH (ref 68–114)
GLUCOSE BLDC GLUCOMTR-MCNC: 173 MG/DL — HIGH (ref 70–99)
GLUCOSE BLDC GLUCOMTR-MCNC: 196 MG/DL — HIGH (ref 70–99)
GLUCOSE BLDC GLUCOMTR-MCNC: 205 MG/DL — HIGH (ref 70–99)
GLUCOSE BLDC GLUCOMTR-MCNC: 226 MG/DL — HIGH (ref 70–99)
GLUCOSE SERPL-MCNC: 201 MG/DL — HIGH (ref 70–99)
HCT VFR BLD CALC: 41.9 % — LOW (ref 42–52)
HGB BLD-MCNC: 13.8 G/DL — LOW (ref 14–18)
IMM GRANULOCYTES NFR BLD AUTO: 0.5 % — HIGH (ref 0.1–0.3)
LACTATE SERPL-SCNC: 2.1 MMOL/L — HIGH (ref 0.7–2)
LYMPHOCYTES # BLD AUTO: 2.1 K/UL — SIGNIFICANT CHANGE UP (ref 1.2–3.4)
LYMPHOCYTES # BLD AUTO: 25.9 % — SIGNIFICANT CHANGE UP (ref 20.5–51.1)
MCHC RBC-ENTMCNC: 30.6 PG — SIGNIFICANT CHANGE UP (ref 27–31)
MCHC RBC-ENTMCNC: 32.9 G/DL — SIGNIFICANT CHANGE UP (ref 32–37)
MCV RBC AUTO: 92.9 FL — SIGNIFICANT CHANGE UP (ref 80–94)
MONOCYTES # BLD AUTO: 0.92 K/UL — HIGH (ref 0.1–0.6)
MONOCYTES NFR BLD AUTO: 11.3 % — HIGH (ref 1.7–9.3)
NEUTROPHILS # BLD AUTO: 4.76 K/UL — SIGNIFICANT CHANGE UP (ref 1.4–6.5)
NEUTROPHILS NFR BLD AUTO: 58.7 % — SIGNIFICANT CHANGE UP (ref 42.2–75.2)
NRBC # BLD: 0 /100 WBCS — SIGNIFICANT CHANGE UP (ref 0–0)
PLATELET # BLD AUTO: 148 K/UL — SIGNIFICANT CHANGE UP (ref 130–400)
PMV BLD: 11.9 FL — HIGH (ref 7.4–10.4)
POTASSIUM SERPL-MCNC: 4.9 MMOL/L — SIGNIFICANT CHANGE UP (ref 3.5–5)
POTASSIUM SERPL-SCNC: 4.9 MMOL/L — SIGNIFICANT CHANGE UP (ref 3.5–5)
PROT SERPL-MCNC: 6.6 G/DL — SIGNIFICANT CHANGE UP (ref 6–8)
RBC # BLD: 4.51 M/UL — LOW (ref 4.7–6.1)
RBC # FLD: 13 % — SIGNIFICANT CHANGE UP (ref 11.5–14.5)
SODIUM SERPL-SCNC: 140 MMOL/L — SIGNIFICANT CHANGE UP (ref 135–146)
WBC # BLD: 8.11 K/UL — SIGNIFICANT CHANGE UP (ref 4.8–10.8)
WBC # FLD AUTO: 8.11 K/UL — SIGNIFICANT CHANGE UP (ref 4.8–10.8)

## 2024-06-24 PROCEDURE — 93306 TTE W/DOPPLER COMPLETE: CPT | Mod: 26

## 2024-06-24 PROCEDURE — 99232 SBSQ HOSP IP/OBS MODERATE 35: CPT

## 2024-06-24 PROCEDURE — 99222 1ST HOSP IP/OBS MODERATE 55: CPT

## 2024-06-24 RX ORDER — BUDESONIDE/FORMOTEROL FUMARATE 160-4.5MCG
2 HFA AEROSOL WITH ADAPTER (GRAM) INHALATION
Refills: 0 | Status: DISCONTINUED | OUTPATIENT
Start: 2024-06-24 | End: 2024-06-26

## 2024-06-24 RX ORDER — IPRATROPIUM BROMIDE AND ALBUTEROL SULFATE .5; 3 MG/3ML; MG/3ML
3 SOLUTION RESPIRATORY (INHALATION) EVERY 6 HOURS
Refills: 0 | Status: DISCONTINUED | OUTPATIENT
Start: 2024-06-24 | End: 2024-06-26

## 2024-06-24 RX ADMIN — IPRATROPIUM BROMIDE AND ALBUTEROL SULFATE 3 MILLILITER(S): .5; 3 SOLUTION RESPIRATORY (INHALATION) at 20:05

## 2024-06-24 RX ADMIN — PANTOPRAZOLE SODIUM 40 MILLIGRAM(S): 40 INJECTION, POWDER, FOR SOLUTION INTRAVENOUS at 05:06

## 2024-06-24 RX ADMIN — INSULIN LISPRO 1: 100 INJECTION, SOLUTION SUBCUTANEOUS at 08:00

## 2024-06-24 RX ADMIN — Medication 25 MILLIGRAM(S): at 17:38

## 2024-06-24 RX ADMIN — INSULIN LISPRO 1: 100 INJECTION, SOLUTION SUBCUTANEOUS at 17:08

## 2024-06-24 RX ADMIN — Medication 25 MILLIGRAM(S): at 05:05

## 2024-06-24 RX ADMIN — PREGABALIN 100 MILLIGRAM(S): 50 CAPSULE ORAL at 05:07

## 2024-06-24 RX ADMIN — RANOLAZINE 500 MILLIGRAM(S): 500 TABLET, EXTENDED RELEASE ORAL at 05:05

## 2024-06-24 RX ADMIN — ASPIRIN 81 MILLIGRAM(S): 325 TABLET, FILM COATED ORAL at 12:06

## 2024-06-24 RX ADMIN — IPRATROPIUM BROMIDE AND ALBUTEROL SULFATE 3 MILLILITER(S): .5; 3 SOLUTION RESPIRATORY (INHALATION) at 13:57

## 2024-06-24 RX ADMIN — TAMSULOSIN HYDROCHLORIDE 0.4 MILLIGRAM(S): 0.4 CAPSULE ORAL at 21:08

## 2024-06-24 RX ADMIN — FUROSEMIDE 20 MILLIGRAM(S): 10 INJECTION, SOLUTION INTRAMUSCULAR; INTRAVENOUS at 05:05

## 2024-06-24 RX ADMIN — PREGABALIN 100 MILLIGRAM(S): 50 CAPSULE ORAL at 17:38

## 2024-06-24 RX ADMIN — HEPARIN SODIUM 5000 UNIT(S): 50 INJECTION, SOLUTION INTRAVENOUS at 17:13

## 2024-06-24 RX ADMIN — Medication 2 PUFF(S): at 21:09

## 2024-06-24 RX ADMIN — RANOLAZINE 500 MILLIGRAM(S): 500 TABLET, EXTENDED RELEASE ORAL at 17:13

## 2024-06-24 RX ADMIN — HEPARIN SODIUM 5000 UNIT(S): 50 INJECTION, SOLUTION INTRAVENOUS at 05:06

## 2024-06-24 RX ADMIN — INSULIN LISPRO 2: 100 INJECTION, SOLUTION SUBCUTANEOUS at 12:05

## 2024-06-25 LAB
AUTO DIFF PNL BLD: NEGATIVE — SIGNIFICANT CHANGE UP
C-ANCA SER-ACNC: NEGATIVE — SIGNIFICANT CHANGE UP
C3 SERPL-MCNC: 102 MG/DL — SIGNIFICANT CHANGE UP (ref 81–157)
C4 SERPL-MCNC: 16 MG/DL — SIGNIFICANT CHANGE UP (ref 13–39)
CRP SERPL-MCNC: <3 MG/L — SIGNIFICANT CHANGE UP
GLUCOSE BLDC GLUCOMTR-MCNC: 202 MG/DL — HIGH (ref 70–99)
GLUCOSE BLDC GLUCOMTR-MCNC: 245 MG/DL — HIGH (ref 70–99)
GLUCOSE BLDC GLUCOMTR-MCNC: 256 MG/DL — HIGH (ref 70–99)
GLUCOSE BLDC GLUCOMTR-MCNC: 266 MG/DL — HIGH (ref 70–99)
MPO AB + PR3 PNL SER: SIGNIFICANT CHANGE UP
P-ANCA SER-ACNC: NEGATIVE — SIGNIFICANT CHANGE UP
RHEUMATOID FACT SERPL-ACNC: <10 IU/ML — SIGNIFICANT CHANGE UP (ref 0–13)

## 2024-06-25 PROCEDURE — 99232 SBSQ HOSP IP/OBS MODERATE 35: CPT

## 2024-06-25 RX ORDER — POLYETHYLENE GLYCOL 3350 1 G/G
17 POWDER ORAL DAILY
Refills: 0 | Status: DISCONTINUED | OUTPATIENT
Start: 2024-06-25 | End: 2024-06-26

## 2024-06-25 RX ORDER — DEXTROSE MONOHYDRATE AND SODIUM CHLORIDE 5; .3 G/100ML; G/100ML
1000 INJECTION, SOLUTION INTRAVENOUS ONCE
Refills: 0 | Status: COMPLETED | OUTPATIENT
Start: 2024-06-25 | End: 2024-06-25

## 2024-06-25 RX ORDER — SENNOSIDES 8.6 MG
2 TABLET ORAL AT BEDTIME
Refills: 0 | Status: DISCONTINUED | OUTPATIENT
Start: 2024-06-25 | End: 2024-06-26

## 2024-06-25 RX ADMIN — Medication 25 MILLIGRAM(S): at 05:18

## 2024-06-25 RX ADMIN — IPRATROPIUM BROMIDE AND ALBUTEROL SULFATE 3 MILLILITER(S): .5; 3 SOLUTION RESPIRATORY (INHALATION) at 07:42

## 2024-06-25 RX ADMIN — HEPARIN SODIUM 5000 UNIT(S): 50 INJECTION, SOLUTION INTRAVENOUS at 18:33

## 2024-06-25 RX ADMIN — IPRATROPIUM BROMIDE AND ALBUTEROL SULFATE 3 MILLILITER(S): .5; 3 SOLUTION RESPIRATORY (INHALATION) at 13:58

## 2024-06-25 RX ADMIN — RANOLAZINE 500 MILLIGRAM(S): 500 TABLET, EXTENDED RELEASE ORAL at 05:18

## 2024-06-25 RX ADMIN — ASPIRIN 81 MILLIGRAM(S): 325 TABLET, FILM COATED ORAL at 12:06

## 2024-06-25 RX ADMIN — TAMSULOSIN HYDROCHLORIDE 0.4 MILLIGRAM(S): 0.4 CAPSULE ORAL at 21:19

## 2024-06-25 RX ADMIN — Medication 2 TABLET(S): at 21:19

## 2024-06-25 RX ADMIN — HEPARIN SODIUM 5000 UNIT(S): 50 INJECTION, SOLUTION INTRAVENOUS at 05:18

## 2024-06-25 RX ADMIN — INSULIN LISPRO 2: 100 INJECTION, SOLUTION SUBCUTANEOUS at 16:44

## 2024-06-25 RX ADMIN — PREGABALIN 100 MILLIGRAM(S): 50 CAPSULE ORAL at 18:33

## 2024-06-25 RX ADMIN — PANTOPRAZOLE SODIUM 40 MILLIGRAM(S): 40 INJECTION, POWDER, FOR SOLUTION INTRAVENOUS at 05:18

## 2024-06-25 RX ADMIN — Medication 2 PUFF(S): at 21:19

## 2024-06-25 RX ADMIN — PREGABALIN 100 MILLIGRAM(S): 50 CAPSULE ORAL at 05:20

## 2024-06-25 RX ADMIN — IPRATROPIUM BROMIDE AND ALBUTEROL SULFATE 3 MILLILITER(S): .5; 3 SOLUTION RESPIRATORY (INHALATION) at 20:57

## 2024-06-25 RX ADMIN — INSULIN LISPRO 2: 100 INJECTION, SOLUTION SUBCUTANEOUS at 07:56

## 2024-06-25 RX ADMIN — DEXTROSE MONOHYDRATE AND SODIUM CHLORIDE 250 MILLILITER(S): 5; .3 INJECTION, SOLUTION INTRAVENOUS at 15:53

## 2024-06-25 RX ADMIN — Medication 2 PUFF(S): at 07:56

## 2024-06-25 RX ADMIN — RANOLAZINE 500 MILLIGRAM(S): 500 TABLET, EXTENDED RELEASE ORAL at 18:33

## 2024-06-25 RX ADMIN — INSULIN LISPRO 3: 100 INJECTION, SOLUTION SUBCUTANEOUS at 12:05

## 2024-06-25 RX ADMIN — Medication 25 MILLIGRAM(S): at 18:33

## 2024-06-25 RX ADMIN — POLYETHYLENE GLYCOL 3350 17 GRAM(S): 1 POWDER ORAL at 15:52

## 2024-06-26 ENCOUNTER — NON-APPOINTMENT (OUTPATIENT)
Age: 79
End: 2024-06-26

## 2024-06-26 ENCOUNTER — TRANSCRIPTION ENCOUNTER (OUTPATIENT)
Age: 79
End: 2024-06-26

## 2024-06-26 VITALS
OXYGEN SATURATION: 97 % | SYSTOLIC BLOOD PRESSURE: 112 MMHG | RESPIRATION RATE: 18 BRPM | TEMPERATURE: 98 F | HEART RATE: 74 BPM | DIASTOLIC BLOOD PRESSURE: 68 MMHG

## 2024-06-26 DIAGNOSIS — Z71.89 OTHER SPECIFIED COUNSELING: ICD-10-CM

## 2024-06-26 DIAGNOSIS — Z51.5 ENCOUNTER FOR PALLIATIVE CARE: ICD-10-CM

## 2024-06-26 DIAGNOSIS — R06.00 DYSPNEA, UNSPECIFIED: ICD-10-CM

## 2024-06-26 LAB
ANA TITR SER: NEGATIVE — SIGNIFICANT CHANGE UP
ANION GAP SERPL CALC-SCNC: 10 MMOL/L — SIGNIFICANT CHANGE UP (ref 7–14)
BUN SERPL-MCNC: 21 MG/DL — HIGH (ref 10–20)
CALCIUM SERPL-MCNC: 9.4 MG/DL — SIGNIFICANT CHANGE UP (ref 8.4–10.5)
CCP IGG SERPL-ACNC: <8 UNITS — SIGNIFICANT CHANGE UP
CHLORIDE SERPL-SCNC: 100 MMOL/L — SIGNIFICANT CHANGE UP (ref 98–110)
CO2 SERPL-SCNC: 28 MMOL/L — SIGNIFICANT CHANGE UP (ref 17–32)
CREAT SERPL-MCNC: 1.7 MG/DL — HIGH (ref 0.7–1.5)
EGFR: 40 ML/MIN/1.73M2 — LOW
GLUCOSE BLDC GLUCOMTR-MCNC: 239 MG/DL — HIGH (ref 70–99)
GLUCOSE BLDC GLUCOMTR-MCNC: 293 MG/DL — HIGH (ref 70–99)
GLUCOSE SERPL-MCNC: 225 MG/DL — HIGH (ref 70–99)
POTASSIUM SERPL-MCNC: 5.5 MMOL/L — HIGH (ref 3.5–5)
POTASSIUM SERPL-SCNC: 5.5 MMOL/L — HIGH (ref 3.5–5)
RF+CCP IGG SER-IMP: NEGATIVE — SIGNIFICANT CHANGE UP
SODIUM SERPL-SCNC: 138 MMOL/L — SIGNIFICANT CHANGE UP (ref 135–146)

## 2024-06-26 PROCEDURE — 99222 1ST HOSP IP/OBS MODERATE 55: CPT

## 2024-06-26 PROCEDURE — 99239 HOSP IP/OBS DSCHRG MGMT >30: CPT

## 2024-06-26 RX ORDER — BUDESONIDE/FORMOTEROL FUMARATE 160-4.5MCG
2 HFA AEROSOL WITH ADAPTER (GRAM) INHALATION
Qty: 5 | Refills: 0
Start: 2024-06-26 | End: 2024-07-25

## 2024-06-26 RX ORDER — FLUTICASONE FUROATE AND VILANTEROL 100; 25 UG/1; UG/1
1 POWDER RESPIRATORY (INHALATION)
Refills: 0 | DISCHARGE

## 2024-06-26 RX ORDER — ALBUTEROL 90 MCG
2 AEROSOL REFILL (GRAM) INHALATION
Qty: 1 | Refills: 0
Start: 2024-06-26 | End: 2024-07-25

## 2024-06-26 RX ORDER — SODIUM ZIRCONIUM CYCLOSILICATE 10 G/10G
10 POWDER, FOR SUSPENSION ORAL ONCE
Refills: 0 | Status: COMPLETED | OUTPATIENT
Start: 2024-06-26 | End: 2024-06-26

## 2024-06-26 RX ADMIN — INSULIN LISPRO 3: 100 INJECTION, SOLUTION SUBCUTANEOUS at 12:17

## 2024-06-26 RX ADMIN — Medication 2 PUFF(S): at 08:34

## 2024-06-26 RX ADMIN — SODIUM ZIRCONIUM CYCLOSILICATE 10 GRAM(S): 10 POWDER, FOR SUSPENSION ORAL at 10:19

## 2024-06-26 RX ADMIN — INSULIN LISPRO 2: 100 INJECTION, SOLUTION SUBCUTANEOUS at 08:32

## 2024-06-26 RX ADMIN — ASPIRIN 81 MILLIGRAM(S): 325 TABLET, FILM COATED ORAL at 10:20

## 2024-06-26 RX ADMIN — IPRATROPIUM BROMIDE AND ALBUTEROL SULFATE 3 MILLILITER(S): .5; 3 SOLUTION RESPIRATORY (INHALATION) at 07:46

## 2024-06-26 RX ADMIN — Medication 25 MILLIGRAM(S): at 06:03

## 2024-06-26 RX ADMIN — HEPARIN SODIUM 5000 UNIT(S): 50 INJECTION, SOLUTION INTRAVENOUS at 06:03

## 2024-06-26 RX ADMIN — RANOLAZINE 500 MILLIGRAM(S): 500 TABLET, EXTENDED RELEASE ORAL at 06:03

## 2024-06-26 RX ADMIN — PREGABALIN 100 MILLIGRAM(S): 50 CAPSULE ORAL at 06:04

## 2024-06-26 RX ADMIN — PANTOPRAZOLE SODIUM 40 MILLIGRAM(S): 40 INJECTION, POWDER, FOR SOLUTION INTRAVENOUS at 06:03

## 2024-07-04 DIAGNOSIS — I24.89 OTHER FORMS OF ACUTE ISCHEMIC HEART DISEASE: ICD-10-CM

## 2024-07-04 DIAGNOSIS — N17.9 ACUTE KIDNEY FAILURE, UNSPECIFIED: ICD-10-CM

## 2024-07-04 DIAGNOSIS — N40.0 BENIGN PROSTATIC HYPERPLASIA WITHOUT LOWER URINARY TRACT SYMPTOMS: ICD-10-CM

## 2024-07-04 DIAGNOSIS — I12.9 HYPERTENSIVE CHRONIC KIDNEY DISEASE WITH STAGE 1 THROUGH STAGE 4 CHRONIC KIDNEY DISEASE, OR UNSPECIFIED CHRONIC KIDNEY DISEASE: ICD-10-CM

## 2024-07-04 DIAGNOSIS — J84.10 PULMONARY FIBROSIS, UNSPECIFIED: ICD-10-CM

## 2024-07-04 DIAGNOSIS — E11.22 TYPE 2 DIABETES MELLITUS WITH DIABETIC CHRONIC KIDNEY DISEASE: ICD-10-CM

## 2024-07-04 DIAGNOSIS — Z66 DO NOT RESUSCITATE: ICD-10-CM

## 2024-07-04 DIAGNOSIS — I25.10 ATHEROSCLEROTIC HEART DISEASE OF NATIVE CORONARY ARTERY WITHOUT ANGINA PECTORIS: ICD-10-CM

## 2024-07-04 DIAGNOSIS — N18.32 CHRONIC KIDNEY DISEASE, STAGE 3B: ICD-10-CM

## 2024-07-04 DIAGNOSIS — I95.1 ORTHOSTATIC HYPOTENSION: ICD-10-CM

## 2024-07-04 DIAGNOSIS — U09.9 POST COVID-19 CONDITION, UNSPECIFIED: ICD-10-CM

## 2024-07-04 DIAGNOSIS — H91.90 UNSPECIFIED HEARING LOSS, UNSPECIFIED EAR: ICD-10-CM

## 2024-07-23 ENCOUNTER — APPOINTMENT (OUTPATIENT)
Dept: PULMONOLOGY | Facility: CLINIC | Age: 79
End: 2024-07-23

## 2024-07-24 ENCOUNTER — APPOINTMENT (OUTPATIENT)
Dept: SLEEP CENTER | Facility: HOSPITAL | Age: 79
End: 2024-07-24

## 2024-07-24 ENCOUNTER — OUTPATIENT (OUTPATIENT)
Dept: OUTPATIENT SERVICES | Facility: HOSPITAL | Age: 79
LOS: 1 days | Discharge: HOME | End: 2024-07-24
Payer: MEDICARE

## 2024-07-24 DIAGNOSIS — Z98.890 OTHER SPECIFIED POSTPROCEDURAL STATES: Chronic | ICD-10-CM

## 2024-07-24 DIAGNOSIS — Z95.5 PRESENCE OF CORONARY ANGIOPLASTY IMPLANT AND GRAFT: Chronic | ICD-10-CM

## 2024-07-24 DIAGNOSIS — Z87.09 PERSONAL HISTORY OF OTHER DISEASES OF THE RESPIRATORY SYSTEM: Chronic | ICD-10-CM

## 2024-07-24 DIAGNOSIS — G47.33 OBSTRUCTIVE SLEEP APNEA (ADULT) (PEDIATRIC): ICD-10-CM

## 2024-07-24 PROCEDURE — 95811 POLYSOM 6/>YRS CPAP 4/> PARM: CPT | Mod: 26

## 2024-07-24 PROCEDURE — 95811 POLYSOM 6/>YRS CPAP 4/> PARM: CPT

## 2024-07-26 DIAGNOSIS — G47.33 OBSTRUCTIVE SLEEP APNEA (ADULT) (PEDIATRIC): ICD-10-CM

## 2024-07-29 ENCOUNTER — NON-APPOINTMENT (OUTPATIENT)
Age: 79
End: 2024-07-29

## 2024-07-29 ENCOUNTER — APPOINTMENT (OUTPATIENT)
Dept: CARDIOLOGY | Facility: CLINIC | Age: 79
End: 2024-07-29
Payer: MEDICARE

## 2024-07-29 PROCEDURE — 93294 REM INTERROG EVL PM/LDLS PM: CPT

## 2024-07-29 PROCEDURE — 93296 REM INTERROG EVL PM/IDS: CPT

## 2024-08-02 ENCOUNTER — APPOINTMENT (OUTPATIENT)
Dept: PULMONOLOGY | Facility: CLINIC | Age: 79
End: 2024-08-02
Payer: MEDICARE

## 2024-08-02 ENCOUNTER — LABORATORY RESULT (OUTPATIENT)
Age: 79
End: 2024-08-02

## 2024-08-02 VITALS
OXYGEN SATURATION: 96 % | HEIGHT: 65 IN | BODY MASS INDEX: 30.99 KG/M2 | DIASTOLIC BLOOD PRESSURE: 70 MMHG | WEIGHT: 186 LBS | SYSTOLIC BLOOD PRESSURE: 100 MMHG | HEART RATE: 97 BPM

## 2024-08-02 DIAGNOSIS — R06.02 SHORTNESS OF BREATH: ICD-10-CM

## 2024-08-02 DIAGNOSIS — G47.33 OBSTRUCTIVE SLEEP APNEA (ADULT) (PEDIATRIC): ICD-10-CM

## 2024-08-02 DIAGNOSIS — J84.112 IDIOPATHIC PULMONARY FIBROSIS: ICD-10-CM

## 2024-08-02 PROCEDURE — G2211 COMPLEX E/M VISIT ADD ON: CPT

## 2024-08-02 PROCEDURE — 99214 OFFICE O/P EST MOD 30 MIN: CPT

## 2024-08-02 RX ORDER — PREDNISONE 20 MG/1
20 TABLET ORAL
Qty: 9 | Refills: 0 | Status: ACTIVE | COMMUNITY
Start: 2024-08-02 | End: 1900-01-01

## 2024-08-02 NOTE — REASON FOR VISIT
[Follow-Up] : a follow-up visit [Asthma] : asthma [Shortness of Breath] : shortness of breath [ILD] : ILD

## 2024-08-02 NOTE — PLAN
[TextEntry] : continue Bryena 160  prednisone ordered stand by if any exacerbation  work up for CTD  most likely the UIP picture is post covid, but will do CTD work up  repeat ct in 6months and pFT  pending cpap titration and then will ordered machine

## 2024-08-02 NOTE — REVIEW OF SYSTEMS
[Nasal Congestion] : nasal congestion [Postnasal Drip] : postnasal drip [Nocturia] : nocturia [Negative] : Endocrine [TextBox_30] : see HPI

## 2024-08-02 NOTE — PHYSICAL EXAM
[No Acute Distress] : no acute distress [IV] : Mallampati Class: IV [Normal Appearance] : normal appearance [No Neck Mass] : no neck mass [Normal Rate/Rhythm] : normal rate/rhythm [Normal S1, S2] : normal s1, s2 [No Murmurs] : no murmurs [No Resp Distress] : no resp distress [Clear to Auscultation Bilaterally] : clear to auscultation bilaterally [No Abnormalities] : no abnormalities [Normal Gait] : normal gait [No Clubbing] : no clubbing [No Cyanosis] : no cyanosis [No Edema] : no edema [FROM] : FROM [Oriented x3] : oriented x3 [Normal Affect] : normal affect

## 2024-08-05 LAB
ANCA AB SER-IMP: NEGATIVE
C-ANCA SER-ACNC: NEGATIVE
ENA SCL70 IGG SER IA-ACNC: <0.2 AL
ERYTHROCYTE [SEDIMENTATION RATE] IN BLOOD BY WESTERGREN METHOD: 5 MM/HR
P-ANCA TITR SER IF: NEGATIVE
RHEUMATOID FACT SER QL: <10 IU/ML

## 2024-08-06 LAB
ANA PAT FLD IF-IMP: ABNORMAL
ANA PAT FLD IF-IMP: ABNORMAL
ANA SER IF-ACNC: ABNORMAL
ANACR T: ABNORMAL
C3 SERPL-MCNC: 122 MG/DL

## 2024-08-20 RX ORDER — BUDESONIDE AND FORMOTEROL FUMARATE 160; 4.5 UG/1; UG/1
160-4.5 AEROSOL, METERED RESPIRATORY (INHALATION) TWICE DAILY
Qty: 3 | Refills: 0 | Status: ACTIVE | COMMUNITY
Start: 2024-08-20 | End: 1900-01-01

## 2024-10-10 ENCOUNTER — APPOINTMENT (OUTPATIENT)
Dept: ENDOCRINOLOGY | Facility: CLINIC | Age: 79
End: 2024-10-10
Payer: MEDICARE

## 2024-10-10 VITALS
OXYGEN SATURATION: 98 % | DIASTOLIC BLOOD PRESSURE: 60 MMHG | WEIGHT: 192 LBS | HEART RATE: 80 BPM | HEIGHT: 65 IN | BODY MASS INDEX: 31.99 KG/M2 | SYSTOLIC BLOOD PRESSURE: 118 MMHG

## 2024-10-10 DIAGNOSIS — N18.9 CHRONIC KIDNEY DISEASE, UNSPECIFIED: ICD-10-CM

## 2024-10-10 DIAGNOSIS — E78.5 HYPERLIPIDEMIA, UNSPECIFIED: ICD-10-CM

## 2024-10-10 DIAGNOSIS — E11.65 TYPE 2 DIABETES MELLITUS WITH HYPERGLYCEMIA: ICD-10-CM

## 2024-10-10 DIAGNOSIS — E66.9 OBESITY, UNSPECIFIED: ICD-10-CM

## 2024-10-10 PROCEDURE — 99204 OFFICE O/P NEW MOD 45 MIN: CPT

## 2024-10-10 RX ORDER — TIRZEPATIDE 5 MG/.5ML
5 INJECTION, SOLUTION SUBCUTANEOUS
Qty: 3 | Refills: 0 | Status: ACTIVE | COMMUNITY
Start: 2024-10-10 | End: 1900-01-01

## 2024-10-10 RX ORDER — TIRZEPATIDE 2.5 MG/.5ML
2.5 INJECTION, SOLUTION SUBCUTANEOUS
Qty: 1 | Refills: 0 | Status: ACTIVE | COMMUNITY
Start: 2024-10-10 | End: 1900-01-01

## 2024-10-11 LAB
ALBUMIN SERPL ELPH-MCNC: 4.2 G/DL
ALP BLD-CCNC: 114 U/L
ALT SERPL-CCNC: 18 U/L
ANION GAP SERPL CALC-SCNC: 11 MMOL/L
AST SERPL-CCNC: 18 U/L
BASOPHILS # BLD AUTO: 0.04 K/UL
BASOPHILS NFR BLD AUTO: 0.5 %
BILIRUB SERPL-MCNC: 0.3 MG/DL
BUN SERPL-MCNC: 28 MG/DL
CALCIUM SERPL-MCNC: 9 MG/DL
CHLORIDE SERPL-SCNC: 104 MMOL/L
CHOLEST SERPL-MCNC: 117 MG/DL
CO2 SERPL-SCNC: 25 MMOL/L
CREAT SERPL-MCNC: 1.8 MG/DL
EGFR: 38 ML/MIN/1.73M2
EOSINOPHIL # BLD AUTO: 0.15 K/UL
EOSINOPHIL NFR BLD AUTO: 1.7 %
GLUCOSE SERPL-MCNC: 156 MG/DL
HCT VFR BLD CALC: 41.5 %
HDLC SERPL-MCNC: 44 MG/DL
HGB BLD-MCNC: 13.3 G/DL
IMM GRANULOCYTES NFR BLD AUTO: 0.7 %
LDLC SERPL CALC-MCNC: 49 MG/DL
LYMPHOCYTES # BLD AUTO: 2.4 K/UL
LYMPHOCYTES NFR BLD AUTO: 27.9 %
MAN DIFF?: NORMAL
MCHC RBC-ENTMCNC: 28.7 PG
MCHC RBC-ENTMCNC: 32 G/DL
MCV RBC AUTO: 89.6 FL
MONOCYTES # BLD AUTO: 0.91 K/UL
MONOCYTES NFR BLD AUTO: 10.6 %
NEUTROPHILS # BLD AUTO: 5.04 K/UL
NEUTROPHILS NFR BLD AUTO: 58.6 %
NONHDLC SERPL-MCNC: 73 MG/DL
PLATELET # BLD AUTO: 171 K/UL
PMV BLD AUTO: 0 /100 WBCS
POTASSIUM SERPL-SCNC: 5.5 MMOL/L
PROT SERPL-MCNC: 6.9 G/DL
RBC # BLD: 4.63 M/UL
RBC # FLD: 15.7 %
SODIUM SERPL-SCNC: 140 MMOL/L
TRIGL SERPL-MCNC: 120 MG/DL
TSH SERPL-ACNC: 1.47 UIU/ML
WBC # FLD AUTO: 8.6 K/UL

## 2024-10-14 LAB
C PEPTIDE SERPL-MCNC: 8.6 NG/ML
CREAT SPEC-SCNC: 56 MG/DL
ESTIMATED AVERAGE GLUCOSE: 186 MG/DL
HBA1C MFR BLD HPLC: 8.1 %
MICROALBUMIN 24H UR DL<=1MG/L-MCNC: 1.4 MG/DL
MICROALBUMIN/CREAT 24H UR-RTO: 25 MG/G
VIT B12 SERPL-MCNC: 567 PG/ML

## 2024-10-23 ENCOUNTER — INPATIENT (INPATIENT)
Facility: HOSPITAL | Age: 79
LOS: 1 days | Discharge: HOME HEALTH | DRG: 125 | End: 2024-10-25
Attending: STUDENT IN AN ORGANIZED HEALTH CARE EDUCATION/TRAINING PROGRAM | Admitting: INTERNAL MEDICINE
Payer: MEDICARE

## 2024-10-23 VITALS
HEART RATE: 72 BPM | OXYGEN SATURATION: 97 % | RESPIRATION RATE: 20 BRPM | DIASTOLIC BLOOD PRESSURE: 72 MMHG | SYSTOLIC BLOOD PRESSURE: 137 MMHG | TEMPERATURE: 97 F

## 2024-10-23 DIAGNOSIS — Z98.890 OTHER SPECIFIED POSTPROCEDURAL STATES: Chronic | ICD-10-CM

## 2024-10-23 DIAGNOSIS — H53.9 UNSPECIFIED VISUAL DISTURBANCE: ICD-10-CM

## 2024-10-23 DIAGNOSIS — Z87.09 PERSONAL HISTORY OF OTHER DISEASES OF THE RESPIRATORY SYSTEM: Chronic | ICD-10-CM

## 2024-10-23 DIAGNOSIS — Z95.5 PRESENCE OF CORONARY ANGIOPLASTY IMPLANT AND GRAFT: Chronic | ICD-10-CM

## 2024-10-23 LAB
ALBUMIN SERPL ELPH-MCNC: 4.3 G/DL — SIGNIFICANT CHANGE UP (ref 3.5–5.2)
ALP SERPL-CCNC: 99 U/L — SIGNIFICANT CHANGE UP (ref 30–115)
ALT FLD-CCNC: 21 U/L — SIGNIFICANT CHANGE UP (ref 0–41)
ANION GAP SERPL CALC-SCNC: 12 MMOL/L — SIGNIFICANT CHANGE UP (ref 7–14)
APTT BLD: 21.9 SEC — CRITICAL LOW (ref 27–39.2)
AST SERPL-CCNC: 24 U/L — SIGNIFICANT CHANGE UP (ref 0–41)
BASOPHILS # BLD AUTO: 0.05 K/UL — SIGNIFICANT CHANGE UP (ref 0–0.2)
BASOPHILS NFR BLD AUTO: 0.5 % — SIGNIFICANT CHANGE UP (ref 0–1)
BILIRUB SERPL-MCNC: 0.5 MG/DL — SIGNIFICANT CHANGE UP (ref 0.2–1.2)
BUN SERPL-MCNC: 31 MG/DL — HIGH (ref 10–20)
CALCIUM SERPL-MCNC: 9.2 MG/DL — SIGNIFICANT CHANGE UP (ref 8.4–10.5)
CHLORIDE SERPL-SCNC: 98 MMOL/L — SIGNIFICANT CHANGE UP (ref 98–110)
CO2 SERPL-SCNC: 24 MMOL/L — SIGNIFICANT CHANGE UP (ref 17–32)
CREAT SERPL-MCNC: 2.1 MG/DL — HIGH (ref 0.7–1.5)
EGFR: 31 ML/MIN/1.73M2 — LOW
EOSINOPHIL # BLD AUTO: 0.14 K/UL — SIGNIFICANT CHANGE UP (ref 0–0.7)
EOSINOPHIL NFR BLD AUTO: 1.4 % — SIGNIFICANT CHANGE UP (ref 0–8)
GLUCOSE BLDC GLUCOMTR-MCNC: 144 MG/DL — HIGH (ref 70–99)
GLUCOSE SERPL-MCNC: 199 MG/DL — HIGH (ref 70–99)
HCT VFR BLD CALC: 42 % — SIGNIFICANT CHANGE UP (ref 42–52)
HGB BLD-MCNC: 13.3 G/DL — LOW (ref 14–18)
IMM GRANULOCYTES NFR BLD AUTO: 1.1 % — HIGH (ref 0.1–0.3)
INR BLD: 0.99 RATIO — SIGNIFICANT CHANGE UP (ref 0.65–1.3)
LYMPHOCYTES # BLD AUTO: 2.48 K/UL — SIGNIFICANT CHANGE UP (ref 1.2–3.4)
LYMPHOCYTES # BLD AUTO: 24.4 % — SIGNIFICANT CHANGE UP (ref 20.5–51.1)
MCHC RBC-ENTMCNC: 28.3 PG — SIGNIFICANT CHANGE UP (ref 27–31)
MCHC RBC-ENTMCNC: 31.7 G/DL — LOW (ref 32–37)
MCV RBC AUTO: 89.4 FL — SIGNIFICANT CHANGE UP (ref 80–94)
MONOCYTES # BLD AUTO: 1.06 K/UL — HIGH (ref 0.1–0.6)
MONOCYTES NFR BLD AUTO: 10.4 % — HIGH (ref 1.7–9.3)
NEUTROPHILS # BLD AUTO: 6.33 K/UL — SIGNIFICANT CHANGE UP (ref 1.4–6.5)
NEUTROPHILS NFR BLD AUTO: 62.2 % — SIGNIFICANT CHANGE UP (ref 42.2–75.2)
NRBC # BLD: 0 /100 WBCS — SIGNIFICANT CHANGE UP (ref 0–0)
PLATELET # BLD AUTO: 149 K/UL — SIGNIFICANT CHANGE UP (ref 130–400)
PMV BLD: 12.3 FL — HIGH (ref 7.4–10.4)
POTASSIUM SERPL-MCNC: 5.4 MMOL/L — HIGH (ref 3.5–5)
POTASSIUM SERPL-SCNC: 5.4 MMOL/L — HIGH (ref 3.5–5)
PROT SERPL-MCNC: 7 G/DL — SIGNIFICANT CHANGE UP (ref 6–8)
PROTHROM AB SERPL-ACNC: 11.3 SEC — SIGNIFICANT CHANGE UP (ref 9.95–12.87)
RBC # BLD: 4.7 M/UL — SIGNIFICANT CHANGE UP (ref 4.7–6.1)
RBC # FLD: 15.9 % — HIGH (ref 11.5–14.5)
SODIUM SERPL-SCNC: 134 MMOL/L — LOW (ref 135–146)
WBC # BLD: 10.17 K/UL — SIGNIFICANT CHANGE UP (ref 4.8–10.8)
WBC # FLD AUTO: 10.17 K/UL — SIGNIFICANT CHANGE UP (ref 4.8–10.8)

## 2024-10-23 PROCEDURE — 80061 LIPID PANEL: CPT

## 2024-10-23 PROCEDURE — 99285 EMERGENCY DEPT VISIT HI MDM: CPT | Mod: FS

## 2024-10-23 PROCEDURE — 80048 BASIC METABOLIC PNL TOTAL CA: CPT

## 2024-10-23 PROCEDURE — 97165 OT EVAL LOW COMPLEX 30 MIN: CPT | Mod: GO

## 2024-10-23 PROCEDURE — 83036 HEMOGLOBIN GLYCOSYLATED A1C: CPT

## 2024-10-23 PROCEDURE — 70498 CT ANGIOGRAPHY NECK: CPT | Mod: 26,MC

## 2024-10-23 PROCEDURE — 80053 COMPREHEN METABOLIC PANEL: CPT

## 2024-10-23 PROCEDURE — 93010 ELECTROCARDIOGRAM REPORT: CPT

## 2024-10-23 PROCEDURE — 97162 PT EVAL MOD COMPLEX 30 MIN: CPT | Mod: GP

## 2024-10-23 PROCEDURE — 82962 GLUCOSE BLOOD TEST: CPT

## 2024-10-23 PROCEDURE — 70496 CT ANGIOGRAPHY HEAD: CPT | Mod: 26,MC

## 2024-10-23 PROCEDURE — 0042T: CPT | Mod: MC

## 2024-10-23 PROCEDURE — 92526 ORAL FUNCTION THERAPY: CPT | Mod: GN

## 2024-10-23 PROCEDURE — 83735 ASSAY OF MAGNESIUM: CPT

## 2024-10-23 PROCEDURE — 36415 COLL VENOUS BLD VENIPUNCTURE: CPT

## 2024-10-23 PROCEDURE — 85025 COMPLETE CBC W/AUTO DIFF WBC: CPT

## 2024-10-23 PROCEDURE — 97535 SELF CARE MNGMENT TRAINING: CPT | Mod: GO

## 2024-10-23 PROCEDURE — 70450 CT HEAD/BRAIN W/O DYE: CPT | Mod: 26,MC,XU

## 2024-10-23 PROCEDURE — 84100 ASSAY OF PHOSPHORUS: CPT

## 2024-10-23 RX ORDER — METOPROLOL TARTRATE 50 MG
50 TABLET ORAL
Refills: 0 | Status: DISCONTINUED | OUTPATIENT
Start: 2024-10-23 | End: 2024-10-25

## 2024-10-23 RX ORDER — TAMSULOSIN HCL 0.4 MG
0.4 CAPSULE ORAL AT BEDTIME
Refills: 0 | Status: DISCONTINUED | OUTPATIENT
Start: 2024-10-23 | End: 2024-10-25

## 2024-10-23 RX ORDER — GLUCAGON INJECTION, SOLUTION 1 MG/.2ML
1 INJECTION, SOLUTION SUBCUTANEOUS ONCE
Refills: 0 | Status: DISCONTINUED | OUTPATIENT
Start: 2024-10-23 | End: 2024-10-25

## 2024-10-23 RX ORDER — CHLORHEXIDINE GLUCONATE 40 MG/ML
1 SOLUTION TOPICAL
Refills: 0 | Status: DISCONTINUED | OUTPATIENT
Start: 2024-10-23 | End: 2024-10-25

## 2024-10-23 RX ORDER — ASPIRIN/MAG CARB/ALUMINUM AMIN 325 MG
300 TABLET ORAL DAILY
Refills: 0 | Status: DISCONTINUED | OUTPATIENT
Start: 2024-10-23 | End: 2024-10-24

## 2024-10-23 RX ORDER — MEMANTINE HYDROCHLORIDE 21 MG/1
5 CAPSULE, EXTENDED RELEASE ORAL DAILY
Refills: 0 | Status: DISCONTINUED | OUTPATIENT
Start: 2024-10-23 | End: 2024-10-25

## 2024-10-23 RX ORDER — ALBUTEROL 90 MCG
1 AEROSOL (GRAM) INHALATION EVERY 6 HOURS
Refills: 0 | Status: DISCONTINUED | OUTPATIENT
Start: 2024-10-23 | End: 2024-10-25

## 2024-10-23 RX ORDER — RANOLAZINE 500 MG/1
500 TABLET, FILM COATED, EXTENDED RELEASE ORAL
Refills: 0 | Status: DISCONTINUED | OUTPATIENT
Start: 2024-10-23 | End: 2024-10-25

## 2024-10-23 RX ORDER — PREGABALIN 150 MG/1
100 CAPSULE ORAL EVERY 12 HOURS
Refills: 0 | Status: DISCONTINUED | OUTPATIENT
Start: 2024-10-23 | End: 2024-10-25

## 2024-10-23 RX ORDER — LISINOPRIL 40 MG
5 TABLET ORAL DAILY
Refills: 0 | Status: DISCONTINUED | OUTPATIENT
Start: 2024-10-23 | End: 2024-10-25

## 2024-10-23 RX ORDER — PANTOPRAZOLE SODIUM 40 MG/1
40 TABLET, DELAYED RELEASE ORAL
Refills: 0 | Status: DISCONTINUED | OUTPATIENT
Start: 2024-10-23 | End: 2024-10-25

## 2024-10-23 RX ORDER — INSULIN LISPRO 100/ML
VIAL (ML) SUBCUTANEOUS
Refills: 0 | Status: DISCONTINUED | OUTPATIENT
Start: 2024-10-23 | End: 2024-10-25

## 2024-10-23 RX ORDER — INSULIN GLARGINE,HUM.REC.ANLOG 100/ML
45 VIAL (ML) SUBCUTANEOUS AT BEDTIME
Refills: 0 | Status: DISCONTINUED | OUTPATIENT
Start: 2024-10-23 | End: 2024-10-25

## 2024-10-23 RX ORDER — INFLUENZ VIR VAC TV P-SURF2003 15MCG/.5ML
0.5 SYRINGE (ML) INTRAMUSCULAR ONCE
Refills: 0 | Status: DISCONTINUED | OUTPATIENT
Start: 2024-10-23 | End: 2024-10-25

## 2024-10-23 RX ADMIN — Medication 80 MILLIGRAM(S): at 23:51

## 2024-10-23 RX ADMIN — Medication 0.4 MILLIGRAM(S): at 23:51

## 2024-10-23 NOTE — ED ADULT NURSE NOTE - OBJECTIVE STATEMENT
visual disturbance x 2 weeks that is intermittent.  Pt  lost vision again in eye but it came back to normal baseline while he was in CT scan.

## 2024-10-23 NOTE — ED PROVIDER NOTE - PROGRESS NOTE DETAILS
Vision improved after patient returned from CAT scan, spoke with neurology who recommends every 8 hour neurochecks if patient were to be admitted and ophthalmology consult.

## 2024-10-23 NOTE — ED PROVIDER NOTE - PHYSICAL EXAMINATION
VITAL SIGNS: I have reviewed nursing notes and confirm.  CONSTITUTIONAL: chronically ill-appearing  SKIN: skin exam is warm and dry, no acute rash.    HEAD: Normocephalic; atraumatic.  EYES:  conjunctiva and sclera clear.  ENT: No nasal discharge; airway clear.  CARD: S1, S2 normal; no murmurs, gallops, or rubs. Regular rate and rhythm.   RESP: No wheezes, rales or rhonchi.  ABD: Normal bowel sounds; soft; non-distended; non-tender  EXT: Normal ROM.  No clubbing, cyanosis or edema.   NEURO: Alert, oriented, grossly unremarkable

## 2024-10-23 NOTE — ED ADULT TRIAGE NOTE - CHIEF COMPLAINT QUOTE
pt complaining of sudden vision loss to R eye. pt states he had bilateral temporary vision loss 2 days ago, went for US of the carotid arteries today.

## 2024-10-23 NOTE — ED PROVIDER NOTE - CLINICAL SUMMARY MEDICAL DECISION MAKING FREE TEXT BOX
Stroke code was called in triage.  Patient's symptoms are improving.  Patient brought to CT.  Verbal report is negative for acute bleeding or stroke.  Discussed with telestroke who recommends no TNK.  We spoke with our neurologist and recommend every 8 neurochecks.  Patient symptoms are improving.  Patient aware of results and plan

## 2024-10-23 NOTE — PHARMACOTHERAPY INTERVENTION NOTE - COMMENTS
Spoke with SARAH Bourgeois regarding order for lisinopril 5mg PO daily. Patient's potassium is hemolyzed to 5.4. PA aware, wishes to continue therapy, and will monitor potassium levels and adjust accordingly.

## 2024-10-23 NOTE — ED PROVIDER NOTE - NSICDXPASTMEDICALHX_GEN_ALL_CORE_FT
PAST MEDICAL HISTORY:  Acute myocardial infarction     BPH (benign prostatic hyperplasia)     CAD (coronary artery disease)     DM (diabetes mellitus)     History of hematologic disorder     [place] (hard of hearing)     Total cataract

## 2024-10-23 NOTE — ED PROVIDER NOTE - ATTENDING APP SHARED VISIT CONTRIBUTION OF CARE
-year-old male past medical history noted presents with his family for evaluation of right-sided vision loss that started about 10 minutes ago.  Patient experienced a similar episode last week however it involves both eyes.  Patient states episode resolved and he followed up with his PMD.  Patient has been seen by his ophthalmologist who told him everything looked normal and recommended he come to the ED if symptoms return.  Patient was also referred for a carotid Doppler which he had today.  Daughter states that symptoms started shortly after test.  No change in speech or gait, no numbness or weakness, on exam patient in NAD, AAOx3, PERRL, EOMI, sensation grossly intact, good tone and equal strength

## 2024-10-23 NOTE — H&P ADULT - ASSESSMENT
79-year-old male history of CAD, diabetes, hypertension, BPH here for evaluation of visual disturbance in right eye.  10 minutes prior to arrival.  Patient states similar episode occurred in both eyes 2 days ago and resolved after 10 minutes.  Patient denies weakness, numbness, fever, chills, chest pain. sob, HA. 79-year-old male Pmhx of CAD, DM, HTN, BPH,  presents with recent c/o of bilateral vision loss approx one week ago.  While en route to ED, pt states his RIGHT eye lost vision for approx 10 minutes prior to arrival.   Patient states similar episode occurred in both eyes 2 days ago and resolved after 10 minutes.  Pt states vision was "black" unable to see figures or shadows.  Pt denies recent falls or head trauma, denies weakness, numbness, fever, chills, chest pain. sob, BARKER    A called was placed to daughter to obtain home meds.  A VM was left with instructions to bring in med list in the morning.       # R/O Stroke  - neuro consult  - Ophthalmologist  consult  - PT   - social servies  - case management  - Swallow screen: PASSED  - no acute deficits    # DM    # HTN    # BPH    # CAD 79-year-old male Pmhx of CAD, DM, HTN, BPH,  presents with recent c/o of bilateral vision loss approx one week ago.  While en route to ED, pt states his RIGHT eye lost vision for approx 10 minutes prior to arrival.   Patient states similar episode occurred in both eyes 2 days ago and resolved after 10 minutes.  Pt states vision was "black" unable to see figures or shadows.  Pt denies recent falls or head trauma, denies weakness, numbness, fever, chills, chest pain. sob, HA           # R/O Stroke  - neuro consult  - Ophthalmologist  consult  - PT   - social servies  - case management  - Swallow screen: PASSED  - no acute deficits    # DM  - FS with SS    # HTN  - vs  - c/w home med    # BPH  - c/w home med    # HLD  - c/w home med    # Neuropathy  - c/w home med 79-year-old male Pmhx of CAD, DM, HTN, BPH,  presents with recent c/o of bilateral vision loss approx one week ago.  While en route to ED, pt states his RIGHT eye lost vision for approx 10 minutes prior to arrival.   Patient states similar episode occurred in both eyes 2 days ago and resolved after 10 minutes.  Pt states vision was "black" unable to see figures or shadows.  Pt denies recent falls or head trauma, denies weakness, numbness, fever, chills, chest pain. sob, HA           # R/O Stroke  - neuro consult  - Ophthalmologist  consult  - PT   -   - case management  - Swallow screen: PASSED  - no acute deficits    # DM  - FS with SS    # HTN  - vs  - c/w home med    # BPH  - c/w home med    # HLD  - c/w home med    # Neuropathy  - c/w home med

## 2024-10-23 NOTE — ED ADULT NURSE NOTE - NSICDXPASTMEDICALHX_GEN_ALL_CORE_FT
PAST MEDICAL HISTORY:  Acute myocardial infarction     BPH (benign prostatic hyperplasia)     CAD (coronary artery disease)     DM (diabetes mellitus)     History of hematologic disorder     [place] (hard of hearing)     Total cataract

## 2024-10-23 NOTE — H&P ADULT - NSICDXPASTMEDICALHX_GEN_ALL_CORE_FT
PAST MEDICAL HISTORY:  Acute myocardial infarction     BPH (benign prostatic hyperplasia)     CAD (coronary artery disease)     DM (diabetes mellitus)     History of hematologic disorder     [place] (hard of hearing)     Total cataract

## 2024-10-23 NOTE — H&P ADULT - HISTORY OF PRESENT ILLNESS
79-year-old male history of CAD, diabetes, hypertension, BPH here for evaluation of visual disturbance in right eye.  10 minutes prior to arrival.  Patient states similar episode occurred in both eyes 2 days ago and resolved after 10 minutes.  Patient denies weakness, numbness, fever, chills, chest pain. sob, HA 79-year-old male Pmhx of CAD, DM, HTN, BPH,  presents with recent c/o of bilateral vision loss approx one week ago.  While en route to ED, pt states his RIGHT eye lost vision for approx 10 minutes prior to arrival.   Patient states similar episode occurred in both eyes 2 days ago and resolved after 10 minutes.  Pt states vision was "black" unable to see figures or shadows.  Pt denies recent falls or head trauma, denies weakness, numbness, fever, chills, chest pain. sob, BARKER    A called was placed to daughter to obtain home meds.  A VM was left with instructions to bring in med list in the morning.  79-year-old male Pmhx of CAD, DM, HTN, BPH,  presents with recent c/o of bilateral vision loss approx one week ago.  While en route to ED, pt states his RIGHT eye lost vision for approx 10 minutes prior to arrival.   Patient states similar episode occurred in both eyes 2 days ago and resolved after 10 minutes.  Pt states vision was "black" unable to see figures or shadows.  Pt denies recent falls or head trauma, denies weakness, numbness, fever, chills, chest pain. sob, BARKER  NOTE: Pt recent travel to [place] and returned on 10/2    Home meds obtained from daughter.

## 2024-10-23 NOTE — ED ADULT NURSE NOTE - NSFALLHARMRISKINTERV_ED_ALL_ED
Communicate risk of Fall with Harm to all staff, patient, and family/Provide visual cue: red socks, yellow wristband, yellow gown, etc/Reinforce activity limits and safety measures with patient and family/Bed in lowest position, wheels locked, appropriate side rails in place/Call bell, personal items and telephone in reach/Instruct patient to call for assistance before getting out of bed/chair/stretcher/Non-slip footwear applied when patient is off stretcher/[place] to call system/Physically safe environment - no spills, clutter or unnecessary equipment/Purposeful Proactive Rounding/Room/bathroom lighting operational, light cord in reach Assistance OOB with selected safe patient handling equipment if applicable/Communicate risk of Fall with Harm to all staff, patient, and family/Provide visual cue: red socks, yellow wristband, yellow gown, etc/Reinforce activity limits and safety measures with patient and family/Bed in lowest position, wheels locked, appropriate side rails in place/Call bell, personal items and telephone in reach/Instruct patient to call for assistance before getting out of bed/chair/stretcher/Non-slip footwear applied when patient is off stretcher/[place] to call system/Physically safe environment - no spills, clutter or unnecessary equipment/Purposeful Proactive Rounding/Room/bathroom lighting operational, light cord in reach

## 2024-10-24 LAB
A1C WITH ESTIMATED AVERAGE GLUCOSE RESULT: 8.1 % — HIGH (ref 4–5.6)
ALBUMIN SERPL ELPH-MCNC: 4.1 G/DL — SIGNIFICANT CHANGE UP (ref 3.5–5.2)
ALP SERPL-CCNC: 91 U/L — SIGNIFICANT CHANGE UP (ref 30–115)
ALT FLD-CCNC: 20 U/L — SIGNIFICANT CHANGE UP (ref 0–41)
ANION GAP SERPL CALC-SCNC: 13 MMOL/L — SIGNIFICANT CHANGE UP (ref 7–14)
AST SERPL-CCNC: 19 U/L — SIGNIFICANT CHANGE UP (ref 0–41)
BASOPHILS # BLD AUTO: 0.06 K/UL — SIGNIFICANT CHANGE UP (ref 0–0.2)
BASOPHILS NFR BLD AUTO: 0.5 % — SIGNIFICANT CHANGE UP (ref 0–1)
BILIRUB SERPL-MCNC: 0.7 MG/DL — SIGNIFICANT CHANGE UP (ref 0.2–1.2)
BUN SERPL-MCNC: 28 MG/DL — HIGH (ref 10–20)
CALCIUM SERPL-MCNC: 9.1 MG/DL — SIGNIFICANT CHANGE UP (ref 8.4–10.5)
CHLORIDE SERPL-SCNC: 98 MMOL/L — SIGNIFICANT CHANGE UP (ref 98–110)
CHOLEST SERPL-MCNC: 88 MG/DL — SIGNIFICANT CHANGE UP
CO2 SERPL-SCNC: 25 MMOL/L — SIGNIFICANT CHANGE UP (ref 17–32)
CREAT SERPL-MCNC: 1.9 MG/DL — HIGH (ref 0.7–1.5)
EGFR: 35 ML/MIN/1.73M2 — LOW
EOSINOPHIL # BLD AUTO: 0.2 K/UL — SIGNIFICANT CHANGE UP (ref 0–0.7)
EOSINOPHIL NFR BLD AUTO: 1.6 % — SIGNIFICANT CHANGE UP (ref 0–8)
ESTIMATED AVERAGE GLUCOSE: 186 MG/DL — HIGH (ref 68–114)
GLUCOSE BLDC GLUCOMTR-MCNC: 162 MG/DL — HIGH (ref 70–99)
GLUCOSE BLDC GLUCOMTR-MCNC: 172 MG/DL — HIGH (ref 70–99)
GLUCOSE BLDC GLUCOMTR-MCNC: 203 MG/DL — HIGH (ref 70–99)
GLUCOSE BLDC GLUCOMTR-MCNC: 210 MG/DL — HIGH (ref 70–99)
GLUCOSE SERPL-MCNC: 160 MG/DL — HIGH (ref 70–99)
HCT VFR BLD CALC: 41.3 % — LOW (ref 42–52)
HDLC SERPL-MCNC: 41 MG/DL — SIGNIFICANT CHANGE UP
HGB BLD-MCNC: 13.3 G/DL — LOW (ref 14–18)
IMM GRANULOCYTES NFR BLD AUTO: 1 % — HIGH (ref 0.1–0.3)
LIPID PNL WITH DIRECT LDL SERPL: 25 MG/DL — SIGNIFICANT CHANGE UP
LYMPHOCYTES # BLD AUTO: 2.46 K/UL — SIGNIFICANT CHANGE UP (ref 1.2–3.4)
LYMPHOCYTES # BLD AUTO: 20.2 % — LOW (ref 20.5–51.1)
MCHC RBC-ENTMCNC: 28.9 PG — SIGNIFICANT CHANGE UP (ref 27–31)
MCHC RBC-ENTMCNC: 32.2 G/DL — SIGNIFICANT CHANGE UP (ref 32–37)
MCV RBC AUTO: 89.6 FL — SIGNIFICANT CHANGE UP (ref 80–94)
MONOCYTES # BLD AUTO: 1.19 K/UL — HIGH (ref 0.1–0.6)
MONOCYTES NFR BLD AUTO: 9.8 % — HIGH (ref 1.7–9.3)
NEUTROPHILS # BLD AUTO: 8.16 K/UL — HIGH (ref 1.4–6.5)
NEUTROPHILS NFR BLD AUTO: 66.9 % — SIGNIFICANT CHANGE UP (ref 42.2–75.2)
NON HDL CHOLESTEROL: 47 MG/DL — SIGNIFICANT CHANGE UP
NRBC # BLD: 0 /100 WBCS — SIGNIFICANT CHANGE UP (ref 0–0)
PLATELET # BLD AUTO: 146 K/UL — SIGNIFICANT CHANGE UP (ref 130–400)
PMV BLD: 12.4 FL — HIGH (ref 7.4–10.4)
POTASSIUM SERPL-MCNC: 5 MMOL/L — SIGNIFICANT CHANGE UP (ref 3.5–5)
POTASSIUM SERPL-SCNC: 5 MMOL/L — SIGNIFICANT CHANGE UP (ref 3.5–5)
PROT SERPL-MCNC: 6.8 G/DL — SIGNIFICANT CHANGE UP (ref 6–8)
RBC # BLD: 4.61 M/UL — LOW (ref 4.7–6.1)
RBC # FLD: 15.9 % — HIGH (ref 11.5–14.5)
SODIUM SERPL-SCNC: 136 MMOL/L — SIGNIFICANT CHANGE UP (ref 135–146)
TRIGL SERPL-MCNC: 110 MG/DL — SIGNIFICANT CHANGE UP
WBC # BLD: 12.19 K/UL — HIGH (ref 4.8–10.8)
WBC # FLD AUTO: 12.19 K/UL — HIGH (ref 4.8–10.8)

## 2024-10-24 PROCEDURE — 99233 SBSQ HOSP IP/OBS HIGH 50: CPT

## 2024-10-24 PROCEDURE — 99222 1ST HOSP IP/OBS MODERATE 55: CPT | Mod: FS

## 2024-10-24 RX ORDER — MELATONIN 5 MG
3 TABLET ORAL AT BEDTIME
Refills: 0 | Status: DISCONTINUED | OUTPATIENT
Start: 2024-10-24 | End: 2024-10-25

## 2024-10-24 RX ORDER — ASPIRIN/MAG CARB/ALUMINUM AMIN 325 MG
81 TABLET ORAL DAILY
Refills: 0 | Status: DISCONTINUED | OUTPATIENT
Start: 2024-10-24 | End: 2024-10-25

## 2024-10-24 RX ORDER — CLOPIDOGREL 75 MG/1
75 TABLET ORAL DAILY
Refills: 0 | Status: DISCONTINUED | OUTPATIENT
Start: 2024-10-25 | End: 2024-10-25

## 2024-10-24 RX ORDER — CLOPIDOGREL 75 MG/1
300 TABLET ORAL ONCE
Refills: 0 | Status: COMPLETED | OUTPATIENT
Start: 2024-10-24 | End: 2024-10-24

## 2024-10-24 RX ORDER — HEPARIN SODIUM 10000 [USP'U]/ML
5000 INJECTION INTRAVENOUS; SUBCUTANEOUS EVERY 8 HOURS
Refills: 0 | Status: DISCONTINUED | OUTPATIENT
Start: 2024-10-24 | End: 2024-10-25

## 2024-10-24 RX ORDER — ASPIRIN/MAG CARB/ALUMINUM AMIN 325 MG
325 TABLET ORAL DAILY
Refills: 0 | Status: DISCONTINUED | OUTPATIENT
Start: 2024-10-24 | End: 2024-10-24

## 2024-10-24 RX ORDER — ONDANSETRON HYDROCHLORIDE 2 MG/ML
4 INJECTION, SOLUTION INTRAMUSCULAR; INTRAVENOUS EVERY 8 HOURS
Refills: 0 | Status: DISCONTINUED | OUTPATIENT
Start: 2024-10-24 | End: 2024-10-25

## 2024-10-24 RX ORDER — MAGNESIUM, ALUMINUM HYDROXIDE 200-200 MG
30 TABLET,CHEWABLE ORAL EVERY 4 HOURS
Refills: 0 | Status: DISCONTINUED | OUTPATIENT
Start: 2024-10-24 | End: 2024-10-25

## 2024-10-24 RX ORDER — ACETAMINOPHEN 500 MG
650 TABLET ORAL EVERY 6 HOURS
Refills: 0 | Status: DISCONTINUED | OUTPATIENT
Start: 2024-10-24 | End: 2024-10-25

## 2024-10-24 RX ADMIN — Medication 81 MILLIGRAM(S): at 12:21

## 2024-10-24 RX ADMIN — Medication 2: at 17:01

## 2024-10-24 RX ADMIN — CLOPIDOGREL 300 MILLIGRAM(S): 75 TABLET ORAL at 17:01

## 2024-10-24 RX ADMIN — Medication 2: at 12:20

## 2024-10-24 RX ADMIN — Medication 50 MILLIGRAM(S): at 18:58

## 2024-10-24 RX ADMIN — HEPARIN SODIUM 5000 UNIT(S): 10000 INJECTION INTRAVENOUS; SUBCUTANEOUS at 17:01

## 2024-10-24 RX ADMIN — RANOLAZINE 500 MILLIGRAM(S): 500 TABLET, FILM COATED, EXTENDED RELEASE ORAL at 18:59

## 2024-10-24 RX ADMIN — HEPARIN SODIUM 5000 UNIT(S): 10000 INJECTION INTRAVENOUS; SUBCUTANEOUS at 22:03

## 2024-10-24 RX ADMIN — Medication 1: at 08:11

## 2024-10-24 RX ADMIN — RANOLAZINE 500 MILLIGRAM(S): 500 TABLET, FILM COATED, EXTENDED RELEASE ORAL at 05:42

## 2024-10-24 RX ADMIN — MEMANTINE HYDROCHLORIDE 5 MILLIGRAM(S): 21 CAPSULE, EXTENDED RELEASE ORAL at 12:21

## 2024-10-24 RX ADMIN — PREGABALIN 100 MILLIGRAM(S): 150 CAPSULE ORAL at 05:42

## 2024-10-24 RX ADMIN — Medication 45 UNIT(S): at 22:03

## 2024-10-24 RX ADMIN — Medication 0.4 MILLIGRAM(S): at 22:03

## 2024-10-24 RX ADMIN — Medication 50 MILLIGRAM(S): at 05:42

## 2024-10-24 RX ADMIN — Medication 80 MILLIGRAM(S): at 22:03

## 2024-10-24 RX ADMIN — PREGABALIN 100 MILLIGRAM(S): 150 CAPSULE ORAL at 18:59

## 2024-10-24 RX ADMIN — PANTOPRAZOLE SODIUM 40 MILLIGRAM(S): 40 TABLET, DELAYED RELEASE ORAL at 05:43

## 2024-10-24 NOTE — PHYSICAL THERAPY INITIAL EVALUATION ADULT - ADDITIONAL COMMENTS
Per patient and wife, there are no steps inside or outside home, does not have an assistive device at home

## 2024-10-24 NOTE — CHART NOTE - NSCHARTNOTEFT_GEN_A_CORE
Discussed with primary team. Pt experienced two episodes of transient vision loss: first episode bilateral blacking out of vision for approx 20-30 mins, vision then returned to baseline. Second episode occurred several weeks later, monocular blacking out of vision for approx 20 mins before returning to baseline. Currently per primary team, pt vision has returned and remained at baseline OU.    Recommended in-patient evaluation as directed by neurology.    Pt to follow up at [facility] as out patient within 1-2 weeks of discharge (Address: [address], [address], [address], [facility] [address]. Phone: [phone]).  Reconsult as needed.

## 2024-10-24 NOTE — OCCUPATIONAL THERAPY INITIAL EVALUATION ADULT - GENERAL OBSERVATIONS, REHAB EVAL
10:08-10:38 Chart reviewed, ok to treat by Occupational Therapist as confirmed by RN Wero, Pt received semi-Moore's in bed (+) Tele (+) abdifatah FELIZ with wife present in NAD. Pt in agreement with OT IE.

## 2024-10-24 NOTE — OCCUPATIONAL THERAPY INITIAL EVALUATION ADULT - PERTINENT HX OF CURRENT PROBLEM, REHAB EVAL
79-year-old male Pmhx of CAD, DM, HTN, BPH,  presents with recent c/o of bilateral vision loss approx one week ago.  While en route to ED, pt states his RIGHT eye lost vision for approx 10 minutes prior to arrival.   Patient states similar episode occurred in both eyes 2 days ago and resolved after 10 minutes.  Pt states vision was "black" unable to see figures or shadows.  Pt denies recent falls or head trauma, denies weakness, numbness, fever, chills, chest pain. sob, BARKER NOTE: Pt recent travel to [place] and returned on 10/2    CT Brain: 1.  No evidence of acute intracranial hemorrhage or large territorial acute infarction. 2.  Focal hypodensities in the right basal ganglia, right head of caudate nucleus and left thalamus, new from prior, suggesting age-indeterminate lacunar infarcts.  CT PERFUSION: No perfusion deficits to suggest areas of completed infarction or at risk territory  CTA HEAD/NECK:No acute arterial occlusion.

## 2024-10-24 NOTE — PHYSICAL THERAPY INITIAL EVALUATION ADULT - IMPAIRMENTS CONTRIBUTING TO GAIT DEVIATIONS, PT EVAL
decreased endurance To be determined when appropriate and necessary/impaired balance/impaired postural control/decreased strength

## 2024-10-24 NOTE — PHYSICAL THERAPY INITIAL EVALUATION ADULT - PREDICTED DURATION OF THERAPY (DAYS/WKS), PT EVAL
[facility]  Vaccine Consent Form    Patient Name:  Mian George :  2024     Vaccine(s) Ordered    DTaP HepB IPV Combined Vaccine IM  HiB PRP-T Conjugate Vaccine 4 Dose IM  Pneumococcal Conjugate Vaccine 20-Valent All  Rotavirus Vaccine PentaValent 3 Dose Oral        Screening Checklist  The following questions should be completed prior to vaccination. If you answer “yes” to any question, it does not necessarily mean you should not be vaccinated. It just means we may need to clarify or ask more questions. If a question is unclear, please ask your healthcare provider to explain it.    Yes No   Any fever or moderate to severe illness today (mild illness and/or antibiotic treatment are not contraindications)?     Do you have a history of a serious reaction to any previous vaccinations, such as anaphylaxis, encephalopathy within 7 days, Guillain-[place] syndrome within 6 weeks, seizure?     Have you received any live vaccine(s) (e.g MMR, BRICE) or any other vaccines in the last month (to ensure duplicate doses aren't given)?     Do you have an anaphylactic allergy to latex (DTaP, DTaP-IPV, Hep A, Hep B, MenB, RV, Td, Tdap), baker’s yeast (Hep B, HPV), polysorbates (RSV, nirsevimab, PCV 20, Rotavirrus, Tdap, Shingrix), or gelatin (BRICE, MMR)?     Do you have an anaphylactic allergy to neomycin (Rabies, BRICE, MMR, IPV, Hep A), polymyxin B (IPV), or streptomycin (IPV)?      Any cancer, leukemia, AIDS, or other immune system disorder? (BRICE, MMR, RV)     Do you have a parent, brother, or sister with an immune system problem (if immune competence of vaccine recipient clinically verified, can proceed)? (MMR, BRICE)     Any recent steroid treatments for >2 weeks, chemotherapy, or radiation treatment? (BRICE, MMR)     Have you received antibody-containing blood transfusions or IVIG in the past 11 months (recommended interval is dependent on product)? (MMR, BRICE)     Have you taken antiviral drugs (acyclovir,  "famciclovir, valacyclovir for BRICE) in the last 24 or 48 hours, respectively?      Are you pregnant or planning to become pregnant within 1 month? (BRICE, MMR, HPV, IPV, MenB, Abrexvy; For Hep B- refer to Engerix-B; For RSV - Abrysvo is indicated for 32-36 weeks of pregnancy from September to January)     For infants, have you ever been told your child has had intussusception or a medical emergency involving obstruction of the intestine (Rotavirus)? If not for an infant, can skip this question.         *Ordering Physicians/APC should be consulted if \"yes\" is checked by the patient or guardian above.  I have received, read, and understand the Vaccine Information Statement (VIS) for each vaccine ordered.  I have considered my or my child's health status as well as the health status of my close contacts.  I have taken the opportunity to discuss my vaccine questions with my or my child's health care provider.   I have requested that the ordered vaccine(s) be given to me or my child.  I understand the benefits and risks of the vaccines.  I understand that I should remain in the clinic for 15 minutes after receiving the vaccine(s).  _________________________________________________________  Signature of Patient or Parent/Legal Guardian ____________________  Date     " 1-2 weeks

## 2024-10-24 NOTE — PHYSICAL THERAPY INITIAL EVALUATION ADULT - LEVEL OF INDEPENDENCE: GAIT, REHAB EVAL
contact guard Mom states she will make an appointment with [facility] tomorrow. However, also provided with [facility] and [facility]

## 2024-10-24 NOTE — OCCUPATIONAL THERAPY INITIAL EVALUATION ADULT - RANGE OF MOTION EXAMINATION
Right shoulder 3/4 range 2/2 h/o shoulder surgery; elbow wrist and hand WFLs/deficits as listed below

## 2024-10-24 NOTE — CONSULT NOTE ADULT - NS ATTEND AMEND GEN_ALL_CORE FT
80yo man with cognitive decline, CAD (on ASA), DM, HTN who presented to ED after recurrent self-resolving episodes of painless right monocular vision loss. CTH without acute infarcts, but possible age-indeterminate lacunar infarcts. CTA H/N with moderate R ICA stenosis, somewhat similar to prior CTA in 2022. CTP without perfusion deficits. Exam non-focal, no visual field cuts. Amaurosis fugax high on ddx in setting of R ECAD.     Recommendations as above

## 2024-10-24 NOTE — PROGRESS NOTE ADULT - SUBJECTIVE AND OBJECTIVE BOX
LENGTH OF HOSPITAL STAY: 1d    CHIEF COMPLAINT:    Patient is a 79y old  Male who presents with a chief complaint of     OVERNIGHT EVENTS:    - no overnight events  - pt examined at bedside, wife at bedside, discussed plan, answered questions  - tolerating PO, having BMs, making urine without urinary sxs    FOLLOW UP:    - MR brain, neuro recs, f/u ophthol OP    HPI:    79-year-old male Pmhx of CAD, DM, HTN, BPH,  presents with recent c/o of bilateral vision loss approx one week ago.  While en route to ED, pt states his RIGHT eye lost vision for approx 10 minutes prior to arrival.   Patient states similar episode occurred in both eyes 2 days ago and resolved after 10 minutes.  Pt states vision was "black" unable to see figures or shadows.  Pt denies recent falls or head trauma, denies weakness, numbness, fever, chills, chest pain. sob, BARKER  NOTE: Pt recent travel to [place] and returned on 10/2    Home meds obtained from daughter.    (23 Oct 2024 21:38)      ALLERGIES:    Seafood (Anaphylaxis)  No Known Drug Allergies    PMHx:    DM (diabetes mellitus)  CAD (coronary artery disease)  BPH (benign prostatic hyperplasia)  History of hematologic disorder  Total cataract  [place] (hard of hearing)  Acute myocardial infarction  H/O coronary angiogram  Stented coronary artery  History of ear surgery  H/O tonsillitis      SOCIAL Hx:    - [place], difficulty ambulating    MEDICATIONS:  STANDING MEDICATIONS  aspirin enteric coated 81 milliGRAM(s) Oral daily  atorvastatin 80 milliGRAM(s) Oral at bedtime  chlorhexidine 2% Cloths 1 Application(s) Topical <User Schedule>  dextrose 5%. 1000 milliLiter(s) IV Continuous <Continuous>  dextrose 5%. 1000 milliLiter(s) IV Continuous <Continuous>  dextrose 50% Injectable 25 Gram(s) IV Push once  dextrose 50% Injectable 25 Gram(s) IV Push once  dextrose 50% Injectable 12.5 Gram(s) IV Push once  glucagon  Injectable 1 milliGRAM(s) IntraMuscular once  heparin   Injectable 5000 Unit(s) SubCutaneous every 8 hours  influenza  Vaccine (HIGH DOSE) 0.5 milliLiter(s) IntraMuscular once  insulin glargine Injectable (LANTUS) 45 Unit(s) SubCutaneous at bedtime  insulin lispro (ADMELOG) corrective regimen sliding scale   SubCutaneous three times a day before meals  lisinopril 5 milliGRAM(s) Oral daily  memantine 5 milliGRAM(s) Oral daily  metoprolol tartrate 50 milliGRAM(s) Oral two times a day  pantoprazole    Tablet 40 milliGRAM(s) Oral before breakfast  pregabalin 100 milliGRAM(s) Oral every 12 hours  ranolazine 500 milliGRAM(s) Oral two times a day  tamsulosin 0.4 milliGRAM(s) Oral at bedtime    PRN MEDICATIONS  acetaminophen     Tablet .. 650 milliGRAM(s) Oral every 6 hours PRN  albuterol    90 MICROgram(s) HFA Inhaler 1 Puff(s) Inhalation every 6 hours PRN  aluminum hydroxide/magnesium hydroxide/simethicone Suspension 30 milliLiter(s) Oral every 4 hours PRN  dextrose Oral Gel 15 Gram(s) Oral once PRN  melatonin 3 milliGRAM(s) Oral at bedtime PRN  ondansetron Injectable 4 milliGRAM(s) IV Push every 8 hours PRN      LABS:                        13.3   12.19 )-----------( 146      ( 24 Oct 2024 06:36 )             41.3     10-24    136  |  98  |  28[H]  ----------------------------<  160[H]  5.0   |  25  |  1.9[H]    Ca    9.1      24 Oct 2024 06:36    TPro  6.8  /  Alb  4.1  /  TBili  0.7  /  DBili  x   /  AST  19  /  ALT  20  /  AlkPhos  91  10-24    PT/INR - ( 23 Oct 2024 19:10 )   PT: 11.30 sec;   INR: 0.99 ratio         PTT - ( 23 Oct 2024 19:10 )  PTT:21.9 sec  Urinalysis Basic - ( 24 Oct 2024 06:36 )    Color: x / Appearance: x / SG: x / pH: x  Gluc: 160 mg/dL / Ketone: x  / Bili: x / Urobili: x   Blood: x / Protein: x / Nitrite: x   Leuk Esterase: x / RBC: x / WBC x   Sq Epi: x / Non Sq Epi: x / Bacteria: x        VITALS:   T(F): 98.2  HR: 80  BP: 113/72  RR: 16  SpO2: 97%        PHYSICAL EXAM:    Gen: NAD, sitting in chair  HEENT: Normocephalic, atraumatic  Neck: supple, no lymphadenopathy  CV: Regular rate & regular rhythm  Lungs: decreased BS at bases, no fremitus  Abdomen: Soft, BS present, pronounced, non tender  Ext: Warm, well perfused, no pitting edema  Neuro: moves all extremities against resistance, no droop, awake  Skin: no rash, no erythema

## 2024-10-24 NOTE — CONSULT NOTE ADULT - SUBJECTIVE AND OBJECTIVE BOX
NEUROLOGY CONSULT    HPI: 79y M PM CAD, DM, HTN, BPH presented to ED after two separate episodes of complete, painless visual loss. He notes 2 weeks ago both eyes went "black" and he lost all vision for approximately 20 minutes. He could not see shadows or color. Immediately previous and immediately after he "felt fine" and vision went back to "perfect". Then yesterday while driving, he notes the same thing happened in his right eye, lasting approximately 30 minutes, prompting him to come to ED. Stroke code called in ED. He has never had episode like this before. Denies associated symptoms such as weakness/paresthesias/dizziness/HA. No history of family history of seizures. He does not wake up with blood in his mouth. He saw optho a few months ago without any issue. No recent trauma.     Neuro consulted. He feels fine today and states vision is completely back to baseline. He notes he takes ASA as needed; does not take daily.        MEDICATIONS  Home Medications:  Aspir 81 oral delayed release tablet: 1 tab(s) orally once a day (23 Jun 2024 16:19)  atorvastatin 80 mg oral tablet: 1 tab(s) orally once a day (at bedtime) (23 Jun 2024 09:48)  glipiZIDE 5 mg oral tablet: 1 tab(s) orally once a day (23 Jun 2024 09:48)  Lantus 100 units/mL subcutaneous solution: 45 unit(s) subcutaneous once a day (at bedtime) (23 Jun 2024 09:48)  MEMANTINE HCL ER 7 MG CAPSULE:  (23 Oct 2024 23:37)  metoprolol tartrate 50 mg oral tablet: 1 tab(s) orally 2 times a day 50 mg twice a day (23 Jun 2024 16:01)  omeprazole 40 mg oral delayed release capsule: 1 cap(s) orally once a day (23 Jun 2024 09:48)  pregabalin 150 mg oral capsule: 1 cap(s) orally every 12 hours (23 Jun 2024 09:48)  ranolazine 500 mg oral tablet, extended release: 1 tab(s) orally 2 times a day (23 Jun 2024 09:48)  tamsulosin 0.4 mg oral capsule: 1 cap(s) orally once a day (at bedtime) (23 Jun 2024 09:48)  Zestril 5 mg oral tablet: 1 tab(s) orally once a day (23 Jun 2024 09:48)    MEDICATIONS  (STANDING):  aspirin 325 milliGRAM(s) Oral daily  atorvastatin 80 milliGRAM(s) Oral at bedtime  chlorhexidine 2% Cloths 1 Application(s) Topical <User Schedule>  dextrose 5%. 1000 milliLiter(s) (50 mL/Hr) IV Continuous <Continuous>  dextrose 5%. 1000 milliLiter(s) (100 mL/Hr) IV Continuous <Continuous>  dextrose 50% Injectable 25 Gram(s) IV Push once  dextrose 50% Injectable 12.5 Gram(s) IV Push once  dextrose 50% Injectable 25 Gram(s) IV Push once  glucagon  Injectable 1 milliGRAM(s) IntraMuscular once  influenza  Vaccine (HIGH DOSE) 0.5 milliLiter(s) IntraMuscular once  insulin glargine Injectable (LANTUS) 45 Unit(s) SubCutaneous at bedtime  insulin lispro (ADMELOG) corrective regimen sliding scale   SubCutaneous three times a day before meals  lisinopril 5 milliGRAM(s) Oral daily  memantine 5 milliGRAM(s) Oral daily  metoprolol tartrate 50 milliGRAM(s) Oral two times a day  pantoprazole    Tablet 40 milliGRAM(s) Oral before breakfast  pregabalin 100 milliGRAM(s) Oral every 12 hours  ranolazine 500 milliGRAM(s) Oral two times a day  tamsulosin 0.4 milliGRAM(s) Oral at bedtime    MEDICATIONS  (PRN):  acetaminophen     Tablet .. 650 milliGRAM(s) Oral every 6 hours PRN Temp greater or equal to 38C (100.4F), Mild Pain (1 - 3)  albuterol    90 MICROgram(s) HFA Inhaler 1 Puff(s) Inhalation every 6 hours PRN Shortness of Breath and/or Wheezing  aluminum hydroxide/magnesium hydroxide/simethicone Suspension 30 milliLiter(s) Oral every 4 hours PRN Dyspepsia  dextrose Oral Gel 15 Gram(s) Oral once PRN Blood Glucose LESS THAN 70 milliGRAM(s)/deciliter  melatonin 3 milliGRAM(s) Oral at bedtime PRN Insomnia  ondansetron Injectable 4 milliGRAM(s) IV Push every 8 hours PRN Nausea and/or Vomiting      FAMILY HISTORY:  Family history of breast cancer in mother    FHx: heart disease    Family history of diabetes mellitus (DM)      SOCIAL HISTORY: negative for tobacco, alcohol, or ilicit drug use.    Allergies    Seafood (Anaphylaxis)  No Known Drug Allergies    Intolerances        GEN: NAD, pleasant, cooperative    NEURO:   MENTAL STATUS: AAOx3  LANG/SPEECH: Fluent, intact naming, repetition & comprehension  CRANIAL NERVES:  II: Pupils equal round and reactive, no RAPD, normal visual fields  III, IV, VI: EOM intact, no gaze preference or deviation  V: normal  VII: no facial asymmetry  VIII: normal hearing to speech  MOTOR: 5/5 in both upper and lower extremities  REFLEXES: 1+ overall. Downgoing Babinski   SENSORY: Normal to light touch in all extremities   COORD: Normal finger to nose and heel to shin, no tremor, no dysmetria    NIHSS: 0    LABS:                        13.3   12.19 )-----------( 146      ( 24 Oct 2024 06:36 )             41.3     10-23    134[L]  |  98  |  31[H]  ----------------------------<  199[H]  5.4[H]   |  24  |  2.1[H]    Ca    9.2      23 Oct 2024 19:10    TPro  7.0  /  Alb  4.3  /  TBili  0.5  /  DBili  x   /  AST  24  /  ALT  21  /  AlkPhos  99  10-23      PT/INR - ( 23 Oct 2024 19:10 )   PT: 11.30 sec;   INR: 0.99 ratio         PTT - ( 23 Oct 2024 19:10 )  PTT:21.9 sec  CAPILLARY BLOOD GLUCOSE      POCT Blood Glucose.: 162 mg/dL (24 Oct 2024 07:46)      Urinalysis Basic - ( 23 Oct 2024 19:10 )    Color: x / Appearance: x / SG: x / pH: x  Gluc: 199 mg/dL / Ketone: x  / Bili: x / Urobili: x   Blood: x / Protein: x / Nitrite: x   Leuk Esterase: x / RBC: x / WBC x   Sq Epi: x / Non Sq Epi: x / Bacteria: x        RADIOLOGY    < from: CT Brain Stroke Protocol (10.23.24 @ 18:52) >  IMPRESSION:  1.  No evidence of acute intracranial hemorrhage or large territorial   acute infarction.  2.  Focal hypodensities in the right basal ganglia, right head of caudate   nucleus and left thalamus, new from prior, suggesting age-indeterminate   lacunar infarcts.    Radiology resident Dr. Manoj Ayala spoke with KIMBERLY WHITFIELD PA;   Emergency Me on 10/23/2024 1900 with readback.    --- End of Report ---    < end of copied text >        < from: CT Angio Brain Stroke Protocol  w/ IV Cont (10.23.24 @ 19:06) >  IMPRESSION:    CT PERFUSION:  No perfusion deficits to suggest areas of completed infarction or at risk   territory.    CTA HEAD/NECK:  No acute arterial occlusion.    Multiple areas of stenosis and calcification, the most notable being   moderate stenosis and calcification of the right internal carotid artery,   which was similar to prior.    Other findings as described above.    --- End of Report ---    < end of copied text >   NEUROLOGY CONSULT    HPI: 79y M PM CAD, DM, HTN, BPH presented to ED after two separate episodes of complete, painless visual loss. He notes 2 weeks ago both eyes went "black" and he lost all vision for approximately 20 minutes. He could not see shadows or color. Immediately previous and immediately after he "felt fine" and vision went back to "perfect". Then yesterday while driving, he notes the same thing happened in his right eye, lasting approximately 30 minutes, prompting him to come to ED. Stroke code called in ED. He has never had episode like this before. Denies associated symptoms such as weakness/paresthesias/dizziness/HA. No history of family history of seizures. He does not wake up with blood in his mouth. He saw optho a few months ago without any issue. No recent trauma.     Neuro consulted. He feels fine today and states vision is completely back to baseline. He notes he takes ASA as needed; does not take daily.        MEDICATIONS  Home Medications:  Aspir 81 oral delayed release tablet: 1 tab(s) orally once a day (23 Jun 2024 16:19)  atorvastatin 80 mg oral tablet: 1 tab(s) orally once a day (at bedtime) (23 Jun 2024 09:48)  glipiZIDE 5 mg oral tablet: 1 tab(s) orally once a day (23 Jun 2024 09:48)  Lantus 100 units/mL subcutaneous solution: 45 unit(s) subcutaneous once a day (at bedtime) (23 Jun 2024 09:48)  MEMANTINE HCL ER 7 MG CAPSULE:  (23 Oct 2024 23:37)  metoprolol tartrate 50 mg oral tablet: 1 tab(s) orally 2 times a day 50 mg twice a day (23 Jun 2024 16:01)  omeprazole 40 mg oral delayed release capsule: 1 cap(s) orally once a day (23 Jun 2024 09:48)  pregabalin 150 mg oral capsule: 1 cap(s) orally every 12 hours (23 Jun 2024 09:48)  ranolazine 500 mg oral tablet, extended release: 1 tab(s) orally 2 times a day (23 Jun 2024 09:48)  tamsulosin 0.4 mg oral capsule: 1 cap(s) orally once a day (at bedtime) (23 Jun 2024 09:48)  Zestril 5 mg oral tablet: 1 tab(s) orally once a day (23 Jun 2024 09:48)    MEDICATIONS  (STANDING):  aspirin 325 milliGRAM(s) Oral daily  atorvastatin 80 milliGRAM(s) Oral at bedtime  chlorhexidine 2% Cloths 1 Application(s) Topical <User Schedule>  dextrose 5%. 1000 milliLiter(s) (50 mL/Hr) IV Continuous <Continuous>  dextrose 5%. 1000 milliLiter(s) (100 mL/Hr) IV Continuous <Continuous>  dextrose 50% Injectable 25 Gram(s) IV Push once  dextrose 50% Injectable 12.5 Gram(s) IV Push once  dextrose 50% Injectable 25 Gram(s) IV Push once  glucagon  Injectable 1 milliGRAM(s) IntraMuscular once  influenza  Vaccine (HIGH DOSE) 0.5 milliLiter(s) IntraMuscular once  insulin glargine Injectable (LANTUS) 45 Unit(s) SubCutaneous at bedtime  insulin lispro (ADMELOG) corrective regimen sliding scale   SubCutaneous three times a day before meals  lisinopril 5 milliGRAM(s) Oral daily  memantine 5 milliGRAM(s) Oral daily  metoprolol tartrate 50 milliGRAM(s) Oral two times a day  pantoprazole    Tablet 40 milliGRAM(s) Oral before breakfast  pregabalin 100 milliGRAM(s) Oral every 12 hours  ranolazine 500 milliGRAM(s) Oral two times a day  tamsulosin 0.4 milliGRAM(s) Oral at bedtime    MEDICATIONS  (PRN):  acetaminophen     Tablet .. 650 milliGRAM(s) Oral every 6 hours PRN Temp greater or equal to 38C (100.4F), Mild Pain (1 - 3)  albuterol    90 MICROgram(s) HFA Inhaler 1 Puff(s) Inhalation every 6 hours PRN Shortness of Breath and/or Wheezing  aluminum hydroxide/magnesium hydroxide/simethicone Suspension 30 milliLiter(s) Oral every 4 hours PRN Dyspepsia  dextrose Oral Gel 15 Gram(s) Oral once PRN Blood Glucose LESS THAN 70 milliGRAM(s)/deciliter  melatonin 3 milliGRAM(s) Oral at bedtime PRN Insomnia  ondansetron Injectable 4 milliGRAM(s) IV Push every 8 hours PRN Nausea and/or Vomiting      FAMILY HISTORY:  Family history of breast cancer in mother    FHx: heart disease    Family history of diabetes mellitus (DM)      SOCIAL HISTORY: quit cigarettes 30 years ago, alcohol, or ilicit drug use.    Allergies    Seafood (Anaphylaxis)  No Known Drug Allergies    Intolerances        GEN: NAD, pleasant, cooperative    NEURO:   MENTAL STATUS: AAOx3  LANG/SPEECH: Fluent, intact naming, repetition & comprehension  CRANIAL NERVES:  II: Pupils equal round and reactive, no RAPD, normal visual fields  III, IV, VI: EOM intact, no gaze preference or deviation  V: normal  VII: no facial asymmetry  VIII: normal hearing to speech  MOTOR: 5/5 in both upper and lower extremities  REFLEXES: 1+ overall. Downgoing Babinski   SENSORY: Normal to light touch in all extremities   COORD: Normal finger to nose and heel to shin, no tremor, no dysmetria    NIHSS: 0    LABS:                        13.3   12.19 )-----------( 146      ( 24 Oct 2024 06:36 )             41.3     10-23    134[L]  |  98  |  31[H]  ----------------------------<  199[H]  5.4[H]   |  24  |  2.1[H]    Ca    9.2      23 Oct 2024 19:10    TPro  7.0  /  Alb  4.3  /  TBili  0.5  /  DBili  x   /  AST  24  /  ALT  21  /  AlkPhos  99  10-23      PT/INR - ( 23 Oct 2024 19:10 )   PT: 11.30 sec;   INR: 0.99 ratio         PTT - ( 23 Oct 2024 19:10 )  PTT:21.9 sec  CAPILLARY BLOOD GLUCOSE      POCT Blood Glucose.: 162 mg/dL (24 Oct 2024 07:46)      Urinalysis Basic - ( 23 Oct 2024 19:10 )    Color: x / Appearance: x / SG: x / pH: x  Gluc: 199 mg/dL / Ketone: x  / Bili: x / Urobili: x   Blood: x / Protein: x / Nitrite: x   Leuk Esterase: x / RBC: x / WBC x   Sq Epi: x / Non Sq Epi: x / Bacteria: x        RADIOLOGY    < from: CT Brain Stroke Protocol (10.23.24 @ 18:52) >  IMPRESSION:  1.  No evidence of acute intracranial hemorrhage or large territorial   acute infarction.  2.  Focal hypodensities in the right basal ganglia, right head of caudate   nucleus and left thalamus, new from prior, suggesting age-indeterminate   lacunar infarcts.    Radiology resident Dr. Mnaoj Ayala spoke with KIMBERLY WHITFIELD PA;   Emergency Me on 10/23/2024 1900 with readback.    --- End of Report ---    < end of copied text >        < from: CT Angio Brain Stroke Protocol  w/ IV Cont (10.23.24 @ 19:06) >  IMPRESSION:    CT PERFUSION:  No perfusion deficits to suggest areas of completed infarction or at risk   territory.    CTA HEAD/NECK:  No acute arterial occlusion.    Multiple areas of stenosis and calcification, the most notable being   moderate stenosis and calcification of the right internal carotid artery,   which was similar to prior.    Other findings as described above.    --- End of Report ---    < end of copied text >   NEUROLOGY CONSULT    HPI: 79y M PMH CAD, DM, HTN, BPH presented to ED after two separate episodes of complete, painless visual loss. He notes 2 weeks ago both eyes went "black" and he lost all vision for approximately 20 minutes. He could not see shadows or color. Immediately previous and immediately after he "felt fine" and vision went back to "perfect". Then yesterday while driving, he notes the same thing happened in his right eye, lasting approximately 30 minutes, prompting him to come to ED. Stroke code called in ED. He has never had episode like this before. Denies associated symptoms such as weakness/paresthesias/dizziness/HA. No history of family history of seizures. He does not wake up with blood in his mouth. He saw optho a few months ago without any issue. No recent trauma.     Neuro consulted. He feels fine today and states vision is completely back to baseline. He notes he takes ASA as needed; does not take daily. Spoke with wife later on in the day. She notes that he does take ASA daily, and actually had 3 episodes of visual loss- two involved loss of vision only in left eye, and then most recent episode involved complete loss of vision in both eyes, that slowly improved to blurriness my the time he got to the hospital, after around 15minutes. She notes he sees outpatient neurologist Dr Carvalho and has dementia.        MEDICATIONS  Home Medications:  Aspir 81 oral delayed release tablet: 1 tab(s) orally once a day (23 Jun 2024 16:19)  atorvastatin 80 mg oral tablet: 1 tab(s) orally once a day (at bedtime) (23 Jun 2024 09:48)  glipiZIDE 5 mg oral tablet: 1 tab(s) orally once a day (23 Jun 2024 09:48)  Lantus 100 units/mL subcutaneous solution: 45 unit(s) subcutaneous once a day (at bedtime) (23 Jun 2024 09:48)  MEMANTINE HCL ER 7 MG CAPSULE:  (23 Oct 2024 23:37)  metoprolol tartrate 50 mg oral tablet: 1 tab(s) orally 2 times a day 50 mg twice a day (23 Jun 2024 16:01)  omeprazole 40 mg oral delayed release capsule: 1 cap(s) orally once a day (23 Jun 2024 09:48)  pregabalin 150 mg oral capsule: 1 cap(s) orally every 12 hours (23 Jun 2024 09:48)  ranolazine 500 mg oral tablet, extended release: 1 tab(s) orally 2 times a day (23 Jun 2024 09:48)  tamsulosin 0.4 mg oral capsule: 1 cap(s) orally once a day (at bedtime) (23 Jun 2024 09:48)  Zestril 5 mg oral tablet: 1 tab(s) orally once a day (23 Jun 2024 09:48)    MEDICATIONS  (STANDING):  aspirin 325 milliGRAM(s) Oral daily  atorvastatin 80 milliGRAM(s) Oral at bedtime  chlorhexidine 2% Cloths 1 Application(s) Topical <User Schedule>  dextrose 5%. 1000 milliLiter(s) (50 mL/Hr) IV Continuous <Continuous>  dextrose 5%. 1000 milliLiter(s) (100 mL/Hr) IV Continuous <Continuous>  dextrose 50% Injectable 25 Gram(s) IV Push once  dextrose 50% Injectable 12.5 Gram(s) IV Push once  dextrose 50% Injectable 25 Gram(s) IV Push once  glucagon  Injectable 1 milliGRAM(s) IntraMuscular once  influenza  Vaccine (HIGH DOSE) 0.5 milliLiter(s) IntraMuscular once  insulin glargine Injectable (LANTUS) 45 Unit(s) SubCutaneous at bedtime  insulin lispro (ADMELOG) corrective regimen sliding scale   SubCutaneous three times a day before meals  lisinopril 5 milliGRAM(s) Oral daily  memantine 5 milliGRAM(s) Oral daily  metoprolol tartrate 50 milliGRAM(s) Oral two times a day  pantoprazole    Tablet 40 milliGRAM(s) Oral before breakfast  pregabalin 100 milliGRAM(s) Oral every 12 hours  ranolazine 500 milliGRAM(s) Oral two times a day  tamsulosin 0.4 milliGRAM(s) Oral at bedtime    MEDICATIONS  (PRN):  acetaminophen     Tablet .. 650 milliGRAM(s) Oral every 6 hours PRN Temp greater or equal to 38C (100.4F), Mild Pain (1 - 3)  albuterol    90 MICROgram(s) HFA Inhaler 1 Puff(s) Inhalation every 6 hours PRN Shortness of Breath and/or Wheezing  aluminum hydroxide/magnesium hydroxide/simethicone Suspension 30 milliLiter(s) Oral every 4 hours PRN Dyspepsia  dextrose Oral Gel 15 Gram(s) Oral once PRN Blood Glucose LESS THAN 70 milliGRAM(s)/deciliter  melatonin 3 milliGRAM(s) Oral at bedtime PRN Insomnia  ondansetron Injectable 4 milliGRAM(s) IV Push every 8 hours PRN Nausea and/or Vomiting      FAMILY HISTORY:  Family history of breast cancer in mother    FHx: heart disease    Family history of diabetes mellitus (DM)      SOCIAL HISTORY: quit cigarettes 30 years ago, alcohol, or ilicit drug use.    Allergies    Seafood (Anaphylaxis)  No Known Drug Allergies    Intolerances        GEN: NAD, pleasant, cooperative    NEURO:   MENTAL STATUS: AAOx2+  LANG/SPEECH: Fluent, intact naming, repetition & comprehension  CRANIAL NERVES:  II: Pupils equal round and reactive, no RAPD, normal visual fields  III, IV, VI: EOM intact, no gaze preference or deviation  V: normal  VII: no facial asymmetry  VIII: normal hearing to speech  MOTOR: 5/5 in both upper and lower extremities  REFLEXES: 1+ overall. Downgoing Babinski   SENSORY: Normal to light touch in all extremities   COORD: Normal finger to nose and heel to shin, no tremor, no dysmetria    NIHSS: 0    LABS:                        13.3   12.19 )-----------( 146      ( 24 Oct 2024 06:36 )             41.3     10-23    134[L]  |  98  |  31[H]  ----------------------------<  199[H]  5.4[H]   |  24  |  2.1[H]    Ca    9.2      23 Oct 2024 19:10    TPro  7.0  /  Alb  4.3  /  TBili  0.5  /  DBili  x   /  AST  24  /  ALT  21  /  AlkPhos  99  10-23      PT/INR - ( 23 Oct 2024 19:10 )   PT: 11.30 sec;   INR: 0.99 ratio         PTT - ( 23 Oct 2024 19:10 )  PTT:21.9 sec  CAPILLARY BLOOD GLUCOSE      POCT Blood Glucose.: 162 mg/dL (24 Oct 2024 07:46)      Urinalysis Basic - ( 23 Oct 2024 19:10 )    Color: x / Appearance: x / SG: x / pH: x  Gluc: 199 mg/dL / Ketone: x  / Bili: x / Urobili: x   Blood: x / Protein: x / Nitrite: x   Leuk Esterase: x / RBC: x / WBC x   Sq Epi: x / Non Sq Epi: x / Bacteria: x        RADIOLOGY    < from: CT Brain Stroke Protocol (10.23.24 @ 18:52) >  IMPRESSION:  1.  No evidence of acute intracranial hemorrhage or large territorial   acute infarction.  2.  Focal hypodensities in the right basal ganglia, right head of caudate   nucleus and left thalamus, new from prior, suggesting age-indeterminate   lacunar infarcts.    Radiology resident Dr. Manoj Ayala spoke with KIMBERLY WHITFIELD PA;   Emergency Me on 10/23/2024 1900 with readback.    --- End of Report ---    < end of copied text >        < from: CT Angio Brain Stroke Protocol  w/ IV Cont (10.23.24 @ 19:06) >  IMPRESSION:    CT PERFUSION:  No perfusion deficits to suggest areas of completed infarction or at risk   territory.    CTA HEAD/NECK:  No acute arterial occlusion.    Multiple areas of stenosis and calcification, the most notable being   moderate stenosis and calcification of the right internal carotid artery,   which was similar to prior.    Other findings as described above.    --- End of Report ---    < end of copied text >

## 2024-10-24 NOTE — CONSULT NOTE ADULT - ASSESSMENT
79y M PMH CAD, DM, HTN, BPH presented to ED after two separate episodes of complete visual loss, last one being yesterday, both lasting between 20-30minutes without symptoms before or after. Stroke code called. CTH without acute infarcts, but possible age-indeterminate lacunar infarcts. CTA without acute arterial occlusion, but multiple areas of stenosis and calcification similar to previous study in 2022. CTP without deficits. On exam today, non focal neuro exam. 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  79y M PMH CAD, DM, HTN, BPH presented to ED after two separate episodes of complete visual loss, last one being yesterday, both lasting between 20-30minutes without symptoms before or after. Stroke code called. CTH without acute infarcts, but possible age-indeterminate lacunar infarcts. CTA without acute arterial occlusion, but multiple areas of stenosis and calcification similar to previous study in 2022. CTP without deficits. On exam today, non focal neuro exam. 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     Recommendations  - MRI Brain no contrast  - Load with Plavix 300mg once, then continue with Plavix 75mg thereafter for 90 days, then discontinue   - ASA 81mg qd  - Stroke neuro checks q8hrs  - SBP parameters= 120-160  - If MRI Brain +stroke, then TTE   - Will need f/u with neuroIR Dr Sawyer outpatient to consider possible DSA    - Medical management per primary team    Discussed with attending Dr Leone  79y M PMH CAD, DM, HTN, BPH presented to ED after two separate episodes of complete visual loss, last one being yesterday, both lasting between 20-30minutes without symptoms before or after. Stroke code called. CTH without acute infarcts, but possible age-indeterminate lacunar infarcts. CTA without acute arterial occlusion, but multiple areas of stenosis and calcification similar to previous study in 2022. CTP without deficits. On exam today, non focal neuro exam. 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     Recommendations  - MRI Brain no contrast to assess for possible silent strokes from R ECAD  - Load with Plavix 300mg once, then continue with Plavix 75mg thereafter for 90 days, then discontinue   - ASA 81mg qd  - continue home statin  - Stroke neuro checks q8hrs  - SBP parameters= 120-160  - If MRI Brain +stroke, then TTE   - outpatient opthalmology followup  - Will need f/u with neuroIR outpatient to consider possible DSA    - Medical management per primary team    Discussed with attending Dr Leone

## 2024-10-24 NOTE — PHYSICAL THERAPY INITIAL EVALUATION ADULT - GENERAL OBSERVATIONS, REHAB EVAL
11:10-11:35 Chart reviewed. Pt encountered sitting in chair, may be seen by Physical Therapist as confirmed with Nurse. Patient denied pain and  ready to walk now but "not very good" per wife; +tele/ IV lock TRAY

## 2024-10-24 NOTE — OCCUPATIONAL THERAPY INITIAL EVALUATION ADULT - ADDITIONAL COMMENTS
PLOF obtained from patient and wife at bedside. (+) , however, now limited 2/2 visual disturbance issues.    Pt is [place] and does not have hearing aides with him at this time.

## 2024-10-24 NOTE — PHYSICAL THERAPY INITIAL EVALUATION ADULT - PERTINENT HX OF CURRENT PROBLEM, REHAB EVAL
80 y/o male admitted with diagnosis of Visual disturbance, presented to ED for eval of visual disturbance on (R) eye 10 minutes prior to arrival in ED, stroke code was called, CT (-) for acute bleeding or stroke, seen by Neurology/Opthalmology

## 2024-10-24 NOTE — CONSULT NOTE ADULT - ASSESSMENT
IMPRESSION: Rehab of 78 y/o m  rehab  for  debility  r/o  tia      PRECAUTIONS: [  ] Cardiac  [  ] Respiratory  [  ] Seizures [  ] Contact Isolation  [  ] Droplet Isolation  [ FALL ] Other    Weight Bearing Status:     RECOMMENDATION:    Out of Bed to Chair     DVT/Decubiti Prophylaxis    REHAB PLAN:     [  x ] Bedside P/T 3-5 times a week   [   ]   Bedside O/T  2-3 times a week             [   ] No Rehab Therapy Indicated                   [   ]  Speech Therapy   Conditioning/ROM                                    ADL  Bed Mobility                                               Conditioning/ROM  Transfers                                                     Bed Mobility  Sitting /Standing Balance                         Transfers                                        Gait Training                                               Sitting/Standing Balance  Stair Training [   ]Applicable                    Home equipment Eval                                                                        Splinting  [   ] Only      GOALS:   ADL   [  x ]   Independent                    Transfers  [x   ] Independent                          Ambulation  [x   ] Independent     [    ] With device                            [   ]  CG                                                         [   ]  CG                                                                  [   ] CG                            [    ] Min A                                                   [   ] Min A                                                              [   ] Min  A          DISCHARGE PLAN:   [   ]  Good candidate for Intensive Rehabilitation/Hospital based-4A [facility]                                             Will tolerate 3hrs Intensive Rehab Daily                                       [    ]  Short Term Rehab in Skilled Nursing Facility                                       [  xx  ]  Home with Outpatient or VN services d/w  ptn and wife  rehab  plan  both agree                                           [    ]  Possible Candidate for Intensive Hospital based Rehab

## 2024-10-24 NOTE — PHYSICAL THERAPY INITIAL EVALUATION ADULT - GAIT DEVIATIONS NOTED, PT EVAL
stooped posture, dec heel strike/pushoff/decreased emani/decreased step length/decreased weight-shifting ability

## 2024-10-24 NOTE — OCCUPATIONAL THERAPY INITIAL EVALUATION ADULT - WEIGHT-BEARING RESTRICTIONS: TOILET, REHAB EVAL
Telephone call to pt.  Message left to call clinic.    Please inform pt of need to come to lab for INR.    Please confirm current warfarin dose.      full weight-bearing

## 2024-10-24 NOTE — PROGRESS NOTE ADULT - ASSESSMENT
79-year-old male Pmhx of CAD, DM, HTN, BPH, admitted r/o cva    # R/O Stroke  - neuro consult appreciated  - Ophthalmologist  consult appreciated - f/u OP  - PT   -   - case management  - Swallow screen: PASSED  - no acute deficits  - neuro check q8; f/u MR brain  - asa, loaded plavix, 75qd starting tomorrow     # DM  - FS with SS    # HTN  - vs  - c/w home med    # BPH  - c/w home med    # HLD  - c/w home med    # Neuropathy  - c/w home med  - pt

## 2024-10-24 NOTE — OCCUPATIONAL THERAPY INITIAL EVALUATION ADULT - LIVES WITH, PROFILE
in a private house 0 steps to enter and level inside; (+) walk in shower with grab bar; (+) standard toilet/spouse

## 2024-10-24 NOTE — CONSULT NOTE ADULT - SUBJECTIVE AND OBJECTIVE BOX
HPI: 79-year-old male Pmhx of CAD, DM, HTN, BPH,  presents with recent c/o of bilateral vision loss   one week ago.  While en route to ED, pt states his RIGHT eye lost vision for approx 10 minutes prior to arrival.   Patient states similar episode occurred in both eyes 2 days ago and resolved after 10 minutes.  Pt states vision was "black" unable to see figures or shadows.  Pt denies recent falls or head trauma, denies weakness, numbness, fever, chills, chest pain. sob, BARKER  NOTE: Pt recent travel to [place] and returned on 10/2 ,  ptn wife is at  bed  side visiting and  providing  hx  ptn  seen and exam  oob  to chair  no new  c/o  ptn labs, imaging  and  medical  notes  are appreciated  and reviewed      PTN  REFERRED TO ACUTE  REHAB  FOR  EVAL AND  TX   PAST MEDICAL & SURGICAL HISTORY:  DM (diabetes mellitus)      CAD (coronary artery disease)      BPH (benign prostatic hyperplasia)      History of hematologic disorder      Total cataract      [place] (hard of hearing)      Acute myocardial infarction      H/O coronary angiogram      Stented coronary artery      History of ear surgery      H/O tonsillitis          Hospital Course:    TODAY'S SUBJECTIVE & REVIEW OF SYMPTOMS:     Constitutional WNL   Cardio WNL   Resp WNL   GI WNL  Heme WNL  Endo WNL  Skin WNL  MSK WNL  Neuro WNL  Cognitive WNL  Psych WNL      MEDICATIONS  (STANDING):  aspirin enteric coated 81 milliGRAM(s) Oral daily  atorvastatin 80 milliGRAM(s) Oral at bedtime  chlorhexidine 2% Cloths 1 Application(s) Topical <User Schedule>  dextrose 5%. 1000 milliLiter(s) (50 mL/Hr) IV Continuous <Continuous>  dextrose 5%. 1000 milliLiter(s) (100 mL/Hr) IV Continuous <Continuous>  dextrose 50% Injectable 25 Gram(s) IV Push once  dextrose 50% Injectable 12.5 Gram(s) IV Push once  dextrose 50% Injectable 25 Gram(s) IV Push once  glucagon  Injectable 1 milliGRAM(s) IntraMuscular once  influenza  Vaccine (HIGH DOSE) 0.5 milliLiter(s) IntraMuscular once  insulin glargine Injectable (LANTUS) 45 Unit(s) SubCutaneous at bedtime  insulin lispro (ADMELOG) corrective regimen sliding scale   SubCutaneous three times a day before meals  lisinopril 5 milliGRAM(s) Oral daily  memantine 5 milliGRAM(s) Oral daily  metoprolol tartrate 50 milliGRAM(s) Oral two times a day  pantoprazole    Tablet 40 milliGRAM(s) Oral before breakfast  pregabalin 100 milliGRAM(s) Oral every 12 hours  ranolazine 500 milliGRAM(s) Oral two times a day  tamsulosin 0.4 milliGRAM(s) Oral at bedtime    MEDICATIONS  (PRN):  acetaminophen     Tablet .. 650 milliGRAM(s) Oral every 6 hours PRN Temp greater or equal to 38C (100.4F), Mild Pain (1 - 3)  albuterol    90 MICROgram(s) HFA Inhaler 1 Puff(s) Inhalation every 6 hours PRN Shortness of Breath and/or Wheezing  aluminum hydroxide/magnesium hydroxide/simethicone Suspension 30 milliLiter(s) Oral every 4 hours PRN Dyspepsia  dextrose Oral Gel 15 Gram(s) Oral once PRN Blood Glucose LESS THAN 70 milliGRAM(s)/deciliter  melatonin 3 milliGRAM(s) Oral at bedtime PRN Insomnia  ondansetron Injectable 4 milliGRAM(s) IV Push every 8 hours PRN Nausea and/or Vomiting      FAMILY HISTORY:  Family history of breast cancer in mother    FHx: heart disease    Family history of diabetes mellitus (DM)        Allergies    Seafood (Anaphylaxis)  No Known Drug Allergies    Intolerances        SOCIAL HISTORY:    [  ] E[place]h  [  ] Smoking  [  ] Substance abuse     Home Environment:  [  ] Home Alone  [x] Lives with Family wife  [  ] Home Health Aid    Dwelling:  [  ] Apartment  [ x ] Private House  [  ] Adult Home  [  ] Skilled Nursing Facility      [  ] Short Term  [  ] Long Term  [ - ] Stairs       Elevator [  ]    FUNCTIONAL STATUS PTA: (Check all that apply)  Ambulation: [  x ]Independent    [  ] Dependent     [  ] Non-Ambulatory  Assistive Device: [  ] SA Cane  [  ]  Q Cane  [  ] Walker  [  ]  Wheelchair  ADL : [ x ] Independent  [  ]  Dependent       Vital Signs Last 24 Hrs  T(C): 36.7 (24 Oct 2024 04:34), Max: 36.8 (23 Oct 2024 22:28)  T(F): 98.1 (24 Oct 2024 04:34), Max: 98.3 (23 Oct 2024 22:28)  HR: 73 (24 Oct 2024 04:34) (67 - 84)  BP: 109/64 (24 Oct 2024 04:34) (109/64 - 137/72)  BP(mean): 96 (23 Oct 2024 21:33) (85 - 98)  RR: 17 (24 Oct 2024 04:34) (16 - 20)  SpO2: 96% (24 Oct 2024 04:34) (96% - 99%)    Parameters below as of 24 Oct 2024 01:50  Patient On (Oxygen Delivery Method): room air          PHYSICAL EXAM: Alert & Oriented X3  GENERAL: NAD, well-groomed, well-developed  HEAD:  Atraumatic, Normocephalic  EYES: EOMI, PERRLA, conjunctiva and sclera clear  NECK: Supple, No JVD, Normal thyroid  CHEST/LUNG: Clear to percussion bilaterally; No rales, rhonchi, wheezing, or rubs  HEART: Regular rate and rhythm; No murmurs, rubs, or gallops  ABDOMEN: Soft, Nontender, Nondistended; Bowel sounds present  EXTREMITIES:  2+ Peripheral Pulses, No clubbing, cyanosis, or edema    NERVOUS SYSTEM:  Cranial Nerves 2-12 intact [x  ] Abnormal  [  ]  ROM: WFL all extremities [x  ]  Abnormal [  ]  Motor Strength: WFL all extremities  [x ]  Abnormal [  ]  Sensation: intact to light touch [x ] Abnormal [  ]  Reflexes: Symmetric [ x]  Abnormal [  ]    FUNCTIONAL STATUS:  Bed Mobility: Independent [x  ]  Supervision [  ]  Needs Assistance [  ]  N/A [  ]  Transfers: Independent [ x ]  Supervision [  ]  Needs Assistance [  ]  N/A [  ]   Ambulation: Independent [x]  Supervision [  ]  Needs Assistance [  ]  N/A [  ]  ADL: Independent [ x ] Requires Assistance [  ] N/A [  ]  SEE PT/OT IE NOTES    LABS:                        13.3   12.19 )-----------( 146      ( 24 Oct 2024 06:36 )             41.3     10-24    136  |  98  |  28[H]  ----------------------------<  160[H]  5.0   |  25  |  1.9[H]    Ca    9.1      24 Oct 2024 06:36    TPro  6.8  /  Alb  4.1  /  TBili  0.7  /  DBili  x   /  AST  19  /  ALT  20  /  AlkPhos  91  10-24    PT/INR - ( 23 Oct 2024 19:10 )   PT: 11.30 sec;   INR: 0.99 ratio         PTT - ( 23 Oct 2024 19:10 )  PTT:21.9 sec  Urinalysis Basic - ( 24 Oct 2024 06:36 )    Color: x / Appearance: x / SG: x / pH: x  Gluc: 160 mg/dL / Ketone: x  / Bili: x / Urobili: x   Blood: x / Protein: x / Nitrite: x   Leuk Esterase: x / RBC: x / WBC x   Sq Epi: x / Non Sq Epi: x / Bacteria: x        RADIOLOGY & ADDITIONAL STUDIES:< from: CT Brain Perfusion Maps Stroke (10.23.24 @ 19:06) >  IMPRESSION:    CT PERFUSION:  No perfusion deficits to suggest areas of completed infarction or at risk   territory.    CTA HEAD/NECK:  No acute arterial occlusion.    Multiple areas of stenosis and calcification, the most notable being   moderate stenosis and calcification of the right internal carotid artery,   which was similar to prior.    Other findings as described above.    < end of copied text >      Assesment:

## 2024-10-25 ENCOUNTER — TRANSCRIPTION ENCOUNTER (OUTPATIENT)
Age: 79
End: 2024-10-25

## 2024-10-25 VITALS
HEART RATE: 74 BPM | RESPIRATION RATE: 18 BRPM | SYSTOLIC BLOOD PRESSURE: 108 MMHG | TEMPERATURE: 98 F | DIASTOLIC BLOOD PRESSURE: 64 MMHG | OXYGEN SATURATION: 97 %

## 2024-10-25 DIAGNOSIS — G45.9 TRANSIENT CEREBRAL ISCHEMIC ATTACK, UNSPECIFIED: ICD-10-CM

## 2024-10-25 LAB
ANION GAP SERPL CALC-SCNC: 10 MMOL/L — SIGNIFICANT CHANGE UP (ref 7–14)
BASOPHILS # BLD AUTO: 0.07 K/UL — SIGNIFICANT CHANGE UP (ref 0–0.2)
BASOPHILS NFR BLD AUTO: 0.6 % — SIGNIFICANT CHANGE UP (ref 0–1)
BUN SERPL-MCNC: 28 MG/DL — HIGH (ref 10–20)
CALCIUM SERPL-MCNC: 9.1 MG/DL — SIGNIFICANT CHANGE UP (ref 8.4–10.5)
CHLORIDE SERPL-SCNC: 100 MMOL/L — SIGNIFICANT CHANGE UP (ref 98–110)
CO2 SERPL-SCNC: 26 MMOL/L — SIGNIFICANT CHANGE UP (ref 17–32)
CREAT SERPL-MCNC: 1.9 MG/DL — HIGH (ref 0.7–1.5)
EGFR: 35 ML/MIN/1.73M2 — LOW
EOSINOPHIL # BLD AUTO: 0.2 K/UL — SIGNIFICANT CHANGE UP (ref 0–0.7)
EOSINOPHIL NFR BLD AUTO: 1.7 % — SIGNIFICANT CHANGE UP (ref 0–8)
GLUCOSE BLDC GLUCOMTR-MCNC: 142 MG/DL — HIGH (ref 70–99)
GLUCOSE BLDC GLUCOMTR-MCNC: 180 MG/DL — HIGH (ref 70–99)
GLUCOSE BLDC GLUCOMTR-MCNC: 212 MG/DL — HIGH (ref 70–99)
GLUCOSE SERPL-MCNC: 152 MG/DL — HIGH (ref 70–99)
HCT VFR BLD CALC: 40.5 % — LOW (ref 42–52)
HGB BLD-MCNC: 12.8 G/DL — LOW (ref 14–18)
IMM GRANULOCYTES NFR BLD AUTO: 0.7 % — HIGH (ref 0.1–0.3)
LYMPHOCYTES # BLD AUTO: 2.86 K/UL — SIGNIFICANT CHANGE UP (ref 1.2–3.4)
LYMPHOCYTES # BLD AUTO: 24.5 % — SIGNIFICANT CHANGE UP (ref 20.5–51.1)
MAGNESIUM SERPL-MCNC: 2.1 MG/DL — SIGNIFICANT CHANGE UP (ref 1.8–2.4)
MCHC RBC-ENTMCNC: 28.1 PG — SIGNIFICANT CHANGE UP (ref 27–31)
MCHC RBC-ENTMCNC: 31.6 G/DL — LOW (ref 32–37)
MCV RBC AUTO: 89 FL — SIGNIFICANT CHANGE UP (ref 80–94)
MONOCYTES # BLD AUTO: 1.17 K/UL — HIGH (ref 0.1–0.6)
MONOCYTES NFR BLD AUTO: 10 % — HIGH (ref 1.7–9.3)
NEUTROPHILS # BLD AUTO: 7.3 K/UL — HIGH (ref 1.4–6.5)
NEUTROPHILS NFR BLD AUTO: 62.5 % — SIGNIFICANT CHANGE UP (ref 42.2–75.2)
NRBC # BLD: 0 /100 WBCS — SIGNIFICANT CHANGE UP (ref 0–0)
PHOSPHATE SERPL-MCNC: 4 MG/DL — SIGNIFICANT CHANGE UP (ref 2.1–4.9)
PLATELET # BLD AUTO: 146 K/UL — SIGNIFICANT CHANGE UP (ref 130–400)
PMV BLD: 12.1 FL — HIGH (ref 7.4–10.4)
POTASSIUM SERPL-MCNC: 4.8 MMOL/L — SIGNIFICANT CHANGE UP (ref 3.5–5)
POTASSIUM SERPL-SCNC: 4.8 MMOL/L — SIGNIFICANT CHANGE UP (ref 3.5–5)
RBC # BLD: 4.55 M/UL — LOW (ref 4.7–6.1)
RBC # FLD: 15.9 % — HIGH (ref 11.5–14.5)
SODIUM SERPL-SCNC: 136 MMOL/L — SIGNIFICANT CHANGE UP (ref 135–146)
WBC # BLD: 11.68 K/UL — HIGH (ref 4.8–10.8)
WBC # FLD AUTO: 11.68 K/UL — HIGH (ref 4.8–10.8)

## 2024-10-25 PROCEDURE — 99233 SBSQ HOSP IP/OBS HIGH 50: CPT | Mod: FS

## 2024-10-25 RX ORDER — CLOPIDOGREL 75 MG/1
1 TABLET ORAL
Qty: 30 | Refills: 0
Start: 2024-10-25 | End: 2024-11-23

## 2024-10-25 RX ORDER — MIDODRINE HYDROCHLORIDE 2.5 MG/1
5 TABLET ORAL EVERY 8 HOURS
Refills: 0 | Status: DISCONTINUED | OUTPATIENT
Start: 2024-10-25 | End: 2024-10-25

## 2024-10-25 RX ADMIN — RANOLAZINE 500 MILLIGRAM(S): 500 TABLET, FILM COATED, EXTENDED RELEASE ORAL at 17:04

## 2024-10-25 RX ADMIN — RANOLAZINE 500 MILLIGRAM(S): 500 TABLET, FILM COATED, EXTENDED RELEASE ORAL at 05:19

## 2024-10-25 RX ADMIN — HEPARIN SODIUM 5000 UNIT(S): 10000 INJECTION INTRAVENOUS; SUBCUTANEOUS at 05:20

## 2024-10-25 RX ADMIN — MIDODRINE HYDROCHLORIDE 5 MILLIGRAM(S): 2.5 TABLET ORAL at 17:04

## 2024-10-25 RX ADMIN — PREGABALIN 100 MILLIGRAM(S): 150 CAPSULE ORAL at 05:18

## 2024-10-25 RX ADMIN — Medication 5 MILLIGRAM(S): at 05:20

## 2024-10-25 RX ADMIN — Medication 2: at 17:03

## 2024-10-25 RX ADMIN — MEMANTINE HYDROCHLORIDE 5 MILLIGRAM(S): 21 CAPSULE, EXTENDED RELEASE ORAL at 12:07

## 2024-10-25 RX ADMIN — Medication 81 MILLIGRAM(S): at 12:07

## 2024-10-25 RX ADMIN — Medication 1: at 12:07

## 2024-10-25 RX ADMIN — HEPARIN SODIUM 5000 UNIT(S): 10000 INJECTION INTRAVENOUS; SUBCUTANEOUS at 14:20

## 2024-10-25 RX ADMIN — CLOPIDOGREL 75 MILLIGRAM(S): 75 TABLET ORAL at 12:07

## 2024-10-25 RX ADMIN — Medication 50 MILLIGRAM(S): at 17:03

## 2024-10-25 RX ADMIN — Medication 50 MILLIGRAM(S): at 05:19

## 2024-10-25 RX ADMIN — PANTOPRAZOLE SODIUM 40 MILLIGRAM(S): 40 TABLET, DELAYED RELEASE ORAL at 05:19

## 2024-10-25 RX ADMIN — PREGABALIN 100 MILLIGRAM(S): 150 CAPSULE ORAL at 17:03

## 2024-10-25 NOTE — DISCHARGE NOTE NURSING/CASE MANAGEMENT/SOCIAL WORK - NSDCFUADDAPPT_GEN_ALL_CORE_FT
Opthamology Clinic  Within 1-2 weeks of discharge   Address: [address], [address], [address], [place] [address]  Phone: [phone]

## 2024-10-25 NOTE — SWALLOW BEDSIDE ASSESSMENT ADULT - SLP PERTINENT HISTORY OF CURRENT PROBLEM
79-year-old male Pmhx of CAD, DM, HTN, BPH,  presents with recent c/o of bilateral vision loss approx one week ago.  While en route to ED, pt states his RIGHT eye lost vision for approx 10 minutes prior to arrival.   Patient states similar episode occurred in both eyes 2 days ago and resolved after 10 minutes.  Pt states vision was "black" unable to see figures or shadows.  Pt denies recent falls or head trauma, denies weakness, numbness, fever, chills, chest pain. sob, HA
79-year-old male Pmhx of CAD, DM, HTN, BPH,  presents with recent c/o of bilateral vision loss approx one week ago.  While en route to ED, pt states his RIGHT eye lost vision for approx 10 minutes prior to arrival.   Patient states similar episode occurred in both eyes 2 days ago and resolved after 10 minutes.  Pt states vision was "black" unable to see figures or shadows.  Pt denies recent falls or head trauma, denies weakness, numbness, fever, chills, chest pain. sob, HA

## 2024-10-25 NOTE — DISCHARGE NOTE PROVIDER - PROVIDER TOKENS
PROVIDER:[TOKEN:[[id number]],FOLLOWUP:[1 week]] PROVIDER:[TOKEN:[[id number]],FOLLOWUP:[1 week]],PROVIDER:[TOKEN:[[id number]]]

## 2024-10-25 NOTE — DISCHARGE NOTE PROVIDER - ATTENDING DISCHARGE PHYSICAL EXAMINATION:
Gen: NAD, sitting in chair  HEENT: Normocephalic, atraumatic  Neck: supple, no lymphadenopathy  CV: Regular rate & regular rhythm  Lungs: decreased BS at bases, no fremitus  Abdomen: Soft, BS present, pronounced, non tender  Ext: Warm, well perfused, no pitting edema  Neuro: moves all extremities against resistance, no droop, awake  Skin: no rash, no erythema

## 2024-10-25 NOTE — DISCHARGE NOTE NURSING/CASE MANAGEMENT/SOCIAL WORK - PATIENT PORTAL LINK FT
You can access the FollowMyHealth Patient Portal offered by [facility] by registering at the following website: [email or web address]. By joining "[employer]"’s FollowMyHealth portal, you will also be able to view your health information using other applications (apps) compatible with our system.

## 2024-10-25 NOTE — DISCHARGE NOTE PROVIDER - NSDCHHHOMEBOUND_GEN_ALL_CORE
Stroke/TBI (neurological/cognitive impairment) Chest pain/weakness during/after ambulation   greater than 20 feet/Stroke/TBI (neurological/cognitive impairment)

## 2024-10-25 NOTE — SWALLOW BEDSIDE ASSESSMENT ADULT - SWALLOW EVAL: DIAGNOSIS
Toleration of regular with thin liquids w/o overt s/s of penetration/aspiration.
Toleration of puree and regular with thin liquids w/o overt s/s of penetration/aspiration.

## 2024-10-25 NOTE — DISCHARGE NOTE PROVIDER - NSDCFUSCHEDAPPT_GEN_ALL_CORE_FT
[facility]  ELECTROPH [address]  Scheduled Appointment: 10/28/2024    Gael Hogue  [facility]  PULMMED [address]  Scheduled Appointment: 11/08/2024

## 2024-10-25 NOTE — SWALLOW BEDSIDE ASSESSMENT ADULT - SLP GENERAL OBSERVATIONS
Pt. received OOB in chair awake and alert. Oriented to person, place, time and event. Pt. denies difficulty swallowing. Denies changes to speech/language/cognition. Reports mild word finding deficits suspected 2/2 to age. Reports vision is back to baseline.
Pt. received OOB in chair awake and alert. Oriented to person, place, time and event. Pt. denies difficulty swallowing. Denies changes to speech/language/cognition. Reports mild word finding deficits suspected 2/2 to age. Reports vision is back to baseline.

## 2024-10-25 NOTE — DISCHARGE NOTE PROVIDER - HOSPITAL COURSE
79-year-old male Pmhx of CAD, DM, HTN, BPH,  presents with recent c/o of bilateral vision loss approx one week ago.  While en route to ED, pt states his RIGHT eye lost vision for approx 10 minutes prior to arrival.   Patient states similar episode occurred in both eyes 2 days ago and resolved after 10 minutes.  Pt states vision was "black" unable to see figures or shadows.  Pt denies recent falls or head trauma, denies weakness, numbness, fever, chills, chest pain. sob, BARKER  NOTE: Pt recent travel to [place] and returned on 10/2. Patient admitted for stroke r/o    # R/O Stroke  - neuro consult appreciated  - Ophthalmologist  consult appreciated - f/u OP within 1-2 wks of discharge  - PT consult appreciate - home w/ rolling walker  - Swallow screen: PASSED  - no acute deficits  - neuro check q8; f/u MR brain  - asa, c/w home statin, s/p loading dose of plavix, 75qd starting tomorrow x90d  - Outpatient FU w/ neuro IR for possible DSA     # DM  - FS with SS    # HTN  - vs  - c/w home med    # BPH  - c/w home med    # HLD  - c/w home med    # Neuropathy  - c/w home med   79-year-old male Pmhx of CAD, DM, HTN, BPH,  presents with recent c/o of bilateral vision loss approx one week ago.  While en route to ED, pt states his RIGHT eye lost vision for approx 10 minutes prior to arrival.   Patient states similar episode occurred in both eyes 2 days ago and resolved after 10 minutes.  Pt states vision was "black" unable to see figures or shadows.  Pt denies recent falls or head trauma, denies weakness, numbness, fever, chills, chest pain. sob, BARKER  NOTE: Pt recent travel to [place] and returned on 10/2. Patient admitted for stroke r/o    # R/O Stroke  - neuro consult appreciated  - Ophthalmologist  consult appreciated - f/u OP within 1-2 wks of discharge  - PT consult appreciate - home w/ rolling walker  - Swallow screen: PASSED  - no acute deficits  - neuro check q8;   - asa, c/w home statin, s/p loading dose of plavix, 75qd starting tomorrow x90d  - Outpatient FU w/ neuro IR for possible DSA   - OP MR being set up by neuro    # DM  - FS with SS    # HTN  - vs  - c/w home med    # BPH  - c/w home med    # HLD  - c/w home med    # Neuropathy  - c/w home med

## 2024-10-25 NOTE — DISCHARGE NOTE PROVIDER - CARE PROVIDERS DIRECT ADDRESSES
[email or web address]

## 2024-10-25 NOTE — DISCHARGE NOTE PROVIDER - NSDCCPCAREPLAN_GEN_ALL_CORE_FT
PRINCIPAL DISCHARGE DIAGNOSIS  Diagnosis: Change in vision  Assessment and Plan of Treatment: You were seen and treated by the medical, neurologist team for transient vision loss, stroke rule out. Continue with home aspirin and statin. You were started on plavix for 90 days. Outpatient follow up with Neurology IR. MRI done _____________________________________  Outpatient follow up with opthamology team within 1-2 weeks of discharge. (Address: [facility] [address], [address], [facility] [address]. Phone: [phone]).  If worsening weakness, changes in mental status, changes in speech, recommend coming back to the ED immediately.

## 2024-10-25 NOTE — DISCHARGE NOTE PROVIDER - NSDCMRMEDTOKEN_GEN_ALL_CORE_FT
albuterol 90 mcg/inh inhalation aerosol: 2 puff(s) inhaled every 6 hours as needed for Shortness of Breath and/or Wheezing  Aspir 81 oral delayed release tablet: 1 tab(s) orally once a day  atorvastatin 80 mg oral tablet: 1 tab(s) orally once a day (at bedtime)  glipiZIDE 5 mg oral tablet: 1 tab(s) orally once a day  Lantus 100 units/mL subcutaneous solution: 45 unit(s) subcutaneous once a day (at bedtime)  MEMANTINE HCL ER 7 MG CAPSULE:   metoprolol tartrate 50 mg oral tablet: 1 tab(s) orally 2 times a day 50 mg twice a day  omeprazole 40 mg oral delayed release capsule: 1 cap(s) orally once a day  pregabalin 150 mg oral capsule: 1 cap(s) orally every 12 hours  ranolazine 500 mg oral tablet, extended release: 1 tab(s) orally 2 times a day  tamsulosin 0.4 mg oral capsule: 1 cap(s) orally once a day (at bedtime)  Zestril 5 mg oral tablet: 1 tab(s) orally once a day   albuterol 90 mcg/inh inhalation aerosol: 2 puff(s) inhaled every 6 hours as needed for Shortness of Breath and/or Wheezing  Aspir 81 oral delayed release tablet: 1 tab(s) orally once a day  atorvastatin 80 mg oral tablet: 1 tab(s) orally once a day (at bedtime)  clopidogrel 75 mg oral tablet: 1 tab(s) orally once a day  glipiZIDE 5 mg oral tablet: 1 tab(s) orally once a day  Lantus 100 units/mL subcutaneous solution: 45 unit(s) subcutaneous once a day (at bedtime)  MEMANTINE HCL ER 7 MG CAPSULE: 1 tab(s) orally once a day  metoprolol tartrate 50 mg oral tablet: 1 tab(s) orally 2 times a day 50 mg twice a day  omeprazole 40 mg oral delayed release capsule: 1 cap(s) orally once a day  pregabalin 150 mg oral capsule: 1 cap(s) orally every 12 hours  ranolazine 500 mg oral tablet, extended release: 1 tab(s) orally 2 times a day  tamsulosin 0.4 mg oral capsule: 1 cap(s) orally once a day (at bedtime)  Zestril 5 mg oral tablet: 1 tab(s) orally once a day

## 2024-10-25 NOTE — DISCHARGE NOTE NURSING/CASE MANAGEMENT/SOCIAL WORK - FINANCIAL ASSISTANCE
[facility] provides services at a reduced cost to those who are determined to be eligible through [facility]’s financial assistance program. Information regarding [facility]’s financial assistance program can be found by going to [email or web address] or by calling [phone].

## 2024-10-25 NOTE — DISCHARGE NOTE PROVIDER - NSDCFUADDAPPT_GEN_ALL_CORE_FT
Opthamology Clinic  Within 1-2 weeks of discharge   Address: [address], [address], [address], [place] [address]  Phone: [phone]

## 2024-10-25 NOTE — DISCHARGE NOTE PROVIDER - CARE PROVIDER_API CALL
Karen Ferris J  Internal Medicine  [address] [place] [address]  Phone: [phone]  Fax: [phone]  Follow Up Time: 1 week   Karen Ferris J  Internal Medicine  [address] [facility] [place] [address]  Phone: [phone]  Fax: [phone]  Follow Up Time: 1 week    Kristofer Leone  Neurology  [address] [place] [id number]  Phone: [phone]  Fax: [phone]  Follow Up Time:

## 2024-10-28 ENCOUNTER — EMERGENCY (EMERGENCY)
Facility: HOSPITAL | Age: 79
LOS: 0 days | Discharge: HOME | End: 2024-10-28
Attending: STUDENT IN AN ORGANIZED HEALTH CARE EDUCATION/TRAINING PROGRAM
Payer: MEDICARE

## 2024-10-28 ENCOUNTER — INPATIENT (INPATIENT)
Facility: HOSPITAL | Age: 79
LOS: 10 days | Discharge: HOME HEALTH | DRG: 948 | End: 2024-11-08
Attending: INTERNAL MEDICINE | Admitting: INTERNAL MEDICINE
Payer: MEDICARE

## 2024-10-28 VITALS
HEIGHT: 64 IN | DIASTOLIC BLOOD PRESSURE: 66 MMHG | OXYGEN SATURATION: 99 % | SYSTOLIC BLOOD PRESSURE: 119 MMHG | HEART RATE: 80 BPM | WEIGHT: 184.97 LBS | RESPIRATION RATE: 20 BRPM | TEMPERATURE: 98 F

## 2024-10-28 VITALS — HEART RATE: 70 BPM | SYSTOLIC BLOOD PRESSURE: 127 MMHG | DIASTOLIC BLOOD PRESSURE: 71 MMHG

## 2024-10-28 VITALS
RESPIRATION RATE: 24 BRPM | SYSTOLIC BLOOD PRESSURE: 136 MMHG | HEIGHT: 64 IN | DIASTOLIC BLOOD PRESSURE: 85 MMHG | HEART RATE: 91 BPM | OXYGEN SATURATION: 99 %

## 2024-10-28 DIAGNOSIS — Z95.5 PRESENCE OF CORONARY ANGIOPLASTY IMPLANT AND GRAFT: Chronic | ICD-10-CM

## 2024-10-28 DIAGNOSIS — Z98.890 OTHER SPECIFIED POSTPROCEDURAL STATES: Chronic | ICD-10-CM

## 2024-10-28 DIAGNOSIS — Z98.49 CATARACT EXTRACTION STATUS, UNSPECIFIED EYE: ICD-10-CM

## 2024-10-28 DIAGNOSIS — Z87.891 PERSONAL HISTORY OF NICOTINE DEPENDENCE: ICD-10-CM

## 2024-10-28 DIAGNOSIS — R94.31 ABNORMAL ELECTROCARDIOGRAM [ECG] [EKG]: ICD-10-CM

## 2024-10-28 DIAGNOSIS — R53.1 WEAKNESS: ICD-10-CM

## 2024-10-28 DIAGNOSIS — H54.7 UNSPECIFIED VISUAL LOSS: ICD-10-CM

## 2024-10-28 DIAGNOSIS — I12.9 HYPERTENSIVE CHRONIC KIDNEY DISEASE WITH STAGE 1 THROUGH STAGE 4 CHRONIC KIDNEY DISEASE, OR UNSPECIFIED CHRONIC KIDNEY DISEASE: ICD-10-CM

## 2024-10-28 DIAGNOSIS — R42 DIZZINESS AND GIDDINESS: ICD-10-CM

## 2024-10-28 DIAGNOSIS — N40.0 BENIGN PROSTATIC HYPERPLASIA WITHOUT LOWER URINARY TRACT SYMPTOMS: ICD-10-CM

## 2024-10-28 DIAGNOSIS — E11.9 TYPE 2 DIABETES MELLITUS WITHOUT COMPLICATIONS: ICD-10-CM

## 2024-10-28 DIAGNOSIS — I25.10 ATHEROSCLEROTIC HEART DISEASE OF NATIVE CORONARY ARTERY WITHOUT ANGINA PECTORIS: ICD-10-CM

## 2024-10-28 DIAGNOSIS — N18.9 CHRONIC KIDNEY DISEASE, UNSPECIFIED: ICD-10-CM

## 2024-10-28 DIAGNOSIS — D72.829 ELEVATED WHITE BLOOD CELL COUNT, UNSPECIFIED: ICD-10-CM

## 2024-10-28 DIAGNOSIS — Z87.09 PERSONAL HISTORY OF OTHER DISEASES OF THE RESPIRATORY SYSTEM: Chronic | ICD-10-CM

## 2024-10-28 DIAGNOSIS — R11.10 VOMITING, UNSPECIFIED: ICD-10-CM

## 2024-10-28 LAB
ALBUMIN SERPL ELPH-MCNC: 3.9 G/DL — SIGNIFICANT CHANGE UP (ref 3.5–5.2)
ALBUMIN SERPL ELPH-MCNC: 4 G/DL — SIGNIFICANT CHANGE UP (ref 3.5–5.2)
ALP SERPL-CCNC: 109 U/L — SIGNIFICANT CHANGE UP (ref 30–115)
ALP SERPL-CCNC: 110 U/L — SIGNIFICANT CHANGE UP (ref 30–115)
ALT FLD-CCNC: 19 U/L — SIGNIFICANT CHANGE UP (ref 0–41)
ALT FLD-CCNC: 20 U/L — SIGNIFICANT CHANGE UP (ref 0–41)
ANION GAP SERPL CALC-SCNC: 12 MMOL/L — SIGNIFICANT CHANGE UP (ref 7–14)
ANION GAP SERPL CALC-SCNC: 15 MMOL/L — HIGH (ref 7–14)
APPEARANCE UR: CLEAR — SIGNIFICANT CHANGE UP
APTT BLD: 28.9 SEC — SIGNIFICANT CHANGE UP (ref 27–39.2)
AST SERPL-CCNC: 23 U/L — SIGNIFICANT CHANGE UP (ref 0–41)
AST SERPL-CCNC: 26 U/L — SIGNIFICANT CHANGE UP (ref 0–41)
BASE EXCESS BLDV CALC-SCNC: -4.1 MMOL/L — LOW (ref -2–3)
BASOPHILS # BLD AUTO: 0.04 K/UL — SIGNIFICANT CHANGE UP (ref 0–0.2)
BASOPHILS # BLD AUTO: 0.06 K/UL — SIGNIFICANT CHANGE UP (ref 0–0.2)
BASOPHILS NFR BLD AUTO: 0.3 % — SIGNIFICANT CHANGE UP (ref 0–1)
BASOPHILS NFR BLD AUTO: 0.4 % — SIGNIFICANT CHANGE UP (ref 0–1)
BILIRUB SERPL-MCNC: 0.7 MG/DL — SIGNIFICANT CHANGE UP (ref 0.2–1.2)
BILIRUB SERPL-MCNC: 0.8 MG/DL — SIGNIFICANT CHANGE UP (ref 0.2–1.2)
BILIRUB UR-MCNC: NEGATIVE — SIGNIFICANT CHANGE UP
BUN SERPL-MCNC: 32 MG/DL — HIGH (ref 10–20)
BUN SERPL-MCNC: 36 MG/DL — HIGH (ref 10–20)
CA-I SERPL-SCNC: 1.19 MMOL/L — SIGNIFICANT CHANGE UP (ref 1.15–1.33)
CALCIUM SERPL-MCNC: 8.8 MG/DL — SIGNIFICANT CHANGE UP (ref 8.4–10.5)
CALCIUM SERPL-MCNC: 9 MG/DL — SIGNIFICANT CHANGE UP (ref 8.4–10.5)
CHLORIDE SERPL-SCNC: 97 MMOL/L — LOW (ref 98–110)
CHLORIDE SERPL-SCNC: 99 MMOL/L — SIGNIFICANT CHANGE UP (ref 98–110)
CO2 SERPL-SCNC: 19 MMOL/L — SIGNIFICANT CHANGE UP (ref 17–32)
CO2 SERPL-SCNC: 22 MMOL/L — SIGNIFICANT CHANGE UP (ref 17–32)
COLOR SPEC: YELLOW — SIGNIFICANT CHANGE UP
CREAT SERPL-MCNC: 2 MG/DL — HIGH (ref 0.7–1.5)
CREAT SERPL-MCNC: 2.3 MG/DL — HIGH (ref 0.7–1.5)
DIFF PNL FLD: NEGATIVE — SIGNIFICANT CHANGE UP
EGFR: 28 ML/MIN/1.73M2 — LOW
EGFR: 33 ML/MIN/1.73M2 — LOW
EOSINOPHIL # BLD AUTO: 0.09 K/UL — SIGNIFICANT CHANGE UP (ref 0–0.7)
EOSINOPHIL # BLD AUTO: 0.15 K/UL — SIGNIFICANT CHANGE UP (ref 0–0.7)
EOSINOPHIL NFR BLD AUTO: 0.6 % — SIGNIFICANT CHANGE UP (ref 0–8)
EOSINOPHIL NFR BLD AUTO: 1 % — SIGNIFICANT CHANGE UP (ref 0–8)
FLUAV AG NPH QL: SIGNIFICANT CHANGE UP
FLUBV AG NPH QL: SIGNIFICANT CHANGE UP
GAS PNL BLDV: 129 MMOL/L — LOW (ref 136–145)
GAS PNL BLDV: SIGNIFICANT CHANGE UP
GLUCOSE BLDC GLUCOMTR-MCNC: 227 MG/DL — HIGH (ref 70–99)
GLUCOSE BLDC GLUCOMTR-MCNC: 233 MG/DL — HIGH (ref 70–99)
GLUCOSE SERPL-MCNC: 229 MG/DL — HIGH (ref 70–99)
GLUCOSE SERPL-MCNC: 251 MG/DL — HIGH (ref 70–99)
GLUCOSE UR QL: >=1000 MG/DL
HCO3 BLDV-SCNC: 22 MMOL/L — SIGNIFICANT CHANGE UP (ref 22–29)
HCT VFR BLD CALC: 42.1 % — SIGNIFICANT CHANGE UP (ref 42–52)
HCT VFR BLD CALC: 43.4 % — SIGNIFICANT CHANGE UP (ref 42–52)
HCT VFR BLDA CALC: 42 % — SIGNIFICANT CHANGE UP (ref 39–51)
HGB BLD CALC-MCNC: 14.1 G/DL — SIGNIFICANT CHANGE UP (ref 12.6–17.4)
HGB BLD-MCNC: 13.7 G/DL — LOW (ref 14–18)
HGB BLD-MCNC: 13.8 G/DL — LOW (ref 14–18)
IMM GRANULOCYTES NFR BLD AUTO: 0.9 % — HIGH (ref 0.1–0.3)
IMM GRANULOCYTES NFR BLD AUTO: 1 % — HIGH (ref 0.1–0.3)
INR BLD: 1.04 RATIO — SIGNIFICANT CHANGE UP (ref 0.65–1.3)
KETONES UR-MCNC: NEGATIVE MG/DL — SIGNIFICANT CHANGE UP
LACTATE BLDV-MCNC: 2.3 MMOL/L — HIGH (ref 0.5–2)
LACTATE SERPL-SCNC: 1.8 MMOL/L — SIGNIFICANT CHANGE UP (ref 0.7–2)
LACTATE SERPL-SCNC: 2.2 MMOL/L — HIGH (ref 0.7–2)
LEUKOCYTE ESTERASE UR-ACNC: NEGATIVE — SIGNIFICANT CHANGE UP
LIDOCAIN IGE QN: 48 U/L — SIGNIFICANT CHANGE UP (ref 7–60)
LIDOCAIN IGE QN: 50 U/L — SIGNIFICANT CHANGE UP (ref 7–60)
LYMPHOCYTES # BLD AUTO: 14.6 % — LOW (ref 20.5–51.1)
LYMPHOCYTES # BLD AUTO: 15.8 % — LOW (ref 20.5–51.1)
LYMPHOCYTES # BLD AUTO: 2.12 K/UL — SIGNIFICANT CHANGE UP (ref 1.2–3.4)
LYMPHOCYTES # BLD AUTO: 2.37 K/UL — SIGNIFICANT CHANGE UP (ref 1.2–3.4)
MAGNESIUM SERPL-MCNC: 2.3 MG/DL — SIGNIFICANT CHANGE UP (ref 1.8–2.4)
MCHC RBC-ENTMCNC: 28.2 PG — SIGNIFICANT CHANGE UP (ref 27–31)
MCHC RBC-ENTMCNC: 28.7 PG — SIGNIFICANT CHANGE UP (ref 27–31)
MCHC RBC-ENTMCNC: 31.8 G/DL — LOW (ref 32–37)
MCHC RBC-ENTMCNC: 32.5 G/DL — SIGNIFICANT CHANGE UP (ref 32–37)
MCV RBC AUTO: 88.3 FL — SIGNIFICANT CHANGE UP (ref 80–94)
MCV RBC AUTO: 88.8 FL — SIGNIFICANT CHANGE UP (ref 80–94)
MONOCYTES # BLD AUTO: 1.51 K/UL — HIGH (ref 0.1–0.6)
MONOCYTES # BLD AUTO: 1.69 K/UL — HIGH (ref 0.1–0.6)
MONOCYTES NFR BLD AUTO: 10.4 % — HIGH (ref 1.7–9.3)
MONOCYTES NFR BLD AUTO: 11.3 % — HIGH (ref 1.7–9.3)
NEUTROPHILS # BLD AUTO: 10.51 K/UL — HIGH (ref 1.4–6.5)
NEUTROPHILS # BLD AUTO: 10.68 K/UL — HIGH (ref 1.4–6.5)
NEUTROPHILS NFR BLD AUTO: 71.1 % — SIGNIFICANT CHANGE UP (ref 42.2–75.2)
NEUTROPHILS NFR BLD AUTO: 72.6 % — SIGNIFICANT CHANGE UP (ref 42.2–75.2)
NITRITE UR-MCNC: NEGATIVE — SIGNIFICANT CHANGE UP
NRBC # BLD: 0 /100 WBCS — SIGNIFICANT CHANGE UP (ref 0–0)
NRBC # BLD: 0 /100 WBCS — SIGNIFICANT CHANGE UP (ref 0–0)
NT-PROBNP SERPL-SCNC: 332 PG/ML — HIGH (ref 0–300)
PCO2 BLDV: 44 MMHG — SIGNIFICANT CHANGE UP (ref 42–55)
PH BLDV: 7.31 — LOW (ref 7.32–7.43)
PH UR: 6 — SIGNIFICANT CHANGE UP (ref 5–8)
PLATELET # BLD AUTO: 158 K/UL — SIGNIFICANT CHANGE UP (ref 130–400)
PLATELET # BLD AUTO: 180 K/UL — SIGNIFICANT CHANGE UP (ref 130–400)
PMV BLD: 11.8 FL — HIGH (ref 7.4–10.4)
PMV BLD: 12.5 FL — HIGH (ref 7.4–10.4)
PO2 BLDV: 41 MMHG — SIGNIFICANT CHANGE UP (ref 25–45)
POTASSIUM BLDV-SCNC: 4.9 MMOL/L — SIGNIFICANT CHANGE UP (ref 3.5–5.1)
POTASSIUM SERPL-MCNC: 5.6 MMOL/L — HIGH (ref 3.5–5)
POTASSIUM SERPL-MCNC: 5.8 MMOL/L — HIGH (ref 3.5–5)
POTASSIUM SERPL-SCNC: 5.6 MMOL/L — HIGH (ref 3.5–5)
POTASSIUM SERPL-SCNC: 5.8 MMOL/L — HIGH (ref 3.5–5)
PROT SERPL-MCNC: 6.6 G/DL — SIGNIFICANT CHANGE UP (ref 6–8)
PROT SERPL-MCNC: 6.7 G/DL — SIGNIFICANT CHANGE UP (ref 6–8)
PROT UR-MCNC: NEGATIVE MG/DL — SIGNIFICANT CHANGE UP
PROTHROM AB SERPL-ACNC: 11.9 SEC — SIGNIFICANT CHANGE UP (ref 9.95–12.87)
RBC # BLD: 4.77 M/UL — SIGNIFICANT CHANGE UP (ref 4.7–6.1)
RBC # BLD: 4.89 M/UL — SIGNIFICANT CHANGE UP (ref 4.7–6.1)
RBC # FLD: 15.6 % — HIGH (ref 11.5–14.5)
RBC # FLD: 15.8 % — HIGH (ref 11.5–14.5)
RSV RNA NPH QL NAA+NON-PROBE: SIGNIFICANT CHANGE UP
SAO2 % BLDV: 67.2 % — SIGNIFICANT CHANGE UP (ref 67–88)
SARS-COV-2 RNA SPEC QL NAA+PROBE: SIGNIFICANT CHANGE UP
SODIUM SERPL-SCNC: 131 MMOL/L — LOW (ref 135–146)
SODIUM SERPL-SCNC: 133 MMOL/L — LOW (ref 135–146)
SP GR SPEC: >1.03 — HIGH (ref 1–1.03)
TROPONIN T, HIGH SENSITIVITY RESULT: 15 NG/L — SIGNIFICANT CHANGE UP (ref 6–21)
TROPONIN T, HIGH SENSITIVITY RESULT: 16 NG/L — SIGNIFICANT CHANGE UP (ref 6–21)
TROPONIN T, HIGH SENSITIVITY RESULT: 20 NG/L — SIGNIFICANT CHANGE UP (ref 6–21)
UROBILINOGEN FLD QL: 0.2 MG/DL — SIGNIFICANT CHANGE UP (ref 0.2–1)
WBC # BLD: 14.5 K/UL — HIGH (ref 4.8–10.8)
WBC # BLD: 15.01 K/UL — HIGH (ref 4.8–10.8)
WBC # FLD AUTO: 14.5 K/UL — HIGH (ref 4.8–10.8)
WBC # FLD AUTO: 15.01 K/UL — HIGH (ref 4.8–10.8)

## 2024-10-28 PROCEDURE — 86658 ENTEROVIRUS ANTIBODY: CPT

## 2024-10-28 PROCEDURE — A9579: CPT

## 2024-10-28 PROCEDURE — 93005 ELECTROCARDIOGRAM TRACING: CPT

## 2024-10-28 PROCEDURE — 99285 EMERGENCY DEPT VISIT HI MDM: CPT | Mod: GC

## 2024-10-28 PROCEDURE — 36415 COLL VENOUS BLD VENIPUNCTURE: CPT

## 2024-10-28 PROCEDURE — 87077 CULTURE AEROBIC IDENTIFY: CPT

## 2024-10-28 PROCEDURE — 87640 STAPH A DNA AMP PROBE: CPT

## 2024-10-28 PROCEDURE — 92610 EVALUATE SWALLOWING FUNCTION: CPT | Mod: GN

## 2024-10-28 PROCEDURE — 97166 OT EVAL MOD COMPLEX 45 MIN: CPT | Mod: GO

## 2024-10-28 PROCEDURE — 87070 CULTURE OTHR SPECIMN AEROBIC: CPT

## 2024-10-28 PROCEDURE — 86140 C-REACTIVE PROTEIN: CPT

## 2024-10-28 PROCEDURE — 85610 PROTHROMBIN TIME: CPT

## 2024-10-28 PROCEDURE — 82330 ASSAY OF CALCIUM: CPT

## 2024-10-28 PROCEDURE — 70496 CT ANGIOGRAPHY HEAD: CPT | Mod: MC

## 2024-10-28 PROCEDURE — 86850 RBC ANTIBODY SCREEN: CPT

## 2024-10-28 PROCEDURE — 97162 PT EVAL MOD COMPLEX 30 MIN: CPT | Mod: GP

## 2024-10-28 PROCEDURE — 83690 ASSAY OF LIPASE: CPT

## 2024-10-28 PROCEDURE — 82962 GLUCOSE BLOOD TEST: CPT

## 2024-10-28 PROCEDURE — 74176 CT ABD & PELVIS W/O CONTRAST: CPT | Mod: 26,MC

## 2024-10-28 PROCEDURE — 70498 CT ANGIOGRAPHY NECK: CPT | Mod: 26,MC

## 2024-10-28 PROCEDURE — 93280 PM DEVICE PROGR EVAL DUAL: CPT

## 2024-10-28 PROCEDURE — 83036 HEMOGLOBIN GLYCOSYLATED A1C: CPT

## 2024-10-28 PROCEDURE — 97535 SELF CARE MNGMENT TRAINING: CPT | Mod: GO

## 2024-10-28 PROCEDURE — 85018 HEMOGLOBIN: CPT

## 2024-10-28 PROCEDURE — 84484 ASSAY OF TROPONIN QUANT: CPT

## 2024-10-28 PROCEDURE — 93010 ELECTROCARDIOGRAM REPORT: CPT

## 2024-10-28 PROCEDURE — 83605 ASSAY OF LACTIC ACID: CPT

## 2024-10-28 PROCEDURE — 80048 BASIC METABOLIC PNL TOTAL CA: CPT

## 2024-10-28 PROCEDURE — 0241U: CPT

## 2024-10-28 PROCEDURE — 87040 BLOOD CULTURE FOR BACTERIA: CPT

## 2024-10-28 PROCEDURE — 84100 ASSAY OF PHOSPHORUS: CPT

## 2024-10-28 PROCEDURE — 99285 EMERGENCY DEPT VISIT HI MDM: CPT | Mod: 25

## 2024-10-28 PROCEDURE — 71045 X-RAY EXAM CHEST 1 VIEW: CPT | Mod: 26,77

## 2024-10-28 PROCEDURE — 94640 AIRWAY INHALATION TREATMENT: CPT

## 2024-10-28 PROCEDURE — 97116 GAIT TRAINING THERAPY: CPT | Mod: GP

## 2024-10-28 PROCEDURE — 84132 ASSAY OF SERUM POTASSIUM: CPT

## 2024-10-28 PROCEDURE — 94660 CPAP INITIATION&MGMT: CPT

## 2024-10-28 PROCEDURE — 74176 CT ABD & PELVIS W/O CONTRAST: CPT | Mod: MC

## 2024-10-28 PROCEDURE — 93307 TTE W/O DOPPLER COMPLETE: CPT

## 2024-10-28 PROCEDURE — 80061 LIPID PANEL: CPT

## 2024-10-28 PROCEDURE — 71045 X-RAY EXAM CHEST 1 VIEW: CPT

## 2024-10-28 PROCEDURE — 70496 CT ANGIOGRAPHY HEAD: CPT | Mod: 26,MC

## 2024-10-28 PROCEDURE — 70450 CT HEAD/BRAIN W/O DYE: CPT | Mod: MC

## 2024-10-28 PROCEDURE — 84145 PROCALCITONIN (PCT): CPT

## 2024-10-28 PROCEDURE — 83735 ASSAY OF MAGNESIUM: CPT

## 2024-10-28 PROCEDURE — 86901 BLOOD TYPING SEROLOGIC RH(D): CPT

## 2024-10-28 PROCEDURE — 87205 SMEAR GRAM STAIN: CPT

## 2024-10-28 PROCEDURE — 70551 MRI BRAIN STEM W/O DYE: CPT | Mod: MC

## 2024-10-28 PROCEDURE — 80053 COMPREHEN METABOLIC PANEL: CPT

## 2024-10-28 PROCEDURE — 87086 URINE CULTURE/COLONY COUNT: CPT

## 2024-10-28 PROCEDURE — 74183 MRI ABD W/O CNTR FLWD CNTR: CPT | Mod: MC

## 2024-10-28 PROCEDURE — 96375 TX/PRO/DX INJ NEW DRUG ADDON: CPT

## 2024-10-28 PROCEDURE — 85025 COMPLETE CBC W/AUTO DIFF WBC: CPT

## 2024-10-28 PROCEDURE — 93306 TTE W/DOPPLER COMPLETE: CPT

## 2024-10-28 PROCEDURE — 87641 MR-STAPH DNA AMP PROBE: CPT

## 2024-10-28 PROCEDURE — 96361 HYDRATE IV INFUSION ADD-ON: CPT

## 2024-10-28 PROCEDURE — 81003 URINALYSIS AUTO W/O SCOPE: CPT

## 2024-10-28 PROCEDURE — 99222 1ST HOSP IP/OBS MODERATE 55: CPT

## 2024-10-28 PROCEDURE — 84295 ASSAY OF SERUM SODIUM: CPT

## 2024-10-28 PROCEDURE — 95819 EEG AWAKE AND ASLEEP: CPT

## 2024-10-28 PROCEDURE — 92611 MOTION FLUOROSCOPY/SWALLOW: CPT | Mod: GN

## 2024-10-28 PROCEDURE — 85730 THROMBOPLASTIN TIME PARTIAL: CPT

## 2024-10-28 PROCEDURE — 71045 X-RAY EXAM CHEST 1 VIEW: CPT | Mod: 26

## 2024-10-28 PROCEDURE — 86900 BLOOD TYPING SEROLOGIC ABO: CPT

## 2024-10-28 PROCEDURE — 85027 COMPLETE CBC AUTOMATED: CPT

## 2024-10-28 PROCEDURE — 70498 CT ANGIOGRAPHY NECK: CPT | Mod: MC

## 2024-10-28 PROCEDURE — 82803 BLOOD GASES ANY COMBINATION: CPT

## 2024-10-28 PROCEDURE — 74230 X-RAY XM SWLNG FUNCJ C+: CPT

## 2024-10-28 PROCEDURE — 85652 RBC SED RATE AUTOMATED: CPT

## 2024-10-28 PROCEDURE — 70450 CT HEAD/BRAIN W/O DYE: CPT | Mod: 26,MC,XU

## 2024-10-28 PROCEDURE — 96374 THER/PROPH/DIAG INJ IV PUSH: CPT

## 2024-10-28 PROCEDURE — 97530 THERAPEUTIC ACTIVITIES: CPT | Mod: GP

## 2024-10-28 PROCEDURE — 85014 HEMATOCRIT: CPT

## 2024-10-28 PROCEDURE — 97110 THERAPEUTIC EXERCISES: CPT | Mod: GO

## 2024-10-28 PROCEDURE — 93010 ELECTROCARDIOGRAM REPORT: CPT | Mod: 76,77

## 2024-10-28 RX ORDER — SODIUM CHLORIDE IRRIG SOLUTION 0.9 %
1500 SOLUTION, IRRIGATION IRRIGATION ONCE
Refills: 0 | Status: COMPLETED | OUTPATIENT
Start: 2024-10-28 | End: 2024-10-28

## 2024-10-28 RX ORDER — ASPIRIN/MAG CARB/ALUMINUM AMIN 325 MG
324 TABLET ORAL ONCE
Refills: 0 | Status: COMPLETED | OUTPATIENT
Start: 2024-10-28 | End: 2024-10-28

## 2024-10-28 RX ORDER — MEMANTINE HYDROCHLORIDE 21 MG/1
1 CAPSULE, EXTENDED RELEASE ORAL
Qty: 0 | Refills: 0 | DISCHARGE

## 2024-10-28 RX ORDER — INSULIN GLARGINE,HUM.REC.ANLOG 100/ML
45 VIAL (ML) SUBCUTANEOUS AT BEDTIME
Refills: 0 | Status: DISCONTINUED | OUTPATIENT
Start: 2024-10-28 | End: 2024-11-05

## 2024-10-28 RX ORDER — INFLUENZ VIR VAC TV P-SURF2003 15MCG/.5ML
0.5 SYRINGE (ML) INTRAMUSCULAR ONCE
Refills: 0 | Status: DISCONTINUED | OUTPATIENT
Start: 2024-10-28 | End: 2024-11-08

## 2024-10-28 RX ORDER — MELATONIN 5 MG
5 TABLET ORAL AT BEDTIME
Refills: 0 | Status: DISCONTINUED | OUTPATIENT
Start: 2024-10-28 | End: 2024-11-08

## 2024-10-28 RX ORDER — PREGABALIN 150 MG/1
150 CAPSULE ORAL EVERY 12 HOURS
Refills: 0 | Status: DISCONTINUED | OUTPATIENT
Start: 2024-10-28 | End: 2024-11-04

## 2024-10-28 RX ORDER — ACETAMINOPHEN 500 MG
650 TABLET ORAL EVERY 6 HOURS
Refills: 0 | Status: DISCONTINUED | OUTPATIENT
Start: 2024-10-28 | End: 2024-11-08

## 2024-10-28 RX ORDER — MEMANTINE HYDROCHLORIDE 21 MG/1
5 CAPSULE, EXTENDED RELEASE ORAL DAILY
Refills: 0 | Status: DISCONTINUED | OUTPATIENT
Start: 2024-10-28 | End: 2024-11-08

## 2024-10-28 RX ORDER — METOPROLOL TARTRATE 50 MG
50 TABLET ORAL
Refills: 0 | Status: DISCONTINUED | OUTPATIENT
Start: 2024-10-28 | End: 2024-10-31

## 2024-10-28 RX ORDER — VANCOMYCIN HYDROCHLORIDE 50 MG/ML
1000 KIT ORAL EVERY 24 HOURS
Refills: 0 | Status: DISCONTINUED | OUTPATIENT
Start: 2024-10-29 | End: 2024-10-29

## 2024-10-28 RX ORDER — VANCOMYCIN HCL-SODIUM CHLORIDE IV SOLN 1.5 GM/250ML-0.9% 1.5-0.9/25 GM/ML-%
1000 SOLUTION INTRAVENOUS ONCE
Refills: 0 | Status: COMPLETED | OUTPATIENT
Start: 2024-10-28 | End: 2024-10-28

## 2024-10-28 RX ORDER — CLOPIDOGREL 75 MG/1
75 TABLET ORAL DAILY
Refills: 0 | Status: DISCONTINUED | OUTPATIENT
Start: 2024-10-28 | End: 2024-10-30

## 2024-10-28 RX ORDER — ASPIRIN/MAG CARB/ALUMINUM AMIN 325 MG
81 TABLET ORAL DAILY
Refills: 0 | Status: DISCONTINUED | OUTPATIENT
Start: 2024-10-28 | End: 2024-11-08

## 2024-10-28 RX ORDER — MORPHINE SULFATE 30 MG/1
4 TABLET, EXTENDED RELEASE ORAL ONCE
Refills: 0 | Status: DISCONTINUED | OUTPATIENT
Start: 2024-10-28 | End: 2024-10-28

## 2024-10-28 RX ORDER — PANTOPRAZOLE SODIUM 40 MG/1
40 TABLET, DELAYED RELEASE ORAL
Refills: 0 | Status: DISCONTINUED | OUTPATIENT
Start: 2024-10-28 | End: 2024-11-08

## 2024-10-28 RX ORDER — SODIUM CHLORIDE IRRIG SOLUTION 0.9 %
1000 SOLUTION, IRRIGATION IRRIGATION ONCE
Refills: 0 | Status: COMPLETED | OUTPATIENT
Start: 2024-10-28 | End: 2024-10-28

## 2024-10-28 RX ORDER — LISINOPRIL 40 MG
5 TABLET ORAL DAILY
Refills: 0 | Status: DISCONTINUED | OUTPATIENT
Start: 2024-10-28 | End: 2024-10-29

## 2024-10-28 RX ORDER — ASPIRIN/MAG CARB/ALUMINUM AMIN 325 MG
243 TABLET ORAL DAILY
Refills: 0 | Status: DISCONTINUED | OUTPATIENT
Start: 2024-10-28 | End: 2024-10-28

## 2024-10-28 RX ORDER — TAMSULOSIN HCL 0.4 MG
0.4 CAPSULE ORAL AT BEDTIME
Refills: 0 | Status: DISCONTINUED | OUTPATIENT
Start: 2024-10-28 | End: 2024-11-08

## 2024-10-28 RX ORDER — ONDANSETRON HYDROCHLORIDE 2 MG/ML
4 INJECTION, SOLUTION INTRAMUSCULAR; INTRAVENOUS EVERY 8 HOURS
Refills: 0 | Status: DISCONTINUED | OUTPATIENT
Start: 2024-10-28 | End: 2024-10-31

## 2024-10-28 RX ORDER — CEFEPIME 2 G/1
1000 INJECTION, POWDER, FOR SOLUTION INTRAVENOUS EVERY 12 HOURS
Refills: 0 | Status: DISCONTINUED | OUTPATIENT
Start: 2024-10-28 | End: 2024-10-29

## 2024-10-28 RX ORDER — MORPHINE SULFATE 30 MG/1
4 TABLET, EXTENDED RELEASE ORAL EVERY 4 HOURS
Refills: 0 | Status: DISCONTINUED | OUTPATIENT
Start: 2024-10-28 | End: 2024-10-29

## 2024-10-28 RX ORDER — CEFEPIME 2 G/1
1000 INJECTION, POWDER, FOR SOLUTION INTRAVENOUS ONCE
Refills: 0 | Status: COMPLETED | OUTPATIENT
Start: 2024-10-28 | End: 2024-10-28

## 2024-10-28 RX ORDER — RANOLAZINE 500 MG/1
500 TABLET, FILM COATED, EXTENDED RELEASE ORAL
Refills: 0 | Status: DISCONTINUED | OUTPATIENT
Start: 2024-10-28 | End: 2024-10-31

## 2024-10-28 RX ORDER — CHLORHEXIDINE GLUCONATE 40 MG/ML
1 SOLUTION TOPICAL
Refills: 0 | Status: DISCONTINUED | OUTPATIENT
Start: 2024-10-28 | End: 2024-11-08

## 2024-10-28 RX ORDER — ALBUTEROL 90 MCG
2 AEROSOL (GRAM) INHALATION EVERY 6 HOURS
Refills: 0 | Status: DISCONTINUED | OUTPATIENT
Start: 2024-10-28 | End: 2024-11-08

## 2024-10-28 RX ORDER — MAGNESIUM, ALUMINUM HYDROXIDE 200-200 MG
30 TABLET,CHEWABLE ORAL EVERY 4 HOURS
Refills: 0 | Status: DISCONTINUED | OUTPATIENT
Start: 2024-10-28 | End: 2024-11-08

## 2024-10-28 RX ORDER — INSULIN LISPRO 100/ML
VIAL (ML) SUBCUTANEOUS
Refills: 0 | Status: DISCONTINUED | OUTPATIENT
Start: 2024-10-28 | End: 2024-11-08

## 2024-10-28 RX ORDER — MECLIZINE HYDROCLORIDE 25 MG/1
25 TABLET ORAL ONCE
Refills: 0 | Status: COMPLETED | OUTPATIENT
Start: 2024-10-28 | End: 2024-10-28

## 2024-10-28 RX ORDER — TIRZEPATIDE 5 MG/.5ML
2.5 INJECTION, SOLUTION SUBCUTANEOUS
Refills: 0 | DISCHARGE

## 2024-10-28 RX ADMIN — Medication 80 MILLIGRAM(S): at 21:39

## 2024-10-28 RX ADMIN — CEFEPIME 100 MILLIGRAM(S): 2 INJECTION, POWDER, FOR SOLUTION INTRAVENOUS at 13:34

## 2024-10-28 RX ADMIN — MORPHINE SULFATE 4 MILLIGRAM(S): 30 TABLET, EXTENDED RELEASE ORAL at 20:34

## 2024-10-28 RX ADMIN — Medication 45 UNIT(S): at 21:39

## 2024-10-28 RX ADMIN — MORPHINE SULFATE 4 MILLIGRAM(S): 30 TABLET, EXTENDED RELEASE ORAL at 22:22

## 2024-10-28 RX ADMIN — Medication 0.4 MILLIGRAM(S): at 21:39

## 2024-10-28 RX ADMIN — Medication 1000 MILLILITER(S): at 11:23

## 2024-10-28 RX ADMIN — VANCOMYCIN HCL-SODIUM CHLORIDE IV SOLN 1.5 GM/250ML-0.9% 250 MILLIGRAM(S): 1.5-0.9/25 SOLUTION at 13:34

## 2024-10-28 RX ADMIN — Medication 324 MILLIGRAM(S): at 18:37

## 2024-10-28 RX ADMIN — Medication 1500 MILLILITER(S): at 13:34

## 2024-10-28 RX ADMIN — Medication 1500 MILLILITER(S): at 14:45

## 2024-10-28 RX ADMIN — Medication 1000 MILLILITER(S): at 20:00

## 2024-10-28 RX ADMIN — MECLIZINE HYDROCLORIDE 25 MILLIGRAM(S): 25 TABLET ORAL at 11:22

## 2024-10-28 NOTE — ED PROVIDER NOTE - CLINICAL SUMMARY MEDICAL DECISION MAKING FREE TEXT BOX
patient is a 79-year-old male presents for evaluation of chest pain past medical history of coronary artery disease diabetes hypertension and BPH patient was admitted to the hospital for vision loss discharge upon discharge patient return to the ER for generalized weakness patient was treated in aseptic fashion improved discharged went home developed chest pain and Hema presented here to the emergency department denies any associated shortness of breath fevers chills vomiting diaphoresis denies any abdominal pain or back pain    On physical exam patient is normocephalic atraumatic pupils equal round react light accommodation extraocular muscles intact oropharynx clear chest clear to auscultation bilaterally abdomen soft nontender nondistended bowel sounds positive no guarding or rebound extremities full range of motion radial pulse 2+ pedal pulse 2+ no edema noted no skin changes noted     patient given IV fluids we obtain labs patient found to ha troponin of 15 BNP 332ve lactate 2.3 we ordered order to repeat lactate patient given IV antibiotics on prior visit in the emergency department after chart review in addition on prior also given IV fluids of 30 cc/kg blood cultures ordered at that time however patient presents on the second visit with complaints of chest pain given his age past medical history and presenting complaint I will admit for further evaluation at this time  we obtained ekg per my independent evaluation not consistent with stemi, qt prolongation or arrythmia   we obtained cxr which is not consistent with pneumonia or ptx     family updated and agree with plan of care

## 2024-10-28 NOTE — H&P ADULT - ASSESSMENT
79 year old male with PMH of CAD, DM, HTN, and BPH, with recent admission for vision loss, and presented earlier today for generalized weakness, returning to the ER for chest pain. Patient states shortly after being discharged from the ER, patient went home and then began to have a sudden onset of substernal chest pressure, associated with shortness of breath.  Patient denies experiencing similar symptoms earlier today.  79 year old male with PMH of CAD, DM, HTN, and BPH, with recent admission for vision loss, and presented earlier today for generalized weakness, returning to the ER for chest pain. Patient states shortly after being discharged from the ER, patient went home and then began to have a sudden onset of substernal chest pressure, associated with shortness of breath.  Patient denies experiencing similar symptoms earlier today.       # Chest Pain  - LRT  - Cardio consult  - echo in am  - EKG in am    # DM  - FS with SS    # HTN  - c/w home med    # BPH  - c/w home med

## 2024-10-28 NOTE — ED PROVIDER NOTE - DIFFERENTIAL DIAGNOSIS
differential dx includes but is not limited to:  ICH, stroke, electrolyte derangement, anemia, arrhythmia, hypoglycemia, UTI, pneumonia Differential Diagnosis

## 2024-10-28 NOTE — ED PROVIDER NOTE - ATTENDING CONTRIBUTION TO CARE
80 yo M with hx of CAD, BPH, DM, HTN, CKD, cataract surgery who presents with generalized weakness which started this AM. Per chart review, pt was admitted 10/23-10/28 for intermittent transient vision loss alternating between R eye and bilateral eyes which had been going on for past few wks. Had stroke workup notable for multiple areas of stenosis and calcification, most notable being moderate stenosis and calcification of R ICA similar to prior. Seen by ophtho and recommended for outpatient MRI as well as neuro IR f/u for possible DSA. Wife says they are supposed to have MRI today but was never scheduled. Today pt woke up and was too weak to walk on own. Normally ambulates without assistance. Pt had some nbnb vomiting, abd pain, constipation yesterday which improved after wife gave him stool softeners, and has been able to have BM since then. Says blurry vision is still intermittent. No fever, cp, sob, dysuria.    PMD Dr. Jones    CONSTITUTIONAL: well developed, in no acute distress, speaking in full sentences, nontoxic appearing  SKIN: warm, dry, no rash  HEAD: normocephalic, atraumatic  EYES: PERRL at 3mm, EOMI, no conjunctival erythema, no spontaneous nystagmus, no provoked nystagmus   ENT: patent airway, moist mucous membranes, no tongue deviation  NECK: supple, no masses, full flexion/extension without pain  CV:  regular rate, regular rhythm, 2+ radial pulses bilaterally  RESP: no wheezes, no rales, no rhonchi, normal work of breathing  ABD: soft, no tenderness, nondistended, no rebound, no guarding  MSK: normal ROM, no cyanosis, no edema  NEURO: alert, oriented, CN 2-12 grossly intact, sensation intact to light touch symmetrically, 5/5 motor strength in all extremities, normal finger to nose, no pronator drift, no facial asymmetry  PSYCH: cooperative, appropriate    A&P:  Pt here with generalized weakness and inability to ambulate in setting of recent transient vision loss for which he was admitted. Vitals here wnl in ED. No focal neuro deficits. Plan for labs, ekg, imaging r/o ICH, stroke, electrolyte derangement, anemia, arrhythmia, hypoglycemia, UTI, pneumonia.

## 2024-10-28 NOTE — ED PROVIDER NOTE - PATIENT PORTAL LINK FT
You can access the FollowMyHealth Patient Portal offered by [facility] by registering at the following website: [email or web address]. By joining [employer]s FollowMyHealth portal, you will also be able to view your health information using other applications (apps) compatible with our system.

## 2024-10-28 NOTE — ED ADULT TRIAGE NOTE - MEANS OF ARRIVAL
wheelchair Drysol Pregnancy And Lactation Text: This medication is considered safe during pregnancy and breast feeding.

## 2024-10-28 NOTE — ED PROVIDER NOTE - PHYSICAL EXAMINATION
VITAL SIGNS: I have reviewed nursing notes and confirm.  CONSTITUTIONAL: ill-appearing in mild distress  SKIN: Warm dry, normal skin turgor  HEAD: NCAT  EYES: No conjunctival injection, scleral anicteric  ENT: Moist mucous membranes, normal pharynx with no erythema or exudates  NECK: Supple; full ROM. Nontender. No cervical LAD  CARD: RRR, no murmurs, rubs or gallops  RESP: Clear to ausculation bilaterally.  No rales, rhonchi, or wheezing.  ABD: Soft, non-distended, non-tender, no rebound or guarding. No CVA tenderness  EXT: Full ROM, no bony tenderness, no pedal edema, no calf tenderness  NEURO: Normal motor, normal sensory.  PSYCH: Cooperative, appropriate.

## 2024-10-28 NOTE — ED PROVIDER NOTE - CARE PLAN
1 Principal Discharge DX:	Generalized weakness  Secondary Diagnosis:	Unable to ambulate  Secondary Diagnosis:	Leukocytosis  Secondary Diagnosis:	Chronic kidney disease (CKD)   Principal Discharge DX:	Generalized weakness  Secondary Diagnosis:	Leukocytosis  Secondary Diagnosis:	Chronic kidney disease (CKD)

## 2024-10-28 NOTE — ED PROVIDER NOTE - NSICDXPASTMEDICALHX_GEN_ALL_CORE_FT
PAST MEDICAL HISTORY:  Acute myocardial infarction     BPH (benign prostatic hyperplasia)     CAD (coronary artery disease)     Chronic kidney disease (CKD)     DM (diabetes mellitus)     History of hematologic disorder     [place] (hard of hearing)     Total cataract

## 2024-10-28 NOTE — ED PROVIDER NOTE - NSFOLLOWUPINSTRUCTIONS_ED_ALL_ED_FT
Weakness    WHAT YOU NEED TO KNOW:    Weakness is a loss of muscle strength. It may be caused by brain, nerve, or muscle problems. Physical and mental conditions such as heart problems, pregnancy, dehydration, or depression may also cause weakness. Reactions to certain drugs can cause weakness. Parts of your body may become weak if you need to wear a cast or splint or have been on bed rest for a long time.    DISCHARGE INSTRUCTIONS:    Call 911 for any of the following:     You have any of the following signs of a stroke:   Numbness or drooping on one side of your face       Weakness in an arm or leg      Confusion or difficulty speaking      Dizziness, a severe headache, or vision loss      You lose feeling in your weakened body area.      You have electric shock-like feelings down your arms and legs when you flex or move your neck.      You have sudden or increased trouble speaking, swallowing, or breathing.    Return to the emergency department if:     You have severe pain in your back, arms, or legs that worsens.      You have sudden or worsened muscle weakness or loss of movement.      You are not able to control when you urinate or have a bowel movement.    Contact your healthcare provider if:     You feel depressed or anxious.       You have questions or concerns about your condition or care.     Manage weakness:     Use assistive devices as directed. These help protect you from injury. Examples include a walker or cane. Have someone install handrails in your home. These will help you get out of a bathtub or stand up from a toilet. Use a shower chair so you can sit while you shower. Sit down on the toilet or another chair to dry off and put on your clothes. Get help going up and down stairs if your legs are weak.       Go to physical or occupational therapy if directed. A physical therapist can teach you exercises to help strengthen weak muscles. An occupational therapist can show you ways to do your daily activities more easily. For example, light forks and spoons can be easier to use if you have hand weakness. You may also learn ways to organize your household items so you are not moving heavy items.      Balance rest with exercise. Exercise can help increase your muscle strength and energy. Do not exercise for long periods at a time. Take breaks often to rest. Too much exercise can cause muscle strain or make you more tired. Ask your healthcare provider how much exercise is right for you.      Eat a variety of healthy foods. Too much or too little food may cause weakness or tiredness. Ask your healthcare provider what a healthy amount of food is for you. Healthy foods include fruits, vegetables, whole-grain breads, low-fat dairy products, lean meats and fish, nuts, and cooked beans.      Do not smoke. Nicotine and other chemicals in cigarettes and cigars can make your symptoms worse, and can cause lung damage. Ask your healthcare provider for information if you currently smoke and need help to quit. E-cigarettes or smokeless tobacco still contain nicotine. Talk to your healthcare provider before you use these products.       Do not use caffeine, alcohol, or illegal drugs. These may cause muscle twitching, which could lead to worsened weakness.     Follow up with your healthcare provider as directed: Write down your questions so you remember to ask them during your visits. YOU WERE INCIDENTALLY NOTED TO HAVE AN INDETERMINATE 1.4CM LESION ON THE LEFT SIDE OF YOUR LIVER AND 2.5CM HYPOTENSIVE IN THE LEFT LOWER POLE. PLEASE FOLLOW UP WITH YOUR PRIMARY CARE DOCTOR FOR OUTPATIENT MRI.     Weakness    WHAT YOU NEED TO KNOW:    Weakness is a loss of muscle strength. It may be caused by brain, nerve, or muscle problems. Physical and mental conditions such as heart problems, pregnancy, dehydration, or depression may also cause weakness. Reactions to certain drugs can cause weakness. Parts of your body may become weak if you need to wear a cast or splint or have been on bed rest for a long time.    DISCHARGE INSTRUCTIONS:    Call 911 for any of the following:     You have any of the following signs of a stroke:   Numbness or drooping on one side of your face       Weakness in an arm or leg      Confusion or difficulty speaking      Dizziness, a severe headache, or vision loss      You lose feeling in your weakened body area.      You have electric shock-like feelings down your arms and legs when you flex or move your neck.      You have sudden or increased trouble speaking, swallowing, or breathing.    Return to the emergency department if:     You have severe pain in your back, arms, or legs that worsens.      You have sudden or worsened muscle weakness or loss of movement.      You are not able to control when you urinate or have a bowel movement.    Contact your healthcare provider if:     You feel depressed or anxious.       You have questions or concerns about your condition or care.     Manage weakness:     Use assistive devices as directed. These help protect you from injury. Examples include a walker or cane. Have someone install handrails in your home. These will help you get out of a bathtub or stand up from a toilet. Use a shower chair so you can sit while you shower. Sit down on the toilet or another chair to dry off and put on your clothes. Get help going up and down stairs if your legs are weak.       Go to physical or occupational therapy if directed. A physical therapist can teach you exercises to help strengthen weak muscles. An occupational therapist can show you ways to do your daily activities more easily. For example, light forks and spoons can be easier to use if you have hand weakness. You may also learn ways to organize your household items so you are not moving heavy items.      Balance rest with exercise. Exercise can help increase your muscle strength and energy. Do not exercise for long periods at a time. Take breaks often to rest. Too much exercise can cause muscle strain or make you more tired. Ask your healthcare provider how much exercise is right for you.      Eat a variety of healthy foods. Too much or too little food may cause weakness or tiredness. Ask your healthcare provider what a healthy amount of food is for you. Healthy foods include fruits, vegetables, whole-grain breads, low-fat dairy products, lean meats and fish, nuts, and cooked beans.      Do not smoke. Nicotine and other chemicals in cigarettes and cigars can make your symptoms worse, and can cause lung damage. Ask your healthcare provider for information if you currently smoke and need help to quit. E-cigarettes or smokeless tobacco still contain nicotine. Talk to your healthcare provider before you use these products.       Do not use caffeine, alcohol, or illegal drugs. These may cause muscle twitching, which could lead to worsened weakness.     Follow up with your healthcare provider as directed: Write down your questions so you remember to ask them during your visits.

## 2024-10-28 NOTE — ED PROVIDER NOTE - OBJECTIVE STATEMENT
Patient is a 79-year-old male with PMH of CAD, DM, HTN, and BPH,  with recent history of vision loss presenting for weakness. Patient endorses generalized weakness, dizziness, and feeling unsteady on his feet for the past several days. Patient also reports an episode of nbnb vomiting yesterday shortly after his wife gave him a laxative for constipation. Patient denies chest pain, shortness of breath, nausea, abdominal pain, back pain, diarrhea, headache, or additional complaints.

## 2024-10-28 NOTE — PATIENT PROFILE ADULT - FALL HARM RISK - RISK INTERVENTIONS

## 2024-10-28 NOTE — ED PROVIDER NOTE - NSICDXPASTSURGICALHX_GEN_ALL_CORE_FT
PAST SURGICAL HISTORY:  H/O coronary angiogram     H/O tonsillitis     History of ear surgery     Stented coronary artery     
Raymundo Martinez (MD)  Neurology  [address] [facility] [address] [facility] [address]  [place] [address]  Phone: [phone]  Fax: [phone]  Follow Up Time: 1 week    Reyes Rodriguez)  Cardiovascular Disease; Internal Medicine  [address] [place] [address]  Phone: [phone]  Fax: [phone]  Follow Up Time: 1 week    Edwardo Rey ()  Internal Medicine  [address] [place] [address]  Phone: [phone]  Fax: [phone]  Follow Up Time: 1 week    Ita Ruiz)  Internal Medicine  [address] [place] [address]  Phone: [phone]  Fax: [phone]  Follow Up Time: 1-3 days

## 2024-10-28 NOTE — ED ADULT NURSE NOTE - NSFALLUNIVINTERV_ED_ALL_ED
Bed/Stretcher in lowest position, wheels locked, appropriate side rails in place/Call bell, personal items and telephone in reach/Instruct patient to call for assistance before getting out of bed/chair/stretcher/Non-slip footwear applied when patient is off stretcher/[place] to call system/Physically safe environment - no spills, clutter or unnecessary equipment/Purposeful proactive rounding/Room/bathroom lighting operational, light cord in reach

## 2024-10-28 NOTE — ED PROVIDER NOTE - PROGRESS NOTE DETAILS
TC: WBC 14, trop 20>16. BP was 110s/60s on arrival, down trended to 90s/60s, now 80s/60s. Pt says he feels better. Rectal temp wnl. Sepsis suspected at 13:24 Given 30cc/kg IVF of ideal body weight. Ordered cefepime, vanco for broad spectrum coverage given recent admission. Added on CT abd to eval for intra abd pathology. CT head and CTA head/neck with stable stenosis 50%. SO: Patient tolerating PO well and is feeling better. Patient is able to ambulate without assistance. BP improved after fluid boluses. Plan is to discharge patient with close follow-up and strict return precautions.

## 2024-10-28 NOTE — ED ADULT NURSE NOTE - SUICIDE SCREENING DEPRESSION
Follow Up:      Inverval History/ROS:Patient is a 80y old  Female who presents with a chief complaint of R buttock pain/sciatica (09 Nov 2018 11:27)  No fever.   Still has pain    Allergies    No Known Allergies    Intolerances        ANTIMICROBIALS:      OTHER MEDS:  amLODIPine   Tablet 10 milliGRAM(s) Oral daily  aspirin enteric coated 81 milliGRAM(s) Oral daily  calcium carbonate 1250 mG  + Vitamin D (OsCal 500 + D) 1 Tablet(s) Oral daily  cyanocobalamin 1000 MICROGram(s) Oral daily  gabapentin 300 milliGRAM(s) Oral every 8 hours  heparin  Injectable 5000 Unit(s) SubCutaneous every 8 hours  ketorolac   Injectable 15 milliGRAM(s) IV Push every 8 hours  morphine  - Injectable 4 milliGRAM(s) IV Push every 4 hours PRN  oxyCODONE    5 mG/acetaminophen 325 mG 1 Tablet(s) Oral every 4 hours PRN  pantoprazole    Tablet 40 milliGRAM(s) Oral before breakfast  polyethylene glycol 3350 17 Gram(s) Oral daily  senna 2 Tablet(s) Oral at bedtime  simvastatin 5 milliGRAM(s) Oral at bedtime      Vital Signs Last 24 Hrs  T(C): 36.8 (09 Nov 2018 05:31), Max: 36.9 (08 Nov 2018 22:38)  T(F): 98.2 (09 Nov 2018 05:31), Max: 98.4 (08 Nov 2018 22:38)  HR: 60 (09 Nov 2018 05:31) (60 - 69)  BP: 114/67 (09 Nov 2018 05:31) (114/67 - 137/76)  BP(mean): --  RR: 18 (09 Nov 2018 05:31) (18 - 18)  SpO2: 98% (09 Nov 2018 05:31) (98% - 98%)    PHYSICAL EXAM:  General: [x ] non-toxic  HEAD/EYES: [ ] PERRL [ x] white sclera [ ] icterus  ENT:  [ ] normal [x ] supple [ ] thrush [ ] pharyngeal exudate  Cardiovascular:   [ ] murmur [x ] normal [ ] PPM/AICD  Respiratory:  [x ] clear to ausculation bilaterally  GI:  [x ] soft, non-tender, normal bowel sounds  :  [ ] carballo [ x] no CVA tenderness   Musculoskeletal:  [ x] no synovitis  Neurologic:  [ x] non-focal exam   Skin:  [ ] no rash  Lymph: [ ] no lymphadenopathy  Psychiatric:  [ ] appropriate affect [ x] alert & oriented  Lines:  [x ] no phlebitis [ ] central line                                13.0   4.10  )-----------( 160      ( 09 Nov 2018 06:27 )             40.2       11-09    143  |  104  |  15  ----------------------------<  88  4.0   |  29  |  0.78    Ca    8.6      09 Nov 2018 06:27  Mg     2.3     11-09    TPro  6.3  /  Alb  3.5  /  TBili  0.4  /  DBili  x   /  AST  24  /  ALT  20  /  AlkPhos  69  11-09          MICROBIOLOGY:    RADIOLOGY: Negative

## 2024-10-28 NOTE — ED PROVIDER NOTE - OBJECTIVE STATEMENT
Patient is a 79-year-old male with PMH of CAD, DM, HTN, and BPH, with recent admission for vision loss, and presented earlier today for generalized weakness, returning to the ER for chest pain. Patient states shortly after being discharged from the ER, patient went home and then began to have a sudden onset of substernal chest pressure, associated with shortness of breath.  Patient denies experiencing similar symptoms earlier today.    Of note, patient triggered sepsis criteria earlier today and received one dose of broad spectrum antibiotics.

## 2024-10-28 NOTE — ED PROVIDER NOTE - ATTENDING CONTRIBUTION TO CARE
I have personally performed a history and physical exam on this patient and personally directed the management of the patient.  patient is a 79-year-old male presents for evaluation of chest pain past medical history of coronary artery disease diabetes hypertension and BPH patient was admitted to the hospital for vision loss discharge upon discharge patient return to the ER for generalized weakness patient was treated in aseptic fashion improved discharged went home developed chest pain and Hema presented here to the emergency department denies any associated shortness of breath fevers chills vomiting diaphoresis denies any abdominal pain or back pain    On physical exam patient is normocephalic atraumatic pupils equal round react light accommodation extraocular muscles intact oropharynx clear chest clear to auscultation bilaterally abdomen soft nontender nondistended bowel sounds positive no guarding or rebound extremities full range of motion radial pulse 2+ pedal pulse 2+ no edema noted no skin changes noted     patient given IV fluids we obtain labs patient found to ha troponin of 15 BNP 332ve lactate 2.3 we ordered order to repeat lactate patient given IV antibiotics on prior visit in the emergency department after chart review in addition on prior also given IV fluids of 30 cc/kg blood cultures ordered at that time however patient presents on the second visit with complaints of chest pain given his age past medical history and presenting complaint I will admit for further evaluation at this time  we obtained ekg per my independent evaluation not consistent with stemi, qt prolongation or arrythmia   we obtained cxr which is not consistent with pneumonia or ptx     family updated and agree with plan of care

## 2024-10-28 NOTE — ED PROVIDER NOTE - CLINICAL SUMMARY MEDICAL DECISION MAKING FREE TEXT BOX
Pt here with generalized weakness and inability to ambulate in setting of recent transient vision loss for which he was admitted. Vitals here wnl in ED. No focal neuro deficits. Plan for labs, ekg, imaging r/o ICH, stroke, electrolyte derangement, anemia, arrhythmia, hypoglycemia, UTI, pneumonia. WBC 14, trop 20>16. BP was 110s/60s on arrival, down trended to 90s/60s, now 80s/60s. Pt says he feels better. Rectal temp wnl. Sepsis suspected at 13:24 Given 30cc/kg IVF of ideal body weight. Ordered cefepime, vanco for broad spectrum coverage given recent admission. CT head and CTA head/neck with stable stenosis 50%. Added on CT abd to eval for intra abd pathology which was negative for acute intra abd pathology but shows indeterminate L hepatic 1.4 cm hypodensity and indeterminate L  lower pole 2.5 cm hypodensity which were discussed with pt/wife and need for outpatient f/u. Repeat BP improved to 120s/70s. Pt feels better and ambulated in ED. Had shared decision making with pt and wife regarding admission for transient hypotension vs d/c home with outpatient f/u. They opted for d/c home as he is feeling better and ED workup reassuring. Blood cx sent out, will be called back if +. Strict ED return precautions given. Pt verbalized understanding and was agreeable with plan. Pt here with generalized weakness and inability to ambulate in setting of recent transient vision loss for which he was admitted. Vitals here wnl in ED. No focal neuro deficits. Plan for labs, ekg, imaging r/o ICH, stroke, electrolyte derangement, anemia, arrhythmia, hypoglycemia, UTI, pneumonia. WBC 14, trop 20>16. BP was 110s/60s on arrival, down trended to 90s/60s, now 80s/60s. Pt says he feels better. Rectal temp wnl. Initially pursued sepsis workup and given 30cc/kg IVF of ideal body weight. Ordered cefepime, vanco for broad spectrum coverage given recent admission. CT head and CTA head/neck with stable stenosis 50%. Added on CT abd to eval for intra abd pathology which was negative for acute intra abd pathology but shows indeterminate L hepatic 1.4 cm hypodensity and indeterminate L  lower pole 2.5 cm hypodensity which were discussed with pt/wife and need for outpatient f/u. Repeat BP improved to 120s/70s. Pt feels better and ambulated in ED. Had shared decision making with pt and wife regarding admission for transient hypotension vs d/c home with outpatient f/u. They opted for d/c home as he is feeling better and ED workup reassuring with no source for sepsis and improved vitals. Blood cx sent out, will be called back if +. Strict ED return precautions given. Pt verbalized understanding and was agreeable with plan. Pt here with generalized weakness and inability to ambulate in setting of recent transient vision loss for which he was admitted. Vitals here wnl in ED. No focal neuro deficits. Plan for labs, ekg, imaging r/o ICH, stroke, electrolyte derangement, anemia, arrhythmia, hypoglycemia, UTI, pneumonia. WBC 14, trop 20>16. BP was 110s/60s on arrival, down trended to 90s/60s, now 80s/60s. Pt says he feels better. Rectal temp wnl. Initially pursued sepsis workup and given 30cc/kg IVF of ideal body weight. Ordered cefepime, vanco for broad spectrum coverage given recent admission. CT head and CTA head/neck with stable stenosis 50%. Added on CT abd to eval for intra abd pathology which was negative for acute intra abd pathology but shows indeterminate L hepatic 1.4 cm hypodensity and indeterminate L  lower pole 2.5 cm hypodensity which were discussed with pt/wife and need for outpatient f/u. Repeat BP improved to 120s/70s. Pt feels better and ambulated in ED. Had shared decision making with pt and wife regarding admission for transient hypotension vs d/c home with outpatient f/u. They opted for d/c home as he is feeling better and ED workup reassuring with no source for sepsis and improved vitals. Blood cx sent out, will be called back if +. Strict ED return precautions given. Pt verbalized understanding and was agreeable with plan.    fspatient is a 79-year-old male presents for evaluation of chest pain past medical history of coronary artery disease diabetes hypertension and BPH patient was admitted to the hospital for vision loss discharge upon discharge patient return to the ER for generalized weakness patient was treated in aseptic fashion improved discharged went home developed chest pain and Hema presented here to the emergency department denies any associated shortness of breath fevers chills vomiting diaphoresis denies any abdominal pain or back pain    On physical exam patient is normocephalic atraumatic pupils equal round react light accommodation extraocular muscles intact oropharynx clear chest clear to auscultation bilaterally abdomen soft nontender nondistended bowel sounds positive no guarding or rebound extremities full range of motion radial pulse 2+ pedal pulse 2+ no edema noted no skin changes noted     patient given IV fluids we obtain labs patient found to ha troponin of 15 BNP 332ve lactate 2.3 we ordered order to repeat lactate patient given IV antibiotics on prior visit in the emergency department after chart review in addition on prior also given IV fluids of 30 cc/kg blood cultures ordered at that time however patient presents on the second visit with complaints of chest pain given his age past medical history and presenting complaint I will admit for further evaluation at this time  we obtained ekg per my independent evaluation not consistent with stemi, qt prolongation or arrythmia   we obtained cxr which is not consistent with pneumonia or ptx     family updated and agree with plan of care

## 2024-10-28 NOTE — ED ADULT NURSE REASSESSMENT NOTE - NS ED NURSE REASSESS COMMENT FT1
IVF bolus still infusing in ED, report given to 3S ER, endorsed lactate needed to be drawn post IVF bolus

## 2024-10-28 NOTE — ED PROVIDER NOTE - NSICDXPASTMEDICALHX_GEN_ALL_CORE_FT
PAST MEDICAL HISTORY:  Acute myocardial infarction     BPH (benign prostatic hyperplasia)     CAD (coronary artery disease)     DM (diabetes mellitus)     History of hematologic disorder     [place] (hard of hearing)     Total cataract      PAST MEDICAL HISTORY:  Acute myocardial infarction     BPH (benign prostatic hyperplasia)     CAD (coronary artery disease)     Chronic kidney disease (CKD)     DM (diabetes mellitus)     History of hematologic disorder     [place] (hard of hearing)     Total cataract

## 2024-10-28 NOTE — ED PROVIDER NOTE - PHYSICAL EXAMINATION
VITAL SIGNS: I have reviewed nursing notes and confirm.  CONSTITUTIONAL: Non-toxic, in NAD  SKIN: Warm dry, normal skin turgor  HEAD: NCAT  EYES: No conjunctival injection, scleral anicteric  ENT: Moist mucous membranes, normal pharynx with no erythema or exudates  NECK: Supple; full ROM. Nontender. No cervical LAD  CARD: RRR, no murmurs, rubs or gallops  RESP: Clear to ausculation bilaterally.  No rales, rhonchi, or wheezing.  ABD: Soft, non-distended, non-tender, no rebound or guarding. No CVA tenderness  EXT: Full ROM, no bony tenderness, no pedal edema, no calf tenderness  NEURO: Normal motor, normal sensory. CN II-XII grossly intact. Cerebellar testing normal. Normal but slow gait   PSYCH: Cooperative, appropriate.

## 2024-10-28 NOTE — ED PROVIDER NOTE - TOBACCO USE
[FreeTextEntry1] : 68F w/ PMH of HTN presenting for evaluation of chest pain.  She says she is normally walks about 5 miles per day, but during her walk recently she is began to feel left-sided chest pain.  The pain is rated as 5/10, last for about 10 minutes at a time, alleviated with rest and worsened with exercise and stress.\par \par 1/25/2022:\par Since our last visit she has began feeling hot flashes.  She was recently started on Norvasc.  She is unclear as to the timing of the hot flashes initiating versus the time of amlodipine starting.  Her noninvasive evaluation was overall unremarkable. Former smoker

## 2024-10-28 NOTE — H&P ADULT - NSICDXPASTMEDICALHX_GEN_ALL_CORE_FT
PAST MEDICAL HISTORY:  Acute myocardial infarction     BPH (benign prostatic hyperplasia)     CAD (coronary artery disease)     Chronic kidney disease (CKD)     DM (diabetes mellitus)     History of hematologic disorder     [place] (hard of hearing)     Total cataract

## 2024-10-28 NOTE — ED ADULT NURSE NOTE - NS ED NOTE  TALK SOMEONE YN
4 Eyes Admission Assessment     I agree as the admission nurse that 2 RN's have performed a thorough Head to Toe Skin Assessment on the patient. ALL assessment sites listed below have been assessed on admission. Areas assessed by both nurses:  [x]   Head, Face, and Ears   [x]   Shoulders, Back, and Chest  [x]   Arms, Elbows, and Hands   [x]   Coccyx, Sacrum, and Ischum  [x]   Legs, Feet, and Heels        Does the Patient have Skin Breakdown?   No         [place] Prevention initiated:  No   Wound Care Orders initiated:  No      [facility] nurse consulted for Pressure Injury (Stage 3,4, Unstageable, DTI, NWPT, and Complex wounds):  No      Nurse 1 eSignature: Electronically signed by Mark Anthony Eaton RN on 7/29/20 at 4:49 AM EDT    **SHARE this note so that the co-signing nurse is able to place an eSignature**    Nurse 2 eSignature: Electronically signed by Twin Holman RN on 7/29/20 at 4:52 AM EDT No

## 2024-10-28 NOTE — ED ADULT NURSE NOTE - NSICDXPASTMEDICALHX_GEN_ALL_CORE_FT
PAST MEDICAL HISTORY:  Acute myocardial infarction     BPH (benign prostatic hyperplasia)     CAD (coronary artery disease)     DM (diabetes mellitus)     History of hematologic disorder     [place] (hard of hearing)     Total cataract

## 2024-10-28 NOTE — H&P ADULT - NS ATTEND AMEND GEN_ALL_CORE FT
Seen while in the ER, old records reviewed. Of note on ER visit a few hours ago patient was being assessed for sepsis and although able to be discharged at that time, I received report that patient did has rigors on current ER visit so IV antibiotics given earlier will be continued Seen while in the ER, old records reviewed. Of note on ER visit a few hours ago patient was being assessed for sepsis and although able to be discharged at that time, I received report that patient did has rigors on current ER visit and patient seen to have leukocytosis, elevated lactic acid, so IV antibiotics given on prior ER visit,  will be continued Seen while in the ER, old records reviewed. Of note on ER visit a few hours ago patient was being assessed for sepsis and although able to be discharged at that time, I received report that patient did has rigors on current ER visit and patient seen to have leukocytosis, elevated lactic acid, so IV antibiotics given on prior ER visit,  will be continued. Elevated potassium noted but specimen was hemolyzed

## 2024-10-28 NOTE — ED ADULT TRIAGE NOTE - CHIEF COMPLAINT QUOTE
generalized weakness, wife states he has some difficulty ambulating at baseline, but has become "weak all over" since yesterday morning.

## 2024-10-28 NOTE — ED ADULT NURSE NOTE - TEMPLATE
Problem: Patient Care Overview  Goal: Plan of Care Review  Outcome: Ongoing (interventions implemented as appropriate)   09/09/18 0232   Coping/Psychosocial   Plan of Care Reviewed With patient   Plan of Care Review   Progress no change   OTHER   Outcome Summary vss; alert and oriented x 4; no c/o pain; impulsive; bed alarm on; spouse at bedside; blood glucose continues to be high; sliding scale insulin changed to high dose; iv decadron; awaiting surgery on monday     Goal: Individualization and Mutuality  Outcome: Ongoing (interventions implemented as appropriate)    Goal: Discharge Needs Assessment  Outcome: Ongoing (interventions implemented as appropriate)    Goal: Interprofessional Rounds/Family Conf  Outcome: Ongoing (interventions implemented as appropriate)      Problem: Confusion, Acute (Adult)  Goal: Cognitive/Functional Impairments Minimized  Outcome: Ongoing (interventions implemented as appropriate)    Goal: Safety  Outcome: Ongoing (interventions implemented as appropriate)      Problem: Fall Risk (Adult)  Goal: Absence of Fall  Outcome: Ongoing (interventions implemented as appropriate)         General

## 2024-10-28 NOTE — PATIENT PROFILE ADULT - FUNCTIONAL ASSESSMENT - BASIC MOBILITY 6.
Quality 130: Documentation Of Current Medications In The Medical Record: Current Medications Documented Detail Level: Detailed Quality 226: Preventive Care And Screening: Tobacco Use: Screening And Cessation Intervention: Patient screened for tobacco and never smoked Quality 110: Preventive Care And Screening: Influenza Immunization: Influenza Immunization not Administered because Patient Refused. Quality 128: Preventive Care And Screening: Body Mass Index (Bmi) Screening And Follow-Up Plan: BMI is documented within normal parameters and no follow-up plan is required.  3-calculated by average/Not able to assess (calculate score using [facility] averaging method)

## 2024-10-29 ENCOUNTER — RESULT REVIEW (OUTPATIENT)
Age: 79
End: 2024-10-29

## 2024-10-29 LAB
A1C WITH ESTIMATED AVERAGE GLUCOSE RESULT: 8.1 % — HIGH (ref 4–5.6)
ALBUMIN SERPL ELPH-MCNC: 3.2 G/DL — LOW (ref 3.5–5.2)
ALP SERPL-CCNC: 72 U/L — SIGNIFICANT CHANGE UP (ref 30–115)
ALT FLD-CCNC: 13 U/L — SIGNIFICANT CHANGE UP (ref 0–41)
ANION GAP SERPL CALC-SCNC: 13 MMOL/L — SIGNIFICANT CHANGE UP (ref 7–14)
AST SERPL-CCNC: 12 U/L — SIGNIFICANT CHANGE UP (ref 0–41)
BASOPHILS # BLD AUTO: 0.04 K/UL — SIGNIFICANT CHANGE UP (ref 0–0.2)
BASOPHILS NFR BLD AUTO: 0.2 % — SIGNIFICANT CHANGE UP (ref 0–1)
BILIRUB SERPL-MCNC: 0.9 MG/DL — SIGNIFICANT CHANGE UP (ref 0.2–1.2)
BUN SERPL-MCNC: 33 MG/DL — HIGH (ref 10–20)
CALCIUM SERPL-MCNC: 8.1 MG/DL — LOW (ref 8.4–10.5)
CHLORIDE SERPL-SCNC: 100 MMOL/L — SIGNIFICANT CHANGE UP (ref 98–110)
CHOLEST SERPL-MCNC: 60 MG/DL — SIGNIFICANT CHANGE UP
CO2 SERPL-SCNC: 20 MMOL/L — SIGNIFICANT CHANGE UP (ref 17–32)
CREAT SERPL-MCNC: 2.4 MG/DL — HIGH (ref 0.7–1.5)
EGFR: 27 ML/MIN/1.73M2 — LOW
EOSINOPHIL # BLD AUTO: 0.02 K/UL — SIGNIFICANT CHANGE UP (ref 0–0.7)
EOSINOPHIL NFR BLD AUTO: 0.1 % — SIGNIFICANT CHANGE UP (ref 0–8)
ESTIMATED AVERAGE GLUCOSE: 186 MG/DL — HIGH (ref 68–114)
GLUCOSE BLDC GLUCOMTR-MCNC: 179 MG/DL — HIGH (ref 70–99)
GLUCOSE BLDC GLUCOMTR-MCNC: 191 MG/DL — HIGH (ref 70–99)
GLUCOSE BLDC GLUCOMTR-MCNC: 199 MG/DL — HIGH (ref 70–99)
GLUCOSE BLDC GLUCOMTR-MCNC: 242 MG/DL — HIGH (ref 70–99)
GLUCOSE SERPL-MCNC: 188 MG/DL — HIGH (ref 70–99)
HCT VFR BLD CALC: 37.8 % — LOW (ref 42–52)
HDLC SERPL-MCNC: 36 MG/DL — LOW
HGB BLD-MCNC: 12.1 G/DL — LOW (ref 14–18)
IMM GRANULOCYTES NFR BLD AUTO: 0.9 % — HIGH (ref 0.1–0.3)
LACTATE SERPL-SCNC: 1.8 MMOL/L — SIGNIFICANT CHANGE UP (ref 0.7–2)
LIPID PNL WITH DIRECT LDL SERPL: 13 MG/DL — SIGNIFICANT CHANGE UP
LYMPHOCYTES # BLD AUTO: 13 % — LOW (ref 20.5–51.1)
LYMPHOCYTES # BLD AUTO: 2.62 K/UL — SIGNIFICANT CHANGE UP (ref 1.2–3.4)
MCHC RBC-ENTMCNC: 28.5 PG — SIGNIFICANT CHANGE UP (ref 27–31)
MCHC RBC-ENTMCNC: 32 G/DL — SIGNIFICANT CHANGE UP (ref 32–37)
MCV RBC AUTO: 88.9 FL — SIGNIFICANT CHANGE UP (ref 80–94)
MONOCYTES # BLD AUTO: 1.92 K/UL — HIGH (ref 0.1–0.6)
MONOCYTES NFR BLD AUTO: 9.6 % — HIGH (ref 1.7–9.3)
NEUTROPHILS # BLD AUTO: 15.3 K/UL — HIGH (ref 1.4–6.5)
NEUTROPHILS NFR BLD AUTO: 76.2 % — HIGH (ref 42.2–75.2)
NON HDL CHOLESTEROL: 24 MG/DL — SIGNIFICANT CHANGE UP
NRBC # BLD: 0 /100 WBCS — SIGNIFICANT CHANGE UP (ref 0–0)
PLATELET # BLD AUTO: 148 K/UL — SIGNIFICANT CHANGE UP (ref 130–400)
PMV BLD: 12.4 FL — HIGH (ref 7.4–10.4)
POTASSIUM SERPL-MCNC: 5.2 MMOL/L — HIGH (ref 3.5–5)
POTASSIUM SERPL-SCNC: 5.2 MMOL/L — HIGH (ref 3.5–5)
PROT SERPL-MCNC: 5.3 G/DL — LOW (ref 6–8)
RBC # BLD: 4.25 M/UL — LOW (ref 4.7–6.1)
RBC # FLD: 16 % — HIGH (ref 11.5–14.5)
SODIUM SERPL-SCNC: 133 MMOL/L — LOW (ref 135–146)
TRIGL SERPL-MCNC: 53 MG/DL — SIGNIFICANT CHANGE UP
TROPONIN T, HIGH SENSITIVITY RESULT: 20 NG/L — SIGNIFICANT CHANGE UP (ref 6–21)
WBC # BLD: 20.09 K/UL — HIGH (ref 4.8–10.8)
WBC # FLD AUTO: 20.09 K/UL — HIGH (ref 4.8–10.8)

## 2024-10-29 PROCEDURE — 99223 1ST HOSP IP/OBS HIGH 75: CPT

## 2024-10-29 PROCEDURE — 70551 MRI BRAIN STEM W/O DYE: CPT | Mod: 26

## 2024-10-29 PROCEDURE — 93306 TTE W/DOPPLER COMPLETE: CPT | Mod: 26

## 2024-10-29 PROCEDURE — 74183 MRI ABD W/O CNTR FLWD CNTR: CPT | Mod: 26

## 2024-10-29 PROCEDURE — 93010 ELECTROCARDIOGRAM REPORT: CPT

## 2024-10-29 PROCEDURE — 99233 SBSQ HOSP IP/OBS HIGH 50: CPT

## 2024-10-29 RX ORDER — HEPARIN SODIUM 10000 [USP'U]/ML
5000 INJECTION INTRAVENOUS; SUBCUTANEOUS EVERY 8 HOURS
Refills: 0 | Status: DISCONTINUED | OUTPATIENT
Start: 2024-10-29 | End: 2024-10-30

## 2024-10-29 RX ORDER — SODIUM CHLORIDE 9 MG/ML
1000 INJECTION, SOLUTION INTRAMUSCULAR; INTRAVENOUS; SUBCUTANEOUS ONCE
Refills: 0 | Status: COMPLETED | OUTPATIENT
Start: 2024-10-29 | End: 2024-10-29

## 2024-10-29 RX ORDER — FUROSEMIDE 40 MG
20 TABLET ORAL ONCE
Refills: 0 | Status: DISCONTINUED | OUTPATIENT
Start: 2024-10-29 | End: 2024-10-30

## 2024-10-29 RX ADMIN — Medication 1: at 12:00

## 2024-10-29 RX ADMIN — CLOPIDOGREL 75 MILLIGRAM(S): 75 TABLET ORAL at 11:52

## 2024-10-29 RX ADMIN — Medication 1: at 17:56

## 2024-10-29 RX ADMIN — Medication 80 MILLIGRAM(S): at 22:07

## 2024-10-29 RX ADMIN — PANTOPRAZOLE SODIUM 40 MILLIGRAM(S): 40 TABLET, DELAYED RELEASE ORAL at 05:45

## 2024-10-29 RX ADMIN — Medication 1: at 08:30

## 2024-10-29 RX ADMIN — Medication 0.4 MILLIGRAM(S): at 22:08

## 2024-10-29 RX ADMIN — RANOLAZINE 500 MILLIGRAM(S): 500 TABLET, FILM COATED, EXTENDED RELEASE ORAL at 05:45

## 2024-10-29 RX ADMIN — HEPARIN SODIUM 5000 UNIT(S): 10000 INJECTION INTRAVENOUS; SUBCUTANEOUS at 22:08

## 2024-10-29 RX ADMIN — SODIUM CHLORIDE 1000 MILLILITER(S): 9 INJECTION, SOLUTION INTRAMUSCULAR; INTRAVENOUS; SUBCUTANEOUS at 05:44

## 2024-10-29 RX ADMIN — RANOLAZINE 500 MILLIGRAM(S): 500 TABLET, FILM COATED, EXTENDED RELEASE ORAL at 17:57

## 2024-10-29 RX ADMIN — MEMANTINE HYDROCHLORIDE 5 MILLIGRAM(S): 21 CAPSULE, EXTENDED RELEASE ORAL at 11:52

## 2024-10-29 RX ADMIN — CEFEPIME 100 MILLIGRAM(S): 2 INJECTION, POWDER, FOR SOLUTION INTRAVENOUS at 05:44

## 2024-10-29 RX ADMIN — PREGABALIN 150 MILLIGRAM(S): 150 CAPSULE ORAL at 17:57

## 2024-10-29 RX ADMIN — Medication 81 MILLIGRAM(S): at 11:52

## 2024-10-29 RX ADMIN — PREGABALIN 150 MILLIGRAM(S): 150 CAPSULE ORAL at 05:44

## 2024-10-29 RX ADMIN — Medication 45 UNIT(S): at 21:55

## 2024-10-29 NOTE — CONSULT NOTE ADULT - ASSESSMENT
ASSESSMENT  This is a 79 year old male with PMH of CAD, DM, HTN, and BPH, with recent admission for vision loss, and presented earlier today for generalized weakness.    IMPRESSION  #Leukocytosis- Unclear cause.   #Chest pressure, Small pericardial effusion.  #Recent admission for TIA  #Right sided abdominal wall pain- CT noted- Right inguinal hernia with bladder entering in hernia? referred discomfort?  #History of Covid-19 (2020) and possible covid induced interstitial lung disease. Stable lung nodules.   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    RECOMMENDATIONS  -No Blood cultures performed on admission.  -Currently no signs of cellulitis, pneumonia, UTI, abdominal complaints or findings.   -D/C abx. Monitor off abx. If there is an infection, let it declare itself.   -Workup for TIA- Follow up with MRI brain. (thrombosis can cause leukocytosis)  -Consider urology/Surgery consult for the inguinal hernia  -Off loading to prevent pressure sores and preventive measures to avoid aspiration    If any questions, please send a message or call on [employer]  Please continue to update ID with any pertinent new laboratory or radiographic findings.    Yanelis Damico M.D  Infectious Diseases Attending/   [employer] at [facility]

## 2024-10-29 NOTE — PROGRESS NOTE ADULT - SUBJECTIVE AND OBJECTIVE BOX
LENGTH OF HOSPITAL STAY: 1d    CHIEF COMPLAINT:    Patient is a 79y old  Male who presents with a chief complaint of CP    OVERNIGHT EVENTS:    - tele reviewed by me; NSR, no events overnight  - no overnight events;   - pt examined at bedside, family at bedside, discussed case, answered questions  - tolerating PO, having BMs, making urine without urinary sxs    FOLLOW UP:    - MR head/liver; trend WBC fever;     HPI:    79 year old male with PMH of CAD, DM, HTN, and BPH, with recent admission for vision loss, and presented earlier today for generalized weakness, returning to the ER for chest pain. Patient states shortly after being discharged from the ER, patient went home and then began to have a sudden onset of substernal chest pressure, associated with shortness of breath.  Patient denies experiencing similar symptoms earlier today.   (28 Oct 2024 20:48)    Recent d/c known to author - was here with transient loss of vision, worked up by neuro, pending OP MRI needing auth - will order this admission - may be related; on imaging this admission fth abnormality in liver, will get MR liver as well;       ALLERGIES:    No Known Drug Allergies  Seafood (Anaphylaxis)      PMHx:    DM (diabetes mellitus)  CAD (coronary artery disease)  BPH (benign prostatic hyperplasia)  History of hematologic disorder  Total cataract  [place] (hard of hearing)  Acute myocardial infarction  Chronic kidney disease (CKD)  H/O coronary angiogram  Stented coronary artery  History of ear surgery  H/O tonsillitis      SOCIAL Hx:    - [place]  - difficulty ambulating 2/2 neuropathy    MEDICATIONS:  STANDING MEDICATIONS  aspirin enteric coated 81 milliGRAM(s) Oral daily  atorvastatin 80 milliGRAM(s) Oral at bedtime  chlorhexidine 2% Cloths 1 Application(s) Topical <User Schedule>  clopidogrel Tablet 75 milliGRAM(s) Oral daily  dextrose 5%. 1000 milliLiter(s) IV Continuous <Continuous>  dextrose 5%. 1000 milliLiter(s) IV Continuous <Continuous>  dextrose 50% Injectable 12.5 Gram(s) IV Push once  dextrose 50% Injectable 25 Gram(s) IV Push once  dextrose 50% Injectable 25 Gram(s) IV Push once  influenza  Vaccine (HIGH DOSE) 0.5 milliLiter(s) IntraMuscular once  insulin glargine Injectable (LANTUS) 45 Unit(s) SubCutaneous at bedtime  insulin lispro (ADMELOG) corrective regimen sliding scale   SubCutaneous three times a day before meals  lisinopril 5 milliGRAM(s) Oral daily  memantine 5 milliGRAM(s) Oral daily  metoprolol tartrate 50 milliGRAM(s) Oral two times a day  pantoprazole    Tablet 40 milliGRAM(s) Oral before breakfast  pregabalin 150 milliGRAM(s) Oral every 12 hours  ranolazine 500 milliGRAM(s) Oral two times a day  tamsulosin 0.4 milliGRAM(s) Oral at bedtime    PRN MEDICATIONS  acetaminophen     Tablet .. 650 milliGRAM(s) Oral every 6 hours PRN  albuterol    90 MICROgram(s) HFA Inhaler 2 Puff(s) Inhalation every 6 hours PRN  aluminum hydroxide/magnesium hydroxide/simethicone Suspension 30 milliLiter(s) Oral every 4 hours PRN  dextrose Oral Gel 15 Gram(s) Oral once PRN  melatonin 5 milliGRAM(s) Oral at bedtime PRN  morphine  - Injectable 4 milliGRAM(s) IV Push every 4 hours PRN  ondansetron Injectable 4 milliGRAM(s) IV Push every 8 hours PRN      LABS:                        12.1   20.09 )-----------( 148      ( 29 Oct 2024 07:03 )             37.8     10-29    133[L]  |  100  |  33[H]  ----------------------------<  188[H]  5.2[H]   |  20  |  2.4[H]    Ca    8.1[L]      29 Oct 2024 07:03  Mg     2.3     10-28    TPro  5.3[L]  /  Alb  3.2[L]  /  TBili  0.9  /  DBili  x   /  AST  12  /  ALT  13  /  AlkPhos  72  10-29    PT/INR - ( 28 Oct 2024 10:16 )   PT: 11.90 sec;   INR: 1.04 ratio         PTT - ( 28 Oct 2024 10:16 )  PTT:28.9 sec  Urinalysis Basic - ( 29 Oct 2024 07:03 )    Color: x / Appearance: x / SG: x / pH: x  Gluc: 188 mg/dL / Ketone: x  / Bili: x / Urobili: x   Blood: x / Protein: x / Nitrite: x   Leuk Esterase: x / RBC: x / WBC x   Sq Epi: x / Non Sq Epi: x / Bacteria: x        Lactate, Blood: 1.8 mmol/L (10-29-24 @ 07:03)  Lactate, Blood: 2.2 mmol/L *H* (10-28-24 @ 21:35)      Urinalysis with Rflx Culture (collected 28 Oct 2024 12:51)      RADIOLOGY:      Bladder enters a right inguinal hernia with nonspecific bladder wall   thickening in this location and follow-up with outpatient urology is   recommended.    Indeterminate left hepatic 1.4 cm hypodensity and indeterminate left   lower pole 2.5 cm hypodensity.    As this patient contains a cardiac pacing device, further   characterization of the above findings is recommended with outpatient   liver protocol and renal protocol CT.    However, if this cardiac pacing device is MRI compatible, this can be   further evaluated with MRI on outpatient basis    Findings consistent with interstitial lung disease.    Stable pulmonary nodules and follow-up as per Fleischner Society   guidelines.    --- End of Report ---          VITALS:   T(F): 99.4  HR: 94  BP: 94/61  RR: 18  SpO2: 94%        PHYSICAL EXAM:    Gen: NAD, sitting in chair  HEENT: Normocephalic, atraumatic  Neck: supple, no lymphadenopathy  CV: Regular rate & regular rhythm  Lungs: decreased BS at bases, no fremitus  Abdomen: Soft, BS present, pronounced, non tender  Ext: Warm, well perfused, no pitting edema  Neuro: moves all extremities against resistance, no droop, awake  Skin: no rash, no erythema

## 2024-10-29 NOTE — CONSULT NOTE ADULT - SUBJECTIVE AND OBJECTIVE BOX
HPI:  79 year old male with PMH of CAD, DM, HTN, and BPH, with recent admission for vision loss, and presented earlier today for generalized weakness, returning to the ER for chest pain. Patient states shortly after being discharged from the ER, patient went home and then began to have a sudden onset of substernal chest pressure, associated with shortness of breath.  Patient denies experiencing similar symptoms earlier today.   (28 Oct 2024 20:48)    Patient without CP currently. He had developed CP along with SOB and back pain. It had not occurred prior and no longer experiencing. He follows with Dr. Murrieta and has follow up appointment. shortly.       ROS: A 10-point review of systems was otherwise negative.    PAST MEDICAL & SURGICAL HISTORY:  DM (diabetes mellitus)      CAD (coronary artery disease)      BPH (benign prostatic hyperplasia)      History of hematologic disorder      Total cataract      [place] (hard of hearing)      Acute myocardial infarction      Chronic kidney disease (CKD)      H/O coronary angiogram      Stented coronary artery      History of ear surgery      H/O tonsillitis          SOCIAL HISTORY:  FAMILY HISTORY:  Family history of breast cancer in mother    FHx: heart disease    Family history of diabetes mellitus (DM)        ALLERGIES: 	  No Known Drug Allergies  Seafood (Anaphylaxis)            MEDICATIONS:  acetaminophen     Tablet .. 650 milliGRAM(s) Oral every 6 hours PRN  albuterol    90 MICROgram(s) HFA Inhaler 2 Puff(s) Inhalation every 6 hours PRN  aluminum hydroxide/magnesium hydroxide/simethicone Suspension 30 milliLiter(s) Oral every 4 hours PRN  aspirin enteric coated 81 milliGRAM(s) Oral daily  atorvastatin 80 milliGRAM(s) Oral at bedtime  cefepime   IVPB 1000 milliGRAM(s) IV Intermittent every 12 hours  chlorhexidine 2% Cloths 1 Application(s) Topical <User Schedule>  clopidogrel Tablet 75 milliGRAM(s) Oral daily  dextrose 5%. 1000 milliLiter(s) IV Continuous <Continuous>  dextrose 5%. 1000 milliLiter(s) IV Continuous <Continuous>  dextrose 50% Injectable 12.5 Gram(s) IV Push once  dextrose 50% Injectable 25 Gram(s) IV Push once  dextrose 50% Injectable 25 Gram(s) IV Push once  dextrose Oral Gel 15 Gram(s) Oral once PRN  influenza  Vaccine (HIGH DOSE) 0.5 milliLiter(s) IntraMuscular once  insulin glargine Injectable (LANTUS) 45 Unit(s) SubCutaneous at bedtime  insulin lispro (ADMELOG) corrective regimen sliding scale   SubCutaneous three times a day before meals  lisinopril 5 milliGRAM(s) Oral daily  melatonin 5 milliGRAM(s) Oral at bedtime PRN  memantine 5 milliGRAM(s) Oral daily  metoprolol tartrate 50 milliGRAM(s) Oral two times a day  morphine  - Injectable 4 milliGRAM(s) IV Push every 4 hours PRN  ondansetron Injectable 4 milliGRAM(s) IV Push every 8 hours PRN  pantoprazole    Tablet 40 milliGRAM(s) Oral before breakfast  pregabalin 150 milliGRAM(s) Oral every 12 hours  ranolazine 500 milliGRAM(s) Oral two times a day  tamsulosin 0.4 milliGRAM(s) Oral at bedtime  vancomycin  IVPB 1000 milliGRAM(s) IV Intermittent every 24 hours      PHYSICAL EXAM:  T(C): 36.9 (10-29-24 @ 04:23), Max: 37.2 (10-28-24 @ 21:10)  HR: 71 (10-29-24 @ 06:38) (67 - 92)  BP: 89/64 (10-29-24 @ 06:38) (84/55 - 136/85)  RR: 18 (10-29-24 @ 04:23) (18 - 24)  SpO2: 94% (10-29-24 @ 04:23) (93% - 99%)  Wt(kg): --    GEN: Awake, comfortable. NAD.   HEENT: NCAT, PERRL, EOMI. Mucosa moist. No JVD.   RESP: CTA b/l  CV: RRR, normal s1/s2. No m/r/g.  ABD: Soft, NTND. BS+  EXT: Warm. No edema, clubbing, or cyanosis.   NEURO: AAOx3. No focal deficits.    I&O's Summary    28 Oct 2024 07:01  -  29 Oct 2024 07:00  --------------------------------------------------------  IN: 0 mL / OUT: 600 mL / NET: -600 mL      Height (cm): 165.1 (10-28 @ 21:10), 162.6 (10-28 @ 09:54)  Weight (kg): 87.7 (10-28 @ 21:10), 83.9 (10-28 @ 09:54)  BMI (kg/m2): 32.2 (10-28 @ 21:10), 31.7 (10-28 @ 09:54)  BSA (m2): 1.95 (10-28 @ 21:10), 1.89 (10-28 @ 09:54)  	  LABS:	 	    CARDIAC MARKERS:                                  12.1   20.09 )-----------( 148      ( 29 Oct 2024 07:03 )             37.8     10-29    133[L]  |  100  |  33[H]  ----------------------------<  188[H]  5.2[H]   |  20  |  2.4[H]    Ca    8.1[L]      29 Oct 2024 07:03  Mg     2.3     10-28    TPro  5.3[L]  /  Alb  3.2[L]  /  TBili  0.9  /  DBili  x   /  AST  12  /  ALT  13  /  AlkPhos  72  10-29    proBNP:   Lipid Profile:   HgA1c:   TSH:     TELEMETRY: NSR	    ECG: < from: 12 Lead ECG (10.29.24 @ 08:30) >  Normal sinus rhythm with sinus arrhythmia  Left axis deviation  Cannot rule out Anterior infarct , age undetermined      < end of copied text >   	  RADIOLOGY:   ECHO: < from: TTE Echo Complete w/ Contrast w/ Doppler (10.29.24 @ 06:28) >    Summary:   1. Technically suboptimal study.   2. Normal global left ventricular systolic function.   3. Left ventricular ejection fraction, by visual estimation, is 65 to   70%.   4. Mild concentric left ventricular hypertrophy.   5. Spectral Doppler shows impaired relaxation pattern of left   ventricular myocardial filling (Grade I diastolic dysfunction).   6. Normal right ventricular size and function.   7. Left atrial size not well visualized.   8. Right atrial size not well visualized.   9. Sclerotic aortic valve with decreased opening.  10. Mild to moderate mitral annular calcification.  11. No evidence of mitral valve regurgitation.  12. Dilatation of the ascending aorta, measures 3.7 cm.  13.Estimated pulmonary artery systolic pressure is 35.1 mmHg assuming a   right atrial pressure of 15 mmHg, which is consistent with borderline   pulmonary hypertension.  14. Small pericardial effusion.    < end of copied text >    STRESS:   CATH:

## 2024-10-29 NOTE — CONSULT NOTE ADULT - SUBJECTIVE AND OBJECTIVE BOX
CASEY BENÍTEZ  79y, Male  Allergies    No Known Drug Allergies  Seafood (Anaphylaxis)    Intolerances    LOS  1d    HPI  HPI:  79 year old male with PMH of CAD, DM, HTN, and BPH, with recent admission for vision loss, and presented earlier today for generalized weakness, returning to the ER for chest pain. Patient states shortly after being discharged from the ER, patient went home and then began to have a sudden onset of substernal chest pressure, associated with shortness of breath.  Patient denies experiencing similar symptoms earlier today.   (28 Oct 2024 20:48)    INFECTIOUS DISEASE HISTORY:  Hospital course-  ID consulted for antimicrobial recommendations.     Prior hospital charts reviewed [Yes]  Primary team notes reviewed [Yes]  Other consultant notes reviewed [Yes]    REVIEW OF SYSTEMS:  CONSTITUTIONAL: No fever or chills  HEENT: No sore throat  RESPIRATORY: No cough, no shortness of breath  CARDIOVASCULAR: No chest pain or palpitations  GASTROINTESTINAL: No abdominal or epigastric pain  GENITOURINARY: No dysuria  NEUROLOGICAL: No headache/dizziness  MSK: No joint pain, erythema, or swelling; no back pain  SKIN: No itching, rashes  All other ROS negative except noted above    PAST MEDICAL & SURGICAL HISTORY:  DM (diabetes mellitus)      CAD (coronary artery disease)      BPH (benign prostatic hyperplasia)      History of hematologic disorder      Total cataract      [place] (hard of hearing)      Acute myocardial infarction      Chronic kidney disease (CKD)      H/O coronary angiogram      Stented coronary artery      History of ear surgery      H/O tonsillitis          SOCIAL HISTORY:  - No recent travel    FAMILY HISTORY:  Family history of breast cancer in mother    FHx: heart disease    Family history of diabetes mellitus (DM)    ANTIMICROBIALS:      ANTIMICROBIALS (past 90 days):  MEDICATIONS  (STANDING):  cefepime   IVPB   100 mL/Hr IV Intermittent (10-29-24 @ 05:44)        OTHER MEDS:   MEDICATIONS  (STANDING):  acetaminophen     Tablet .. 650 every 6 hours PRN  albuterol    90 MICROgram(s) HFA Inhaler 2 every 6 hours PRN  aluminum hydroxide/magnesium hydroxide/simethicone Suspension 30 every 4 hours PRN  aspirin enteric coated 81 daily  atorvastatin 80 at bedtime  clopidogrel Tablet 75 daily  dextrose 50% Injectable 12.5 once  dextrose 50% Injectable 25 once  dextrose 50% Injectable 25 once  dextrose Oral Gel 15 once PRN  furosemide   Injectable 20 once  heparin   Injectable 5000 every 8 hours  influenza  Vaccine (HIGH DOSE) 0.5 once  insulin glargine Injectable (LANTUS) 45 at bedtime  insulin lispro (ADMELOG) corrective regimen sliding scale  three times a day before meals  melatonin 5 at bedtime PRN  memantine 5 daily  metoprolol tartrate 50 two times a day  ondansetron Injectable 4 every 8 hours PRN  pantoprazole    Tablet 40 before breakfast  pregabalin 150 every 12 hours  ranolazine 500 two times a day  tamsulosin 0.4 at bedtime      VITALS:  Vital Signs Last 24 Hrs  T(F): 99.4 (10-29-24 @ 14:00), Max: 99.4 (10-29-24 @ 14:00)    Vital Signs Last 24 Hrs  HR: 94 (10-29-24 @ 14:00) (71 - 94)  BP: 94/61 (10-29-24 @ 14:00) (84/55 - 117/73)  RR: 18 (10-29-24 @ 14:00)  SpO2: 94% (10-29-24 @ 14:00) (93% - 94%)  Wt(kg): --    EXAM:  GENERAL: NAD, Obese. Tired.  HEAD: No head lesions  NECK: Supple  CHEST/LUNG: Clear to auscultation bilaterally  HEART: S1 S2  ABDOMEN: Soft, nontender, mild right lower back tenderness.   EXTREMITIES: No clubbing, cyanosis, or petal edema  NERVOUS SYSTEM: Alert and oriented to person  MSK: No joint erythema, swelling or pain  SKIN: No rashes or lesions, no superficial thrombophlebitis    Labs:                        12.1   20.09 )-----------( 148      ( 29 Oct 2024 07:03 )             37.8     10-29    133[L]  |  100  |  33[H]  ----------------------------<  188[H]  5.2[H]   |  20  |  2.4[H]    Ca    8.1[L]      29 Oct 2024 07:03  Mg     2.3     10-28    TPro  5.3[L]  /  Alb  3.2[L]  /  TBili  0.9  /  DBili  x   /  AST  12  /  ALT  13  /  AlkPhos  72  10-29      WBC Trend:  WBC Count: 20.09 (10-29-24 @ 07:03)  WBC Count: 15.01 (10-28-24 @ 18:10)  WBC Count: 14.50 (10-28-24 @ 10:16)  WBC Count: 11.68 (10-25-24 @ 06:18)      Auto Neutrophil #: 15.30 K/uL (10-29-24 @ 07:03)  Auto Neutrophil #: 10.68 K/uL (10-28-24 @ 18:10)  Auto Neutrophil #: 10.51 K/uL (10-28-24 @ 10:16)  Auto Neutrophil #: 7.30 K/uL (10-25-24 @ 06:18)  Auto Neutrophil #: 8.16 K/uL (10-24-24 @ 06:36)      Creatine Trend:  Creatinine: 2.4 (10-29)  Creatinine: 2.0 (10-28)  Creatinine: 2.3 (10-28)  Creatinine: 1.9 (10-25)      Liver Biochemical Testing Trend:  Alanine Aminotransferase (ALT/SGPT): 13 (10-29)  Alanine Aminotransferase (ALT/SGPT): 19 (10-28)  Alanine Aminotransferase (ALT/SGPT): 20 (10-28)  Alanine Aminotransferase (ALT/SGPT): 20 (10-24)  Alanine Aminotransferase (ALT/SGPT): 21 (10-23)  Aspartate Aminotransferase (AST/SGOT): 12 (10-29-24 @ 07:03)  Aspartate Aminotransferase (AST/SGOT): 23 (10-28-24 @ 18:10)  Aspartate Aminotransferase (AST/SGOT): 26 (10-28-24 @ 10:16)  Aspartate Aminotransferase (AST/SGOT): 19 (10-24-24 @ 06:36)  Aspartate Aminotransferase (AST/SGOT): 24 (10-23-24 @ 19:10)  Bilirubin Total: 0.9 (10-29)  Bilirubin Total: 0.8 (10-28)  Bilirubin Total: 0.7 (10-28)  Bilirubin Total: 0.7 (10-24)  Bilirubin Total: 0.5 (10-23)      Trend LDH      Auto Eosinophil %: 0.1 % (10-29-24 @ 07:03)  Auto Eosinophil %: 0.6 % (10-28-24 @ 18:10)  Auto Eosinophil %: 1.0 % (10-28-24 @ 10:16)      Urinalysis Basic - ( 29 Oct 2024 07:03 )    Color: x / Appearance: x / SG: x / pH: x  Gluc: 188 mg/dL / Ketone: x  / Bili: x / Urobili: x   Blood: x / Protein: x / Nitrite: x   Leuk Esterase: x / RBC: x / WBC x   Sq Epi: x / Non Sq Epi: x / Bacteria: x        Troponin T, High Sensitivity Result: 20 (10-29)  Troponin T, High Sensitivity Result: 15 (10-28)  Troponin T, High Sensitivity Result: 16 (10-28)  Troponin T, High Sensitivity Result: 20 (10-28)    Lactate, Blood: 1.8 (10-29 @ 07:03)  Lactate, Blood: 2.2 (10-28 @ 21:35)  Blood Gas Venous - Lactate: 2.3 (10-28 @ 18:39)  Lactate, Blood: 1.8 (10-28 @ 13:41)    A1C with Estimated Average Glucose Result: 8.1 % (10-29-24 @ 07:03)  A1C with Estimated Average Glucose Result: 8.1 % (10-24-24 @ 06:36)  A1C with Estimated Average Glucose Result: 8.3 % (06-24-24 @ 06:44)      INFLAMMATORY MARKERS      RADIOLOGY & ADDITIONAL TESTS:  I have personally reviewed the imagings.  CXR  Xray Chest 1 View- PORTABLE-Urgent:   ACC: [id number] EXAM:  XR CHEST PORTABLE URGENT 1V   ORDERED BY: MICHELLE TRENT     PROCEDURE DATE:  10/28/2024          INTERPRETATION:  CLINICAL HISTORY: Chest Pain.    COMPARISON: Chest radiograph 10/28/2024 at 10:14 AM.    TECHNIQUE: Frontal chest radiograph.    FINDINGS:    Support devices: Left chest pacemaker.    Cardiac/mediastinum/hilum: Unchanged.    Lung parenchyma/Pleura: Mild increase in bilateral opacities. No   pneumothorax.    Skeleton/soft tissues: Unchanged.      IMPRESSION:    Mild increase in bilateral opacities.    --- End of Report ---          SOM ZAMORA MD; Resident Radiologist  This document has been electronically signed.  MOHAMUD MINA MD; Attending Radiologist  This document has been electronically signed. Oct 29 2024 10:32AM (10-28-24 @ 19:42)      CT  CT Abdomen and Pelvis No Cont:   ACC: [id number] EXAM:  CT ABDOMEN AND PELVIS   ORDERED BY: IDA SINGH     PROCEDURE DATE:  10/28/2024          INTERPRETATION:  CLINICAL STATEMENT: Abdominal pain    TECHNIQUE: Contiguous axial CT images were obtained from the lower chest   to the pubic symphysis . Residual intravenous contrast is on board from a   recent postcontrast CT scan performed earlier in the day Reformatted   images in the coronal and sagittal planes were acquired.    COMPARISON CT: Cardiac CT 6/21/2024, chest CT 6/23/2024      FINDINGS:    LOWER CHEST: Partially imaged cardiac pacemaker leads. Bilateral lower   lung field reticular opacities, stable. Anterior left lower lung field   nodular opacity in image 22 series 301 appears stable. There is a 5 mm   pulmonary nodule in the left lower lobe in image 20 series 301 and a 4 mm   nodule in image 7 of series 301. These are stable since 6/23/2024.    HEPATOBILIARY: There is an indeterminate left hepatic 1.4 cm hypodensity   in image 75 of series 302. The gallbladder is present.    SPLEEN: Unremarkable..    PANCREAS: Unremarkable..    ADRENAL GLANDS: Unremarkable..    KIDNEYS: There is an indeterminate left lower pole 2.5 cm hypodensity in   image 63 of series 301. There are bilateral renal cysts. There is no   hydronephrosis. Excreted contrast is noted within the collecting system   from recent intravenous contrast administration.    ABDOMINOPELVIC NODES: Unremarkable...    PELVIC ORGANS: A portion of the bladder enters a right inguinal hernia.   There is nonspecific bladder wall thickening in this location and   follow-up with outpatient urology is recommended. The right inguinal   hernia is incompletely imaged. There is a trace amount of free fluid   within the right inguinal hernia. The prostate gland is enlarged.    PERITONEUM/MESENTERY/BOWEL: Diverticulosis. The appendix is unremarkable.   There is a small hiatal hernia. There is no free air or obstruction..    BONES: Degenerative change.There is compression deformity involving T12   and L5, age indeterminate...    IMPRESSION:    Bladder enters a right inguinal hernia with nonspecific bladder wall   thickening in this location and follow-up with outpatient urology is   recommended.    Indeterminate left hepatic 1.4 cm hypodensity and indeterminate left   lower pole 2.5 cm hypodensity.    As this patient contains a cardiac pacing device, further   characterization of the above findings is recommended with outpatient   liver protocol and renal protocol CT.    However, if this cardiac pacing device is MRI compatible, this can be   further evaluated with MRI on outpatient basis    Findings consistent with interstitial lung disease.    Stable pulmonary nodules and follow-up as per Fleischner Society   guidelines.    --- End of Report ---            REGINE SULTANA MD; Attending Radiologist  This document has been electronically signed. Oct 28 2024  2:05PM (10-28-24 @ 13:00)    < from: CT Angio Neck w/ IV Cont (10.28.24 @ 10:40) >  HEAD:  The distal internal carotid arteries demonstrate calcific plaque with   mild stenosis on the right. The anterior and middle cerebral arteries are   patent. Hypoplastic A1 segment of the right RUBEN, normal variant.    The distal right vertebral artery is unremarkable. The distal left   vertebral artery terminates as the PICA, which is diminutive (unchanged).   The basilar artery is patent. The posterior cerebral arteries are patent.    OTHER:  Left chest wall pacemaker.  Degenerative changes of the spine.    IMPRESSION:    1.  No evidence of acute large vessel occlusion. Stable exam.    2.  Calcific plaque of bilateral proximal ICAs with about 50% stenosis on   the right and <50% on the left.    < end of copied text >  < from: CT Head No Cont (10.28.24 @ 10:39) >  IMPRESSION:    1.  No evidence of acute intracranial hemorrhage or large territory   infarct.    2.  Stable mild chronic microvascular changes and lacunar infarcts.    --- End of Report ---    < end of copied text >      CARDIOLOGY TESTING  12 Lead ECG:   Ventricular Rate 95 BPM    Atrial Rate 95 BPM    P-R Interval 154 ms    QRS Duration 80 ms    Q-T Interval 354 ms    QTC Calculation(Bazett) 444 ms    P Axis 37 degrees    R Axis -48 degrees    T Axis 66 degrees    Diagnosis Line Normal sinus rhythm with sinus arrhythmia  Left axis deviation  Cannot rule out Anterior infarct , age undetermined      Confirmed by JONAS MACHUCA MD ([id number]) on 10/29/2024 9:12:30 AM (10-29-24 @ 08:30)  12 Lead ECG:   Ventricular Rate 86 BPM    Atrial Rate 86 BPM    P-R Interval 166 ms    QRS Duration 82 ms    Q-T Interval 352 ms    QTC Calculation(Bazett) 421 ms    P Axis 36 degrees    R Axis -18 degrees    T Axis 55 degrees    Diagnosis Line    Normal sinus rhythm  Possible Inferior infarct , age undetermined  Abnormal ECG    Confirmed by JONAS MACHUCA MD ([id number]) on 10/29/2024 7:13:08 AM (10-28-24 @ 18:18)

## 2024-10-29 NOTE — CONSULT NOTE ADULT - ASSESSMENT
Mr. Mcclain is a 78yo M with CAD s/p PCI, HTN, HLD, DM, CKD, PPM, BPH, possible TIA who presents for CP.     CP: NEgative for ACS without ischemic EKG or troponin elevation.  -Unclear cause for CP.   -May be underlying infection.  -TTE with small pericardial effusion without tamponade.   -Can consider checking CRP/ESR. Though doubt pericarditis given no longer having CP.   -No need to treat with ASA or colchicine at this time.  -Will need follow up TTE as outpatient with Dr. Murrieta.   -Continue with current meds.

## 2024-10-29 NOTE — PROGRESS NOTE ADULT - ASSESSMENT
79 year old male with PMH of CAD, DM, HTN, and BPH, with recent admission for vision loss, and presented earlier today for generalized weakness, returning to the ER for chest pain.     # Chest Pain  - sxs resolved on exam  - Cardio consult appreciated  - echo good ef, AS, small pericardial effusion volume overload  - f/u esr/crp r/o pericarditis (doubt)  - can d/c tele    # leukocytosis  - afebrile, cxr negative, ua negative  - ID recs appreciated  - observe off abx, trend wbc and fever curve  - smear available in lab r/o blasts    # HFpEF exacerbation   - comfortable on room air  - IVC non collapsing on tte  - lasix 20 bid IV if BP improves    # CARINA - pre renal iso hypotension vs congestion    # r/o CVA  - f/u MR brain  - needs OP f/u ophthalmology (noted on last d/c)    # DM  - FS with SS    # HTN  - hold home meds iso hypotension  - resume as appropriate     # BPH  - c/w home med

## 2024-10-29 NOTE — CHART NOTE - NSCHARTNOTEFT_GEN_A_CORE
BP is "soft" this morning, will hold antihypertensives and try fluid bolus BP is "soft" this morning (84/55) though MAP is 65. Patient did produce 600 ml urine overnight. At this time will hold antihypertensives and try fluid bolus BP is "soft" this morning (84/55) though MAP is 65. Patient did produce 600 ml urine overnight. Seen at bedside, once I woke him up from sleep he had a normal conversation with me. Denies lightheadedness. At this time will hold antihypertensives and try fluid bolus. Awake morning labs BP is "soft" this morning (84/55) though MAP is 65. Patient did produce 600 ml urine overnight. Seen at bedside, once I woke him up from sleep he had a normal conversation with me. Denies lightheadedness. Bilateral radial pulses easily felt. At this time will hold antihypertensives and try fluid bolus. Awake morning labs

## 2024-10-30 LAB
ALBUMIN SERPL ELPH-MCNC: 3.3 G/DL — LOW (ref 3.5–5.2)
ALP SERPL-CCNC: 70 U/L — SIGNIFICANT CHANGE UP (ref 30–115)
ALT FLD-CCNC: 14 U/L — SIGNIFICANT CHANGE UP (ref 0–41)
ANION GAP SERPL CALC-SCNC: 16 MMOL/L — HIGH (ref 7–14)
AST SERPL-CCNC: 15 U/L — SIGNIFICANT CHANGE UP (ref 0–41)
BASE EXCESS BLDA CALC-SCNC: -7.3 MMOL/L — LOW (ref -2–3)
BASOPHILS # BLD AUTO: 0.04 K/UL — SIGNIFICANT CHANGE UP (ref 0–0.2)
BASOPHILS NFR BLD AUTO: 0.3 % — SIGNIFICANT CHANGE UP (ref 0–1)
BILIRUB SERPL-MCNC: 1 MG/DL — SIGNIFICANT CHANGE UP (ref 0.2–1.2)
BUN SERPL-MCNC: 38 MG/DL — HIGH (ref 10–20)
CALCIUM SERPL-MCNC: 8.1 MG/DL — LOW (ref 8.4–10.5)
CHLORIDE SERPL-SCNC: 101 MMOL/L — SIGNIFICANT CHANGE UP (ref 98–110)
CO2 SERPL-SCNC: 17 MMOL/L — SIGNIFICANT CHANGE UP (ref 17–32)
CREAT SERPL-MCNC: 2.4 MG/DL — HIGH (ref 0.7–1.5)
CRP SERPL-MCNC: 205.4 MG/L — HIGH
EGFR: 27 ML/MIN/1.73M2 — LOW
EOSINOPHIL # BLD AUTO: 0.04 K/UL — SIGNIFICANT CHANGE UP (ref 0–0.7)
EOSINOPHIL NFR BLD AUTO: 0.3 % — SIGNIFICANT CHANGE UP (ref 0–8)
ERYTHROCYTE [SEDIMENTATION RATE] IN BLOOD: 34 MM/HR — HIGH (ref 0–10)
GLUCOSE BLDC GLUCOMTR-MCNC: 200 MG/DL — HIGH (ref 70–99)
GLUCOSE BLDC GLUCOMTR-MCNC: 258 MG/DL — HIGH (ref 70–99)
GLUCOSE BLDC GLUCOMTR-MCNC: 271 MG/DL — HIGH (ref 70–99)
GLUCOSE BLDC GLUCOMTR-MCNC: 316 MG/DL — HIGH (ref 70–99)
GLUCOSE SERPL-MCNC: 197 MG/DL — HIGH (ref 70–99)
HCO3 BLDA-SCNC: 17 MMOL/L — LOW (ref 21–28)
HCT VFR BLD CALC: 37.9 % — LOW (ref 42–52)
HGB BLD-MCNC: 12.2 G/DL — LOW (ref 14–18)
IMM GRANULOCYTES NFR BLD AUTO: 1.2 % — HIGH (ref 0.1–0.3)
LYMPHOCYTES # BLD AUTO: 1.84 K/UL — SIGNIFICANT CHANGE UP (ref 1.2–3.4)
LYMPHOCYTES # BLD AUTO: 12.4 % — LOW (ref 20.5–51.1)
MAGNESIUM SERPL-MCNC: 2.3 MG/DL — SIGNIFICANT CHANGE UP (ref 1.8–2.4)
MCHC RBC-ENTMCNC: 28.4 PG — SIGNIFICANT CHANGE UP (ref 27–31)
MCHC RBC-ENTMCNC: 32.2 G/DL — SIGNIFICANT CHANGE UP (ref 32–37)
MCV RBC AUTO: 88.1 FL — SIGNIFICANT CHANGE UP (ref 80–94)
MONOCYTES # BLD AUTO: 1.41 K/UL — HIGH (ref 0.1–0.6)
MONOCYTES NFR BLD AUTO: 9.5 % — HIGH (ref 1.7–9.3)
NEUTROPHILS # BLD AUTO: 11.29 K/UL — HIGH (ref 1.4–6.5)
NEUTROPHILS NFR BLD AUTO: 76.3 % — HIGH (ref 42.2–75.2)
NRBC # BLD: 0 /100 WBCS — SIGNIFICANT CHANGE UP (ref 0–0)
PCO2 BLDA: 31 MMHG — LOW (ref 35–48)
PH BLDA: 7.35 — SIGNIFICANT CHANGE UP (ref 7.35–7.45)
PHOSPHATE SERPL-MCNC: 4 MG/DL — SIGNIFICANT CHANGE UP (ref 2.1–4.9)
PLATELET # BLD AUTO: 136 K/UL — SIGNIFICANT CHANGE UP (ref 130–400)
PMV BLD: 12.1 FL — HIGH (ref 7.4–10.4)
PO2 BLDA: 78 MMHG — LOW (ref 83–108)
POTASSIUM SERPL-MCNC: 4.4 MMOL/L — SIGNIFICANT CHANGE UP (ref 3.5–5)
POTASSIUM SERPL-SCNC: 4.4 MMOL/L — SIGNIFICANT CHANGE UP (ref 3.5–5)
PROT SERPL-MCNC: 5.5 G/DL — LOW (ref 6–8)
RBC # BLD: 4.3 M/UL — LOW (ref 4.7–6.1)
RBC # FLD: 16.4 % — HIGH (ref 11.5–14.5)
SAO2 % BLDA: 96.9 % — SIGNIFICANT CHANGE UP (ref 94–98)
SODIUM SERPL-SCNC: 134 MMOL/L — LOW (ref 135–146)
WBC # BLD: 14.8 K/UL — HIGH (ref 4.8–10.8)
WBC # FLD AUTO: 14.8 K/UL — HIGH (ref 4.8–10.8)

## 2024-10-30 PROCEDURE — 99233 SBSQ HOSP IP/OBS HIGH 50: CPT

## 2024-10-30 PROCEDURE — 93010 ELECTROCARDIOGRAM REPORT: CPT

## 2024-10-30 PROCEDURE — 71045 X-RAY EXAM CHEST 1 VIEW: CPT | Mod: 26

## 2024-10-30 PROCEDURE — 99291 CRITICAL CARE FIRST HOUR: CPT

## 2024-10-30 PROCEDURE — 74230 X-RAY XM SWLNG FUNCJ C+: CPT | Mod: 26

## 2024-10-30 RX ORDER — AMPICILLIN AND SULBACTAM 2; 1 G/1; G/1
1.5 INJECTION, POWDER, FOR SOLUTION INTRAMUSCULAR; INTRAVENOUS EVERY 6 HOURS
Refills: 0 | Status: DISCONTINUED | OUTPATIENT
Start: 2024-10-30 | End: 2024-10-31

## 2024-10-30 RX ORDER — AMIODARONE HCL 200 MG
1 TABLET ORAL
Qty: 450 | Refills: 0 | Status: DISCONTINUED | OUTPATIENT
Start: 2024-10-30 | End: 2024-10-31

## 2024-10-30 RX ORDER — AMIODARONE HCL 200 MG
0.5 TABLET ORAL
Qty: 450 | Refills: 0 | Status: DISCONTINUED | OUTPATIENT
Start: 2024-10-30 | End: 2024-10-31

## 2024-10-30 RX ORDER — FUROSEMIDE 40 MG
40 TABLET ORAL DAILY
Refills: 0 | Status: DISCONTINUED | OUTPATIENT
Start: 2024-10-30 | End: 2024-10-31

## 2024-10-30 RX ORDER — AMIODARONE HCL 200 MG
150 TABLET ORAL ONCE
Refills: 0 | Status: COMPLETED | OUTPATIENT
Start: 2024-10-30 | End: 2024-10-30

## 2024-10-30 RX ORDER — HEPARIN SODIUM 10000 [USP'U]/ML
3500 INJECTION INTRAVENOUS; SUBCUTANEOUS EVERY 6 HOURS
Refills: 0 | Status: DISCONTINUED | OUTPATIENT
Start: 2024-10-30 | End: 2024-11-01

## 2024-10-30 RX ORDER — SODIUM CHLORIDE 9 MG/ML
500 INJECTION, SOLUTION INTRAMUSCULAR; INTRAVENOUS; SUBCUTANEOUS ONCE
Refills: 0 | Status: COMPLETED | OUTPATIENT
Start: 2024-10-30 | End: 2024-10-30

## 2024-10-30 RX ORDER — IPRATROPIUM BROMIDE AND ALBUTEROL SULFATE .5; 2.5 MG/3ML; MG/3ML
3 SOLUTION RESPIRATORY (INHALATION) EVERY 6 HOURS
Refills: 0 | Status: DISCONTINUED | OUTPATIENT
Start: 2024-10-30 | End: 2024-11-08

## 2024-10-30 RX ORDER — HEPARIN SODIUM 10000 [USP'U]/ML
7000 INJECTION INTRAVENOUS; SUBCUTANEOUS ONCE
Refills: 0 | Status: COMPLETED | OUTPATIENT
Start: 2024-10-30 | End: 2024-10-30

## 2024-10-30 RX ORDER — METOPROLOL TARTRATE 50 MG
5 TABLET ORAL ONCE
Refills: 0 | Status: COMPLETED | OUTPATIENT
Start: 2024-10-30 | End: 2024-10-30

## 2024-10-30 RX ORDER — AMPICILLIN AND SULBACTAM 2; 1 G/1; G/1
1.5 INJECTION, POWDER, FOR SOLUTION INTRAMUSCULAR; INTRAVENOUS ONCE
Refills: 0 | Status: COMPLETED | OUTPATIENT
Start: 2024-10-30 | End: 2024-10-30

## 2024-10-30 RX ORDER — AMPICILLIN AND SULBACTAM 2; 1 G/1; G/1
INJECTION, POWDER, FOR SOLUTION INTRAMUSCULAR; INTRAVENOUS
Refills: 0 | Status: DISCONTINUED | OUTPATIENT
Start: 2024-10-30 | End: 2024-10-31

## 2024-10-30 RX ORDER — HEPARIN SODIUM 10000 [USP'U]/ML
7000 INJECTION INTRAVENOUS; SUBCUTANEOUS EVERY 6 HOURS
Refills: 0 | Status: DISCONTINUED | OUTPATIENT
Start: 2024-10-30 | End: 2024-11-01

## 2024-10-30 RX ORDER — HEPARIN SODIUM 10000 [USP'U]/ML
INJECTION INTRAVENOUS; SUBCUTANEOUS
Qty: 25000 | Refills: 0 | Status: DISCONTINUED | OUTPATIENT
Start: 2024-10-30 | End: 2024-11-01

## 2024-10-30 RX ADMIN — PANTOPRAZOLE SODIUM 40 MILLIGRAM(S): 40 TABLET, DELAYED RELEASE ORAL at 06:19

## 2024-10-30 RX ADMIN — Medication 1: at 08:15

## 2024-10-30 RX ADMIN — AMPICILLIN AND SULBACTAM 100 GRAM(S): 2; 1 INJECTION, POWDER, FOR SOLUTION INTRAMUSCULAR; INTRAVENOUS at 16:34

## 2024-10-30 RX ADMIN — Medication 50 MILLIGRAM(S): at 06:19

## 2024-10-30 RX ADMIN — MEMANTINE HYDROCHLORIDE 5 MILLIGRAM(S): 21 CAPSULE, EXTENDED RELEASE ORAL at 13:40

## 2024-10-30 RX ADMIN — HEPARIN SODIUM 1600 UNIT(S)/HR: 10000 INJECTION INTRAVENOUS; SUBCUTANEOUS at 21:12

## 2024-10-30 RX ADMIN — Medication 3: at 16:35

## 2024-10-30 RX ADMIN — HEPARIN SODIUM 7000 UNIT(S): 10000 INJECTION INTRAVENOUS; SUBCUTANEOUS at 21:13

## 2024-10-30 RX ADMIN — Medication 600 MILLIGRAM(S): at 21:26

## 2024-10-30 RX ADMIN — HEPARIN SODIUM 5000 UNIT(S): 10000 INJECTION INTRAVENOUS; SUBCUTANEOUS at 06:19

## 2024-10-30 RX ADMIN — Medication 33.3 MG/MIN: at 21:42

## 2024-10-30 RX ADMIN — PREGABALIN 150 MILLIGRAM(S): 150 CAPSULE ORAL at 06:19

## 2024-10-30 RX ADMIN — IPRATROPIUM BROMIDE AND ALBUTEROL SULFATE 3 MILLILITER(S): .5; 2.5 SOLUTION RESPIRATORY (INHALATION) at 20:12

## 2024-10-30 RX ADMIN — Medication 81 MILLIGRAM(S): at 13:42

## 2024-10-30 RX ADMIN — Medication 4: at 13:41

## 2024-10-30 RX ADMIN — SODIUM CHLORIDE 500 MILLILITER(S): 9 INJECTION, SOLUTION INTRAMUSCULAR; INTRAVENOUS; SUBCUTANEOUS at 11:06

## 2024-10-30 RX ADMIN — RANOLAZINE 500 MILLIGRAM(S): 500 TABLET, FILM COATED, EXTENDED RELEASE ORAL at 06:19

## 2024-10-30 RX ADMIN — Medication 40 MILLIGRAM(S): at 18:25

## 2024-10-30 RX ADMIN — Medication 45 UNIT(S): at 22:54

## 2024-10-30 RX ADMIN — AMPICILLIN AND SULBACTAM 100 GRAM(S): 2; 1 INJECTION, POWDER, FOR SOLUTION INTRAMUSCULAR; INTRAVENOUS at 21:43

## 2024-10-30 RX ADMIN — CHLORHEXIDINE GLUCONATE 1 APPLICATION(S): 40 SOLUTION TOPICAL at 06:20

## 2024-10-30 RX ADMIN — Medication 5 MILLIGRAM(S): at 19:27

## 2024-10-30 NOTE — PROGRESS NOTE ADULT - SUBJECTIVE AND OBJECTIVE BOX
Cardiology Follow Up HPI:     Mr. Mcclain is a 80yo M with CAD s/p PCI, HTN, HLD, DM, CKD, PPM, BPH, possible TIA who initially presented for CP.   Received call from hospitalist stating patient had irregularities on telemetry, had AMS and a new EKG was pending.      MEDICATIONS  (STANDING):  ampicillin/sulbactam  IVPB 1.5 Gram(s) IV Intermittent once  ampicillin/sulbactam  IVPB 1.5 Gram(s) IV Intermittent every 6 hours  ampicillin/sulbactam  IVPB      aspirin enteric coated 81 milliGRAM(s) Oral daily  atorvastatin 80 milliGRAM(s) Oral at bedtime  chlorhexidine 2% Cloths 1 Application(s) Topical <User Schedule>  dextrose 5%. 1000 milliLiter(s) (100 mL/Hr) IV Continuous <Continuous>  dextrose 5%. 1000 milliLiter(s) (50 mL/Hr) IV Continuous <Continuous>  dextrose 50% Injectable 12.5 Gram(s) IV Push once  dextrose 50% Injectable 25 Gram(s) IV Push once  dextrose 50% Injectable 25 Gram(s) IV Push once  influenza  Vaccine (HIGH DOSE) 0.5 milliLiter(s) IntraMuscular once  insulin glargine Injectable (LANTUS) 45 Unit(s) SubCutaneous at bedtime  insulin lispro (ADMELOG) corrective regimen sliding scale   SubCutaneous three times a day before meals  memantine 5 milliGRAM(s) Oral daily  metoprolol tartrate 50 milliGRAM(s) Oral two times a day  pantoprazole    Tablet 40 milliGRAM(s) Oral before breakfast  pregabalin 150 milliGRAM(s) Oral every 12 hours  ranolazine 500 milliGRAM(s) Oral two times a day  tamsulosin 0.4 milliGRAM(s) Oral at bedtime    MEDICATIONS  (PRN):  acetaminophen     Tablet .. 650 milliGRAM(s) Oral every 6 hours PRN Temp greater or equal to 38C (100.4F), Mild Pain (1 - 3)  albuterol    90 MICROgram(s) HFA Inhaler 2 Puff(s) Inhalation every 6 hours PRN Shortness of Breath and/or Wheezing  aluminum hydroxide/magnesium hydroxide/simethicone Suspension 30 milliLiter(s) Oral every 4 hours PRN Dyspepsia  dextrose Oral Gel 15 Gram(s) Oral once PRN Blood Glucose LESS THAN 70 milliGRAM(s)/deciliter  melatonin 5 milliGRAM(s) Oral at bedtime PRN Insomnia  ondansetron Injectable 4 milliGRAM(s) IV Push every 8 hours PRN Nausea and/or Vomiting          Vital Signs Last 24 Hrs  T(C): 36.8 (30 Oct 2024 14:00), Max: 37 (29 Oct 2024 20:17)  T(F): 98.3 (30 Oct 2024 14:00), Max: 98.6 (29 Oct 2024 20:17)  HR: 110 (30 Oct 2024 15:06) (55 - 118)  BP: 93/57 (30 Oct 2024 15:06) (80/50 - 126/72)  BP(mean): 82 (30 Oct 2024 04:22) (82 - 82)  RR: 20 (30 Oct 2024 12:58) (16 - 20)  SpO2: 91% (30 Oct 2024 14:00) (91% - 97%)    Parameters below as of 29 Oct 2024 22:30  Patient On (Oxygen Delivery Method): nasal cannula  O2 Flow (L/min): 3    I&O's Detail    29 Oct 2024 07:01  -  30 Oct 2024 07:00  --------------------------------------------------------  IN:  Total IN: 0 mL    OUT:    Voided (mL): 450 mL  Total OUT: 450 mL    Total NET: -450 mL      30 Oct 2024 07:01  -  30 Oct 2024 16:11  --------------------------------------------------------  IN:  Total IN: 0 mL    OUT:    Voided (mL): 240 mL  Total OUT: 240 mL    Total NET: -240 mL            Physical Exam  GENERAL:  78y/o Male/ dyspneic  HEAD:  Atraumatic, Normocephalic  EYES: EOMI, PERRLA, conjunctiva and sclera clear  NECK: Supple, No JVD, no cervical lymphadenopathy, non-tender  CHEST/LUNG: Bilateral wheeze  HEART: Regular rate and rhythm; S1&S2  ABDOMEN: Soft, Nontender, Nondistended x 4 quadrants; Bowel sounds present  EXTREMITIES:  + trace edema all 4 extremities  PSYCH: AAOx2, cooperative  NEUROLOGY: Confused, changed from baseline as per wife  SKIN: WNL      TTE:  < from: TTE Echo Complete w/ Contrast w/ Doppler (10.29.24 @ 06:28) >    Summary:   1. Technically suboptimal study.   2. Normal global left ventricular systolic function.   3. Left ventricular ejection fraction, by visual estimation, is 65 to   70%.   4. Mild concentric left ventricular hypertrophy.   5. Spectral Doppler shows impaired relaxation pattern of left   ventricular myocardial filling (Grade I diastolic dysfunction).   6. Normal right ventricular size and function.   7. Left atrial size not well visualized.   8. Right atrial size not well visualized.   9. Sclerotic aortic valve with decreased opening.  10. Mild to moderate mitral annular calcification.  11. No evidence of mitral valve regurgitation.  12. Dilatation of the ascending aorta, measures 3.7 cm.  13.Estimated pulmonary artery systolic pressure is 35.1 mmHg assuming a   right atrial pressure of 15 mmHg, which is consistent with borderline   pulmonary hypertension.  14. Small pericardial effusion.      < end of copied text >    EKG/ TELEM:  < from: 12 Lead ECG (10.30.24 @ 15:30) >    Ventricular Rate 95 BPM    Atrial Rate 95 BPM    P-R Interval 152 ms    QRS Duration 78 ms    Q-T Interval 366 ms    QTC Calculation(Bazett) 459 ms    P Axis 22 degrees    R Axis -32 degrees    T Axis 67 degrees    Diagnosis Line Normal sinus rhythm with sinus arrhythmia  Left axis deviation  Pulmonary disease pattern  Nonspecific ST abnormality  Abnormal ECG    Confirmed by JONAS MACHUCA MD ([id number]) on 10/30/2024 3:40:04 PM    < end of copied text >      LABS:                          12.2   14.80 )-----------( 136      ( 30 Oct 2024 07:02 )             37.9       30 Oct 2024 07:02    134[L]  |  101    |  38[H]  ----------------------------<  197[H]  4.4     |  17     |  2.4[H]  29 Oct 2024 07:03    133[L]  |  100    |  33[H]  ----------------------------<  188[H]  5.2[H]   |  20     |  2.4[H]    Ca    8.1[L]      30 Oct 2024 07:02  Ca    8.1[L]      29 Oct 2024 07:03  Phos  4.0       30 Oct 2024 07:02  Mg     2.3       30 Oct 2024 07:02  Mg     2.3       28 Oct 2024 18:10    TPro  5.5[L]  /  Alb  3.3[L]  /  TBili  1.0    /  DBili  x      /  AST  15     /  ALT  14     /  AlkPhos  70     30 Oct 2024 07:02  TPro  5.3[L]  /  Alb  3.2[L]  /  TBili  0.9    /  DBili  x      /  AST  12     /  ALT  13     /  AlkPhos  72     29 Oct 2024 07:03

## 2024-10-30 NOTE — CHART NOTE - NSCHARTNOTEFT_GEN_A_CORE
Called to evaluate for elevated heart rate   Seen at bedside with Intensivist     Patient is a  80 Yo M w a PMH Of CAD, MI (s/p Cath w stent) BPH, DM, CKD stage unspecified brought in for weakness and confusion. Patient w waxing and waning levels of confusion. At this time patient states that he is short of breath + has been feeling better while on bipap.   Denies chest pain, Dizziness, weakness, N/V/D, and any other acute complaints at this time.     ICU Vital Signs Last 24 Hrs  T(C): 37.2 (30 Oct 2024 17:22), Max: 37.2 (30 Oct 2024 17:22)  T(F): 99 (30 Oct 2024 17:22), Max: 99 (30 Oct 2024 17:22)  HR: 142 (30 Oct 2024 20:15) (55 - 150)  BP: 114/59 (30 Oct 2024 19:15) (80/50 - 126/72)  BP(mean): 78 (30 Oct 2024 19:15) (78 - 82)  ABP: --  ABP(mean): --  RR: 29 (30 Oct 2024 19:30) (12 - 33)  SpO2: 98% (30 Oct 2024 20:15) (91% - 98%)    O2 Parameters below as of 30 Oct 2024 19:30  Patient On (Oxygen Delivery Method): BiPAP/CPAP        PHYSICAL EXAM:      Constitutional: On bipap with moderately increased respiratory effort     Eyes: PEERL, EOM intact b/l.     Neck: Supple, non-tender, trachea midline.     Respiratory: Rales in lung bases b/l. No rhonchi/ wheeze.     Cardiovascular: Irregular rate + rhythm. S1/ S2 present and clear, no murmur, gallops, or rub.     Gastrointestinal: Abd soft + non-tender. BS NA x 4 quadrants.     Extremities: Trace edema in feet + ankles b/l. , clubbing, or cyanosis in Ext x 4.     Vascular: DP + radial Pulse 2+ b/l.     Skin: No rash/ focal lesions.     Neuro: Speech clear. CN II-XII intact b/l. Sensation intact to soft touch + Strength 5/5 in ext x 4.     Impression   Patient is a  80 Yo M w a PMH Of CAD, MI (s/p Cath w stent) BPH, DM, CKD stage unspecified brought in for weakness and confusion.   Upgraded earlier today for SOB 2/2 pulmonary edema requiring Bipap. Known to have small pericardial effusion, ICU team attempted toimage earlier today, unable to obtain sufficient quality view on ultrasound. Now in Afib + RVR.   Case discussed with Cardiac fellow, Dr. Bandar Al, at this time recommends transfer to [facility] Cardiology service.     #Afib + RVR  No improvement with Metoprolol   Started on Amiodarone bolus + Infusion   Heparin gtt for stroke ppx     #AMS  # leukocytosis  waxing and waning Mental status, possible delirium   ABG w/o hypercapnea  -MR head negative (10/29)  -EKG w/o arrythmia, now in Afib + RVR   -BL chest consolidations on CXR increased from previous concern for aspiration PNA, started on Amp/Sulb    # Chest Pain RESOLVED   - sxs resolved on exam  - echo good ef, AS, small pericardial effusion volume overload  - f/u esr/crp r/o pericarditis (doubt)      # HFpEF exacerbation   - comfortable on room air  - IVC non collapsing on tte  - On Lasix     # CARINA RESOLVED   - pre renal iso hypotension vs congestion    # r/o CVA  - MR brain NEGATIVE   - needs OP f/u ophthalmology (noted on last d/c)    # DM  - FS with SS    # HTN  - hold home meds iso hypotension  - resume as appropriate     # BPH  - c/w home med    Dispo: Transfer to [facility] Cardiology service   Case discussed with Intensivist/ Cardiology fellow    Josh SMITH

## 2024-10-30 NOTE — PROGRESS NOTE ADULT - SUBJECTIVE AND OBJECTIVE BOX
CASEY BENÍTEZ 79y Male  MRN#: [id number]   Hospital Day: 2d    SUBJECTIVE  Patient is a 79y old Male who presents with a chief complaint of CP (29 Oct 2024 15:50)  Currently admitted to medicine with the primary diagnosis of General weakness      INTERVAL HPI AND OVERNIGHT EVENTS:  Patient was examined and seen at bedside. This morning he is resting comfortably in bed and reports no issues or overnight events. Patient seen again at bedside this afternoon at 14:40 and was speaking nonsensically and making sewing motions with his hands     REVIEW OF SYMPTOMS:  This AM ROS negative  Unable to assess this afternoon     OBJECTIVE  PAST MEDICAL & SURGICAL HISTORY  DM (diabetes mellitus)    CAD (coronary artery disease)    BPH (benign prostatic hyperplasia)    History of hematologic disorder    Total cataract    [place] (hard of hearing)    Acute myocardial infarction    Chronic kidney disease (CKD)    H/O coronary angiogram    Stented coronary artery    History of ear surgery    H/O tonsillitis      ALLERGIES:  No Known Drug Allergies  Seafood (Anaphylaxis)    MEDICATIONS:  STANDING MEDICATIONS  ampicillin/sulbactam  IVPB 1.5 Gram(s) IV Intermittent every 6 hours  ampicillin/sulbactam  IVPB      ampicillin/sulbactam  IVPB 1.5 Gram(s) IV Intermittent once  aspirin enteric coated 81 milliGRAM(s) Oral daily  atorvastatin 80 milliGRAM(s) Oral at bedtime  chlorhexidine 2% Cloths 1 Application(s) Topical <User Schedule>  dextrose 5%. 1000 milliLiter(s) IV Continuous <Continuous>  dextrose 5%. 1000 milliLiter(s) IV Continuous <Continuous>  dextrose 50% Injectable 12.5 Gram(s) IV Push once  dextrose 50% Injectable 25 Gram(s) IV Push once  dextrose 50% Injectable 25 Gram(s) IV Push once  influenza  Vaccine (HIGH DOSE) 0.5 milliLiter(s) IntraMuscular once  insulin glargine Injectable (LANTUS) 45 Unit(s) SubCutaneous at bedtime  insulin lispro (ADMELOG) corrective regimen sliding scale   SubCutaneous three times a day before meals  memantine 5 milliGRAM(s) Oral daily  metoprolol tartrate 50 milliGRAM(s) Oral two times a day  pantoprazole    Tablet 40 milliGRAM(s) Oral before breakfast  pregabalin 150 milliGRAM(s) Oral every 12 hours  ranolazine 500 milliGRAM(s) Oral two times a day  tamsulosin 0.4 milliGRAM(s) Oral at bedtime    PRN MEDICATIONS  acetaminophen     Tablet .. 650 milliGRAM(s) Oral every 6 hours PRN  albuterol    90 MICROgram(s) HFA Inhaler 2 Puff(s) Inhalation every 6 hours PRN  aluminum hydroxide/magnesium hydroxide/simethicone Suspension 30 milliLiter(s) Oral every 4 hours PRN  dextrose Oral Gel 15 Gram(s) Oral once PRN  melatonin 5 milliGRAM(s) Oral at bedtime PRN  ondansetron Injectable 4 milliGRAM(s) IV Push every 8 hours PRN      VITAL SIGNS: Last 24 Hours  T(C): 36.8 (30 Oct 2024 14:00), Max: 37 (29 Oct 2024 20:17)  T(F): 98.3 (30 Oct 2024 14:00), Max: 98.6 (29 Oct 2024 20:17)  HR: 110 (30 Oct 2024 15:06) (55 - 118)  BP: 93/57 (30 Oct 2024 15:06) (80/50 - 126/72)  BP(mean): 82 (30 Oct 2024 04:22) (82 - 82)  RR: 20 (30 Oct 2024 12:58) (16 - 20)  SpO2: 91% (30 Oct 2024 14:00) (91% - 97%)    LABS:                        12.2   14.80 )-----------( 136      ( 30 Oct 2024 07:02 )             37.9     10-30    134[L]  |  101  |  38[H]  ----------------------------<  197[H]  4.4   |  17  |  2.4[H]    Ca    8.1[L]      30 Oct 2024 07:02  Phos  4.0     10-30  Mg     2.3     10-30    TPro  5.5[L]  /  Alb  3.3[L]  /  TBili  1.0  /  DBili  x   /  AST  15  /  ALT  14  /  AlkPhos  70  10-30      Urinalysis Basic - ( 30 Oct 2024 07:02 )    Color: x / Appearance: x / SG: x / pH: x  Gluc: 197 mg/dL / Ketone: x  / Bili: x / Urobili: x   Blood: x / Protein: x / Nitrite: x   Leuk Esterase: x / RBC: x / WBC x   Sq Epi: x / Non Sq Epi: x / Bacteria: x        Sedimentation Rate, Erythrocyte: 34 mm/Hr *H* (10-30-24 @ 07:02)      Culture - Blood (collected 28 Oct 2024 13:41)  Source: .Blood BLOOD  Preliminary Report (30 Oct 2024 01:01):    No growth at 24 hours    Culture - Blood (collected 28 Oct 2024 13:41)  Source: .Blood BLOOD  Preliminary Report (30 Oct 2024 01:01):    No growth at 24 hours    Urinalysis with Rflx Culture (collected 28 Oct 2024 12:51)          RADIOLOGY:  < from: MR Head No Cont (10.29.24 @ 17:44) >  IMPRESSION:    No acute intracranial pathology.    Mild chronic microvascular ischemic changes and chronic lacunar infarcts.    < end of copied text >  < from: Xray Chest 1 View- PORTABLE-Routine (Xray Chest 1 View- PORTABLE-Routine .) (10.30.24 @ 11:08) >    IMPRESSION:    Increasing bilateral opacities.    < end of copied text >      PHYSICAL EXAM:  CONSTITUTIONAL: No acute distress, well-groomed, FLUSHED CHEEKS   HEAD: Atraumatic, normocephalic  EYES:  PERRLA, conjunctiva and sclera clear  ENT: Supple, no masses, no thyromegaly, no bruits, no JVD; moist mucous membranes  PULMONARY: diffuse wheezing   CARDIOVASCULAR: Regular rate and rhythm; no murmurs, rubs, or gallops  GASTROINTESTINAL: Soft, non-tender, non-distended; bowel sounds present  MUSCULOSKELETAL: 2+ peripheral pulses; no clubbing, no cyanosis, no edema  NEUROLOGY: UNABLE TO FOLLOW SIMPLE COMMANDS (eg unable to assess EOMI)   SKIN: No rashes or lesions; warm and dry

## 2024-10-30 NOTE — PROVIDER CONTACT NOTE (CHANGE IN STATUS NOTIFICATION) - SITUATION
SARAH Lara aware on assessment of pt, very SOB on 3LNC. Placed back on bipap for tachypnea. 's, looks Afib. BP  systolic. Lopressor 5mg IVP given. Bp in 90's now. Sats 92-95%. Pt does not have history of afib. Pacemaker. PA f/u with cardio- transfer for [place]

## 2024-10-30 NOTE — CONSULT NOTE ADULT - SUBJECTIVE AND OBJECTIVE BOX
Patient is a 79y old  Male who presents with a chief complaint of CP (29 Oct 2024 15:50)        REVIEW OF SYSTEMS   General:	feels weak  Skin/Breast: no rashs	  Ophthalmologic: no blurry vision 	  ENMT:	no thrush   Respiratory and Thorax: + dyspnea 	  Cardiovascular:	no chest pain   Gastrointestinal:	no diarrhea   Genitourinary:	no dysuria   Musculoskeletal:	 + weakness   Neurological:	+ weakness  Psychiatric:	no hallucinations              PHYSICAL EXAM:  GENERAL: NAD, well-groomed, well-developed  HEAD:  Atraumatic, Normocephalic  EYES: EOMI, PERRLA, conjunctiva and sclera clear  ENMT: No tonsillar erythema, exudates, or enlargement; Moist mucous membranes, Good dentition, No lesions  NECK: Supple, No JVD, Normal thyroid  NERVOUS SYSTEM:  Alert & Oriented X3, Good concentration; Motor Strength 5/5 B/L upper and lower extremities; DTRs 2+ intact and symmetric  CHEST/LUNG: Clear to percussion bilaterally; No rales, rhonchi, wheezing, or rubs  HEART: Regular rate and rhythm; No murmurs, rubs, or gallops  ABDOMEN: Soft, Nontender, Nondistended; Bowel sounds present  EXTREMITIES:  trace edema   labs  10-30    134[L]  |  101  |  38[H]  ----------------------------<  197[H]  4.4   |  17  |  2.4[H]    Ca    8.1[L]      30 Oct 2024 07:02  Phos  4.0     10-30  Mg     2.3     10-30    TPro  5.5[L]  /  Alb  3.3[L]  /  TBili  1.0  /  DBili  x   /  AST  15  /  ALT  14  /  AlkPhos  70  10-30                          12.2   14.80 )-----------( 136      ( 30 Oct 2024 07:02 )             37.9         Culture - Blood (collected 28 Oct 2024 13:41)  Source: .Blood BLOOD  Preliminary Report (30 Oct 2024 01:01):    No growth at 24 hours    Culture - Blood (collected 28 Oct 2024 13:41)  Source: .Blood BLOOD  Preliminary Report (30 Oct 2024 01:01):    No growth at 24 hours    Urinalysis with Rflx Culture (collected 28 Oct 2024 12:51)

## 2024-10-30 NOTE — PROGRESS NOTE ADULT - SUBJECTIVE AND OBJECTIVE BOX
Med/Surg Transfer Note    Transfer from:   Transfer to:  (  ) Medicine    (  ) Telemetry    (  ) RCU    (  ) Palliative    (  ) Stroke Unit    (X  ) _____ICU__________  Accepting physican:    MEDICATIONS:  STANDING MEDICATIONS  albuterol/ipratropium for Nebulization 3 milliLiter(s) Nebulizer every 6 hours  ampicillin/sulbactam  IVPB 1.5 Gram(s) IV Intermittent every 6 hours  ampicillin/sulbactam  IVPB 1.5 Gram(s) IV Intermittent once  ampicillin/sulbactam  IVPB      aspirin enteric coated 81 milliGRAM(s) Oral daily  atorvastatin 80 milliGRAM(s) Oral at bedtime  chlorhexidine 2% Cloths 1 Application(s) Topical <User Schedule>  dextrose 5%. 1000 milliLiter(s) IV Continuous <Continuous>  dextrose 5%. 1000 milliLiter(s) IV Continuous <Continuous>  dextrose 50% Injectable 12.5 Gram(s) IV Push once  dextrose 50% Injectable 25 Gram(s) IV Push once  dextrose 50% Injectable 25 Gram(s) IV Push once  influenza  Vaccine (HIGH DOSE) 0.5 milliLiter(s) IntraMuscular once  insulin glargine Injectable (LANTUS) 45 Unit(s) SubCutaneous at bedtime  insulin lispro (ADMELOG) corrective regimen sliding scale   SubCutaneous three times a day before meals  memantine 5 milliGRAM(s) Oral daily  metoprolol tartrate 50 milliGRAM(s) Oral two times a day  pantoprazole    Tablet 40 milliGRAM(s) Oral before breakfast  pregabalin 150 milliGRAM(s) Oral every 12 hours  ranolazine 500 milliGRAM(s) Oral two times a day  tamsulosin 0.4 milliGRAM(s) Oral at bedtime    PRN MEDICATIONS  acetaminophen     Tablet .. 650 milliGRAM(s) Oral every 6 hours PRN  albuterol    90 MICROgram(s) HFA Inhaler 2 Puff(s) Inhalation every 6 hours PRN  aluminum hydroxide/magnesium hydroxide/simethicone Suspension 30 milliLiter(s) Oral every 4 hours PRN  dextrose Oral Gel 15 Gram(s) Oral once PRN  melatonin 5 milliGRAM(s) Oral at bedtime PRN  ondansetron Injectable 4 milliGRAM(s) IV Push every 8 hours PRN      VITAL SIGNS: Last 24 Hours  T(C): 36.8 (30 Oct 2024 14:00), Max: 37 (29 Oct 2024 20:17)  T(F): 98.3 (30 Oct 2024 14:00), Max: 98.6 (29 Oct 2024 20:17)  HR: 110 (30 Oct 2024 15:06) (55 - 118)  BP: 93/57 (30 Oct 2024 15:06) (80/50 - 126/72)  BP(mean): 82 (30 Oct 2024 04:22) (82 - 82)  RR: 20 (30 Oct 2024 12:58) (16 - 20)  SpO2: 91% (30 Oct 2024 14:00) (91% - 97%)    LABS:                        12.2   14.80 )-----------( 136      ( 30 Oct 2024 07:02 )             37.9     10-30    134[L]  |  101  |  38[H]  ----------------------------<  197[H]  4.4   |  17  |  2.4[H]    Ca    8.1[L]      30 Oct 2024 07:02  Phos  4.0     10-30  Mg     2.3     10-30    TPro  5.5[L]  /  Alb  3.3[L]  /  TBili  1.0  /  DBili  x   /  AST  15  /  ALT  14  /  AlkPhos  70  10-30      Urinalysis Basic - ( 30 Oct 2024 07:02 )    Color: x / Appearance: x / SG: x / pH: x  Gluc: 197 mg/dL / Ketone: x  / Bili: x / Urobili: x   Blood: x / Protein: x / Nitrite: x   Leuk Esterase: x / RBC: x / WBC x   Sq Epi: x / Non Sq Epi: x / Bacteria: x      ABG - ( 30 Oct 2024 16:17 )  pH, Arterial: 7.35  pH, Blood: x     /  pCO2: 31    /  pO2: 78    / HCO3: 17    / Base Excess: -7.3  /  SaO2: 96.9                Culture - Blood (collected 28 Oct 2024 13:41)  Source: .Blood BLOOD  Preliminary Report (30 Oct 2024 01:01):    No growth at 24 hours    Culture - Blood (collected 28 Oct 2024 13:41)  Source: .Blood BLOOD  Preliminary Report (30 Oct 2024 01:01):    No growth at 24 hours    Urinalysis with Rflx Culture (collected 28 Oct 2024 12:51)          RADIOLOGY:    < from: CT Angio Head w/ IV Cont (10.28.24 @ 10:40) >  IMPRESSION:    1.  No evidence of acute large vessel occlusion. Stable exam.    2.  Calcific plaque of bilateral proximal ICAs with about 50% stenosis on   the right and <50% on the left.    < end of copied text >  < from: CT Abdomen and Pelvis No Cont (10.28.24 @ 13:00) >  IMPRESSION:    Bladder enters a right inguinal hernia with nonspecific bladder wall   thickening in this location and follow-up with outpatient urology is   recommended.    Indeterminate left hepatic 1.4 cm hypodensity and indeterminate left   lower pole 2.5 cm hypodensity.    As this patient contains a cardiac pacing device, further   characterization of the above findings is recommended with outpatient   liver protocol and renal protocol CT.    However, if this cardiac pacing device is MRI compatible, this can be   further evaluated with MRI on outpatient basis    Findings consistent with interstitial lung disease.    Stable pulmonary nodules and follow-up as per Fleischner Society   guidelines.    < end of copied text >  < from: MR Head No Cont (10.29.24 @ 17:44) >  IMPRESSION:    No acute intracranial pathology.    Mild chronic microvascular ischemic changes and chronic lacunar infarcts.    < end of copied text >  < from: Xray Chest 1 View- PORTABLE-Routine (Xray Chest 1 View- PORTABLE-Routine .) (10.30.24 @ 11:08) >  IMPRESSION:    Increasing bilateral opacities.    < end of copied text >  < from: 12 Lead ECG (10.30.24 @ 15:30) >  Ventricular Rate 95 BPM    Atrial Rate 95 BPM    P-R Interval 152 ms    QRS Duration 78 ms    Q-T Interval 366 ms    QTC Calculation(Bazett) 459 ms    P Axis 22 degrees    R Axis -32 degrees    T Axis 67 degrees    Diagnosis Line Normal sinus rhythm with sinus arrhythmia  Left axis deviation  Pulmonary disease pattern  Nonspecific ST abnormality  Abnormal ECG    < end of copied text >      Tele COURSE:  Mr Mcclain is a 79 YOM w a PMH Of CAD, MI (s/p Cath w stent) BPH, DM, CKD stage unspecified brought in for weakness and confusion. Patient w waxing and waning levels of confusion. AMS not correlating w low BP.     #AMS  # leukocytosis  waxing and waning MS, possible delerium   ABG w/o hypercapnea  -MR head negative (10/29)  -EKG w/o arrythmia, discussed w Cardiology NP Lindsay  -diffuse wheezing  -case discussed w Dr Polanco (ICU) regarding possible upgrade  -ICU upgrade per Dr SHANNAN Pizano above   -c/w NC at this time (NIV at night)   -BL chest consolidations on CXR increased from previous concern for aspiration PNA, started on Amp/Sulb    # Chest Pain RESOLVED   - sxs resolved on exam  - Cardio consult appreciated  - echo good ef, AS, small pericardial effusion volume overload  - f/u esr/crp r/o pericarditis (doubt)      # HFpEF exacerbation   - comfortable on room air  - IVC non collapsing on tte  - lasix 20 bid IV if BP improves    # CARINA RESOLVED   - pre renal iso hypotension vs congestion    # r/o CVA  - MR brain NEGATIVE   - needs OP f/u ophthalmology (noted on last d/c)    # DM  - FS with SS    # HTN  - hold home meds iso hypotension  - resume as appropriate     # BPH  - c/w home med          #Misc  - DVT Prophylaxis: NURSE reporting OOZING from Heparin injection site, patient most recent cardiac event >9 yrs ago. Plavix HELD AND ICDs PUT IN PLACE 10/30           ASSESSMENT & PLAN:         For Follow-Up:

## 2024-10-30 NOTE — SWALLOW BEDSIDE ASSESSMENT ADULT - SLP PERTINENT HISTORY OF CURRENT PROBLEM
79 year old male with PMH of CAD, DM, HTN, and BPH, with recent admission for vision loss, and presented earlier today for generalized weakness, returning to the ER for chest pain. Patient states shortly after being discharged from the ER, patient went home and then began to have a sudden onset of substernal chest pressure, associated with shortness of breath.

## 2024-10-30 NOTE — CHART NOTE - NSCHARTNOTEFT_GEN_A_CORE
BP  80/50 left arm       78/50 right arm       88 HR  + orthostatics yesterday  received Lasix 20mg IV yesterday    Give N/S 500ml IV over 1 hour  discussed with Dr Ortiz

## 2024-10-30 NOTE — PROGRESS NOTE ADULT - ASSESSMENT
Cardiology HPI:     Mr. Mcclain is a 78yo M with CAD s/p PCI, HTN, HLD, DM, CKD, PPM, BPH, possible TIA who initially presented for CP.   Received call from hospitalist stating patient had irregularities on telemetry, had AMS and a new EKG was pending.  Patient seen and examined at bedside.  Patient looks visibly uncomfortable.  Patient denies chest pain or palpitations at this time, but c/o shortness of breath.  Patient has +wheeze bilaterally, and edema of all extremities.  As per wife, patient is not making sense- he can be forgetful at times, but he is different than his baseline at this time.    Telemetry: NSR  EKG: Normal sinus rhythm with sinus arrhythmia  Plan:  ***Discussed with both Dr. Pepe and Dr. Mancini- patient to be upgraded to ICU  ***Pulmonary/ Critical Care eval (patient sees Dr. Hogue as outpatient)  ***Repeat TTE/ ultrasound    *****Incomplete Note; Attending Attestation to follow*****

## 2024-10-30 NOTE — CHART NOTE - NSCHARTNOTEFT_GEN_A_CORE
Called by [facility] cardiology team regarding this patient. Concern for tamponade. I reviewed the chart. Pt had TTE 10/29 showing a small pericardial effusion. Images sent by the [place] team show that the effusion is possibly larger however difficult to say if there is tamponade physiology as images are suboptimal and do not show right atrium or right ventricle. Discussed with interventional cardiologist and CCU cardiologist on call. Unable to determine if this is tamponade. Would recommend sending to [facility] for an echo.

## 2024-10-30 NOTE — SWALLOW BEDSIDE ASSESSMENT ADULT - SWALLOW EVAL: DIAGNOSIS
Overt s/s of penetration/aspiration for thin liquids and soft & bite sized. +Coughing. +Wet/gurgly vocal quality. Toleration of puree with mildly thick liquids w/o overt s/s of penetration/aspiration.

## 2024-10-30 NOTE — SWALLOW BEDSIDE ASSESSMENT ADULT - PHARYNGEAL PHASE
Wet vocal quality post oral intake/Cough post oral intake/Throat clear post oral intake Within functional limits

## 2024-10-30 NOTE — CONSULT NOTE ADULT - ASSESSMENT
A/P:     1. Acute respiratory failure failure   - c/w BIPAP   - chest xray worsening   - WBC elevated   - c/w antibiotics as wbc up trending   fu all cx  - send procal   - bedside echo + pericardial effusion   - repeat echo in am   - lasix as needed  CARINA on CKD   - moniotor creatinine   DVT ppx     CCT 35 min

## 2024-10-30 NOTE — SWALLOW BEDSIDE ASSESSMENT ADULT - COMMENTS
Well known to ST services from last admission; change in mental status; prior to this admission pt. p/w with swallow function WNL

## 2024-10-30 NOTE — SWALLOW BEDSIDE ASSESSMENT ADULT - SLP GENERAL OBSERVATIONS
Pt. received in bed awake and alert. On 2L O2 nasal cannula. Pts. spouse reported he did not recognize her this morning. Oriented to person and place.

## 2024-10-30 NOTE — PROGRESS NOTE ADULT - ASSESSMENT
Mr Mcclain is a 79 YOM w a PMH Of CAD, MI (s/p Cath w stent) BPH, DM, CKD stage unspecified brought in for weakness and confusion. Patient w waxing and waning levels of confusion. AMS not correlating w low BP.     #AMS  # leukocytosis  waxing and waning MS, possible delerium but can not rule out metabolic encephalopathy at this time  -MR head negative (10/29)  -EKG w/o arrythmia, discussed w Cardiology NP Lindsay  -diffuse wheezing  -case discussed w Dr Polanco (ICU) regarding possible upgrade  -f/u ICU consult  -f/u ABG (to assess for hypercapnia   -c/w NC at this time (NIV at night)   -BL chest consolidations on CXR increased from previous concern for aspiration PNA, started on Amp/Sulb    # Chest Pain RESOLVED   - sxs resolved on exam  - Cardio consult appreciated  - echo good ef, AS, small pericardial effusion volume overload  - f/u esr/crp r/o pericarditis (doubt)      # HFpEF exacerbation   - comfortable on room air  - IVC non collapsing on tte  - lasix 20 bid IV if BP improves    # CARINA RESOLVED   - pre renal iso hypotension vs congestion    # r/o CVA  - MR brain NEGATIVE   - needs OP f/u ophthalmology (noted on last d/c)    # DM  - FS with SS    # HTN  - hold home meds iso hypotension  - resume as appropriate     # BPH  - c/w home med          #Misc  - DVT Prophylaxis: NURSE reporting OOZING from Heparin injection site, patient most recent cardiac event >9 yrs ago and   - GI Prophylaxis:  - Diet:  - Activity:  - IV Fluids:  - Code Status:    Dispo:

## 2024-10-30 NOTE — SWALLOW VFSS/MBS ASSESSMENT ADULT - SLP GENERAL OBSERVATIONS
Pt. received in radiology suite on 2L O2 nasal cannula. Lethargic. Coughing noticed prior to PO trials.

## 2024-10-31 ENCOUNTER — RESULT REVIEW (OUTPATIENT)
Age: 79
End: 2024-10-31

## 2024-10-31 ENCOUNTER — RX RENEWAL (OUTPATIENT)
Age: 79
End: 2024-10-31

## 2024-10-31 LAB
APTT BLD: 72.4 SEC — CRITICAL HIGH (ref 27–39.2)
APTT BLD: 87 SEC — CRITICAL HIGH (ref 27–39.2)
APTT BLD: >200 SEC — CRITICAL HIGH (ref 27–39.2)
GAS PNL BLDA: SIGNIFICANT CHANGE UP
GLUCOSE BLDC GLUCOMTR-MCNC: 154 MG/DL — HIGH (ref 70–99)
GLUCOSE BLDC GLUCOMTR-MCNC: 159 MG/DL — HIGH (ref 70–99)
GLUCOSE BLDC GLUCOMTR-MCNC: 165 MG/DL — HIGH (ref 70–99)
GLUCOSE BLDC GLUCOMTR-MCNC: 243 MG/DL — HIGH (ref 70–99)
HCT VFR BLD CALC: 33 % — LOW (ref 42–52)
HGB BLD-MCNC: 10.9 G/DL — LOW (ref 14–18)
MCHC RBC-ENTMCNC: 28.6 PG — SIGNIFICANT CHANGE UP (ref 27–31)
MCHC RBC-ENTMCNC: 33 G/DL — SIGNIFICANT CHANGE UP (ref 32–37)
MCV RBC AUTO: 86.6 FL — SIGNIFICANT CHANGE UP (ref 80–94)
MRSA PCR RESULT.: NEGATIVE — SIGNIFICANT CHANGE UP
NRBC # BLD: 0 /100 WBCS — SIGNIFICANT CHANGE UP (ref 0–0)
PLATELET # BLD AUTO: 139 K/UL — SIGNIFICANT CHANGE UP (ref 130–400)
PMV BLD: 11.7 FL — HIGH (ref 7.4–10.4)
RBC # BLD: 3.81 M/UL — LOW (ref 4.7–6.1)
RBC # FLD: 16.6 % — HIGH (ref 11.5–14.5)
WBC # BLD: 15.04 K/UL — HIGH (ref 4.8–10.8)
WBC # FLD AUTO: 15.04 K/UL — HIGH (ref 4.8–10.8)

## 2024-10-31 PROCEDURE — 99291 CRITICAL CARE FIRST HOUR: CPT | Mod: FS

## 2024-10-31 PROCEDURE — 93306 TTE W/DOPPLER COMPLETE: CPT | Mod: 26

## 2024-10-31 PROCEDURE — 93010 ELECTROCARDIOGRAM REPORT: CPT

## 2024-10-31 RX ORDER — AMIODARONE HCL 200 MG
400 TABLET ORAL
Refills: 0 | Status: DISCONTINUED | OUTPATIENT
Start: 2024-10-31 | End: 2024-10-31

## 2024-10-31 RX ORDER — AMIODARONE HCL 200 MG
400 TABLET ORAL
Refills: 0 | Status: DISCONTINUED | OUTPATIENT
Start: 2024-10-31 | End: 2024-11-08

## 2024-10-31 RX ORDER — FUROSEMIDE 40 MG
40 TABLET ORAL
Refills: 0 | Status: DISCONTINUED | OUTPATIENT
Start: 2024-10-31 | End: 2024-11-05

## 2024-10-31 RX ORDER — AMPICILLIN AND SULBACTAM 2; 1 G/1; G/1
3 INJECTION, POWDER, FOR SOLUTION INTRAMUSCULAR; INTRAVENOUS EVERY 12 HOURS
Refills: 0 | Status: DISCONTINUED | OUTPATIENT
Start: 2024-10-31 | End: 2024-11-01

## 2024-10-31 RX ORDER — METOPROLOL TARTRATE 50 MG
50 TABLET ORAL
Refills: 0 | Status: DISCONTINUED | OUTPATIENT
Start: 2024-10-31 | End: 2024-11-08

## 2024-10-31 RX ADMIN — Medication 0.6 MILLIGRAM(S): at 11:50

## 2024-10-31 RX ADMIN — HEPARIN SODIUM 1300 UNIT(S)/HR: 10000 INJECTION INTRAVENOUS; SUBCUTANEOUS at 13:03

## 2024-10-31 RX ADMIN — IPRATROPIUM BROMIDE AND ALBUTEROL SULFATE 3 MILLILITER(S): .5; 2.5 SOLUTION RESPIRATORY (INHALATION) at 19:34

## 2024-10-31 RX ADMIN — IPRATROPIUM BROMIDE AND ALBUTEROL SULFATE 3 MILLILITER(S): .5; 2.5 SOLUTION RESPIRATORY (INHALATION) at 15:13

## 2024-10-31 RX ADMIN — AMPICILLIN AND SULBACTAM 200 GRAM(S): 2; 1 INJECTION, POWDER, FOR SOLUTION INTRAMUSCULAR; INTRAVENOUS at 17:11

## 2024-10-31 RX ADMIN — Medication 81 MILLIGRAM(S): at 11:50

## 2024-10-31 RX ADMIN — PREGABALIN 150 MILLIGRAM(S): 150 CAPSULE ORAL at 17:11

## 2024-10-31 RX ADMIN — HEPARIN SODIUM 1300 UNIT(S)/HR: 10000 INJECTION INTRAVENOUS; SUBCUTANEOUS at 16:12

## 2024-10-31 RX ADMIN — HEPARIN SODIUM 1300 UNIT(S)/HR: 10000 INJECTION INTRAVENOUS; SUBCUTANEOUS at 19:54

## 2024-10-31 RX ADMIN — Medication 1: at 16:19

## 2024-10-31 RX ADMIN — Medication 400 MILLIGRAM(S): at 14:25

## 2024-10-31 RX ADMIN — CHLORHEXIDINE GLUCONATE 1 APPLICATION(S): 40 SOLUTION TOPICAL at 06:20

## 2024-10-31 RX ADMIN — HEPARIN SODIUM 1300 UNIT(S)/HR: 10000 INJECTION INTRAVENOUS; SUBCUTANEOUS at 05:36

## 2024-10-31 RX ADMIN — IPRATROPIUM BROMIDE AND ALBUTEROL SULFATE 3 MILLILITER(S): .5; 2.5 SOLUTION RESPIRATORY (INHALATION) at 07:43

## 2024-10-31 RX ADMIN — Medication 80 MILLIGRAM(S): at 21:36

## 2024-10-31 RX ADMIN — Medication 50 MILLIGRAM(S): at 16:18

## 2024-10-31 RX ADMIN — Medication 45 UNIT(S): at 21:36

## 2024-10-31 RX ADMIN — Medication 16.7 MG/MIN: at 03:42

## 2024-10-31 RX ADMIN — AMPICILLIN AND SULBACTAM 100 GRAM(S): 2; 1 INJECTION, POWDER, FOR SOLUTION INTRAMUSCULAR; INTRAVENOUS at 06:19

## 2024-10-31 RX ADMIN — Medication 40 MILLIGRAM(S): at 16:18

## 2024-10-31 RX ADMIN — MEMANTINE HYDROCHLORIDE 5 MILLIGRAM(S): 21 CAPSULE, EXTENDED RELEASE ORAL at 11:50

## 2024-10-31 RX ADMIN — Medication 0.4 MILLIGRAM(S): at 21:36

## 2024-10-31 RX ADMIN — AMPICILLIN AND SULBACTAM 100 GRAM(S): 2; 1 INJECTION, POWDER, FOR SOLUTION INTRAMUSCULAR; INTRAVENOUS at 09:38

## 2024-10-31 RX ADMIN — HEPARIN SODIUM 0 UNIT(S)/HR: 10000 INJECTION INTRAVENOUS; SUBCUTANEOUS at 03:26

## 2024-10-31 RX ADMIN — IPRATROPIUM BROMIDE AND ALBUTEROL SULFATE 3 MILLILITER(S): .5; 2.5 SOLUTION RESPIRATORY (INHALATION) at 01:06

## 2024-10-31 NOTE — PROGRESS NOTE ADULT - SUBJECTIVE AND OBJECTIVE BOX
Patient is a 79y old Male who presents with a chief complaint of Respiratory failure (30 Oct 2024 19:51)      Interval History/Overnight Events  No acute overnight events    Admission HPI  HPI:  79 year old male with PMH of CAD, DM, HTN, and BPH, with recent admission for vision loss, and presented earlier today for generalized weakness, returning to the ER for chest pain. Patient states shortly after being discharged from the ER, patient went home and then began to have a sudden onset of substernal chest pressure, associated with shortness of breath.  Patient denies experiencing similar symptoms earlier today.   (28 Oct 2024 20:48)      ICU Vital Signs Last 24 Hrs  T(C): 36.9 (30 Oct 2024 23:30), Max: 37.2 (30 Oct 2024 17:22)  T(F): 98.5 (30 Oct 2024 23:30), Max: 99 (30 Oct 2024 17:22)  HR: 120 (31 Oct 2024 07:44) (55 - 150)  BP: 111/63 (31 Oct 2024 07:35) (85/46 - 114/59)  BP(mean): 82 (31 Oct 2024 07:35) (57 - 82)  ABP: --  ABP(mean): --  RR: 18 (31 Oct 2024 07:35) (12 - 33)  SpO2: 97% (31 Oct 2024 10:34) (91% - 100%)    O2 Parameters below as of 31 Oct 2024 10:34  Patient On (Oxygen Delivery Method): nasal cannula w/ humidification  O2 Flow (L/min): 4  O2 Concentration (%): 36      I&O's Summary    30 Oct 2024 07:01  -  31 Oct 2024 07:00  --------------------------------------------------------  IN: 344.6 mL / OUT: 1570 mL / NET: -1225.4 mL    31 Oct 2024 07:01  -  31 Oct 2024 11:35  --------------------------------------------------------  IN: 0 mL / OUT: 250 mL / NET: -250 mL          Labs                        10.9   15.04 )-----------( 139      ( 31 Oct 2024 02:50 )             33.0     10-30    134[L]  |  101  |  38[H]  ----------------------------<  197[H]  4.4   |  17  |  2.4[H]    Ca    8.1[L]      30 Oct 2024 07:02  Phos  4.0     10-30  Mg     2.3     10-30    TPro  5.5[L]  /  Alb  3.3[L]  /  TBili  1.0  /  DBili  x   /  AST  15  /  ALT  14  /  AlkPhos  70  10-30    PTT - ( 31 Oct 2024 02:50 )  PTT:>200.0 sec  Urinalysis Basic - ( 30 Oct 2024 07:02 )    Color: x / Appearance: x / SG: x / pH: x  Gluc: 197 mg/dL / Ketone: x  / Bili: x / Urobili: x   Blood: x / Protein: x / Nitrite: x   Leuk Esterase: x / RBC: x / WBC x   Sq Epi: x / Non Sq Epi: x / Bacteria: x      CAPILLARY BLOOD GLUCOSE      POCT Blood Glucose.: 159 mg/dL (31 Oct 2024 11:10)  POCT Blood Glucose.: 154 mg/dL (31 Oct 2024 07:37)  POCT Blood Glucose.: 258 mg/dL (30 Oct 2024 21:38)  POCT Blood Glucose.: 271 mg/dL (30 Oct 2024 16:17)  POCT Blood Glucose.: 316 mg/dL (30 Oct 2024 13:32)    ABG - ( 31 Oct 2024 09:16 )  pH, Arterial: 7.38  pH, Blood: x     /  pCO2: 33    /  pO2: 130   / HCO3: 20    / Base Excess: -4.8  /  SaO2: 99.3                Imaging  CXR  Xray Chest 1 View- PORTABLE-Urgent:   ACC: [id number] EXAM:  XR CHEST PORTABLE URGENT 1V   ORDERED BY: MICHELLE TRENT     PROCEDURE DATE:  10/28/2024          INTERPRETATION:  CLINICAL HISTORY: Chest Pain.    COMPARISON: Chest radiograph 10/28/2024 at 10:14 AM.    TECHNIQUE: Frontal chest radiograph.    FINDINGS:    Support devices: Left chest pacemaker.    Cardiac/mediastinum/hilum: Unchanged.    Lung parenchyma/Pleura: Mild increase in bilateral opacities. No   pneumothorax.    Skeleton/soft tissues: Unchanged.      IMPRESSION:    Mild increase in bilateral opacities.    --- End of Report ---          SOM ZAMORA MD; Resident Radiologist  This document has been electronically signed.  MOHAMUD MINA MD; Attending Radiologist  This document has been electronically signed. Oct 29 2024 10:32AM (10-28-24 @ 19:42)      CT  CT Abdomen and Pelvis No Cont:   ACC: [id number] EXAM:  CT ABDOMEN AND PELVIS   ORDERED BY: IDA SINGH     PROCEDURE DATE:  10/28/2024          INTERPRETATION:  CLINICAL STATEMENT: Abdominal pain    TECHNIQUE: Contiguous axial CT images were obtained from the lower chest   to the pubic symphysis . Residual intravenous contrast is on board from a   recent postcontrast CT scan performed earlier in the day Reformatted   images in the coronal and sagittal planes were acquired.    COMPARISON CT: Cardiac CT 6/21/2024, chest CT 6/23/2024      FINDINGS:    LOWER CHEST: Partially imaged cardiac pacemaker leads. Bilateral lower   lung field reticular opacities, stable. Anterior left lower lung field   nodular opacity in image 22 series 301 appears stable. There is a 5 mm   pulmonary nodule in the left lower lobe in image 20 series 301 and a 4 mm   nodule in image 7 of series 301. These are stable since 6/23/2024.    HEPATOBILIARY: There is an indeterminate left hepatic 1.4 cm hypodensity   in image 75 of series 302. The gallbladder is present.    SPLEEN: Unremarkable..    PANCREAS: Unremarkable..    ADRENAL GLANDS: Unremarkable..    KIDNEYS: There is an indeterminate left lower pole 2.5 cm hypodensity in   image 63 of series 301. There are bilateral renal cysts. There is no   hydronephrosis. Excreted contrast is noted within the collecting system   from recent intravenous contrast administration.    ABDOMINOPELVIC NODES: Unremarkable...    PELVIC ORGANS: A portion of the bladder enters a right inguinal hernia.   There is nonspecific bladder wall thickening in this location and   follow-up with outpatient urology is recommended. The right inguinal   hernia is incompletely imaged. There is a trace amount of free fluid   within the right inguinal hernia. The prostate gland is enlarged.    PERITONEUM/MESENTERY/BOWEL: Diverticulosis. The appendix is unremarkable.   There is a small hiatal hernia. There is no free air or obstruction..    BONES: Degenerative change.There is compression deformity involving T12   and L5, age indeterminate...    IMPRESSION:    Bladder enters a right inguinal hernia with nonspecific bladder wall   thickening in this location and follow-up with outpatient urology is   recommended.    Indeterminate left hepatic 1.4 cm hypodensity and indeterminate left   lower pole 2.5 cm hypodensity.    As this patient contains a cardiac pacing device, further   characterization of the above findings is recommended with outpatient   liver protocol and renal protocol CT.    However, if this cardiac pacing device is MRI compatible, this can be   further evaluated with MRI on outpatient basis    Findings consistent with interstitial lung disease.    Stable pulmonary nodules and follow-up as per Fleischner Society   guidelines.    --- End of Report ---            REGINE SULTANA MD; Attending Radiologist  This document has been electronically signed. Oct 28 2024  2:05PM (10-28-24 @ 13:00)      MEDICATIONS  (STANDING):  albuterol/ipratropium for Nebulization 3 milliLiter(s) Nebulizer every 6 hours  aMIOdarone Infusion 1 mG/Min (33.3 mL/Hr) IV Continuous <Continuous>  aMIOdarone Infusion 0.5 mG/Min (16.7 mL/Hr) IV Continuous <Continuous>  ampicillin/sulbactam  IVPB      ampicillin/sulbactam  IVPB 1.5 Gram(s) IV Intermittent every 6 hours  aspirin enteric coated 81 milliGRAM(s) Oral daily  atorvastatin 80 milliGRAM(s) Oral at bedtime  chlorhexidine 2% Cloths 1 Application(s) Topical <User Schedule>  dextrose 5%. 1000 milliLiter(s) (100 mL/Hr) IV Continuous <Continuous>  dextrose 5%. 1000 milliLiter(s) (50 mL/Hr) IV Continuous <Continuous>  dextrose 50% Injectable 12.5 Gram(s) IV Push once  dextrose 50% Injectable 25 Gram(s) IV Push once  dextrose 50% Injectable 25 Gram(s) IV Push once  heparin  Infusion.  Unit(s)/Hr (16 mL/Hr) IV Continuous <Continuous>  influenza  Vaccine (HIGH DOSE) 0.5 milliLiter(s) IntraMuscular once  insulin glargine Injectable (LANTUS) 45 Unit(s) SubCutaneous at bedtime  insulin lispro (ADMELOG) corrective regimen sliding scale   SubCutaneous three times a day before meals  memantine 5 milliGRAM(s) Oral daily  pantoprazole    Tablet 40 milliGRAM(s) Oral before breakfast  pregabalin 150 milliGRAM(s) Oral every 12 hours  ranolazine 500 milliGRAM(s) Oral two times a day  tamsulosin 0.4 milliGRAM(s) Oral at bedtime    MEDICATIONS  (PRN):  acetaminophen     Tablet .. 650 milliGRAM(s) Oral every 6 hours PRN Temp greater or equal to 38C (100.4F), Mild Pain (1 - 3)  albuterol    90 MICROgram(s) HFA Inhaler 2 Puff(s) Inhalation every 6 hours PRN Shortness of Breath and/or Wheezing  aluminum hydroxide/magnesium hydroxide/simethicone Suspension 30 milliLiter(s) Oral every 4 hours PRN Dyspepsia  dextrose Oral Gel 15 Gram(s) Oral once PRN Blood Glucose LESS THAN 70 milliGRAM(s)/deciliter  heparin   Injectable 7000 Unit(s) IV Push every 6 hours PRN For aPTT less than 40  heparin   Injectable 3500 Unit(s) IV Push every 6 hours PRN For aPTT between 40 - 57  melatonin 5 milliGRAM(s) Oral at bedtime PRN Insomnia  ondansetron Injectable 4 milliGRAM(s) IV Push every 8 hours PRN Nausea and/or Vomiting      Physical Examination  General: NAD, lying in bed comfortably  Head: NCAT  Neck: Supple, no JVD  Cardiac: Regular rate and rhythm. No murmurs, gallops, or rubs  Pulmonary: CTAB  Abdominal: Soft, nontender, and nondistended with positive bowel sounds  Extremities: No pitting edema  Neurologic: AOx3, nonfocal  Skin: No rashes or lesions Patient is a 79y old Male who presents with a chief complaint of Respiratory failure (30 Oct 2024 19:51)      Interval History/Overnight Events  No acute overnight events    Admission HPI  HPI:  79 year old male with PMH of CAD, DM, HTN, and BPH, with recent admission for vision loss, and presented earlier today for generalized weakness, returning to the ER for chest pain. Patient states shortly after being discharged from the ER, patient went home and then began to have a sudden onset of substernal chest pressure, associated with shortness of breath.  Patient denies experiencing similar symptoms earlier today.   (28 Oct 2024 20:48)      ICU Vital Signs Last 24 Hrs  T(C): 36.9 (30 Oct 2024 23:30), Max: 37.2 (30 Oct 2024 17:22)  T(F): 98.5 (30 Oct 2024 23:30), Max: 99 (30 Oct 2024 17:22)  HR: 120 (31 Oct 2024 07:44) (55 - 150)  BP: 111/63 (31 Oct 2024 07:35) (85/46 - 114/59)  BP(mean): 82 (31 Oct 2024 07:35) (57 - 82)  ABP: --  ABP(mean): --  RR: 18 (31 Oct 2024 07:35) (12 - 33)  SpO2: 97% (31 Oct 2024 10:34) (91% - 100%)    O2 Parameters below as of 31 Oct 2024 10:34  Patient On (Oxygen Delivery Method): nasal cannula w/ humidification  O2 Flow (L/min): 4  O2 Concentration (%): 36      I&O's Summary    30 Oct 2024 07:01  -  31 Oct 2024 07:00  --------------------------------------------------------  IN: 344.6 mL / OUT: 1570 mL / NET: -1225.4 mL    31 Oct 2024 07:01  -  31 Oct 2024 11:35  --------------------------------------------------------  IN: 0 mL / OUT: 250 mL / NET: -250 mL          Labs                        10.9   15.04 )-----------( 139      ( 31 Oct 2024 02:50 )             33.0     10-30    134[L]  |  101  |  38[H]  ----------------------------<  197[H]  4.4   |  17  |  2.4[H]    Ca    8.1[L]      30 Oct 2024 07:02  Phos  4.0     10-30  Mg     2.3     10-30    TPro  5.5[L]  /  Alb  3.3[L]  /  TBili  1.0  /  DBili  x   /  AST  15  /  ALT  14  /  AlkPhos  70  10-30    PTT - ( 31 Oct 2024 02:50 )  PTT:>200.0 sec  Urinalysis Basic - ( 30 Oct 2024 07:02 )    Color: x / Appearance: x / SG: x / pH: x  Gluc: 197 mg/dL / Ketone: x  / Bili: x / Urobili: x   Blood: x / Protein: x / Nitrite: x   Leuk Esterase: x / RBC: x / WBC x   Sq Epi: x / Non Sq Epi: x / Bacteria: x      CAPILLARY BLOOD GLUCOSE      POCT Blood Glucose.: 159 mg/dL (31 Oct 2024 11:10)  POCT Blood Glucose.: 154 mg/dL (31 Oct 2024 07:37)  POCT Blood Glucose.: 258 mg/dL (30 Oct 2024 21:38)  POCT Blood Glucose.: 271 mg/dL (30 Oct 2024 16:17)  POCT Blood Glucose.: 316 mg/dL (30 Oct 2024 13:32)    ABG - ( 31 Oct 2024 09:16 )  pH, Arterial: 7.38  pH, Blood: x     /  pCO2: 33    /  pO2: 130   / HCO3: 20    / Base Excess: -4.8  /  SaO2: 99.3                Imaging  CXR  Xray Chest 1 View- PORTABLE-Urgent:   ACC: [id number] EXAM:  XR CHEST PORTABLE URGENT 1V   ORDERED BY: MICHELLE TRENT     PROCEDURE DATE:  10/28/2024          INTERPRETATION:  CLINICAL HISTORY: Chest Pain.    COMPARISON: Chest radiograph 10/28/2024 at 10:14 AM.    TECHNIQUE: Frontal chest radiograph.    FINDINGS:    Support devices: Left chest pacemaker.    Cardiac/mediastinum/hilum: Unchanged.    Lung parenchyma/Pleura: Mild increase in bilateral opacities. No   pneumothorax.    Skeleton/soft tissues: Unchanged.      IMPRESSION:    Mild increase in bilateral opacities.    --- End of Report ---          SOM ZAMORA MD; Resident Radiologist  This document has been electronically signed.  MOHAMUD MINA MD; Attending Radiologist  This document has been electronically signed. Oct 29 2024 10:32AM (10-28-24 @ 19:42)      CT  CT Abdomen and Pelvis No Cont:   ACC: [id number] EXAM:  CT ABDOMEN AND PELVIS   ORDERED BY: IDA SINGH     PROCEDURE DATE:  10/28/2024          INTERPRETATION:  CLINICAL STATEMENT: Abdominal pain    TECHNIQUE: Contiguous axial CT images were obtained from the lower chest   to the pubic symphysis . Residual intravenous contrast is on board from a   recent postcontrast CT scan performed earlier in the day Reformatted   images in the coronal and sagittal planes were acquired.    COMPARISON CT: Cardiac CT 6/21/2024, chest CT 6/23/2024      FINDINGS:    LOWER CHEST: Partially imaged cardiac pacemaker leads. Bilateral lower   lung field reticular opacities, stable. Anterior left lower lung field   nodular opacity in image 22 series 301 appears stable. There is a 5 mm   pulmonary nodule in the left lower lobe in image 20 series 301 and a 4 mm   nodule in image 7 of series 301. These are stable since 6/23/2024.    HEPATOBILIARY: There is an indeterminate left hepatic 1.4 cm hypodensity   in image 75 of series 302. The gallbladder is present.    SPLEEN: Unremarkable..    PANCREAS: Unremarkable..    ADRENAL GLANDS: Unremarkable..    KIDNEYS: There is an indeterminate left lower pole 2.5 cm hypodensity in   image 63 of series 301. There are bilateral renal cysts. There is no   hydronephrosis. Excreted contrast is noted within the collecting system   from recent intravenous contrast administration.    ABDOMINOPELVIC NODES: Unremarkable...    PELVIC ORGANS: A portion of the bladder enters a right inguinal hernia.   There is nonspecific bladder wall thickening in this location and   follow-up with outpatient urology is recommended. The right inguinal   hernia is incompletely imaged. There is a trace amount of free fluid   within the right inguinal hernia. The prostate gland is enlarged.    PERITONEUM/MESENTERY/BOWEL: Diverticulosis. The appendix is unremarkable.   There is a small hiatal hernia. There is no free air or obstruction..    BONES: Degenerative change.There is compression deformity involving T12   and L5, age indeterminate...    IMPRESSION:    Bladder enters a right inguinal hernia with nonspecific bladder wall   thickening in this location and follow-up with outpatient urology is   recommended.    Indeterminate left hepatic 1.4 cm hypodensity and indeterminate left   lower pole 2.5 cm hypodensity.    As this patient contains a cardiac pacing device, further   characterization of the above findings is recommended with outpatient   liver protocol and renal protocol CT.    However, if this cardiac pacing device is MRI compatible, this can be   further evaluated with MRI on outpatient basis    Findings consistent with interstitial lung disease.    Stable pulmonary nodules and follow-up as per Fleischner Society   guidelines.    --- End of Report ---            REGINE SULTANA MD; Attending Radiologist  This document has been electronically signed. Oct 28 2024  2:05PM (10-28-24 @ 13:00)      MEDICATIONS  (STANDING):  albuterol/ipratropium for Nebulization 3 milliLiter(s) Nebulizer every 6 hours  aMIOdarone Infusion 1 mG/Min (33.3 mL/Hr) IV Continuous <Continuous>  aMIOdarone Infusion 0.5 mG/Min (16.7 mL/Hr) IV Continuous <Continuous>  ampicillin/sulbactam  IVPB      ampicillin/sulbactam  IVPB 1.5 Gram(s) IV Intermittent every 6 hours  aspirin enteric coated 81 milliGRAM(s) Oral daily  atorvastatin 80 milliGRAM(s) Oral at bedtime  chlorhexidine 2% Cloths 1 Application(s) Topical <User Schedule>  dextrose 5%. 1000 milliLiter(s) (100 mL/Hr) IV Continuous <Continuous>  dextrose 5%. 1000 milliLiter(s) (50 mL/Hr) IV Continuous <Continuous>  dextrose 50% Injectable 12.5 Gram(s) IV Push once  dextrose 50% Injectable 25 Gram(s) IV Push once  dextrose 50% Injectable 25 Gram(s) IV Push once  heparin  Infusion.  Unit(s)/Hr (16 mL/Hr) IV Continuous <Continuous>  influenza  Vaccine (HIGH DOSE) 0.5 milliLiter(s) IntraMuscular once  insulin glargine Injectable (LANTUS) 45 Unit(s) SubCutaneous at bedtime  insulin lispro (ADMELOG) corrective regimen sliding scale   SubCutaneous three times a day before meals  memantine 5 milliGRAM(s) Oral daily  pantoprazole    Tablet 40 milliGRAM(s) Oral before breakfast  pregabalin 150 milliGRAM(s) Oral every 12 hours  ranolazine 500 milliGRAM(s) Oral two times a day  tamsulosin 0.4 milliGRAM(s) Oral at bedtime    MEDICATIONS  (PRN):  acetaminophen     Tablet .. 650 milliGRAM(s) Oral every 6 hours PRN Temp greater or equal to 38C (100.4F), Mild Pain (1 - 3)  albuterol    90 MICROgram(s) HFA Inhaler 2 Puff(s) Inhalation every 6 hours PRN Shortness of Breath and/or Wheezing  aluminum hydroxide/magnesium hydroxide/simethicone Suspension 30 milliLiter(s) Oral every 4 hours PRN Dyspepsia  dextrose Oral Gel 15 Gram(s) Oral once PRN Blood Glucose LESS THAN 70 milliGRAM(s)/deciliter  heparin   Injectable 7000 Unit(s) IV Push every 6 hours PRN For aPTT less than 40  heparin   Injectable 3500 Unit(s) IV Push every 6 hours PRN For aPTT between 40 - 57  melatonin 5 milliGRAM(s) Oral at bedtime PRN Insomnia  ondansetron Injectable 4 milliGRAM(s) IV Push every 8 hours PRN Nausea and/or Vomiting      Physical Examination  General: NAD,  Head: BIPAP in place, change to NC  Neck: No JVD appreciated  Cardiac: Afib, rate controlled on exam. Later in sinus, went back into Afib w/ RVR  Pulmonary: Crackles at bases  Abdominal: Soft, nontender  Extremities: No pitting edema  Neurologic: AOx1-2 (baseline)  Skin: No rashes or lesions

## 2024-10-31 NOTE — PROGRESS NOTE ADULT - ASSESSMENT
[facility] Course  Patient is a  78 Yo M w a PMH Of CAD, MI (s/p Cath w stent) BPH, DM, CKD stage unspecified brought in for weakness and confusion.   Upgraded earlier today for SOB 2/2 pulmonary edema requiring Bipap. Known to have small pericardial effusion, ICU team attempted toimage earlier today, unable to obtain sufficient quality view on ultrasound. Now in Afib + RVR.   Case discussed with Cardiac fellow, Dr. Bandar Al, at this time recommends transfer to [facility] Cardiology service.     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  #Chest Pain (Improved)  - Initially presented with significant sternal chest pain  - Trops 20 -> 16 -> 15 -> 20  - Serial EKGs stable  - 10/29 TTE: LVEF 65-70%, G1DD, borderline pHTN, small pericardial effusion  - ESR 34,   - Sent to [facility] due to concern for tamponade, poor windows on TTE at [facility]  - S/p Amiodarone infusion  - Start Amiodarone 400mg PO BID  - Start Colchicine 0.6mg BID  - Switch BIPAP to NC  - C/w Heparin infusion for AC  - F/u repeat TTE to r/o evidence of tamponade; if negative will likely start diuretics  - F/u Coxsackie serology  - Monitor BMP    #Possible Aspiration PNA  - CXR showing effusions and consolidation  - Afebrile though with leukocytosis  - 10/30 FEES w/ mild dysphagia, when eating needs soft and bite sized diet w/ sips of thin liquid  - C/w Unasyn 1.5g q6h  - F/u Procalcitonin, Blood Cx, Sputum Cx    #Confusion on Admission r/o CVA  - Per wife patient is "forgetful" has periods of being AOx1-3  - 10/29 MR Head: No acute pathology, mild chronic microvascular ischemic changes and chronic lacunar infarcts  - Delirium precautions  - Awake and Alert x1 to name on exam 10/31 AM    #Diabetes Mellitus  - C/w basal insulin w/ sliding scale  - Titrate for glucose 140-180 while inpatient    #HTN  - Hypotensive during hospitalization  - Holding home antihypertensives for now    #BPH  - C/w home Tamsulosin 0.4mg PO QD    #Recent Admission for Vision Loss  - Need for o/p f/u w/ Ophthalmology noted on last d/c    #Misc  #Code Status: Full Code  #DVT ppx: Heparin drip  #GI ppx: N/A  #Diet: Soft and Bite Sized w/ Thin Liquids, CC/DASH  #Activity: Out of Bed w/ Assistance  #Dispo: 4T Cardiac SDU [facility] Course  Patient is a  78 Yo M w a PMH Of CAD, MI (s/p Cath w stent) BPH, DM, CKD stage unspecified brought in for weakness and confusion.   Upgraded earlier today for SOB 2/2 pulmonary edema requiring Bipap. Known to have small pericardial effusion, ICU team attempted toimage earlier today, unable to obtain sufficient quality view on ultrasound. Now in Afib + RVR.   Case discussed with Cardiac fellow, Dr. Bandar Al, at this time recommends transfer to [facility] Cardiology service.     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  #Hx of CAD  #Chest Pain (Improved)  - Initially presented with significant sternal chest pain  - Trops 20 -> 16 -> 15 -> 20  - Serial EKGs stable  - 10/29 TTE: LVEF 65-70%, G1DD, borderline pHTN, small pericardial effusion  - ESR 34,   - Sent to [facility] due to concern for tamponade, poor windows on TTE at [facility]  - S/p Amiodarone infusion  - Start Amiodarone 400mg PO BID  - Start Colchicine 0.6mg BID  - Switch BIPAP to NC  - C/w Heparin infusion for AC  - C/w home ASA and Ranolazine  - F/u repeat TTE to r/o evidence of tamponade; if negative will likely start diuretics  - F/u Coxsackie serology  - Monitor BMP    #Possible Aspiration PNA  - CXR showing effusions and consolidation  - Afebrile though with leukocytosis  - 10/30 FEES w/ mild dysphagia, when eating needs soft and bite sized diet w/ sips of thin liquid  - C/w Unasyn 1.5g q6h  - F/u Procalcitonin, Blood Cx, Sputum Cx    #Confusion on Admission r/o CVA  - Per wife patient is "forgetful" has periods of being AOx1-3  - 10/29 MR Head: No acute pathology, mild chronic microvascular ischemic changes and chronic lacunar infarcts  - Delirium precautions  - Awake and Alert x1 to name on exam 10/31 AM    #Diabetes Mellitus  - C/w basal insulin w/ sliding scale  - Titrate for glucose 140-180 while inpatient    #HTN  - Hypotensive during hospitalization  - Holding home antihypertensives for now    #BPH  - C/w home Tamsulosin 0.4mg PO QD    #Recent Admission for Vision Loss  - Need for o/p f/u w/ Ophthalmology noted on last d/c    #Misc  #Code Status: Full Code  #DVT ppx: Heparin drip  #GI ppx: N/A  #Diet: Soft and Bite Sized w/ Thin Liquids, CC/DASH  #Activity: Out of Bed w/ Assistance  #Dispo: 4T Cardiac SDU [facility] Course  Patient is a  80 Yo M w a PMH Of CAD, MI (s/p Cath w stent) BPH, DM, CKD stage unspecified brought in for weakness and confusion.   Upgraded earlier today for SOB 2/2 pulmonary edema requiring Bipap. Known to have small pericardial effusion, ICU team attempted toimage earlier today, unable to obtain sufficient quality view on ultrasound. Now in Afib + RVR.   Case discussed with Cardiac fellow, Dr. Bandar Al, at this time recommends transfer to [facility] Cardiology service.     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  #Hx of CAD  #Chest Pain (Improved)  - Initially presented with significant sternal chest pain  - Trops 20 -> 16 -> 15 -> 20  - Serial EKGs stable  - 10/29 TTE: LVEF 65-70%, G1DD, borderline pHTN, small pericardial effusion  - ESR 34,   - Sent to [facility]- due to concern for tamponade, poor windows on TTE at [facility]  - S/p Amiodarone infusion  - Hold home Ranexa due to interaction w/ Amiodarone  - Start Amiodarone 400mg PO BID  - Given STAT dose of Colchicine, has significant interaction w/ Amio will need to be dosed likely q48h per discussion w/ pharmacy  - Switch BIPAP to NC  - C/w Heparin infusion for AC  - F/u repeat TTE to r/o evidence of tamponade; if negative will likely start diuretics  - F/u Coxsackie serology  - Monitor BMP    #Possible Aspiration PNA  - CXR showing effusions and consolidation  - Afebrile though with leukocytosis  - 10/30 FEES w/ mild dysphagia, when eating needs soft and bite sized diet w/ sips of thin liquid  - C/w Unasyn 1.5g q6h  - F/u Procalcitonin, Blood Cx, Sputum Cx    #Confusion on Admission r/o CVA  - Per wife patient is "forgetful" has periods of being AOx1-3  - 10/29 MR Head: No acute pathology, mild chronic microvascular ischemic changes and chronic lacunar infarcts  - Delirium precautions  - Awake and Alert x1 to name on exam 10/31 AM    #Diabetes Mellitus  - C/w basal insulin w/ sliding scale  - Titrate for glucose 140-180 while inpatient    #HTN  - Hypotensive during hospitalization  - Holding home antihypertensives for now    #BPH  - C/w home Tamsulosin 0.4mg PO QD    #Recent Admission for Vision Loss  - Need for o/p f/u w/ Ophthalmology noted on last d/c    #Misc  #Code Status: Full Code  #DVT ppx: Heparin drip  #GI ppx: N/A  #Diet: Soft and Bite Sized w/ Thin Liquids, CC/DASH  #Activity: Out of Bed w/ Assistance  #Dispo: 4T Cardiac SDU [facility] Course  Patient is a  80 Yo M w a PMH Of CAD, MI (s/p Cath w stent) BPH, DM, CKD stage unspecified brought in for weakness and confusion.   Upgraded earlier today for SOB 2/2 pulmonary edema requiring Bipap. Known to have small pericardial effusion, ICU team attempted toimage earlier today, unable to obtain sufficient quality view on ultrasound. Now in Afib + RVR.   Case discussed with Cardiac fellow, Dr. Bandar Al, at this time recommends transfer to [facility] Cardiology service.     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  #Hx of CAD  #Chest Pain (Improved)  - Initially presented with significant sternal chest pain  - Trops 20 -> 16 -> 15 -> 20  - Serial EKGs stable  - 10/29 TTE: LVEF 65-70%, G1DD, borderline pHTN, small pericardial effusion  - ESR 34,   - Sent to [facility]- due to concern for tamponade, poor windows on TTE at [facility]  - S/p Amiodarone infusion  - Hold home Ranexa due to interaction w/ Amiodarone  - Start Amiodarone 400mg PO BID  - Given STAT dose of Colchicine, has significant interaction w/ Amio will need to be dosed likely q48h per discussion w/ pharmacy  - Switch BIPAP to NC  - C/w Heparin infusion for AC  - F/u repeat TTE to r/o evidence of tamponade; if negative will likely start diuretics  - F/u Coxsackie serology  - Monitor BMP    #Possible Aspiration PNA  - CXR showing effusions and consolidation  - Afebrile though with leukocytosis  - 10/30 FEES w/ mild dysphagia, when eating needs soft and bite sized diet w/ sips of thin liquid  - C/w Unasyn 1.5g q6h  - F/u Procalcitonin, Blood Cx, Sputum Cx    #Confusion on Admission r/o CVA  - Per wife patient is "forgetful" has periods of being AOx1-3  - 10/29 MR Head: No acute pathology, mild chronic microvascular ischemic changes and chronic lacunar infarcts  - Delirium precautions  - Awake and Alert x1 to name on exam 10/31 AM    #Diabetes Mellitus  - C/w basal insulin w/ sliding scale  - Titrate for glucose 140-180 while inpatient    #HTN  - Hypotensive during hospitalization  - Holding home antihypertensives for now    #BPH  - C/w home Tamsulosin 0.4mg PO QD    #Recent Admission for Vision Loss  - Need for o/p f/u w/ Ophthalmology noted on last d/c    #Misc  #Code Status: Full Code  #DVT ppx: Heparin drip  #GI ppx: N/A  #Diet: Soft and Bite Sized w/ Thin Liquids, CC/DASH  #Activity: Out of Bed w/ Assistance  #Dispo: CCU/Cardiac SDU

## 2024-10-31 NOTE — PHARMACOTHERAPY INTERVENTION NOTE - COMMENTS
Patient is currently on ampicillin/sulbactam (Unasyn) 1.5g IV q6h for the treatment of pneumonia. Recommend renally adjusting the dose to 3g IV q12h as patient's creatinine clearance is ~25mL/min.

## 2024-10-31 NOTE — CHART NOTE - NSCHARTNOTEFT_GEN_A_CORE
Patient seen and examined at bedside. He is in Afib with RVR on an amio and heparin drip.  Bedside echo, technically difficult with limited views showed a moderate pericardial effusion with a plethoric IVC, without tamponade physiology.  Patient currently hemodynamically stable, on a BIPAP. Patient seen and examined at bedside. He is in Afib with RVR on an amio and heparin drip.  Bedside echo, technically difficult with limited views showed a moderate pericardial effusion with a plethoric IVC, without tamponade physiology.  Patient currently hemodynamically stable, on a BIPAP.  Will d/c lasix and metoprolol for now; likely is in HF but with the pericardial effusion, will be harmful to keep diuretics.   Will likely need pericardiocentesis vs window with studies.  Will repeat 2D echo

## 2024-11-01 LAB
ANION GAP SERPL CALC-SCNC: 14 MMOL/L — SIGNIFICANT CHANGE UP (ref 7–14)
ANION GAP SERPL CALC-SCNC: 17 MMOL/L — HIGH (ref 7–14)
APTT BLD: 70.8 SEC — CRITICAL HIGH (ref 27–39.2)
BASOPHILS # BLD AUTO: 0.06 K/UL — SIGNIFICANT CHANGE UP (ref 0–0.2)
BASOPHILS NFR BLD AUTO: 0.5 % — SIGNIFICANT CHANGE UP (ref 0–1)
BUN SERPL-MCNC: 41 MG/DL — HIGH (ref 10–20)
BUN SERPL-MCNC: 42 MG/DL — HIGH (ref 10–20)
CALCIUM SERPL-MCNC: 8.3 MG/DL — LOW (ref 8.4–10.5)
CALCIUM SERPL-MCNC: 8.3 MG/DL — LOW (ref 8.4–10.5)
CHLORIDE SERPL-SCNC: 100 MMOL/L — SIGNIFICANT CHANGE UP (ref 98–110)
CHLORIDE SERPL-SCNC: 102 MMOL/L — SIGNIFICANT CHANGE UP (ref 98–110)
CO2 SERPL-SCNC: 18 MMOL/L — SIGNIFICANT CHANGE UP (ref 17–32)
CO2 SERPL-SCNC: 22 MMOL/L — SIGNIFICANT CHANGE UP (ref 17–32)
CREAT SERPL-MCNC: 2.4 MG/DL — HIGH (ref 0.7–1.5)
CREAT SERPL-MCNC: 2.4 MG/DL — HIGH (ref 0.7–1.5)
EGFR: 27 ML/MIN/1.73M2 — LOW
EGFR: 27 ML/MIN/1.73M2 — LOW
EOSINOPHIL # BLD AUTO: 0.2 K/UL — SIGNIFICANT CHANGE UP (ref 0–0.7)
EOSINOPHIL NFR BLD AUTO: 1.7 % — SIGNIFICANT CHANGE UP (ref 0–8)
GLUCOSE BLDC GLUCOMTR-MCNC: 200 MG/DL — HIGH (ref 70–99)
GLUCOSE BLDC GLUCOMTR-MCNC: 204 MG/DL — HIGH (ref 70–99)
GLUCOSE BLDC GLUCOMTR-MCNC: 261 MG/DL — HIGH (ref 70–99)
GLUCOSE BLDC GLUCOMTR-MCNC: 268 MG/DL — HIGH (ref 70–99)
GLUCOSE SERPL-MCNC: 151 MG/DL — HIGH (ref 70–99)
GLUCOSE SERPL-MCNC: 196 MG/DL — HIGH (ref 70–99)
GRAM STN FLD: ABNORMAL
HCT VFR BLD CALC: 35 % — LOW (ref 42–52)
HCT VFR BLD CALC: 35.3 % — LOW (ref 42–52)
HGB BLD-MCNC: 11.5 G/DL — LOW (ref 14–18)
HGB BLD-MCNC: 11.5 G/DL — LOW (ref 14–18)
IMM GRANULOCYTES NFR BLD AUTO: 0.9 % — HIGH (ref 0.1–0.3)
LYMPHOCYTES # BLD AUTO: 2.39 K/UL — SIGNIFICANT CHANGE UP (ref 1.2–3.4)
LYMPHOCYTES # BLD AUTO: 20.3 % — LOW (ref 20.5–51.1)
MAGNESIUM SERPL-MCNC: 2.2 MG/DL — SIGNIFICANT CHANGE UP (ref 1.8–2.4)
MAGNESIUM SERPL-MCNC: 2.3 MG/DL — SIGNIFICANT CHANGE UP (ref 1.8–2.4)
MCHC RBC-ENTMCNC: 28.6 PG — SIGNIFICANT CHANGE UP (ref 27–31)
MCHC RBC-ENTMCNC: 28.8 PG — SIGNIFICANT CHANGE UP (ref 27–31)
MCHC RBC-ENTMCNC: 32.6 G/DL — SIGNIFICANT CHANGE UP (ref 32–37)
MCHC RBC-ENTMCNC: 32.9 G/DL — SIGNIFICANT CHANGE UP (ref 32–37)
MCV RBC AUTO: 87.1 FL — SIGNIFICANT CHANGE UP (ref 80–94)
MCV RBC AUTO: 88.3 FL — SIGNIFICANT CHANGE UP (ref 80–94)
MONOCYTES # BLD AUTO: 1.35 K/UL — HIGH (ref 0.1–0.6)
MONOCYTES NFR BLD AUTO: 11.5 % — HIGH (ref 1.7–9.3)
NEUTROPHILS # BLD AUTO: 7.68 K/UL — HIGH (ref 1.4–6.5)
NEUTROPHILS NFR BLD AUTO: 65.1 % — SIGNIFICANT CHANGE UP (ref 42.2–75.2)
NRBC # BLD: 0 /100 WBCS — SIGNIFICANT CHANGE UP (ref 0–0)
NRBC # BLD: 0 /100 WBCS — SIGNIFICANT CHANGE UP (ref 0–0)
PLATELET # BLD AUTO: 162 K/UL — SIGNIFICANT CHANGE UP (ref 130–400)
PLATELET # BLD AUTO: 169 K/UL — SIGNIFICANT CHANGE UP (ref 130–400)
PMV BLD: 12.5 FL — HIGH (ref 7.4–10.4)
PMV BLD: 12.6 FL — HIGH (ref 7.4–10.4)
POTASSIUM SERPL-MCNC: 4.3 MMOL/L — SIGNIFICANT CHANGE UP (ref 3.5–5)
POTASSIUM SERPL-MCNC: 4.6 MMOL/L — SIGNIFICANT CHANGE UP (ref 3.5–5)
POTASSIUM SERPL-SCNC: 4.3 MMOL/L — SIGNIFICANT CHANGE UP (ref 3.5–5)
POTASSIUM SERPL-SCNC: 4.6 MMOL/L — SIGNIFICANT CHANGE UP (ref 3.5–5)
PROCALCITONIN SERPL-MCNC: 0.32 NG/ML — HIGH (ref 0.02–0.1)
RBC # BLD: 4 M/UL — LOW (ref 4.7–6.1)
RBC # BLD: 4.02 M/UL — LOW (ref 4.7–6.1)
RBC # FLD: 16.7 % — HIGH (ref 11.5–14.5)
RBC # FLD: 16.8 % — HIGH (ref 11.5–14.5)
SODIUM SERPL-SCNC: 135 MMOL/L — SIGNIFICANT CHANGE UP (ref 135–146)
SODIUM SERPL-SCNC: 138 MMOL/L — SIGNIFICANT CHANGE UP (ref 135–146)
SPECIMEN SOURCE: SIGNIFICANT CHANGE UP
WBC # BLD: 11.67 K/UL — HIGH (ref 4.8–10.8)
WBC # BLD: 11.79 K/UL — HIGH (ref 4.8–10.8)
WBC # FLD AUTO: 11.67 K/UL — HIGH (ref 4.8–10.8)
WBC # FLD AUTO: 11.79 K/UL — HIGH (ref 4.8–10.8)

## 2024-11-01 PROCEDURE — 99233 SBSQ HOSP IP/OBS HIGH 50: CPT | Mod: FS

## 2024-11-01 PROCEDURE — 99223 1ST HOSP IP/OBS HIGH 75: CPT | Mod: FS

## 2024-11-01 PROCEDURE — 95816 EEG AWAKE AND DROWSY: CPT | Mod: 26

## 2024-11-01 PROCEDURE — 93280 PM DEVICE PROGR EVAL DUAL: CPT | Mod: 26

## 2024-11-01 RX ORDER — APIXABAN 5 MG/1
2.5 TABLET, FILM COATED ORAL EVERY 12 HOURS
Refills: 0 | Status: DISCONTINUED | OUTPATIENT
Start: 2024-11-01 | End: 2024-11-01

## 2024-11-01 RX ORDER — APIXABAN 5 MG/1
2.5 TABLET, FILM COATED ORAL EVERY 12 HOURS
Refills: 0 | Status: DISCONTINUED | OUTPATIENT
Start: 2024-11-01 | End: 2024-11-08

## 2024-11-01 RX ADMIN — Medication 50 MILLIGRAM(S): at 05:30

## 2024-11-01 RX ADMIN — Medication 81 MILLIGRAM(S): at 11:36

## 2024-11-01 RX ADMIN — Medication 1: at 07:32

## 2024-11-01 RX ADMIN — PREGABALIN 150 MILLIGRAM(S): 150 CAPSULE ORAL at 05:30

## 2024-11-01 RX ADMIN — IPRATROPIUM BROMIDE AND ALBUTEROL SULFATE 3 MILLILITER(S): .5; 2.5 SOLUTION RESPIRATORY (INHALATION) at 07:41

## 2024-11-01 RX ADMIN — IPRATROPIUM BROMIDE AND ALBUTEROL SULFATE 3 MILLILITER(S): .5; 2.5 SOLUTION RESPIRATORY (INHALATION) at 20:42

## 2024-11-01 RX ADMIN — Medication 0.4 MILLIGRAM(S): at 21:14

## 2024-11-01 RX ADMIN — CHLORHEXIDINE GLUCONATE 1 APPLICATION(S): 40 SOLUTION TOPICAL at 06:02

## 2024-11-01 RX ADMIN — AMPICILLIN AND SULBACTAM 200 GRAM(S): 2; 1 INJECTION, POWDER, FOR SOLUTION INTRAMUSCULAR; INTRAVENOUS at 05:28

## 2024-11-01 RX ADMIN — Medication 40 MILLIGRAM(S): at 05:28

## 2024-11-01 RX ADMIN — APIXABAN 2.5 MILLIGRAM(S): 5 TABLET, FILM COATED ORAL at 14:13

## 2024-11-01 RX ADMIN — PREGABALIN 150 MILLIGRAM(S): 150 CAPSULE ORAL at 17:12

## 2024-11-01 RX ADMIN — Medication 400 MILLIGRAM(S): at 17:12

## 2024-11-01 RX ADMIN — Medication 40 MILLIGRAM(S): at 14:12

## 2024-11-01 RX ADMIN — PANTOPRAZOLE SODIUM 40 MILLIGRAM(S): 40 TABLET, DELAYED RELEASE ORAL at 06:02

## 2024-11-01 RX ADMIN — Medication 3: at 17:12

## 2024-11-01 RX ADMIN — Medication 3: at 11:36

## 2024-11-01 RX ADMIN — MEMANTINE HYDROCHLORIDE 5 MILLIGRAM(S): 21 CAPSULE, EXTENDED RELEASE ORAL at 11:33

## 2024-11-01 RX ADMIN — Medication 50 MILLIGRAM(S): at 17:12

## 2024-11-01 RX ADMIN — Medication 45 UNIT(S): at 21:14

## 2024-11-01 RX ADMIN — Medication 80 MILLIGRAM(S): at 21:14

## 2024-11-01 RX ADMIN — Medication 400 MILLIGRAM(S): at 05:30

## 2024-11-01 NOTE — RAPID RESPONSE TEAM SUMMARY - NSADDTLFINDINGSRRT_GEN_ALL_CORE
Patient returned to baseline in <30 seconds per wife. Patient is awake and at baseline mentation and says he feels ok. Patient denies any dizziness, lightheadedness, chest pain, nausea, sweating, or flushing prior to event. Patient does not have memory of event happening. No violent shaking witnessed, no bowel or bladder incontinence, returned to baseline MS immediately. No events were noted on telemetry at or around the event.    Patient remained hemodynamically stable, FSG in 200s, HR 90, /67, saturating 97% O2

## 2024-11-01 NOTE — CONSULT NOTE ADULT - ATTENDING COMMENTS
Patient seen and examined and agree with above except as noted.  Patients history, notes, labs, imaging, vitals and meds reviewed personally.  Prior workup reviewed  Episode highly suspicious for cough syncope (vagal event)  Less likely seizure  No focal deficits currently    Plan as above

## 2024-11-01 NOTE — CHART NOTE - NSCHARTNOTEFT_GEN_A_CORE
TRANSFER FROM: 4T Cardiac SDU  TRANSFER TO: 3C Telemetry      HPI / HOSPITAL COURSE:  HPI:  79 year old male with PMH of CAD, DM, HTN, and BPH, with recent admission for vision loss, and presented earlier today for generalized weakness, returning to the ER for chest pain. Patient states shortly after being discharged from the ER, patient went home and then began to have a sudden onset of substernal chest pressure, associated with shortness of breath.  Patient denies experiencing similar symptoms earlier today.   (28 Oct 2024 20:48)      Vital Signs Last 24 Hrs  T(C): 37.2 (01 Nov 2024 07:25), Max: 37.2 (01 Nov 2024 07:25)  T(F): 99 (01 Nov 2024 07:25), Max: 99 (01 Nov 2024 07:25)  HR: 90 (01 Nov 2024 12:07) (90 - 153)  BP: 127/67 (01 Nov 2024 11:09) (93/65 - 127/67)  BP(mean): 86 (01 Nov 2024 11:09) (74 - 86)  RR: 18 (01 Nov 2024 11:09) (18 - 20)  SpO2: 98% (01 Nov 2024 12:07) (95% - 98%)    Parameters below as of 01 Nov 2024 04:57  Patient On (Oxygen Delivery Method): nasal cannula  O2 Flow (L/min): 4    I&O's Summary    31 Oct 2024 07:01  -  01 Nov 2024 07:00  --------------------------------------------------------  IN: 536.5 mL / OUT: 1500 mL / NET: -963.5 mL    01 Nov 2024 07:01  -  01 Nov 2024 12:55  --------------------------------------------------------  IN: 65 mL / OUT: 900 mL / NET: -835 mL      PHYSICAL EXAM:   General: NAD, lying in bed comfortably  Head: NCAT  Neck: Supple, no JVD  Cardiac: Regular rate and rhythm. No murmurs, gallops, or rubs  Pulmonary: CTAB  Abdominal: Soft, nontender, and nondistended with positive bowel sounds  Extremities: No pitting edema  Neurologic: AOx3, nonfocal  Skin: No rashes or lesions    LABS:                         11.5   11.79 )-----------( 162      ( 01 Nov 2024 05:27 )             35.3     11-01    138  |  102  |  42[H]  ----------------------------<  151[H]  4.6   |  22  |  2.4[H]    Ca    8.3[L]      01 Nov 2024 05:27  Mg     2.3     11-01      PTT - ( 01 Nov 2024 05:27 )  PTT:70.8 sec  Urinalysis Basic - ( 01 Nov 2024 05:27 )    Color: x / Appearance: x / SG: x / pH: x  Gluc: 151 mg/dL / Ketone: x  / Bili: x / Urobili: x   Blood: x / Protein: x / Nitrite: x   Leuk Esterase: x / RBC: x / WBC x   Sq Epi: x / Non Sq Epi: x / Bacteria: x      CAPILLARY BLOOD GLUCOSE      POCT Blood Glucose.: 268 mg/dL (01 Nov 2024 11:03)  POCT Blood Glucose.: 200 mg/dL (01 Nov 2024 07:23)  POCT Blood Glucose.: 243 mg/dL (31 Oct 2024 21:24)  POCT Blood Glucose.: 165 mg/dL (31 Oct 2024 16:05)    ABG - ( 31 Oct 2024 09:16 )  pH, Arterial: 7.38  pH, Blood: x     /  pCO2: 33    /  pO2: 130   / HCO3: 20    / Base Excess: -4.8  /  SaO2: 99.3                IMAGING:  CXR      CT      ASSESSMENT & PLAN: TRANSFER FROM: 4T Cardiac SDU  TRANSFER TO: 3C Telemetry      HPI / HOSPITAL COURSE:  HPI:  79 year old male with PMH of CAD, DM, HTN, and BPH, with recent admission for vision loss, and presented earlier today for generalized weakness, returning to the ER for chest pain. Patient states shortly after being discharged from the ER, patient went home and then began to have a sudden onset of substernal chest pressure, associated with shortness of breath.  Patient denies experiencing similar symptoms earlier today.   (28 Oct 2024 20:48)    [facility] Course  Patient is a  78 Yo M w a PMH Of CAD, MI (s/p Cath w stent) BPH, DM, CKD stage unspecified brought in for weakness and confusion.   Upgraded earlier today for SOB 2/2 pulmonary edema requiring Bipap. Known to have small pericardial effusion, ICU team attempted toimage earlier today, unable to obtain sufficient quality view on ultrasound. Now in Afib + RVR.   Case discussed with Cardiac fellow, Dr. Bandar Al, at this time recommends transfer to [facility] Cardiology service.     4T Course  Patient was initially placed on Amiodarone  Vital Signs Last 24 Hrs  T(C): 37.2 (01 Nov 2024 07:25), Max: 37.2 (01 Nov 2024 07:25)  T(F): 99 (01 Nov 2024 07:25), Max: 99 (01 Nov 2024 07:25)  HR: 90 (01 Nov 2024 12:07) (90 - 153)  BP: 127/67 (01 Nov 2024 11:09) (93/65 - 127/67)  BP(mean): 86 (01 Nov 2024 11:09) (74 - 86)  RR: 18 (01 Nov 2024 11:09) (18 - 20)  SpO2: 98% (01 Nov 2024 12:07) (95% - 98%)    Parameters below as of 01 Nov 2024 04:57  Patient On (Oxygen Delivery Method): nasal cannula  O2 Flow (L/min): 4    I&O's Summary    31 Oct 2024 07:01  -  01 Nov 2024 07:00  --------------------------------------------------------  IN: 536.5 mL / OUT: 1500 mL / NET: -963.5 mL    01 Nov 2024 07:01  -  01 Nov 2024 12:55  --------------------------------------------------------  IN: 65 mL / OUT: 900 mL / NET: -835 mL      PHYSICAL EXAM:   General: NAD, lying in bed comfortably  Head: NCAT  Neck: Supple, no JVD  Cardiac: Regular rate and rhythm. No murmurs, gallops, or rubs  Pulmonary: CTAB  Abdominal: Soft, nontender, and nondistended with positive bowel sounds  Extremities: No pitting edema  Neurologic: AOx3, nonfocal  Skin: No rashes or lesions    LABS:                         11.5   11.79 )-----------( 162      ( 01 Nov 2024 05:27 )             35.3     11-01    138  |  102  |  42[H]  ----------------------------<  151[H]  4.6   |  22  |  2.4[H]    Ca    8.3[L]      01 Nov 2024 05:27  Mg     2.3     11-01      PTT - ( 01 Nov 2024 05:27 )  PTT:70.8 sec  Urinalysis Basic - ( 01 Nov 2024 05:27 )    Color: x / Appearance: x / SG: x / pH: x  Gluc: 151 mg/dL / Ketone: x  / Bili: x / Urobili: x   Blood: x / Protein: x / Nitrite: x   Leuk Esterase: x / RBC: x / WBC x   Sq Epi: x / Non Sq Epi: x / Bacteria: x      CAPILLARY BLOOD GLUCOSE      POCT Blood Glucose.: 268 mg/dL (01 Nov 2024 11:03)  POCT Blood Glucose.: 200 mg/dL (01 Nov 2024 07:23)  POCT Blood Glucose.: 243 mg/dL (31 Oct 2024 21:24)  POCT Blood Glucose.: 165 mg/dL (31 Oct 2024 16:05)    ABG - ( 31 Oct 2024 09:16 )  pH, Arterial: 7.38  pH, Blood: x     /  pCO2: 33    /  pO2: 130   / HCO3: 20    / Base Excess: -4.8  /  SaO2: 99.3                IMAGING:  CXR      CT      ASSESSMENT & PLAN:    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  #Hx of CAD  #Chest Pain (Improved)  - Initially presented with significant sternal chest pain  - Trops 20 -> 16 -> 15 -> 20  - Serial EKGs stable  - 10/29 TTE: LVEF 65-70%, G1DD, borderline pHTN, small pericardial effusion  - ESR 34,   - Sent to [facility]- due to concern for tamponade, poor windows on TTE at [facility]  - S/p Amiodarone infusion  - Hold home Ranexa due to interaction w/ Amiodarone  - C/w Amiodarone 400mg PO BID  - C/w Colchicine 0.6mg q48h; has poor renal function and on amio, discussed w/ pharmacy, do not increase dose or frequency (ideally stop per pharmacy)    - C/w Heparin infusion for AC  - F/u Coxsackie serology  - Monitor BMP    #Possible Aspiration PNA  - CXR showing effusions and consolidation  - Afebrile though with leukocytosis  - 10/30 FEES w/ mild dysphagia, when eating needs soft and bite sized diet w/ sips of thin liquid  - Off abx for now  - F/u Procalcitonin, Blood Cx, Sputum Cx    #Confusion on Admission r/o CVA  - Per wife patient is "forgetful" has periods of being AOx1-3  - 10/29 MR Head: No acute pathology, mild chronic microvascular ischemic changes and chronic lacunar infarcts  - Delirium precautions  - Awake and Alert x1 to name on exam 10/31 AM    #Diabetes Mellitus  - C/w basal insulin w/ sliding scale  - Titrate for glucose 140-180 while inpatient    #HTN  - Hypotensive during hospitalization  - Holding home antihypertensives for now    #BPH  - C/w home Tamsulosin 0.4mg PO QD    #Recent Admission for Vision Loss  - Need for o/p f/u w/ Ophthalmology noted on last d/c    #Misc  #Code Status: Full Code  #DVT ppx: Heparin drip  #GI ppx: N/A  #Diet: Soft and Bite Sized w/ Thin Liquids, CC/DASH  #Activity: Out of Bed w/ Assistance TRANSFER FROM: 4T Cardiac SDU  TRANSFER TO: 3C Telemetry      HPI / HOSPITAL COURSE:  HPI:  79 year old male with PMH of CAD, DM, HTN, and BPH, with recent admission for vision loss, and presented earlier today for generalized weakness, returning to the ER for chest pain. Patient states shortly after being discharged from the ER, patient went home and then began to have a sudden onset of substernal chest pressure, associated with shortness of breath.  Patient denies experiencing similar symptoms earlier today.   (28 Oct 2024 20:48)    [facility] Course  Patient is a  80 Yo M w a PMH Of CAD, MI (s/p Cath w stent) BPH, DM, CKD stage unspecified brought in for weakness and confusion.   Upgraded earlier today for SOB 2/2 pulmonary edema requiring Bipap. Known to have small pericardial effusion, ICU team attempted toimage earlier today, unable to obtain sufficient quality view on ultrasound. Now in Afib + RVR.   Case discussed with Cardiac fellow, at this time recommends transfer to [facility] Cardiology service.      Course  Patient was initially placed on Amiodarone infusion and transitioned to PO. He is on therapeutic heparin for AC, switching to Eliquis today. Repeat TTE did not show tamponade and he will undergo repeat TTE tomorrow 11/2. Patient going in and out of Afib, pending finalized EP note; is on Amio and Metoprolol at this time.    RR on 11/1  Patient was sitting in chair and began to cough, soon after coughing wife at bedside said she saw his eyes "roll to the back of his head" and started "shaking" after which RR was called.  Patient returned to baseline in <30 seconds per wife. Patient is awake and at baseline mentation and says he feels ok. Patient denies any dizziness, lightheadedness, chest pain, nausea, sweating, or flushing prior to event. Patient does not have memory of event happening. No violent shaking witnessed, no bowel or bladder incontinence, returned to baseline MS immediately. No events were noted on telemetry at or around the event.    Patient remained hemodynamically stable, FSG in 200s, HR 90, /67, saturating 97% O2. Low suspicion for seizure given short episode and returned to baseline within seconds. No events on tele. Possibly vagal response from repeated coughing as wife notes that similar episode occurred at [facility] while coughing as well. Stable for downgrade    TO FOLLOW  - Limited TTE 11/2  - EP finalized recommendations    Vital Signs Last 24 Hrs  T(C): 37.2 (01 Nov 2024 07:25), Max: 37.2 (01 Nov 2024 07:25)  T(F): 99 (01 Nov 2024 07:25), Max: 99 (01 Nov 2024 07:25)  HR: 90 (01 Nov 2024 12:07) (90 - 153)  BP: 127/67 (01 Nov 2024 11:09) (93/65 - 127/67)  BP(mean): 86 (01 Nov 2024 11:09) (74 - 86)  RR: 18 (01 Nov 2024 11:09) (18 - 20)  SpO2: 98% (01 Nov 2024 12:07) (95% - 98%)    Parameters below as of 01 Nov 2024 04:57  Patient On (Oxygen Delivery Method): nasal cannula  O2 Flow (L/min): 4    I&O's Summary    31 Oct 2024 07:01  -  01 Nov 2024 07:00  --------------------------------------------------------  IN: 536.5 mL / OUT: 1500 mL / NET: -963.5 mL    01 Nov 2024 07:01  -  01 Nov 2024 12:55  --------------------------------------------------------  IN: 65 mL / OUT: 900 mL / NET: -835 mL      PHYSICAL EXAM:   General: NAD, lying in bed comfortably  Head: NCAT  Neck: Supple, no JVD  Cardiac: Regular rate and rhythm. No murmurs, gallops, or rubs  Pulmonary: CTAB  Abdominal: Soft, nontender, and nondistended with positive bowel sounds  Extremities: No pitting edema  Neurologic: AOx3, nonfocal  Skin: No rashes or lesions    LABS:                         11.5   11.79 )-----------( 162      ( 01 Nov 2024 05:27 )             35.3     11-01    138  |  102  |  42[H]  ----------------------------<  151[H]  4.6   |  22  |  2.4[H]    Ca    8.3[L]      01 Nov 2024 05:27  Mg     2.3     11-01      PTT - ( 01 Nov 2024 05:27 )  PTT:70.8 sec  Urinalysis Basic - ( 01 Nov 2024 05:27 )    Color: x / Appearance: x / SG: x / pH: x  Gluc: 151 mg/dL / Ketone: x  / Bili: x / Urobili: x   Blood: x / Protein: x / Nitrite: x   Leuk Esterase: x / RBC: x / WBC x   Sq Epi: x / Non Sq Epi: x / Bacteria: x      CAPILLARY BLOOD GLUCOSE      POCT Blood Glucose.: 268 mg/dL (01 Nov 2024 11:03)  POCT Blood Glucose.: 200 mg/dL (01 Nov 2024 07:23)  POCT Blood Glucose.: 243 mg/dL (31 Oct 2024 21:24)  POCT Blood Glucose.: 165 mg/dL (31 Oct 2024 16:05)    ABG - ( 31 Oct 2024 09:16 )  pH, Arterial: 7.38  pH, Blood: x     /  pCO2: 33    /  pO2: 130   / HCO3: 20    / Base Excess: -4.8  /  SaO2: 99.3                IMAGING:  CXR      CT      ASSESSMENT & PLAN:    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  #Hx of CAD  #Chest Pain (Improved)  - Initially presented with significant sternal chest pain  - Trops 20 -> 16 -> 15 -> 20  - Serial EKGs stable  - 10/29 TTE: LVEF 65-70%, G1DD, borderline pHTN, small pericardial effusion  - ESR 34,   - Sent to [facility] due to concern for tamponade, poor windows on TTE at [facility]  - S/p Amiodarone infusion  - Hold home Ranexa due to interaction w/ Amiodarone  - Start Eliquis 2.5mg BID for AC (Cr >1.5, will be 80 in 1.5 months)  - C/w Amiodarone 400mg PO BID  - C/w Colchicine 0.6mg q48h; has poor renal function and on amio, discussed w/ pharmacy, do not increase dose or frequency (ideally stop per pharmacy)  - F/u Coxsackie serology  - Monitor BMP    #Possible Aspiration PNA  - CXR showing effusions and consolidation  - Afebrile though with leukocytosis  - 10/30 FEES w/ mild dysphagia, when eating needs soft and bite sized diet w/ sips of thin liquid  - Off abx for now  - F/u Procalcitonin, Blood Cx, Sputum Cx    #Confusion on Admission r/o CVA  - Per wife patient is "forgetful" has periods of being AOx1-3  - 10/29 MR Head: No acute pathology, mild chronic microvascular ischemic changes and chronic lacunar infarcts  - Delirium precautions  - Awake and Alert x1 to name on exam 10/31 AM    #Diabetes Mellitus  - C/w basal insulin w/ sliding scale  - Titrate for glucose 140-180 while inpatient    #HTN  - Hypotensive during hospitalization  - Holding home antihypertensives for now    #BPH  - C/w home Tamsulosin 0.4mg PO QD    #Recent Admission for Vision Loss  - Need for o/p f/u w/ Ophthalmology noted on last d/c    #Misc  #Code Status: Full Code  #DVT ppx: Heparin drip  #GI ppx: N/A  #Diet: Soft and Bite Sized w/ Thin Liquids, CC/DASH  #Activity: Out of Bed w/ Assistance  #Dispo: 3C Medicine TRANSFER FROM: 4T Cardiac SDU  TRANSFER TO: 3C Telemetry      HPI / HOSPITAL COURSE:  HPI:  79 year old male with PMH of CAD, DM, HTN, and BPH, with recent admission for vision loss, and presented earlier today for generalized weakness, returning to the ER for chest pain. Patient states shortly after being discharged from the ER, patient went home and then began to have a sudden onset of substernal chest pressure, associated with shortness of breath.  Patient denies experiencing similar symptoms earlier today.   (28 Oct 2024 20:48)    [facility] Course  Patient is a  78 Yo M w a PMH Of CAD, MI (s/p Cath w stent) BPH, DM, CKD stage unspecified brought in for weakness and confusion.   Upgraded earlier today for SOB 2/2 pulmonary edema requiring Bipap. Known to have small pericardial effusion, ICU team attempted toimage earlier today, unable to obtain sufficient quality view on ultrasound. Now in Afib + RVR.   Case discussed with Cardiac fellow, at this time recommends transfer to [facility] Cardiology service.      Course  Patient was initially placed on Amiodarone infusion and transitioned to PO. He is on therapeutic heparin for AC, switching to Eliquis today. Repeat TTE did not show tamponade and he will undergo repeat TTE tomorrow 11/2. Patient going in and out of Afib, pending finalized EP note; is on Amio and Metoprolol at this time.    RR on 11/1  Patient was sitting in chair and began to cough, soon after coughing wife at bedside said she saw his eyes "roll to the back of his head" and started "shaking" after which RR was called.  Patient returned to baseline in <30 seconds per wife. Patient is awake and at baseline mentation and says he feels ok. Patient denies any dizziness, lightheadedness, chest pain, nausea, sweating, or flushing prior to event. Patient does not have memory of event happening. No violent shaking witnessed, no bowel or bladder incontinence, returned to baseline MS immediately. No events were noted on telemetry at or around the event.    Patient remained hemodynamically stable, FSG in 200s, HR 90, /67, saturating 97% O2. Low suspicion for seizure given short episode and returned to baseline within seconds. No events on tele. Possibly vagal response from repeated coughing as wife notes that similar episode occurred at [facility] while coughing as well. Stable for downgrade    TO FOLLOW  - Limited TTE 11/2  - EP finalized recommendations    Vital Signs Last 24 Hrs  T(C): 37.2 (01 Nov 2024 07:25), Max: 37.2 (01 Nov 2024 07:25)  T(F): 99 (01 Nov 2024 07:25), Max: 99 (01 Nov 2024 07:25)  HR: 90 (01 Nov 2024 12:07) (90 - 153)  BP: 127/67 (01 Nov 2024 11:09) (93/65 - 127/67)  BP(mean): 86 (01 Nov 2024 11:09) (74 - 86)  RR: 18 (01 Nov 2024 11:09) (18 - 20)  SpO2: 98% (01 Nov 2024 12:07) (95% - 98%)    Parameters below as of 01 Nov 2024 04:57  Patient On (Oxygen Delivery Method): nasal cannula  O2 Flow (L/min): 4    I&O's Summary    31 Oct 2024 07:01  -  01 Nov 2024 07:00  --------------------------------------------------------  IN: 536.5 mL / OUT: 1500 mL / NET: -963.5 mL    01 Nov 2024 07:01  -  01 Nov 2024 12:55  --------------------------------------------------------  IN: 65 mL / OUT: 900 mL / NET: -835 mL      PHYSICAL EXAM:   General: NAD, sitting in bed  Head: NC in place  Neck: No JVD appreciated  Cardiac: Afib, rate controlled on exam.  Pulmonary: No wheezing or rales  Abdominal: Soft, nontender  Extremities: No pitting edema  Neurologic: AOx1-2 (baseline)  Skin: No rashes or lesions      LABS:                         11.5   11.79 )-----------( 162      ( 01 Nov 2024 05:27 )             35.3     11-01    138  |  102  |  42[H]  ----------------------------<  151[H]  4.6   |  22  |  2.4[H]    Ca    8.3[L]      01 Nov 2024 05:27  Mg     2.3     11-01      PTT - ( 01 Nov 2024 05:27 )  PTT:70.8 sec  Urinalysis Basic - ( 01 Nov 2024 05:27 )    Color: x / Appearance: x / SG: x / pH: x  Gluc: 151 mg/dL / Ketone: x  / Bili: x / Urobili: x   Blood: x / Protein: x / Nitrite: x   Leuk Esterase: x / RBC: x / WBC x   Sq Epi: x / Non Sq Epi: x / Bacteria: x      CAPILLARY BLOOD GLUCOSE      POCT Blood Glucose.: 268 mg/dL (01 Nov 2024 11:03)  POCT Blood Glucose.: 200 mg/dL (01 Nov 2024 07:23)  POCT Blood Glucose.: 243 mg/dL (31 Oct 2024 21:24)  POCT Blood Glucose.: 165 mg/dL (31 Oct 2024 16:05)    ABG - ( 31 Oct 2024 09:16 )  pH, Arterial: 7.38  pH, Blood: x     /  pCO2: 33    /  pO2: 130   / HCO3: 20    / Base Excess: -4.8  /  SaO2: 99.3        ASSESSMENT & PLAN:    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  #Hx of CAD  #Chest Pain (Improved)  - Initially presented with significant sternal chest pain  - Trops 20 -> 16 -> 15 -> 20  - Serial EKGs stable  - 10/29 TTE: LVEF 65-70%, G1DD, borderline pHTN, small pericardial effusion  - ESR 34,   - Sent to [facility] due to concern for tamponade, poor windows on TTE at [facility]  - S/p Amiodarone infusion  - Hold home Ranexa due to interaction w/ Amiodarone  - Start Eliquis 2.5mg BID for AC (Cr >1.5, will be 80 in 1.5 months)  - C/w Lasix 40mg IV BID  - C/w Metoprolol succinate 50mg BID, monitor HR and increase if needed/tolerated  - C/w Amiodarone 400mg PO BID  - C/w Colchicine 0.6mg q48h; has poor renal function and on amio, discussed w/ pharmacy, do not increase dose or frequency (ideally stop per pharmacy)  - F/u Coxsackie serology  - Monitor BMP    #Possible Aspiration PNA  - CXR showing effusions and consolidation  - Afebrile though with leukocytosis  - 10/30 FEES w/ mild dysphagia, when eating needs soft and bite sized diet w/ sips of thin liquid  - Off abx for now  - F/u Procalcitonin, Blood Cx, Sputum Cx    #Confusion on Admission r/o CVA  - Per wife patient is "forgetful" has periods of being AOx1-3  - 10/29 MR Head: No acute pathology, mild chronic microvascular ischemic changes and chronic lacunar infarcts  - Delirium precautions  - Awake and Alert x1 to name on exam 10/31 AM    #Diabetes Mellitus  - C/w basal insulin w/ sliding scale  - Titrate for glucose 140-180 while inpatient    #HTN  - Hypotensive during hospitalization  - Holding home antihypertensives for now    #BPH  - C/w home Tamsulosin 0.4mg PO QD    #Recent Admission for Vision Loss  - Need for o/p f/u w/ Ophthalmology noted on last d/c    #Misc  #Code Status: Full Code  #DVT ppx: Heparin drip  #GI ppx: N/A  #Diet: Soft and Bite Sized w/ Thin Liquids, CC/DASH  #Activity: Out of Bed w/ Assistance  #Dispo: 3C Medicine

## 2024-11-01 NOTE — CONSULT NOTE ADULT - TIME BILLING
On this date of service, level of risk to patient is considered: Moderate.       I have personally seen and examined this patient.    I have reviewed all pertinent clinical information and reviewed all relevant imaging and diagnostic studies personally.   I discussed recommendations with the primary team. I discussed recommendations with the primary team.
AF, CVA
scheduled surgery

## 2024-11-01 NOTE — PROGRESS NOTE ADULT - SUBJECTIVE AND OBJECTIVE BOX
Patient is a 79y old Male who presents with a chief complaint of Pericardial effusion, suspected tamponade (31 Oct 2024 11:35)      Interval History/Overnight Events  No acute overnight events    RR on 11/1 11AM  Patient was sitting in chair and began to cough, soon after coughing wife at bedside said she saw his eyes "roll to the back of his head" and started "shaking" after which RR was called.  Patient returned to baseline in <30 seconds per wife. Patient is awake and at baseline mentation and says he feels ok. Patient denies any dizziness, lightheadedness, chest pain, nausea, sweating, or flushing prior to event. Patient does not have memory of event happening. No violent shaking witnessed, no bowel or bladder incontinence, returned to baseline MS immediately. No events were noted on telemetry at or around the event.    Patient remained hemodynamically stable, FSG in 200s, HR 90, /67, saturating 97% O2. Low suspicion for seizure given short episode and returned to baseline within seconds. No events on tele. Possibly vagal response from repeated coughing as wife notes that similar episode occurred at [facility] while coughing as well. Stable for DG to 3C    Admission HPI  HPI:  79 year old male with PMH of CAD, DM, HTN, and BPH, with recent admission for vision loss, and presented earlier today for generalized weakness, returning to the ER for chest pain. Patient states shortly after being discharged from the ER, patient went home and then began to have a sudden onset of substernal chest pressure, associated with shortness of breath.  Patient denies experiencing similar symptoms earlier today.   (28 Oct 2024 20:48)    79 year old male with PMH of CAD, DM, HTN, and BPH, with recent admission for vision loss, and presented earlier today for generalized weakness, returning to the ER for chest pain. Patient states shortly after being discharged from the ER, patient went home and then began to have a sudden onset of substernal chest pressure, associated with shortness of breath.  Patient denies experiencing similar symptoms earlier today.   (28 Oct 2024 20:48)    [facility] Course  Patient is a  78 Yo M w a PMH Of CAD, MI (s/p Cath w stent) BPH, DM, CKD stage unspecified brought in for weakness and confusion.   Upgraded earlier today for SOB 2/2 pulmonary edema requiring Bipap. Known to have small pericardial effusion, ICU team attempted toimage earlier today, unable to obtain sufficient quality view on ultrasound. Now in Afib + RVR.   Case discussed with Cardiac fellow, at this time recommends transfer to [facility] Cardiology service.     4T Course  Patient was initially placed on Amiodarone infusion and transitioned to PO. He is on therapeutic heparin for AC, switching to Eliquis today. Repeat TTE did not show tamponade and he will undergo repeat TTE tomorrow 11/2. Patient going in and out of Afib, pending finalized EP note; is on Amio and Metoprolol at this time.      ICU Vital Signs Last 24 Hrs  T(C): 37.2 (01 Nov 2024 07:25), Max: 37.2 (01 Nov 2024 07:25)  T(F): 99 (01 Nov 2024 07:25), Max: 99 (01 Nov 2024 07:25)  HR: 90 (01 Nov 2024 12:07) (90 - 153)  BP: 127/67 (01 Nov 2024 11:09) (93/65 - 127/67)  BP(mean): 86 (01 Nov 2024 11:09) (74 - 86)  ABP: --  ABP(mean): --  RR: 18 (01 Nov 2024 11:09) (18 - 20)  SpO2: 98% (01 Nov 2024 12:07) (95% - 98%)    O2 Parameters below as of 01 Nov 2024 04:57  Patient On (Oxygen Delivery Method): nasal cannula  O2 Flow (L/min): 4        I&O's Summary    31 Oct 2024 07:01  -  01 Nov 2024 07:00  --------------------------------------------------------  IN: 536.5 mL / OUT: 1500 mL / NET: -963.5 mL    01 Nov 2024 07:01  -  01 Nov 2024 13:38  --------------------------------------------------------  IN: 65 mL / OUT: 900 mL / NET: -835 mL          Labs                        11.5   11.79 )-----------( 162      ( 01 Nov 2024 05:27 )             35.3     11-01    138  |  102  |  42[H]  ----------------------------<  151[H]  4.6   |  22  |  2.4[H]    Ca    8.3[L]      01 Nov 2024 05:27  Mg     2.3     11-01      PTT - ( 01 Nov 2024 05:27 )  PTT:70.8 sec  Urinalysis Basic - ( 01 Nov 2024 05:27 )    Color: x / Appearance: x / SG: x / pH: x  Gluc: 151 mg/dL / Ketone: x  / Bili: x / Urobili: x   Blood: x / Protein: x / Nitrite: x   Leuk Esterase: x / RBC: x / WBC x   Sq Epi: x / Non Sq Epi: x / Bacteria: x      CAPILLARY BLOOD GLUCOSE      POCT Blood Glucose.: 268 mg/dL (01 Nov 2024 11:03)  POCT Blood Glucose.: 200 mg/dL (01 Nov 2024 07:23)  POCT Blood Glucose.: 243 mg/dL (31 Oct 2024 21:24)  POCT Blood Glucose.: 165 mg/dL (31 Oct 2024 16:05)    ABG - ( 31 Oct 2024 09:16 )  pH, Arterial: 7.38  pH, Blood: x     /  pCO2: 33    /  pO2: 130   / HCO3: 20    / Base Excess: -4.8  /  SaO2: 99.3                Imaging  CXR      CT      MEDICATIONS  (STANDING):  albuterol/ipratropium for Nebulization 3 milliLiter(s) Nebulizer every 6 hours  aMIOdarone    Tablet 400 milliGRAM(s) Oral two times a day  apixaban 2.5 milliGRAM(s) Oral every 12 hours  aspirin enteric coated 81 milliGRAM(s) Oral daily  atorvastatin 80 milliGRAM(s) Oral at bedtime  chlorhexidine 2% Cloths 1 Application(s) Topical <User Schedule>  dextrose 50% Injectable 12.5 Gram(s) IV Push once  dextrose 50% Injectable 25 Gram(s) IV Push once  dextrose 50% Injectable 25 Gram(s) IV Push once  furosemide   Injectable 40 milliGRAM(s) IV Push two times a day  influenza  Vaccine (HIGH DOSE) 0.5 milliLiter(s) IntraMuscular once  insulin glargine Injectable (LANTUS) 45 Unit(s) SubCutaneous at bedtime  insulin lispro (ADMELOG) corrective regimen sliding scale   SubCutaneous three times a day before meals  memantine 5 milliGRAM(s) Oral daily  metoprolol tartrate 50 milliGRAM(s) Oral two times a day  pantoprazole    Tablet 40 milliGRAM(s) Oral before breakfast  pregabalin 150 milliGRAM(s) Oral every 12 hours  tamsulosin 0.4 milliGRAM(s) Oral at bedtime    MEDICATIONS  (PRN):  acetaminophen     Tablet .. 650 milliGRAM(s) Oral every 6 hours PRN Temp greater or equal to 38C (100.4F), Mild Pain (1 - 3)  albuterol    90 MICROgram(s) HFA Inhaler 2 Puff(s) Inhalation every 6 hours PRN Shortness of Breath and/or Wheezing  aluminum hydroxide/magnesium hydroxide/simethicone Suspension 30 milliLiter(s) Oral every 4 hours PRN Dyspepsia  dextrose Oral Gel 15 Gram(s) Oral once PRN Blood Glucose LESS THAN 70 milliGRAM(s)/deciliter  melatonin 5 milliGRAM(s) Oral at bedtime PRN Insomnia      Physical Examination  General: NAD, sitting in bed  Head: NC in place  Neck: No JVD appreciated  Cardiac: Afib, rate controlled on exam.  Pulmonary: No wheezing or rales  Abdominal: Soft, nontender  Extremities: No pitting edema  Neurologic: AOx1-2 (baseline)  Skin: No rashes or lesions Patient is a 79y old Male who presents with a chief complaint of Pericardial effusion, suspected tamponade (31 Oct 2024 11:35)      Interval History/Overnight Events:  No acute overnight events    RR on 11/1 11AM  Patient was sitting in chair and began to cough, soon after coughing wife at bedside said she saw his eyes "roll to the back of his head" and started "shaking" after which RR was called.  Patient returned to baseline in <30 seconds per wife. Patient is awake and at baseline mentation and says he feels ok. Patient denies any dizziness, lightheadedness, chest pain, nausea, sweating, or flushing prior to event. Patient does not have memory of event happening. No violent shaking witnessed, no bowel or bladder incontinence, returned to baseline MS immediately. No events were noted on telemetry at or around the event.    Patient remained hemodynamically stable, FSG in 200s, HR 90, /67, saturating 97% O2. Low suspicion for seizure given short episode and returned to baseline within seconds. No events on tele. Possibly vagal response from repeated coughing as wife notes that similar episode occurred at [facility] while coughing as well. Stable for DG to 3C    Admission HPI  HPI:  79 year old male with PMH of CAD, DM, HTN, and BPH, with recent admission for vision loss, and presented earlier today for generalized weakness, returning to the ER for chest pain. Patient states shortly after being discharged from the ER, patient went home and then began to have a sudden onset of substernal chest pressure, associated with shortness of breath.  Patient denies experiencing similar symptoms earlier today.   (28 Oct 2024 20:48)    79 year old male with PMH of CAD, DM, HTN, and BPH, with recent admission for vision loss, and presented earlier today for generalized weakness, returning to the ER for chest pain. Patient states shortly after being discharged from the ER, patient went home and then began to have a sudden onset of substernal chest pressure, associated with shortness of breath.  Patient denies experiencing similar symptoms earlier today.   (28 Oct 2024 20:48)    [facility] Course  Patient is a  80 Yo M w a PMH Of CAD, MI (s/p Cath w stent) BPH, DM, CKD stage unspecified brought in for weakness and confusion.   Upgraded earlier today for SOB 2/2 pulmonary edema requiring Bipap. Known to have small pericardial effusion, ICU team attempted toimage earlier today, unable to obtain sufficient quality view on ultrasound. Now in Afib + RVR.   Case discussed with Cardiac fellow, at this time recommends transfer to [facility] Cardiology service.     4T Course  Patient was initially placed on Amiodarone infusion and transitioned to PO. He is on therapeutic heparin for AC, switching to Eliquis today. Repeat TTE did not show tamponade and he will undergo repeat TTE tomorrow 11/2. Patient going in and out of Afib, pending finalized EP note; is on Amio and Metoprolol at this time.      ICU Vital Signs Last 24 Hrs  T(C): 37.2 (01 Nov 2024 07:25), Max: 37.2 (01 Nov 2024 07:25)  T(F): 99 (01 Nov 2024 07:25), Max: 99 (01 Nov 2024 07:25)  HR: 90 (01 Nov 2024 12:07) (90 - 153)  BP: 127/67 (01 Nov 2024 11:09) (93/65 - 127/67)  BP(mean): 86 (01 Nov 2024 11:09) (74 - 86)  ABP: --  ABP(mean): --  RR: 18 (01 Nov 2024 11:09) (18 - 20)  SpO2: 98% (01 Nov 2024 12:07) (95% - 98%)    O2 Parameters below as of 01 Nov 2024 04:57  Patient On (Oxygen Delivery Method): nasal cannula  O2 Flow (L/min): 4        I&O's Summary    31 Oct 2024 07:01  -  01 Nov 2024 07:00  --------------------------------------------------------  IN: 536.5 mL / OUT: 1500 mL / NET: -963.5 mL    01 Nov 2024 07:01  -  01 Nov 2024 13:38  --------------------------------------------------------  IN: 65 mL / OUT: 900 mL / NET: -835 mL          Labs                        11.5   11.79 )-----------( 162      ( 01 Nov 2024 05:27 )             35.3     11-01    138  |  102  |  42[H]  ----------------------------<  151[H]  4.6   |  22  |  2.4[H]    Ca    8.3[L]      01 Nov 2024 05:27  Mg     2.3     11-01      PTT - ( 01 Nov 2024 05:27 )  PTT:70.8 sec  Urinalysis Basic - ( 01 Nov 2024 05:27 )    Color: x / Appearance: x / SG: x / pH: x  Gluc: 151 mg/dL / Ketone: x  / Bili: x / Urobili: x   Blood: x / Protein: x / Nitrite: x   Leuk Esterase: x / RBC: x / WBC x   Sq Epi: x / Non Sq Epi: x / Bacteria: x      CAPILLARY BLOOD GLUCOSE      POCT Blood Glucose.: 268 mg/dL (01 Nov 2024 11:03)  POCT Blood Glucose.: 200 mg/dL (01 Nov 2024 07:23)  POCT Blood Glucose.: 243 mg/dL (31 Oct 2024 21:24)  POCT Blood Glucose.: 165 mg/dL (31 Oct 2024 16:05)    ABG - ( 31 Oct 2024 09:16 )  pH, Arterial: 7.38  pH, Blood: x     /  pCO2: 33    /  pO2: 130   / HCO3: 20    / Base Excess: -4.8  /  SaO2: 99.3                Imaging  CXR      CT      MEDICATIONS  (STANDING):  albuterol/ipratropium for Nebulization 3 milliLiter(s) Nebulizer every 6 hours  aMIOdarone    Tablet 400 milliGRAM(s) Oral two times a day  apixaban 2.5 milliGRAM(s) Oral every 12 hours  aspirin enteric coated 81 milliGRAM(s) Oral daily  atorvastatin 80 milliGRAM(s) Oral at bedtime  chlorhexidine 2% Cloths 1 Application(s) Topical <User Schedule>  dextrose 50% Injectable 12.5 Gram(s) IV Push once  dextrose 50% Injectable 25 Gram(s) IV Push once  dextrose 50% Injectable 25 Gram(s) IV Push once  furosemide   Injectable 40 milliGRAM(s) IV Push two times a day  influenza  Vaccine (HIGH DOSE) 0.5 milliLiter(s) IntraMuscular once  insulin glargine Injectable (LANTUS) 45 Unit(s) SubCutaneous at bedtime  insulin lispro (ADMELOG) corrective regimen sliding scale   SubCutaneous three times a day before meals  memantine 5 milliGRAM(s) Oral daily  metoprolol tartrate 50 milliGRAM(s) Oral two times a day  pantoprazole    Tablet 40 milliGRAM(s) Oral before breakfast  pregabalin 150 milliGRAM(s) Oral every 12 hours  tamsulosin 0.4 milliGRAM(s) Oral at bedtime    MEDICATIONS  (PRN):  acetaminophen     Tablet .. 650 milliGRAM(s) Oral every 6 hours PRN Temp greater or equal to 38C (100.4F), Mild Pain (1 - 3)  albuterol    90 MICROgram(s) HFA Inhaler 2 Puff(s) Inhalation every 6 hours PRN Shortness of Breath and/or Wheezing  aluminum hydroxide/magnesium hydroxide/simethicone Suspension 30 milliLiter(s) Oral every 4 hours PRN Dyspepsia  dextrose Oral Gel 15 Gram(s) Oral once PRN Blood Glucose LESS THAN 70 milliGRAM(s)/deciliter  melatonin 5 milliGRAM(s) Oral at bedtime PRN Insomnia      Physical Examination  General: NAD, sitting in bed  Head: NC in place  Neck: No JVD appreciated  Cardiac: Afib, rate controlled on exam.  Pulmonary: No wheezing or rales  Abdominal: Soft, nontender  Extremities: No pitting edema  Neurologic: AOx1-2 (baseline)  Skin: No rashes or lesions

## 2024-11-01 NOTE — CONSULT NOTE ADULT - SUBJECTIVE AND OBJECTIVE BOX
Neurology Stroke Consult Note    HPI: 79 year old male with PMH of CAD, DM, HTN, and BPH, suspected amaurosis fugax with negative MRI brain non con on 10/24/24, admitted to  for AFIB+ RVR. Rapid response called today for unresponsiveness x 1 minute. Patient was sitting with his wife when he began to cough vigorously, his whole body started to slightly shake, and he became unresponsive. No events on telemetry. Patient returned back to baseline within 1 minute with no recollection of the vent, no tongue trauma, urinary incontinence. Patient denies hx of seizure, wife states he had a similar episode 1 week ago while admitted to [facility]. Denies lethargy, extremity weakness, vison changes, speech changes.       PAST MEDICAL & SURGICAL HISTORY:  DM (diabetes mellitus)      CAD (coronary artery disease)      BPH (benign prostatic hyperplasia)      History of hematologic disorder      Total cataract      [place] (hard of hearing)      Acute myocardial infarction      Chronic kidney disease (CKD)      H/O coronary angiogram      Stented coronary artery      History of ear surgery      H/O tonsillitis          FAMILY HISTORY:  Family history of breast cancer in mother    FHx: heart disease    Family history of diabetes mellitus (DM)        SOCIAL HISTORY:  Denies smoking, drinking, or drug use    ROS: as per HPI     MEDICATIONS  (STANDING):  albuterol/ipratropium for Nebulization 3 milliLiter(s) Nebulizer every 6 hours  aMIOdarone    Tablet 400 milliGRAM(s) Oral two times a day  apixaban 2.5 milliGRAM(s) Oral every 12 hours  aspirin enteric coated 81 milliGRAM(s) Oral daily  atorvastatin 80 milliGRAM(s) Oral at bedtime  chlorhexidine 2% Cloths 1 Application(s) Topical <User Schedule>  dextrose 50% Injectable 12.5 Gram(s) IV Push once  dextrose 50% Injectable 25 Gram(s) IV Push once  dextrose 50% Injectable 25 Gram(s) IV Push once  furosemide   Injectable 40 milliGRAM(s) IV Push two times a day  influenza  Vaccine (HIGH DOSE) 0.5 milliLiter(s) IntraMuscular once  insulin glargine Injectable (LANTUS) 45 Unit(s) SubCutaneous at bedtime  insulin lispro (ADMELOG) corrective regimen sliding scale   SubCutaneous three times a day before meals  memantine 5 milliGRAM(s) Oral daily  metoprolol tartrate 50 milliGRAM(s) Oral two times a day  pantoprazole    Tablet 40 milliGRAM(s) Oral before breakfast  pregabalin 150 milliGRAM(s) Oral every 12 hours  tamsulosin 0.4 milliGRAM(s) Oral at bedtime    MEDICATIONS  (PRN):  acetaminophen     Tablet .. 650 milliGRAM(s) Oral every 6 hours PRN Temp greater or equal to 38C (100.4F), Mild Pain (1 - 3)  albuterol    90 MICROgram(s) HFA Inhaler 2 Puff(s) Inhalation every 6 hours PRN Shortness of Breath and/or Wheezing  aluminum hydroxide/magnesium hydroxide/simethicone Suspension 30 milliLiter(s) Oral every 4 hours PRN Dyspepsia  dextrose Oral Gel 15 Gram(s) Oral once PRN Blood Glucose LESS THAN 70 milliGRAM(s)/deciliter  melatonin 5 milliGRAM(s) Oral at bedtime PRN Insomnia      Allergies    No Known Drug Allergies  Seafood (Anaphylaxis)    Intolerances        Vital Signs Last 24 Hrs  T(C): 37.2 (01 Nov 2024 07:25), Max: 37.2 (01 Nov 2024 07:25)  T(F): 99 (01 Nov 2024 07:25), Max: 99 (01 Nov 2024 07:25)  HR: 90 (01 Nov 2024 12:07) (90 - 115)  BP: 127/67 (01 Nov 2024 11:09) (93/65 - 127/67)  BP(mean): 86 (01 Nov 2024 11:09) (74 - 86)  RR: 18 (01 Nov 2024 11:09) (18 - 20)  SpO2: 98% (01 Nov 2024 12:07) (95% - 98%)    Parameters below as of 01 Nov 2024 04:57  Patient On (Oxygen Delivery Method): nasal cannula  O2 Flow (L/min): 4      Physical exam:    Neurologic:  -Mental status: Awake, alert, oriented to person, place, and time. Speech is fluent with intact naming, repetition, and comprehension, no dysarthria. Recent and remote memory intact. Follows commands. Attention/concentration intact. Fund of knowledge appropriate.  -Cranial nerves:   II: Visual fields are full to confrontation.  III, IV, VI: Extraocular movements are intact without nystagmus. Pupils equally round and reactive to light  V:  Facial sensation V1-V3 equal and intact   VII: Face is symmetric with normal eye closure and smile  XII: Tongue protrudes midline  Motor: Normal bulk and tone. No pronator drift. Strength bilateral upper extremity 5/5, bilateral lower extremities 5/5.  Sensation: Intact to light touch bilaterally. No neglect or extinction on double simultaneous testing.  Coordination: No dysmetria on finger-to-nose       LABS:                        11.5   11.79 )-----------( 162      ( 01 Nov 2024 05:27 )             35.3     11-01    138  |  102  |  42[H]  ----------------------------<  151[H]  4.6   |  22  |  2.4[H]    Ca    8.3[L]      01 Nov 2024 05:27  Mg     2.3     11-01      PTT - ( 01 Nov 2024 05:27 )  PTT:70.8 sec  Urinalysis Basic - ( 01 Nov 2024 05:27 )    Color: x / Appearance: x / SG: x / pH: x  Gluc: 151 mg/dL / Ketone: x  / Bili: x / Urobili: x   Blood: x / Protein: x / Nitrite: x   Leuk Esterase: x / RBC: x / WBC x   Sq Epi: x / Non Sq Epi: x / Bacteria: x        RADIOLOGY & ADDITIONAL TESTS:    < from: MR Head No Cont (10.29.24 @ 17:44) >    IMPRESSION:    No acute intracranial pathology.    Mild chronic microvascular ischemic changes and chronic lacunar infarcts.    < end of copied text >     Neurology Stroke Consult Note    HPI: 79 year old male with PMH of CAD, DM, HTN, and BPH, suspected amaurosis fugax with negative MRI brain non con on 10/24/24, admitted to  for AFIB+ RVR. Rapid response called today for unresponsiveness x 1 minute. Patient was sitting with his wife when he began to cough, his whole body started to slightly shake, and he became unresponsive. No events on telemetry. Patient returned back to baseline within 1 minute with no recollection of the Event, no tongue trauma, urinary incontinence. Patient denies hx of seizure, wife states he had a similar episode 1 week ago while admitted to [facility]. Denies lethargy, extremity weakness, vison changes, speech changes.       PAST MEDICAL & SURGICAL HISTORY:  DM (diabetes mellitus)      CAD (coronary artery disease)      BPH (benign prostatic hyperplasia)      History of hematologic disorder      Total cataract      [place] (hard of hearing)      Acute myocardial infarction      Chronic kidney disease (CKD)      H/O coronary angiogram      Stented coronary artery      History of ear surgery      H/O tonsillitis          FAMILY HISTORY:  Family history of breast cancer in mother    FHx: heart disease    Family history of diabetes mellitus (DM)        SOCIAL HISTORY:  Denies smoking, drinking, or drug use    ROS: as per HPI     MEDICATIONS  (STANDING):  albuterol/ipratropium for Nebulization 3 milliLiter(s) Nebulizer every 6 hours  aMIOdarone    Tablet 400 milliGRAM(s) Oral two times a day  apixaban 2.5 milliGRAM(s) Oral every 12 hours  aspirin enteric coated 81 milliGRAM(s) Oral daily  atorvastatin 80 milliGRAM(s) Oral at bedtime  chlorhexidine 2% Cloths 1 Application(s) Topical <User Schedule>  dextrose 50% Injectable 12.5 Gram(s) IV Push once  dextrose 50% Injectable 25 Gram(s) IV Push once  dextrose 50% Injectable 25 Gram(s) IV Push once  furosemide   Injectable 40 milliGRAM(s) IV Push two times a day  influenza  Vaccine (HIGH DOSE) 0.5 milliLiter(s) IntraMuscular once  insulin glargine Injectable (LANTUS) 45 Unit(s) SubCutaneous at bedtime  insulin lispro (ADMELOG) corrective regimen sliding scale   SubCutaneous three times a day before meals  memantine 5 milliGRAM(s) Oral daily  metoprolol tartrate 50 milliGRAM(s) Oral two times a day  pantoprazole    Tablet 40 milliGRAM(s) Oral before breakfast  pregabalin 150 milliGRAM(s) Oral every 12 hours  tamsulosin 0.4 milliGRAM(s) Oral at bedtime    MEDICATIONS  (PRN):  acetaminophen     Tablet .. 650 milliGRAM(s) Oral every 6 hours PRN Temp greater or equal to 38C (100.4F), Mild Pain (1 - 3)  albuterol    90 MICROgram(s) HFA Inhaler 2 Puff(s) Inhalation every 6 hours PRN Shortness of Breath and/or Wheezing  aluminum hydroxide/magnesium hydroxide/simethicone Suspension 30 milliLiter(s) Oral every 4 hours PRN Dyspepsia  dextrose Oral Gel 15 Gram(s) Oral once PRN Blood Glucose LESS THAN 70 milliGRAM(s)/deciliter  melatonin 5 milliGRAM(s) Oral at bedtime PRN Insomnia      Allergies    No Known Drug Allergies  Seafood (Anaphylaxis)    Intolerances        Vital Signs Last 24 Hrs  T(C): 37.2 (01 Nov 2024 07:25), Max: 37.2 (01 Nov 2024 07:25)  T(F): 99 (01 Nov 2024 07:25), Max: 99 (01 Nov 2024 07:25)  HR: 90 (01 Nov 2024 12:07) (90 - 115)  BP: 127/67 (01 Nov 2024 11:09) (93/65 - 127/67)  BP(mean): 86 (01 Nov 2024 11:09) (74 - 86)  RR: 18 (01 Nov 2024 11:09) (18 - 20)  SpO2: 98% (01 Nov 2024 12:07) (95% - 98%)    Parameters below as of 01 Nov 2024 04:57  Patient On (Oxygen Delivery Method): nasal cannula  O2 Flow (L/min): 4      Physical exam:    Neurologic:  -Mental status: Awake, alert, oriented to person, place, and time with prompting (family endorsing baseline memory issues). Speeh is fluent with intact naming, repetition, and comprehension, no dysarthria. Recent and remote memory intact. Follows commands. Attention/concentration intact. Fund of knowledge appropriate.  -Cranial nerves:   II: Visual fields are full to confrontation.  III, IV, VI: Extraocular movements are intact without nystagmus. Pupils equally round and reactive to light  V:  Facial sensation V1-V3 equal and intact   VII: Face is symmetric with normal eye closure and smile  XII: Tongue protrudes midline  Motor: Normal bulk and tone. No pronator drift. Strength bilateral upper extremity 5/5, bilateral lower extremities 5/5.  Sensation: Intact to light touch bilaterally. No neglect or extinction on double simultaneous testing.  Coordination: No dysmetria on finger-to-nose       LABS:                        11.5   11.79 )-----------( 162      ( 01 Nov 2024 05:27 )             35.3     11-01    138  |  102  |  42[H]  ----------------------------<  151[H]  4.6   |  22  |  2.4[H]    Ca    8.3[L]      01 Nov 2024 05:27  Mg     2.3     11-01      PTT - ( 01 Nov 2024 05:27 )  PTT:70.8 sec  Urinalysis Basic - ( 01 Nov 2024 05:27 )    Color: x / Appearance: x / SG: x / pH: x  Gluc: 151 mg/dL / Ketone: x  / Bili: x / Urobili: x   Blood: x / Protein: x / Nitrite: x   Leuk Esterase: x / RBC: x / WBC x   Sq Epi: x / Non Sq Epi: x / Bacteria: x        RADIOLOGY & ADDITIONAL TESTS:    < from: MR Head No Cont (10.29.24 @ 17:44) >    IMPRESSION:    No acute intracranial pathology.    Mild chronic microvascular ischemic changes and chronic lacunar infarcts.    < end of copied text >     Neurology Stroke Consult Note    HPI: 79 year old male with PMH of CAD, DM, HTN, and BPH, suspected amaurosis fugax with negative MRI brain non con on 10/24/24, admitted to  for AFIB+ RVR. Rapid response called today for unresponsiveness x 1 minute. Patient was sitting with his wife when he began to cough, his whole body started to slightly shake, his eyes rolled back, and he became unresponsive. No events on telemetry. Patient returned back to baseline within 1 minute with no recollection of the Event, no tongue trauma, urinary incontinence. Patient denies hx of seizure, wife states he had a similar episode 1 week ago while admitted to [facility]. Denies lethargy, extremity weakness, vison changes, speech changes.       PAST MEDICAL & SURGICAL HISTORY:  DM (diabetes mellitus)      CAD (coronary artery disease)      BPH (benign prostatic hyperplasia)      History of hematologic disorder      Total cataract      [place] (hard of hearing)      Acute myocardial infarction      Chronic kidney disease (CKD)      H/O coronary angiogram      Stented coronary artery      History of ear surgery      H/O tonsillitis          FAMILY HISTORY:  Family history of breast cancer in mother    FHx: heart disease    Family history of diabetes mellitus (DM)        SOCIAL HISTORY:  Denies smoking, drinking, or drug use    ROS: as per HPI     MEDICATIONS  (STANDING):  albuterol/ipratropium for Nebulization 3 milliLiter(s) Nebulizer every 6 hours  aMIOdarone    Tablet 400 milliGRAM(s) Oral two times a day  apixaban 2.5 milliGRAM(s) Oral every 12 hours  aspirin enteric coated 81 milliGRAM(s) Oral daily  atorvastatin 80 milliGRAM(s) Oral at bedtime  chlorhexidine 2% Cloths 1 Application(s) Topical <User Schedule>  dextrose 50% Injectable 12.5 Gram(s) IV Push once  dextrose 50% Injectable 25 Gram(s) IV Push once  dextrose 50% Injectable 25 Gram(s) IV Push once  furosemide   Injectable 40 milliGRAM(s) IV Push two times a day  influenza  Vaccine (HIGH DOSE) 0.5 milliLiter(s) IntraMuscular once  insulin glargine Injectable (LANTUS) 45 Unit(s) SubCutaneous at bedtime  insulin lispro (ADMELOG) corrective regimen sliding scale   SubCutaneous three times a day before meals  memantine 5 milliGRAM(s) Oral daily  metoprolol tartrate 50 milliGRAM(s) Oral two times a day  pantoprazole    Tablet 40 milliGRAM(s) Oral before breakfast  pregabalin 150 milliGRAM(s) Oral every 12 hours  tamsulosin 0.4 milliGRAM(s) Oral at bedtime    MEDICATIONS  (PRN):  acetaminophen     Tablet .. 650 milliGRAM(s) Oral every 6 hours PRN Temp greater or equal to 38C (100.4F), Mild Pain (1 - 3)  albuterol    90 MICROgram(s) HFA Inhaler 2 Puff(s) Inhalation every 6 hours PRN Shortness of Breath and/or Wheezing  aluminum hydroxide/magnesium hydroxide/simethicone Suspension 30 milliLiter(s) Oral every 4 hours PRN Dyspepsia  dextrose Oral Gel 15 Gram(s) Oral once PRN Blood Glucose LESS THAN 70 milliGRAM(s)/deciliter  melatonin 5 milliGRAM(s) Oral at bedtime PRN Insomnia      Allergies    No Known Drug Allergies  Seafood (Anaphylaxis)    Intolerances        Vital Signs Last 24 Hrs  T(C): 37.2 (01 Nov 2024 07:25), Max: 37.2 (01 Nov 2024 07:25)  T(F): 99 (01 Nov 2024 07:25), Max: 99 (01 Nov 2024 07:25)  HR: 90 (01 Nov 2024 12:07) (90 - 115)  BP: 127/67 (01 Nov 2024 11:09) (93/65 - 127/67)  BP(mean): 86 (01 Nov 2024 11:09) (74 - 86)  RR: 18 (01 Nov 2024 11:09) (18 - 20)  SpO2: 98% (01 Nov 2024 12:07) (95% - 98%)    Parameters below as of 01 Nov 2024 04:57  Patient On (Oxygen Delivery Method): nasal cannula  O2 Flow (L/min): 4      Physical exam:    Neurologic:  -Mental status: Awake, alert, oriented to person, place, and time with prompting (family endorsing baseline memory issues). Speeh is fluent with intact naming, repetition, and comprehension, no dysarthria. Recent and remote memory intact. Follows commands. Attention/concentration intact. Fund of knowledge appropriate.  -Cranial nerves:   II: Visual fields are full to confrontation.  III, IV, VI: Extraocular movements are intact without nystagmus. Pupils equally round and reactive to light  V:  Facial sensation V1-V3 equal and intact   VII: Face is symmetric with normal eye closure and smile  XII: Tongue protrudes midline  Motor: Normal bulk and tone. No pronator drift. Strength bilateral upper extremity 5/5, bilateral lower extremities 5/5.  Sensation: Intact to light touch bilaterally. No neglect or extinction on double simultaneous testing.  Coordination: No dysmetria on finger-to-nose       LABS:                        11.5   11.79 )-----------( 162      ( 01 Nov 2024 05:27 )             35.3     11-01    138  |  102  |  42[H]  ----------------------------<  151[H]  4.6   |  22  |  2.4[H]    Ca    8.3[L]      01 Nov 2024 05:27  Mg     2.3     11-01      PTT - ( 01 Nov 2024 05:27 )  PTT:70.8 sec  Urinalysis Basic - ( 01 Nov 2024 05:27 )    Color: x / Appearance: x / SG: x / pH: x  Gluc: 151 mg/dL / Ketone: x  / Bili: x / Urobili: x   Blood: x / Protein: x / Nitrite: x   Leuk Esterase: x / RBC: x / WBC x   Sq Epi: x / Non Sq Epi: x / Bacteria: x        RADIOLOGY & ADDITIONAL TESTS:    < from: MR Head No Cont (10.29.24 @ 17:44) >    IMPRESSION:    No acute intracranial pathology.    Mild chronic microvascular ischemic changes and chronic lacunar infarcts.    < end of copied text >

## 2024-11-01 NOTE — CONSULT NOTE ADULT - ASSESSMENT
79 year old male with the past medical history of sick sinus syndrome status post [employer] permanent pacemaker presents with chest pain and found with new paroxysmal atrial fibrillation in atrial fibrillation with rapid ventricular response. Concerning for am amaurosis fugax last week. MRI No acute intracranial pathology. Mild chronic microvascular ischemic changes and chronic lacunar infarcts. EKG reveals a narrow complex qrs, sinus rates in the 90's, EF 55-60%, normal LA size.     1. Anticoagulation: CHADS-VASC score of 6: anticoagulation should be initiated if no contraindications exist  2. Rate control: Focus on rate control - on Metroprolol 50mg two times per day. Focus on advancing beta blockers as tolerated as there is little concern for bradycardia as the patient has a permanent pacemaker.   3. Rhythm control: Reviewed option of antiarrhythmic drug therapy as well as ablation therapy. Would hold off on Amiodarone at this time giving. Reviewed results of the JOSE trial as well as the [place] AF trial. I have discussed the risks and benefits of electrophysiology study and ablation with the patient. We discussed the risk of recurrence as well as potential need for repeat procedure. Risks include but are not limited to vascular injury, bleeding, pericardial tamponade, myocardial infarction, stroke, damage to the native conduction system, need for pacemaker, valvular injury, phrenic nerve damage, esophageal damage with fistula formation, pulmonary vein stenosis, risks of anesthesia/sedation and rare risk of death. The patient understands and opportunity was given for questions which I addressed.  4. Non-pharmacologic measures: Weight loss and management of sleep apnea are essential to help prevent future AF episodes. Suggest sleep study to assess for  sleep apnea. Consider nutritional consultation to assist in weight loss.     Plan:   - Start anticoagulation if no contraindications exist  - Focus on rate control - advance beta blocker therapy as tolerated  - Avoid amiodarone at this time considering suspected TIA  - Can consider Transesophageal Echocardiogram followed by direct current cardioversion on Monday if rate control efforts fail  - Plan for further discussion regarding atrial fibrillation ablation as out patient  - Continue medications as optimized

## 2024-11-01 NOTE — RAPID RESPONSE TEAM SUMMARY - NSSITUATIONBACKGROUNDRRT_GEN_ALL_CORE
Patient was sitting in chair and began to cough, soon after coughing wife at bedside said she saw his eyes "roll to the back of his head" and started "shaking" after which RR was called.

## 2024-11-01 NOTE — CONSULT NOTE ADULT - NS ATTEND AMEND GEN_ALL_CORE FT
Complex patient  New onset AF  CVA  recommend AC if no contraindication by neuro  Since converted to sinus, give 3-6 months Amio course Location Indication Override (Is Already Calculated Based On Selected Body Location): Area H

## 2024-11-01 NOTE — CONSULT NOTE ADULT - CONSULT REASON
Chest pain
Possible seizure like episode
Respiratory failure
permanent pacemaker interrogation, found new atrial fibrillation with rapid ventricular response
Infection
Statement Selected

## 2024-11-01 NOTE — PROGRESS NOTE ADULT - ASSESSMENT
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  #Hx of CAD  #Chest Pain (Improved)  - Initially presented with significant sternal chest pain  - Trops 20 -> 16 -> 15 -> 20  - Serial EKGs stable  - 10/29 TTE: LVEF 65-70%, G1DD, borderline pHTN, small pericardial effusion  - ESR 34,   - Sent to [facility] due to concern for tamponade, poor windows on TTE at [facility]  - S/p Amiodarone infusion  - Hold home Ranexa due to interaction w/ Amiodarone  - Start Eliquis 2.5mg BID for AC (Cr >1.5, will be 80 in 1.5 months)  - C/w Lasix 40mg IV BID  - C/w Metoprolol succinate 50mg BID, monitor HR and increase if needed/tolerated  - C/w Amiodarone 400mg PO BID  - C/w Colchicine 0.6mg q48h; has poor renal function and on amio, discussed w/ pharmacy, do not increase dose or frequency (ideally stop per pharmacy)  - F/u Coxsackie serology  - Monitor BMP    #Possible Aspiration PNA  - CXR showing effusions and consolidation  - Afebrile though with leukocytosis  - 10/30 FEES w/ mild dysphagia, when eating needs soft and bite sized diet w/ sips of thin liquid  - Off abx for now  - F/u Procalcitonin, Blood Cx, Sputum Cx    #Confusion on Admission r/o CVA  - Per wife patient is "forgetful" has periods of being AOx1-3  - 10/29 MR Head: No acute pathology, mild chronic microvascular ischemic changes and chronic lacunar infarcts  - Delirium precautions  - Awake and Alert x1 to name on exam 10/31 AM    #Diabetes Mellitus  - C/w basal insulin w/ sliding scale  - Titrate for glucose 140-180 while inpatient    #HTN  - Hypotensive during hospitalization  - Holding home antihypertensives for now    #BPH  - C/w home Tamsulosin 0.4mg PO QD    #Recent Admission for Vision Loss  - Need for o/p f/u w/ Ophthalmology noted on last d/c    #Misc  #Code Status: Full Code  #DVT ppx: Heparin drip  #GI ppx: N/A  #Diet: Soft and Bite Sized w/ Thin Liquids, CC/DASH  #Activity: Out of Bed w/ Assistance  #Dispo: Transfer to  Med/Tele

## 2024-11-01 NOTE — CONSULT NOTE ADULT - SUBJECTIVE AND OBJECTIVE BOX
Patient is a 79y old  Male who presents with a chief complaint of Pericardial effusion, suspected tamponade (31 Oct 2024 11:35)    HPI: 79 year old male with the past medical history of CAD, DM, HTN, and BPH, sick sinus syndrome status post Medtronic permanent pacemaker with recent admission for vision loss, and presented for generalized weakness, returned to the ER for chest pain and was found to have new atrial fibrillation. electrophysiology was consulted for permanent pacemaker interrogation and atrial fibrillation management. The patient was seen and evaluated. permanent pacemaker interrogation was performed. The patient stated that he presented to the hospital last week for spontaneous vision loss and now due to chest pain. atrial fibrillation initiated 10/30/24. The patient is currently in atrial fibrillation with rapid ventricular response.       PAST MEDICAL & SURGICAL HISTORY:  DM (diabetes mellitus)  CAD (coronary artery disease)  BPH (benign prostatic hyperplasia)  History of hematologic disorder  Total cataract  [place] (hard of hearing)  Acute myocardial infarction  Chronic kidney disease (CKD)  H/O coronary angiogram  Stented coronary artery  History of ear surgery  H/O tonsillitis    PREVIOUS DIAGNOSTIC TESTING:      ECHO  FINDINGS: 10/31/24: Summary: 1. Left ventricular ejection fraction, by visual estimation, is 55 to 60%. 2. Normal global left ventricular systolic function. 3. Severe concentric left ventricular hypertrophy. 4. The left ventricular diastolic function could not be assessed in this study.   5. Normal left atrial size. 6. Normal right atrial size. 7. Moderate pericardial effusion. 8. Degenerative mitral valve. 9. Bicuspid aortic valve with decreased opening.10. Endocardial visualization was enhanced with intravenous echo contrast.    STRESS  FINDINGS: None    CATHETERIZATION  FINDINGS: None    ELECTROPHYSIOLOGY STUDY  FINDINGS: 4/20/23: -Medtronic PRE-OP DIAGNOSIS: Sick Sinus Syndrome/sinus pauses      MEDICATIONS  (STANDING):  albuterol/ipratropium for Nebulization 3 milliLiter(s) Nebulizer every 6 hours  aMIOdarone    Tablet 400 milliGRAM(s) Oral two times a day  ampicillin/sulbactam  IVPB 3 Gram(s) IV Intermittent every 12 hours  aspirin enteric coated 81 milliGRAM(s) Oral daily  atorvastatin 80 milliGRAM(s) Oral at bedtime  chlorhexidine 2% Cloths 1 Application(s) Topical <User Schedule>  dextrose 50% Injectable 12.5 Gram(s) IV Push once  dextrose 50% Injectable 25 Gram(s) IV Push once  dextrose 50% Injectable 25 Gram(s) IV Push once  furosemide   Injectable 40 milliGRAM(s) IV Push two times a day  heparin  Infusion.  Unit(s)/Hr (16 mL/Hr) IV Continuous <Continuous>  influenza  Vaccine (HIGH DOSE) 0.5 milliLiter(s) IntraMuscular once  insulin glargine Injectable (LANTUS) 45 Unit(s) SubCutaneous at bedtime  insulin lispro (ADMELOG) corrective regimen sliding scale   SubCutaneous three times a day before meals  memantine 5 milliGRAM(s) Oral daily  metoprolol tartrate 50 milliGRAM(s) Oral two times a day  pantoprazole    Tablet 40 milliGRAM(s) Oral before breakfast  pregabalin 150 milliGRAM(s) Oral every 12 hours  tamsulosin 0.4 milliGRAM(s) Oral at bedtime    MEDICATIONS  (PRN):  acetaminophen     Tablet .. 650 milliGRAM(s) Oral every 6 hours PRN Temp greater or equal to 38C (100.4F), Mild Pain (1 - 3)  albuterol    90 MICROgram(s) HFA Inhaler 2 Puff(s) Inhalation every 6 hours PRN Shortness of Breath and/or Wheezing  aluminum hydroxide/magnesium hydroxide/simethicone Suspension 30 milliLiter(s) Oral every 4 hours PRN Dyspepsia  dextrose Oral Gel 15 Gram(s) Oral once PRN Blood Glucose LESS THAN 70 milliGRAM(s)/deciliter  heparin   Injectable 7000 Unit(s) IV Push every 6 hours PRN For aPTT less than 40  heparin   Injectable 3500 Unit(s) IV Push every 6 hours PRN For aPTT between 40 - 57  melatonin 5 milliGRAM(s) Oral at bedtime PRN Insomnia    FAMILY HISTORY:  Family history of breast cancer in mother  FHx: heart disease  Family history of diabetes mellitus (DM)    SOCIAL HISTORY: lives at home    CIGARETTES: No    ALCOHOL: No    Past Surgical History: As above    Allergies: No Known Drug Allergies Seafood (Anaphylaxis)    REVIEW OF SYSTEMS:  CONSTITUTIONAL: No fever, weight loss, chills, shakes, or fatigue  EYES: No eye pain, visual disturbances, or discharge  ENMT:  No difficulty hearing, tinnitus, vertigo; No sinus or throat pain  NECK: No pain or stiffness  BREASTS: No pain, masses, or nipple discharge  RESPIRATORY: No cough, wheezing, hemoptysis, or shortness of breath  CARDIOVASCULAR: No chest pain, dyspnea, palpitations, dizziness, syncope, paroxysmal nocturnal dyspnea, orthopnea, or arm or leg swelling  GASTROINTESTINAL: No abdominal  or epigastric pain, nausea, vomiting, hematemesis, diarrhea, constipation, melena or bright red blood.  GENITOURINARY: No dysuria, nocturia, hematuria, or urinary incontinence  NEUROLOGICAL: No headaches, memory loss, slurred speech, limb weakness, loss of strength, numbness, or tremors  SKIN: No itching, burning, rashes, or lesions   LYMPH NODES: No enlarged glands  ENDOCRINE: No heat or cold intolerance, or hair loss  MUSCULOSKELETAL: No joint pain or swelling, muscle, back, or extremity pain  PSYCHIATRIC: No depression, anxiety, or difficulty sleeping  HEME/LYMPH: No easy bruising or bleeding gums  ALLERY AND IMMUNOLOGIC: No hives or rash.    Vital Signs Last 24 Hrs  T(C): 36.9 (31 Oct 2024 23:56), Max: 37 (31 Oct 2024 11:09)  T(F): 98.4 (31 Oct 2024 23:56), Max: 98.6 (31 Oct 2024 11:09)  HR: 98 (01 Nov 2024 04:57) (93 - 153)  BP: 118/63 (01 Nov 2024 04:57) (100/70 - 119/59)  BP(mean): 84 (01 Nov 2024 04:57) (77 - 84)  RR: 20 (01 Nov 2024 04:57) (18 - 20)  SpO2: 98% (01 Nov 2024 04:57) (96% - 100%)    Parameters below as of 01 Nov 2024 04:57  Patient On (Oxygen Delivery Method): nasal cannula  O2 Flow (L/min): 4    PHYSICAL EXAM:  GENERAL: In no apparent distress, well nourished, and hydrated.  HEAD:  Atraumatic, Normocephalic  EYES: EOMI, PERRLA, conjunctiva and sclera clear  ENMT: No tonsillar erythema, exudates, or enlargements; ist mucous membranes, Good dentition, No lesions  NECK: Supple and normal thyroid.  No JVD or carotid bruit.  Carotid pulse is 2+ bilaterally.  HEART: Regular rate and rhythm; No murmurs, rubs, or gallops.  PULMONARY: Clear to auscultation and perfusion.  No rales, wheezing, or rhonchi bilaterally.  ABDOMEN: Soft, Nontender, Nondistended; Bowel sounds present  EXTREMITIES:  2+ Peripheral Pulses, No clubbing, cyanosis, or edema  LYMPH: No lymphadenopathy noted  NEUROLOGICAL: Grossly nonfocal    INTERPRETATION OF TELEMETRY: atrial fibrillation with rapid ventricular response     ECG: Rhythm - sinus rhythm, Rate - 92bpm, AV Conduction- 148ms , Interventricular conduction - WNL, QRS duration - 94ms, Hypertrophy - Absent, Myocardial Infarction - Absent, QT interval - 384ms, WPW - Absent, Brugada pattern - Absent. Other -     I&O's Detail    31 Oct 2024 07:01  -  01 Nov 2024 07:00  --------------------------------------------------------  IN:    Amiodarone: 250.5 mL    Heparin Infusion: 286 mL  Total IN: 536.5 mL    OUT:    Voided (mL): 1500 mL  Total OUT: 1500 mL    Total NET: -963.5 mL      01 Nov 2024 07:01  -  01 Nov 2024 08:15  --------------------------------------------------------  IN:    Heparin Infusion: 13 mL  Total IN: 13 mL    OUT:    Voided (mL): 400 mL  Total OUT: 400 mL    Total NET: -387 mL          LABS:                        11.5   11.79 )-----------( 162      ( 01 Nov 2024 05:27 )             35.3     11-01    138  |  102  |  42[H]  ----------------------------<  151[H]  4.6   |  22  |  2.4[H]    Ca    8.3[L]      01 Nov 2024 05:27  Mg     2.3     11-01          PTT - ( 01 Nov 2024 05:27 )  PTT:70.8 sec  Urinalysis Basic - ( 01 Nov 2024 05:27 )    Color: x / Appearance: x / SG: x / pH: x  Gluc: 151 mg/dL / Ketone: x  / Bili: x / Urobili: x   Blood: x / Protein: x / Nitrite: x   Leuk Esterase: x / RBC: x / WBC x   Sq Epi: x / Non Sq Epi: x / Bacteria: x      BNP  I&O's Detail    31 Oct 2024 07:01  -  01 Nov 2024 07:00  --------------------------------------------------------  IN:    Amiodarone: 250.5 mL    Heparin Infusion: 286 mL  Total IN: 536.5 mL    OUT:    Voided (mL): 1500 mL  Total OUT: 1500 mL    Total NET: -963.5 mL      01 Nov 2024 07:01  -  01 Nov 2024 08:15  --------------------------------------------------------  IN:    Heparin Infusion: 13 mL  Total IN: 13 mL    OUT:    Voided (mL): 400 mL  Total OUT: 400 mL    Total NET: -387 mL        Daily     Daily     RADIOLOGY & ADDITIONAL STUDIES:

## 2024-11-01 NOTE — CONSULT NOTE ADULT - ASSESSMENT
79 year old male with PMH of CAD, DM, HTN, and BPH, suspected amaurosis fugax with negative MRI brain non con on 10/24, admitted to  for AFIB+ RVR. Rapid response called today for unresponsiveness x 1 minute with slight body shaking after coughing vigorously. No events on telemetry. Patient returned back to baseline within 1 minute with no recollection of the vent, no tongue trauma, urinary incontinence. Patient denies hx of seizure, wife states he had a similar episode 1 week ago while admitted to [facility]. Neurological exam intact.         Impression:     RECS:  -Medical management per primary team    79 year old male with PMH of CAD, DM, HTN, and BPH, suspected amaurosis fugax with negative MRI brain non con on 10/24, admitted to  for AFIB+ RVR. Rapid response called today for unresponsiveness x 1 minute with slight body shaking after coughing. No events on telemetry. Patient returned back to baseline within 1 minute with no recollection of the event, no tongue trauma, urinary incontinence. Patient denies hx of seizure, wife states he had a similar episode 1 week ago while admitted to [facility]. Neurological exam intact with baseline.       Impression:     RECS:  -Medical management per primary team    79 year old male with PMH of CAD, DM, HTN, and BPH, suspected amaurosis fugax with negative MRI brain non con on 10/24, admitted to  for AFIB+ RVR. Rapid response called today for unresponsiveness x 1 minute with slight body shaking and eyes rolling back after coughing. No events on telemetry. Patient returned back to baseline within 1 minute with no recollection of the event, no tongue trauma, or urinary incontinence. Patient denies hx of seizure, wife states he had a similar episode 1 week ago while admitted to [facility]. Neurological exam intact with baseline.     Impression: 78 y/o M with no hx of seizures and recent negative MRI brain with 2 episodes of unresponsiveness after coughing, likely due to cough syncope. Will rule out seizure with rEEG.      RECS:  -rEEG   -Seizure precautions: Mg>2, K>4, ativan 2mg for seizure  -Medical management per primary team     Discussed with Dr. Granado

## 2024-11-01 NOTE — CHART NOTE - NSCHARTNOTEFT_GEN_A_CORE
Device:    Indication: sick sinus syndrome   Interrogation complete. Device functioning normally.     Mode: DDDR   Dependent: No  AT/AF burden: 17.9%  MRI compatible: Yes  Battery: 12.3 years  Underlying Rhythm: atrial fibrillation     Events:   Multiple episodes of atrial fibrillation with rapid ventricular response     Recommendations:   See full consult    EPS [id number]

## 2024-11-02 LAB
ANION GAP SERPL CALC-SCNC: 14 MMOL/L — SIGNIFICANT CHANGE UP (ref 7–14)
BASOPHILS # BLD AUTO: 0.04 K/UL — SIGNIFICANT CHANGE UP (ref 0–0.2)
BASOPHILS NFR BLD AUTO: 0.4 % — SIGNIFICANT CHANGE UP (ref 0–1)
BUN SERPL-MCNC: 51 MG/DL — HIGH (ref 10–20)
CALCIUM SERPL-MCNC: 8.8 MG/DL — SIGNIFICANT CHANGE UP (ref 8.4–10.5)
CHLORIDE SERPL-SCNC: 100 MMOL/L — SIGNIFICANT CHANGE UP (ref 98–110)
CO2 SERPL-SCNC: 24 MMOL/L — SIGNIFICANT CHANGE UP (ref 17–32)
CREAT SERPL-MCNC: 2.1 MG/DL — HIGH (ref 0.7–1.5)
CULTURE RESULTS: ABNORMAL
CULTURE RESULTS: SIGNIFICANT CHANGE UP
CULTURE RESULTS: SIGNIFICANT CHANGE UP
EGFR: 31 ML/MIN/1.73M2 — LOW
EOSINOPHIL # BLD AUTO: 0.23 K/UL — SIGNIFICANT CHANGE UP (ref 0–0.7)
EOSINOPHIL NFR BLD AUTO: 2.3 % — SIGNIFICANT CHANGE UP (ref 0–8)
GLUCOSE BLDC GLUCOMTR-MCNC: 231 MG/DL — HIGH (ref 70–99)
GLUCOSE BLDC GLUCOMTR-MCNC: 238 MG/DL — HIGH (ref 70–99)
GLUCOSE BLDC GLUCOMTR-MCNC: 238 MG/DL — HIGH (ref 70–99)
GLUCOSE BLDC GLUCOMTR-MCNC: 267 MG/DL — HIGH (ref 70–99)
GLUCOSE SERPL-MCNC: 233 MG/DL — HIGH (ref 70–99)
HCT VFR BLD CALC: 35.4 % — LOW (ref 42–52)
HGB BLD-MCNC: 11.6 G/DL — LOW (ref 14–18)
IMM GRANULOCYTES NFR BLD AUTO: 0.9 % — HIGH (ref 0.1–0.3)
LYMPHOCYTES # BLD AUTO: 1.62 K/UL — SIGNIFICANT CHANGE UP (ref 1.2–3.4)
LYMPHOCYTES # BLD AUTO: 16.2 % — LOW (ref 20.5–51.1)
MAGNESIUM SERPL-MCNC: 2.2 MG/DL — SIGNIFICANT CHANGE UP (ref 1.8–2.4)
MCHC RBC-ENTMCNC: 28.6 PG — SIGNIFICANT CHANGE UP (ref 27–31)
MCHC RBC-ENTMCNC: 32.8 G/DL — SIGNIFICANT CHANGE UP (ref 32–37)
MCV RBC AUTO: 87.2 FL — SIGNIFICANT CHANGE UP (ref 80–94)
MONOCYTES # BLD AUTO: 1.13 K/UL — HIGH (ref 0.1–0.6)
MONOCYTES NFR BLD AUTO: 11.3 % — HIGH (ref 1.7–9.3)
NEUTROPHILS # BLD AUTO: 6.9 K/UL — HIGH (ref 1.4–6.5)
NEUTROPHILS NFR BLD AUTO: 68.9 % — SIGNIFICANT CHANGE UP (ref 42.2–75.2)
NRBC # BLD: 0 /100 WBCS — SIGNIFICANT CHANGE UP (ref 0–0)
PLATELET # BLD AUTO: 173 K/UL — SIGNIFICANT CHANGE UP (ref 130–400)
PMV BLD: 12.3 FL — HIGH (ref 7.4–10.4)
POTASSIUM SERPL-MCNC: 4.2 MMOL/L — SIGNIFICANT CHANGE UP (ref 3.5–5)
POTASSIUM SERPL-SCNC: 4.2 MMOL/L — SIGNIFICANT CHANGE UP (ref 3.5–5)
RBC # BLD: 4.06 M/UL — LOW (ref 4.7–6.1)
RBC # FLD: 16.8 % — HIGH (ref 11.5–14.5)
SODIUM SERPL-SCNC: 138 MMOL/L — SIGNIFICANT CHANGE UP (ref 135–146)
SPECIMEN SOURCE: SIGNIFICANT CHANGE UP
SPECIMEN SOURCE: SIGNIFICANT CHANGE UP
WBC # BLD: 10.01 K/UL — SIGNIFICANT CHANGE UP (ref 4.8–10.8)
WBC # FLD AUTO: 10.01 K/UL — SIGNIFICANT CHANGE UP (ref 4.8–10.8)

## 2024-11-02 PROCEDURE — 99233 SBSQ HOSP IP/OBS HIGH 50: CPT

## 2024-11-02 PROCEDURE — 99222 1ST HOSP IP/OBS MODERATE 55: CPT

## 2024-11-02 RX ORDER — INSULIN LISPRO 100/ML
3 VIAL (ML) SUBCUTANEOUS
Refills: 0 | Status: DISCONTINUED | OUTPATIENT
Start: 2024-11-02 | End: 2024-11-03

## 2024-11-02 RX ADMIN — Medication 400 MILLIGRAM(S): at 05:22

## 2024-11-02 RX ADMIN — MEMANTINE HYDROCHLORIDE 5 MILLIGRAM(S): 21 CAPSULE, EXTENDED RELEASE ORAL at 11:24

## 2024-11-02 RX ADMIN — PREGABALIN 150 MILLIGRAM(S): 150 CAPSULE ORAL at 05:22

## 2024-11-02 RX ADMIN — Medication 40 MILLIGRAM(S): at 13:38

## 2024-11-02 RX ADMIN — Medication 2: at 17:24

## 2024-11-02 RX ADMIN — Medication 2: at 07:57

## 2024-11-02 RX ADMIN — Medication 3: at 11:37

## 2024-11-02 RX ADMIN — Medication 45 UNIT(S): at 21:33

## 2024-11-02 RX ADMIN — PREGABALIN 150 MILLIGRAM(S): 150 CAPSULE ORAL at 17:24

## 2024-11-02 RX ADMIN — Medication 400 MILLIGRAM(S): at 17:24

## 2024-11-02 RX ADMIN — Medication 3 UNIT(S): at 17:25

## 2024-11-02 RX ADMIN — Medication 0.6 MILLIGRAM(S): at 05:22

## 2024-11-02 RX ADMIN — Medication 50 MILLIGRAM(S): at 17:24

## 2024-11-02 RX ADMIN — CHLORHEXIDINE GLUCONATE 1 APPLICATION(S): 40 SOLUTION TOPICAL at 05:23

## 2024-11-02 RX ADMIN — APIXABAN 2.5 MILLIGRAM(S): 5 TABLET, FILM COATED ORAL at 17:24

## 2024-11-02 RX ADMIN — PANTOPRAZOLE SODIUM 40 MILLIGRAM(S): 40 TABLET, DELAYED RELEASE ORAL at 05:22

## 2024-11-02 RX ADMIN — IPRATROPIUM BROMIDE AND ALBUTEROL SULFATE 3 MILLILITER(S): .5; 2.5 SOLUTION RESPIRATORY (INHALATION) at 08:32

## 2024-11-02 RX ADMIN — Medication 40 MILLIGRAM(S): at 05:22

## 2024-11-02 RX ADMIN — APIXABAN 2.5 MILLIGRAM(S): 5 TABLET, FILM COATED ORAL at 05:22

## 2024-11-02 RX ADMIN — Medication 81 MILLIGRAM(S): at 11:24

## 2024-11-02 RX ADMIN — Medication 80 MILLIGRAM(S): at 21:34

## 2024-11-02 RX ADMIN — Medication 0.4 MILLIGRAM(S): at 21:33

## 2024-11-02 RX ADMIN — IPRATROPIUM BROMIDE AND ALBUTEROL SULFATE 3 MILLILITER(S): .5; 2.5 SOLUTION RESPIRATORY (INHALATION) at 19:58

## 2024-11-02 RX ADMIN — Medication 50 MILLIGRAM(S): at 05:22

## 2024-11-02 NOTE — PROGRESS NOTE ADULT - ASSESSMENT
80 yo M PMHx CAD, DM II, HTN, and BPH, with recent admission for vision loss, and presented for evaluation of generalized weakness went to ER, was dc home, then returned later for sudden onset of substernal chest pressure, associated with shortness of breath.  Patient was initially at [facility] and was transferred to [facility] for evaluation of pulmonary edema on bipap, pericardial effusion and afib RVR. Pt was admitted to cardio step down. While in  pt was on amiodarone infusion and transitioned to PO.  on Nov 1 pt was sitting in chair, coughed and then wife  saw his eyes "roll to the back of his head" and started "shaking" after which RR was called.Patient returned to baseline in <30 seconds per wife. EEG was done and pending read. He was downgraded to telemetry on 11/2.    Acute on chronic HFpEF   New Onset Atrial Fibrillation w/ Rapid Ventricular Response  - Initially presented with significant sternal chest pain  - Trops 20 -> 16 -> 15 -> 20  - as per cardiology possibly due to Pericarditis and was started on colchcine-  - c/w lasix, colchicine  - was in NSR but now afib again  - doubt DCCV will help given effusion. c/w amio/metoprolol for now. c/w Eliquis  - 10/29 TTE: LVEF 65-70%, G1DD, borderline pHTN, small pericardial effusion  - ESR 34,   - Sent to [facility] due to concern for tamponade, poor windows on TTE at [facility]  - cardio held home Ranexa due to interaction w/ Amiodarone  - cardio recommended starting Eliquis 2.5mg BID for AC (Cr >1.5, will be 80 in 1.5 months)  - lasix PO     Suspected vasovagal episode  - seen by neuro-recommended eeg  - EEG done, pending read  - doubt seizure    Pleural effusions/consolidation  - while in  was treated for possible Aspiration PNA  - clinically doubt pNA  - CXR showing effusions and consolidation  - Afebrile though with leukocytosis  - 10/30 FEES w/ mild dysphagia, when eating needs soft and bite sized diet w/ sips of thin liquid  - Off abx for now  - F/u Procalcitonin, Blood Cx, Sputum Cx    Confusion   - as per EMR wife states patient is "forgetful" has periods of being AOx1-3  - 10/29 MR Head: No acute pathology, mild chronic microvascular ischemic changes and chronic lacunar infarcts  - aaox3 now    CKD III  - CARINA resolved  - back to baseline    DM II  - monitor FS  - has hyperglycemia  - c/w insulin--adjust dose    HTN  - Hypotensive during hospitalization  - Holding home antihypertensives for now    BPH  - C/w tamsulosin     DVT px  From home    #Progress Note Handoff:  Pending (specify):  limited echo, eeg results  Family discussion: As above with pt and daughter  Disposition: Home___/SNF_x__/Other________/Unknown at this time________

## 2024-11-02 NOTE — PROGRESS NOTE ADULT - SUBJECTIVE AND OBJECTIVE BOX
CHIEF COMPLAINT:    Patient is a 79y old  Male who presents with a chief complaint of Pericardial effusion, suspected tamponade     INTERVAL HPI/OVERNIGHT EVENTS:    Patient seen and examined at bedside. No acute overnight events occurred.    ROS: Denies SOB, chest pain. All other systems are negative.    Medications:  Standing  albuterol/ipratropium for Nebulization 3 milliLiter(s) Nebulizer every 6 hours  aMIOdarone    Tablet 400 milliGRAM(s) Oral two times a day  apixaban 2.5 milliGRAM(s) Oral every 12 hours  aspirin enteric coated 81 milliGRAM(s) Oral daily  atorvastatin 80 milliGRAM(s) Oral at bedtime  chlorhexidine 2% Cloths 1 Application(s) Topical <User Schedule>  colchicine 0.6 milliGRAM(s) Oral every 48 hours  dextrose 50% Injectable 12.5 Gram(s) IV Push once  dextrose 50% Injectable 25 Gram(s) IV Push once  dextrose 50% Injectable 25 Gram(s) IV Push once  furosemide   Injectable 40 milliGRAM(s) IV Push two times a day  influenza  Vaccine (HIGH DOSE) 0.5 milliLiter(s) IntraMuscular once  insulin glargine Injectable (LANTUS) 45 Unit(s) SubCutaneous at bedtime  insulin lispro (ADMELOG) corrective regimen sliding scale   SubCutaneous three times a day before meals  memantine 5 milliGRAM(s) Oral daily  metoprolol tartrate 50 milliGRAM(s) Oral two times a day  pantoprazole    Tablet 40 milliGRAM(s) Oral before breakfast  pregabalin 150 milliGRAM(s) Oral every 12 hours  tamsulosin 0.4 milliGRAM(s) Oral at bedtime    PRN Meds  acetaminophen     Tablet .. 650 milliGRAM(s) Oral every 6 hours PRN  albuterol    90 MICROgram(s) HFA Inhaler 2 Puff(s) Inhalation every 6 hours PRN  aluminum hydroxide/magnesium hydroxide/simethicone Suspension 30 milliLiter(s) Oral every 4 hours PRN  dextrose Oral Gel 15 Gram(s) Oral once PRN  melatonin 5 milliGRAM(s) Oral at bedtime PRN        Vital Signs:    T(F): 98.3 (24 @ 11:01), Max: 98.3 (24 @ 11:01)  HR: 88 (24 @ 11:01) (81 - 88)  BP: 109/67 (24 @ 11:01) (99/67 - 119/70)  RR: 18 (24 @ 11:01) (18 - 18)  SpO2: 94% (24 @ 11:01) (94% - 99%)  I&O's Summary    2024 07:01  -  2024 07:00  --------------------------------------------------------  IN: 365 mL / OUT: 1950 mL / NET: -1585 mL    2024 08:01  -  2024 12:20  --------------------------------------------------------  IN: 0 mL / OUT: 500 mL / NET: -500 mL      Daily Height in cm: 167.64 (2024 19:18)    Daily Weight in k.7 (2024 03:06)  CAPILLARY BLOOD GLUCOSE      POCT Blood Glucose.: 267 mg/dL (2024 11:27)  POCT Blood Glucose.: 238 mg/dL (2024 07:49)  POCT Blood Glucose.: 204 mg/dL (2024 21:11)  POCT Blood Glucose.: 261 mg/dL (2024 17:05)      PHYSICAL EXAM:  GENERAL:  NAD  SKIN: No rashes or lesions  HEENT: Atraumatic. Normocephalic. Anicteric  NECK:  No JVD.   PULMONARY: Clear to ausculation bilaterally. No wheezing. No rales  CVS: Normal S1, S2. Irregular rate and rhythm. No murmurs.  ABDOMEN/GI: Soft, Nontender, Nondistended; Bowel sounds are present  EXTREMITIES:  No edema B/L LE.  NEUROLOGIC:  No motor deficit.  PSYCH: Alert & oriented x 3, normal affect      LABS:                        11.6   10.01 )-----------( 173      ( 2024 06:56 )             35.4     11-    138  |  100  |  51[H]  ----------------------------<  233[H]  4.2   |  24  |  2.1[H]    Ca    8.8      2024 06:56  Mg     2.2           PTT - ( 2024 05:27 )  PTT:70.8 sec        Culture - Sputum (collected 31 Oct 2024 19:18)  Source: .Sputum Sputum  Gram Stain (2024 07:28):    Few polymorphonuclear leukocytes per low power field    Few Squamous epithelial cells per low power field    Few Gram positive cocci in pairs seen per oil power field    Few Gram Variable Rods seen per oil power field    Culture - Blood (collected 31 Oct 2024 17:44)  Source: .Blood BLOOD  Preliminary Report (2024 23:01):    No growth at 24 hours    Culture - Blood (collected 31 Oct 2024 17:44)  Source: .Blood BLOOD  Preliminary Report (2024 23:01):    No growth at 24 hours        RADIOLOGY & ADDITIONAL TESTS:  Imaging or report Personally Reviewed:  [ ] YES  [ ] NO -->no new images    Telemetry reviewed independently - rate controlled afib  EKG reviewed independently -->no new EKGs    Consultant(s) Notes Reviewed:  [x ] YES  [ ] NO  Care Discussed with Consultants/Other Providers [ ] YES  [x ] NO    Case discussed with resident  Care discussed with pt

## 2024-11-02 NOTE — EEG REPORT - NS EEG TEXT BOX
[facility] Department of Neurology         Inpatient Routine-Electroencephalography Report    Patient Name:	CASEY BENÍTEZ    :	1945  MRN:	-    Study Start Date/Time:	2024, 7:38:50 PM    End Time (if applicable):      Brief Clinical History:  CASEY BENÍTEZ is a 79 year old Male; study performed to investigate for seizures or markers of epilepsy.   Technologist notes: R53.1 WEAKNESS    Diagnosis Code:  R56.9 convulsions/seizure  CPT:   22720 (awake/drowsy)     Pertinent Medication:  n/a    Acquisition Details:  Electroencephalography was acquired using a minimum of 21 channels on an [employer] Neu[employer] system v 9.3.1 with electrode placement according to the standard International 10-20 system following ACNS (American Clinical Neurophysiology Society) guidelines.  Anterior temporal T1 and T2 electrodes were utilized whenever possible.  The [employer]TEK automated spike & seizure detections were all reviewed in detail, in addition to the entire raw EEG.    Findings:  Background:  continuous, with predominantly theta frequencies.  Generalized Slowing:  Continuous polymorphic theta   Symmetry/Focality: No persistent asymmetries of voltage or frequency.     Voltage:  Normal (20+ uV)  Organization:  Appropriate anterior-posterior gradient  Posterior Dominant Rhythm:  6-7 Hz  symmetric   Sleep:  Absent.  Variability:   Yes		Reactivity:  Yes    Spontaneous Activity:  No epileptiform discharges   Events:  1)	No electrographic seizures or significant clinical events occurred during this study.  Provocations:  •	Hyperventilation: was not performed.  •	Photic stimulation: was not performed.  FINAL Impression:  Abnormalawake and/or drowsy routine EEG  1)	 There was diffuse slowing of electrographic activity.      Final Clinical Correlation:  1)	Findings consistent with diffuse electrocerebral dysfunction secondary to nonspecific etiology          Tatyana Loaiza MD   Attending Neurologist, [facility]

## 2024-11-03 LAB
ANION GAP SERPL CALC-SCNC: 16 MMOL/L — HIGH (ref 7–14)
BASOPHILS # BLD AUTO: 0.05 K/UL — SIGNIFICANT CHANGE UP (ref 0–0.2)
BASOPHILS NFR BLD AUTO: 0.5 % — SIGNIFICANT CHANGE UP (ref 0–1)
BUN SERPL-MCNC: 53 MG/DL — HIGH (ref 10–20)
CALCIUM SERPL-MCNC: 9.2 MG/DL — SIGNIFICANT CHANGE UP (ref 8.4–10.5)
CHLORIDE SERPL-SCNC: 96 MMOL/L — LOW (ref 98–110)
CO2 SERPL-SCNC: 25 MMOL/L — SIGNIFICANT CHANGE UP (ref 17–32)
CREAT SERPL-MCNC: 2 MG/DL — HIGH (ref 0.7–1.5)
EGFR: 33 ML/MIN/1.73M2 — LOW
EOSINOPHIL # BLD AUTO: 0.23 K/UL — SIGNIFICANT CHANGE UP (ref 0–0.7)
EOSINOPHIL NFR BLD AUTO: 2.2 % — SIGNIFICANT CHANGE UP (ref 0–8)
GLUCOSE BLDC GLUCOMTR-MCNC: 172 MG/DL — HIGH (ref 70–99)
GLUCOSE BLDC GLUCOMTR-MCNC: 237 MG/DL — HIGH (ref 70–99)
GLUCOSE BLDC GLUCOMTR-MCNC: 254 MG/DL — HIGH (ref 70–99)
GLUCOSE BLDC GLUCOMTR-MCNC: 346 MG/DL — HIGH (ref 70–99)
GLUCOSE SERPL-MCNC: 218 MG/DL — HIGH (ref 70–99)
HCT VFR BLD CALC: 40.6 % — LOW (ref 42–52)
HGB BLD-MCNC: 13.2 G/DL — LOW (ref 14–18)
IMM GRANULOCYTES NFR BLD AUTO: 1 % — HIGH (ref 0.1–0.3)
LYMPHOCYTES # BLD AUTO: 1.66 K/UL — SIGNIFICANT CHANGE UP (ref 1.2–3.4)
LYMPHOCYTES # BLD AUTO: 15.7 % — LOW (ref 20.5–51.1)
MAGNESIUM SERPL-MCNC: 2.6 MG/DL — HIGH (ref 1.8–2.4)
MCHC RBC-ENTMCNC: 28.4 PG — SIGNIFICANT CHANGE UP (ref 27–31)
MCHC RBC-ENTMCNC: 32.5 G/DL — SIGNIFICANT CHANGE UP (ref 32–37)
MCV RBC AUTO: 87.5 FL — SIGNIFICANT CHANGE UP (ref 80–94)
MONOCYTES # BLD AUTO: 1.18 K/UL — HIGH (ref 0.1–0.6)
MONOCYTES NFR BLD AUTO: 11.1 % — HIGH (ref 1.7–9.3)
NEUTROPHILS # BLD AUTO: 7.37 K/UL — HIGH (ref 1.4–6.5)
NEUTROPHILS NFR BLD AUTO: 69.5 % — SIGNIFICANT CHANGE UP (ref 42.2–75.2)
NRBC # BLD: 0 /100 WBCS — SIGNIFICANT CHANGE UP (ref 0–0)
PLATELET # BLD AUTO: 186 K/UL — SIGNIFICANT CHANGE UP (ref 130–400)
PMV BLD: 12.1 FL — HIGH (ref 7.4–10.4)
POTASSIUM SERPL-MCNC: 5 MMOL/L — SIGNIFICANT CHANGE UP (ref 3.5–5)
POTASSIUM SERPL-SCNC: 5 MMOL/L — SIGNIFICANT CHANGE UP (ref 3.5–5)
RBC # BLD: 4.64 M/UL — LOW (ref 4.7–6.1)
RBC # FLD: 16.5 % — HIGH (ref 11.5–14.5)
SODIUM SERPL-SCNC: 137 MMOL/L — SIGNIFICANT CHANGE UP (ref 135–146)
WBC # BLD: 10.6 K/UL — SIGNIFICANT CHANGE UP (ref 4.8–10.8)
WBC # FLD AUTO: 10.6 K/UL — SIGNIFICANT CHANGE UP (ref 4.8–10.8)

## 2024-11-03 PROCEDURE — 71045 X-RAY EXAM CHEST 1 VIEW: CPT | Mod: 26

## 2024-11-03 PROCEDURE — 99233 SBSQ HOSP IP/OBS HIGH 50: CPT

## 2024-11-03 RX ORDER — INSULIN LISPRO 100/ML
8 VIAL (ML) SUBCUTANEOUS
Refills: 0 | Status: DISCONTINUED | OUTPATIENT
Start: 2024-11-03 | End: 2024-11-04

## 2024-11-03 RX ADMIN — Medication 4: at 11:30

## 2024-11-03 RX ADMIN — Medication 400 MILLIGRAM(S): at 17:08

## 2024-11-03 RX ADMIN — Medication 80 MILLIGRAM(S): at 22:15

## 2024-11-03 RX ADMIN — Medication 1 LOZENGE: at 07:41

## 2024-11-03 RX ADMIN — IPRATROPIUM BROMIDE AND ALBUTEROL SULFATE 3 MILLILITER(S): .5; 2.5 SOLUTION RESPIRATORY (INHALATION) at 20:13

## 2024-11-03 RX ADMIN — Medication 81 MILLIGRAM(S): at 11:15

## 2024-11-03 RX ADMIN — APIXABAN 2.5 MILLIGRAM(S): 5 TABLET, FILM COATED ORAL at 05:04

## 2024-11-03 RX ADMIN — Medication 2: at 08:13

## 2024-11-03 RX ADMIN — Medication 40 MILLIGRAM(S): at 05:04

## 2024-11-03 RX ADMIN — Medication 400 MILLIGRAM(S): at 05:05

## 2024-11-03 RX ADMIN — Medication 50 MILLIGRAM(S): at 05:06

## 2024-11-03 RX ADMIN — IPRATROPIUM BROMIDE AND ALBUTEROL SULFATE 3 MILLILITER(S): .5; 2.5 SOLUTION RESPIRATORY (INHALATION) at 14:19

## 2024-11-03 RX ADMIN — Medication 40 MILLIGRAM(S): at 13:20

## 2024-11-03 RX ADMIN — MEMANTINE HYDROCHLORIDE 5 MILLIGRAM(S): 21 CAPSULE, EXTENDED RELEASE ORAL at 11:15

## 2024-11-03 RX ADMIN — Medication 0.4 MILLIGRAM(S): at 22:15

## 2024-11-03 RX ADMIN — Medication 8 UNIT(S): at 17:09

## 2024-11-03 RX ADMIN — Medication 3: at 17:08

## 2024-11-03 RX ADMIN — Medication 45 UNIT(S): at 22:15

## 2024-11-03 RX ADMIN — APIXABAN 2.5 MILLIGRAM(S): 5 TABLET, FILM COATED ORAL at 17:08

## 2024-11-03 RX ADMIN — PREGABALIN 150 MILLIGRAM(S): 150 CAPSULE ORAL at 05:05

## 2024-11-03 RX ADMIN — IPRATROPIUM BROMIDE AND ALBUTEROL SULFATE 3 MILLILITER(S): .5; 2.5 SOLUTION RESPIRATORY (INHALATION) at 08:03

## 2024-11-03 RX ADMIN — Medication 3 UNIT(S): at 11:30

## 2024-11-03 RX ADMIN — Medication 3 UNIT(S): at 08:14

## 2024-11-03 RX ADMIN — Medication 50 MILLIGRAM(S): at 17:08

## 2024-11-03 RX ADMIN — PREGABALIN 150 MILLIGRAM(S): 150 CAPSULE ORAL at 17:09

## 2024-11-03 RX ADMIN — CHLORHEXIDINE GLUCONATE 1 APPLICATION(S): 40 SOLUTION TOPICAL at 05:10

## 2024-11-03 RX ADMIN — PANTOPRAZOLE SODIUM 40 MILLIGRAM(S): 40 TABLET, DELAYED RELEASE ORAL at 05:06

## 2024-11-03 NOTE — PROGRESS NOTE ADULT - SUBJECTIVE AND OBJECTIVE BOX
CHIEF COMPLAINT:    Patient is a 79y old  Male who presents with a chief complaint of Pericardial effusion, suspected tamponade    INTERVAL HPI/OVERNIGHT EVENTS:    Patient seen and examined at bedside. No acute overnight events occurred.    ROS: Denies SOB, chest pain. All other systems are negative.    Medications:  Standing  albuterol/ipratropium for Nebulization 3 milliLiter(s) Nebulizer every 6 hours  aMIOdarone    Tablet 400 milliGRAM(s) Oral two times a day  apixaban 2.5 milliGRAM(s) Oral every 12 hours  aspirin enteric coated 81 milliGRAM(s) Oral daily  atorvastatin 80 milliGRAM(s) Oral at bedtime  chlorhexidine 2% Cloths 1 Application(s) Topical <User Schedule>  colchicine 0.6 milliGRAM(s) Oral every 48 hours  dextrose 50% Injectable 25 Gram(s) IV Push once  dextrose 50% Injectable 12.5 Gram(s) IV Push once  dextrose 50% Injectable 25 Gram(s) IV Push once  furosemide   Injectable 40 milliGRAM(s) IV Push two times a day  influenza  Vaccine (HIGH DOSE) 0.5 milliLiter(s) IntraMuscular once  insulin glargine Injectable (LANTUS) 45 Unit(s) SubCutaneous at bedtime  insulin lispro (ADMELOG) corrective regimen sliding scale   SubCutaneous three times a day before meals  insulin lispro Injectable (ADMELOG) 3 Unit(s) SubCutaneous three times a day before meals  memantine 5 milliGRAM(s) Oral daily  metoprolol tartrate 50 milliGRAM(s) Oral two times a day  pantoprazole    Tablet 40 milliGRAM(s) Oral before breakfast  pregabalin 150 milliGRAM(s) Oral every 12 hours  tamsulosin 0.4 milliGRAM(s) Oral at bedtime    PRN Meds  acetaminophen     Tablet .. 650 milliGRAM(s) Oral every 6 hours PRN  albuterol    90 MICROgram(s) HFA Inhaler 2 Puff(s) Inhalation every 6 hours PRN  aluminum hydroxide/magnesium hydroxide/simethicone Suspension 30 milliLiter(s) Oral every 4 hours PRN  dextrose Oral Gel 15 Gram(s) Oral once PRN  melatonin 5 milliGRAM(s) Oral at bedtime PRN        Vital Signs:    T(F): 98.1 (24 @ 12:21), Max: 98.2 (11-02-24 @ 20:14)  HR: 87 (24 @ 12:21) (86 - 94)  BP: 109/70 (24 @ 12:21) (94/63 - 117/72)  RR: 18 (24 @ 12:21) (17 - 18)  SpO2: 97% (24 @ 04:49) (97% - 97%)  I&O's Summary    2024 08:01  -  2024 07:00  --------------------------------------------------------  IN: 400 mL / OUT: 2800 mL / NET: -2400 mL      Daily     Daily Weight in k.6 (2024 05:34)  CAPILLARY BLOOD GLUCOSE      POCT Blood Glucose.: 346 mg/dL (2024 11:20)  POCT Blood Glucose.: 237 mg/dL (2024 07:45)  POCT Blood Glucose.: 231 mg/dL (2024 21:10)  POCT Blood Glucose.: 238 mg/dL (2024 16:36)      PHYSICAL EXAM:  GENERAL:  NAD  SKIN: No rashes or lesions  HEENT: Atraumatic. Normocephalic. Anicteric  NECK:  No JVD.   PULMONARY: bibasilar crackles  CVS: Normal S1, S2. Regular rate and rhythm. No murmurs.  ABDOMEN/GI: Soft, Nontender, Nondistended; Bowel sounds are present  EXTREMITIES:  No edema B/L LE.  NEUROLOGIC:  No motor deficit.  PSYCH: Alert & oriented x 3, normal affect      LABS:                        13.2   10.60 )-----------( 186      ( 2024 06:40 )             40.6         137  |  96[L]  |  53[H]  ----------------------------<  218[H]  5.0   |  25  |  2.0[H]    Ca    9.2      2024 06:40  Mg     2.6                   Culture - Sputum (collected 31 Oct 2024 19:18)  Source: .Sputum Sputum  Gram Stain (2024 07:28):    Few polymorphonuclear leukocytes per low power field    Few Squamous epithelial cells per low power field    Few Gram positive cocci in pairs seen per oil power field    Few Gram Variable Rods seen per oil power field  Final Report (2024 17:29):    Commensal oli consistent with body site    Culture - Blood (collected 31 Oct 2024 17:44)  Source: .Blood BLOOD  Preliminary Report (2024 23:07):    No growth at 48 Hours    Culture - Blood (collected 31 Oct 2024 17:44)  Source: .Blood BLOOD  Preliminary Report (2024 23:07):    No growth at 48 Hours        RADIOLOGY & ADDITIONAL TESTS:  Imaging or report Personally Reviewed:  [ ] YES  [ ] NO -->no new images    Telemetry reviewed independently - NSR, no acute events  EKG reviewed independently -->no new EKGs    Consultant(s) Notes Reviewed:  [ ] YES  [ ] NO  Care Discussed with Consultants/Other Providers [ ] YES  [ ] NO    Case discussed with resident  Care discussed with pt

## 2024-11-03 NOTE — PROGRESS NOTE ADULT - ASSESSMENT
78 yo M PMHx CAD, DM II, HTN, and BPH, with recent admission for vision loss, and presented for evaluation of generalized weakness went to ER, was dc home, then returned later for sudden onset of substernal chest pressure, associated with shortness of breath.  Patient was initially at [facility] and was transferred to [facility] for evaluation of pulmonary edema on bipap, pericardial effusion and afib RVR. Pt was admitted to cardio step down. While in  pt was on amiodarone infusion and transitioned to PO.  on Nov 1 pt was sitting in chair, coughed and then wife  saw his eyes "roll to the back of his head" and started "shaking" after which RR was called. Patient returned to baseline in <30 seconds per wife. EEG was done and pending read. He was downgraded to telemetry on 11/2.    Acute on chronic HFpEF   New Onset Atrial Fibrillation w/ Rapid Ventricular Response  - Initially presented with significant sternal chest pain  - Trops 20 -> 16 -> 15 -> 20  - as per cardiology possibly due to Pericarditis and was started on colchcine-  - c/w lasix, colchicine  - was in NSR but now afib again  - doubt DCCV will help given effusion. c/w amio/metoprolol for now. c/w Eliquis  - 10/29 TTE: LVEF 65-70%, G1DD, borderline pHTN, small pericardial effusion  - ESR 34,   - Sent to [facility] due to concern for tamponade, poor windows on TTE at [facility]  - cardio held home Ranexa due to interaction w/ Amiodarone  - cardio recommended starting Eliquis 2.5mg BID for AC (Cr >1.5, will be 80 in 1.5 months)  - lasix PO     Suspected vasovagal episode  - seen by neuro-recommended eeg  - EEG done, pending read  - doubt seizure    Pleural effusions/consolidation  - xray appears to be volume overloaded form admission  - while in  was treated for possible Aspiration PNA  - clinically doubt pNA  - CXR showing effusions and consolidation  - Afebrile though with leukocytosis  - 10/30 FEES w/ mild dysphagia, when eating needs soft and bite sized diet w/ sips of thin liquid  - Off abx for now  - F/u Procalcitonin, Blood Cx, Sputum Cx  - still has crackles. Xray shows mild effusions, await formal read. C/w lasix today, can likely dc IV in AM    Confusion   - as per EMR wife states patient is "forgetful" has periods of being AOx1-3  - 10/29 MR Head: No acute pathology, mild chronic microvascular ischemic changes and chronic lacunar infarcts  - aaox3 now    CKD III  - CARINA resolved  - back to baseline    DM II  - monitor FS  - has hyperglycemia  - c/w insulin--adjust dose    HTN  - Hypotensive during hospitalization  - Holding home antihypertensives for now    BPH  - C/w tamsulosin     DVT px  From home    #Progress Note Handoff:  Pending (specify):  xray read, FS control, possible dc in AM  Family discussion: As above with pt and daughter  Disposition: Home___/SNF_x__/Other________/Unknown at this time________

## 2024-11-03 NOTE — PROGRESS NOTE ADULT - ASSESSMENT
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  #Hx of CAD  #Chest Pain (Improved)  - Initially presented with significant sternal chest pain  - Trops 20 -> 16 -> 15 -> 20  - Serial EKGs stable  - 10/29 TTE: LVEF 65-70%, G1DD, borderline pHTN, small pericardial effusion  - ESR 34,   - Sent to [facility] due to concern for tamponade, poor windows on TTE at [facility]  - S/p Amiodarone infusion  - Hold home Ranexa due to interaction w/ Amiodarone  - Start Eliquis 2.5mg BID for AC (Cr >1.5, will be 80 in 1.5 months)  - C/w Lasix 40mg IV BID  - C/w Metoprolol succinate 50mg BID, monitor HR and increase if needed/tolerated  - C/w Amiodarone 400mg PO BID  - C/w Colchicine 0.6mg q48h; has poor renal function and on amio, discussed w/ pharmacy, do not increase dose or frequency (ideally stop per pharmacy)  - F/u Coxsackie serology  - Monitor BMP    #Possible Aspiration PNA  - CXR showing effusions and consolidation  - Afebrile though with leukocytosis  - 10/30 FEES w/ mild dysphagia, when eating needs soft and bite sized diet w/ sips of thin liquid  - Off abx for now  - F/u Procalcitonin, Blood Cx, Sputum Cx    #Confusion on Admission r/o CVA  - Per wife patient is "forgetful" has periods of being AOx1-3  - 10/29 MR Head: No acute pathology, mild chronic microvascular ischemic changes and chronic lacunar infarcts  - Delirium precautions  - Awake and Alert x1 to name on exam 10/31 AM    #Diabetes Mellitus  - C/w basal insulin w/ sliding scale  - Titrate for glucose 140-180 while inpatient    #HTN  - Hypotensive during hospitalization  - Holding home antihypertensives for now    #BPH  - C/w home Tamsulosin 0.4mg PO QD    #Recent Admission for Vision Loss  - Need for o/p f/u w/ Ophthalmology noted on last d/c    #Misc  #Code Status: Full Code  #DVT ppx: Heparin drip  #GI ppx: N/A  #Diet: Soft and Bite Sized w/ Thin Liquids, CC/DASH  #Activity: Out of Bed w/ Assistance  #Dispo: 3C Medicine.

## 2024-11-03 NOTE — PROGRESS NOTE ADULT - SUBJECTIVE AND OBJECTIVE BOX
SUBJECTIVE/OVERNIGHT EVENTS  Today is hospital day 6d. This morning patient was seen and examined at bedside, resting comfortably in bed.  Overnight pt c/o chest pain when he coughs. VSS. EKG no change since prior.    MEDICATIONS  STANDING MEDICATIONS  albuterol/ipratropium for Nebulization 3 milliLiter(s) Nebulizer every 6 hours  aMIOdarone    Tablet 400 milliGRAM(s) Oral two times a day  apixaban 2.5 milliGRAM(s) Oral every 12 hours  aspirin enteric coated 81 milliGRAM(s) Oral daily  atorvastatin 80 milliGRAM(s) Oral at bedtime  chlorhexidine 2% Cloths 1 Application(s) Topical <User Schedule>  colchicine 0.6 milliGRAM(s) Oral every 48 hours  dextrose 50% Injectable 25 Gram(s) IV Push once  dextrose 50% Injectable 25 Gram(s) IV Push once  dextrose 50% Injectable 12.5 Gram(s) IV Push once  furosemide   Injectable 40 milliGRAM(s) IV Push two times a day  influenza  Vaccine (HIGH DOSE) 0.5 milliLiter(s) IntraMuscular once  insulin glargine Injectable (LANTUS) 45 Unit(s) SubCutaneous at bedtime  insulin lispro (ADMELOG) corrective regimen sliding scale   SubCutaneous three times a day before meals  insulin lispro Injectable (ADMELOG) 3 Unit(s) SubCutaneous three times a day before meals  memantine 5 milliGRAM(s) Oral daily  metoprolol tartrate 50 milliGRAM(s) Oral two times a day  pantoprazole    Tablet 40 milliGRAM(s) Oral before breakfast  pregabalin 150 milliGRAM(s) Oral every 12 hours  tamsulosin 0.4 milliGRAM(s) Oral at bedtime    PRN MEDICATIONS  acetaminophen     Tablet .. 650 milliGRAM(s) Oral every 6 hours PRN  albuterol    90 MICROgram(s) HFA Inhaler 2 Puff(s) Inhalation every 6 hours PRN  aluminum hydroxide/magnesium hydroxide/simethicone Suspension 30 milliLiter(s) Oral every 4 hours PRN  dextrose Oral Gel 15 Gram(s) Oral once PRN  melatonin 5 milliGRAM(s) Oral at bedtime PRN    VITALS  T(F): 98.2 (11-02-24 @ 20:14), Max: 98.3 (11-02-24 @ 11:01)  HR: 94 (11-02-24 @ 20:14) (83 - 102)  BP: 94/63 (11-02-24 @ 20:14) (94/63 - 119/70)  RR: 17 (11-02-24 @ 20:14) (17 - 18)  SpO2: 97% (11-02-24 @ 16:48) (94% - 99%)  POCT Blood Glucose.: 231 mg/dL (11-02-24 @ 21:10)  POCT Blood Glucose.: 238 mg/dL (11-02-24 @ 16:36)  POCT Blood Glucose.: 267 mg/dL (11-02-24 @ 11:27)  POCT Blood Glucose.: 238 mg/dL (11-02-24 @ 07:49)    PHYSICAL EXAM  GENERAL: NAD, lying comfortably in bed  HEENT: NCAT, PERRL  NECK: supple  PULMONARY: CTAB  CVS: RRR, normal S1, S2  ABDOMENI: soft, nontender, nondistended  EXTREMITIES: no LE edema  SKIN: No rashes or lesion  NEUROLOGIC:  AOx3, no focal deficits  PSYCH: normal affect    LABS             11.6   10.01 )-----------( 173      ( 11-02-24 @ 06:56 )             35.4     138  |  100  |  51  -------------------------<  233   11-02-24 @ 06:56  4.2  |  24  |  2.1    Ca      8.8     11-02-24 @ 06:56  Mg     2.2     11-02-24 @ 06:56      PTT - ( 11-01-24 @ 05:27 )  PTT:70.8 sec      Urinalysis Basic - ( 02 Nov 2024 06:56 )    Color: x / Appearance: x / SG: x / pH: x  Gluc: 233 mg/dL / Ketone: x  / Bili: x / Urobili: x   Blood: x / Protein: x / Nitrite: x   Leuk Esterase: x / RBC: x / WBC x   Sq Epi: x / Non Sq Epi: x / Bacteria: x          Culture - Sputum (collected 31 Oct 2024 19:18)  Source: .Sputum Sputum  Gram Stain (01 Nov 2024 07:28):    Few polymorphonuclear leukocytes per low power field    Few Squamous epithelial cells per low power field    Few Gram positive cocci in pairs seen per oil power field    Few Gram Variable Rods seen per oil power field  Final Report (02 Nov 2024 17:29):    Commensal oli consistent with body site    Culture - Blood (collected 31 Oct 2024 17:44)  Source: .Blood BLOOD  Preliminary Report (02 Nov 2024 23:07):    No growth at 48 Hours    Culture - Blood (collected 31 Oct 2024 17:44)  Source: .Blood BLOOD  Preliminary Report (02 Nov 2024 23:07):    No growth at 48 Hours      IMAGING

## 2024-11-04 LAB
ANION GAP SERPL CALC-SCNC: 14 MMOL/L — SIGNIFICANT CHANGE UP (ref 7–14)
BASOPHILS # BLD AUTO: 0.05 K/UL — SIGNIFICANT CHANGE UP (ref 0–0.2)
BASOPHILS NFR BLD AUTO: 0.4 % — SIGNIFICANT CHANGE UP (ref 0–1)
BUN SERPL-MCNC: 59 MG/DL — HIGH (ref 10–20)
CALCIUM SERPL-MCNC: 8.8 MG/DL — SIGNIFICANT CHANGE UP (ref 8.4–10.5)
CHLORIDE SERPL-SCNC: 96 MMOL/L — LOW (ref 98–110)
CO2 SERPL-SCNC: 25 MMOL/L — SIGNIFICANT CHANGE UP (ref 17–32)
CREAT SERPL-MCNC: 2.1 MG/DL — HIGH (ref 0.7–1.5)
EGFR: 31 ML/MIN/1.73M2 — LOW
EOSINOPHIL # BLD AUTO: 0.23 K/UL — SIGNIFICANT CHANGE UP (ref 0–0.7)
EOSINOPHIL NFR BLD AUTO: 1.8 % — SIGNIFICANT CHANGE UP (ref 0–8)
GLUCOSE BLDC GLUCOMTR-MCNC: 172 MG/DL — HIGH (ref 70–99)
GLUCOSE BLDC GLUCOMTR-MCNC: 216 MG/DL — HIGH (ref 70–99)
GLUCOSE BLDC GLUCOMTR-MCNC: 243 MG/DL — HIGH (ref 70–99)
GLUCOSE BLDC GLUCOMTR-MCNC: 329 MG/DL — HIGH (ref 70–99)
GLUCOSE SERPL-MCNC: 239 MG/DL — HIGH (ref 70–99)
HCT VFR BLD CALC: 38.5 % — LOW (ref 42–52)
HGB BLD-MCNC: 12.7 G/DL — LOW (ref 14–18)
IMM GRANULOCYTES NFR BLD AUTO: 1.3 % — HIGH (ref 0.1–0.3)
LYMPHOCYTES # BLD AUTO: 16.5 % — LOW (ref 20.5–51.1)
LYMPHOCYTES # BLD AUTO: 2.16 K/UL — SIGNIFICANT CHANGE UP (ref 1.2–3.4)
MAGNESIUM SERPL-MCNC: 2.2 MG/DL — SIGNIFICANT CHANGE UP (ref 1.8–2.4)
MCHC RBC-ENTMCNC: 28.3 PG — SIGNIFICANT CHANGE UP (ref 27–31)
MCHC RBC-ENTMCNC: 33 G/DL — SIGNIFICANT CHANGE UP (ref 32–37)
MCV RBC AUTO: 85.7 FL — SIGNIFICANT CHANGE UP (ref 80–94)
MONOCYTES # BLD AUTO: 1.2 K/UL — HIGH (ref 0.1–0.6)
MONOCYTES NFR BLD AUTO: 9.2 % — SIGNIFICANT CHANGE UP (ref 1.7–9.3)
NEUTROPHILS # BLD AUTO: 9.28 K/UL — HIGH (ref 1.4–6.5)
NEUTROPHILS NFR BLD AUTO: 70.8 % — SIGNIFICANT CHANGE UP (ref 42.2–75.2)
NRBC # BLD: 0 /100 WBCS — SIGNIFICANT CHANGE UP (ref 0–0)
PLATELET # BLD AUTO: 221 K/UL — SIGNIFICANT CHANGE UP (ref 130–400)
PMV BLD: 12.4 FL — HIGH (ref 7.4–10.4)
POTASSIUM SERPL-MCNC: 4 MMOL/L — SIGNIFICANT CHANGE UP (ref 3.5–5)
POTASSIUM SERPL-SCNC: 4 MMOL/L — SIGNIFICANT CHANGE UP (ref 3.5–5)
RBC # BLD: 4.49 M/UL — LOW (ref 4.7–6.1)
RBC # FLD: 16.4 % — HIGH (ref 11.5–14.5)
SODIUM SERPL-SCNC: 135 MMOL/L — SIGNIFICANT CHANGE UP (ref 135–146)
WBC # BLD: 13.09 K/UL — HIGH (ref 4.8–10.8)
WBC # FLD AUTO: 13.09 K/UL — HIGH (ref 4.8–10.8)

## 2024-11-04 PROCEDURE — 99233 SBSQ HOSP IP/OBS HIGH 50: CPT

## 2024-11-04 RX ORDER — INSULIN LISPRO 100/ML
12 VIAL (ML) SUBCUTANEOUS
Refills: 0 | Status: DISCONTINUED | OUTPATIENT
Start: 2024-11-04 | End: 2024-11-05

## 2024-11-04 RX ADMIN — PREGABALIN 150 MILLIGRAM(S): 150 CAPSULE ORAL at 17:22

## 2024-11-04 RX ADMIN — Medication 4: at 12:16

## 2024-11-04 RX ADMIN — IPRATROPIUM BROMIDE AND ALBUTEROL SULFATE 3 MILLILITER(S): .5; 2.5 SOLUTION RESPIRATORY (INHALATION) at 14:22

## 2024-11-04 RX ADMIN — IPRATROPIUM BROMIDE AND ALBUTEROL SULFATE 3 MILLILITER(S): .5; 2.5 SOLUTION RESPIRATORY (INHALATION) at 20:31

## 2024-11-04 RX ADMIN — Medication 81 MILLIGRAM(S): at 12:20

## 2024-11-04 RX ADMIN — Medication 50 MILLIGRAM(S): at 05:26

## 2024-11-04 RX ADMIN — Medication 0.4 MILLIGRAM(S): at 21:17

## 2024-11-04 RX ADMIN — APIXABAN 2.5 MILLIGRAM(S): 5 TABLET, FILM COATED ORAL at 05:27

## 2024-11-04 RX ADMIN — Medication 2: at 17:21

## 2024-11-04 RX ADMIN — MEMANTINE HYDROCHLORIDE 5 MILLIGRAM(S): 21 CAPSULE, EXTENDED RELEASE ORAL at 12:20

## 2024-11-04 RX ADMIN — Medication 12 UNIT(S): at 17:21

## 2024-11-04 RX ADMIN — Medication 2: at 08:26

## 2024-11-04 RX ADMIN — APIXABAN 2.5 MILLIGRAM(S): 5 TABLET, FILM COATED ORAL at 17:22

## 2024-11-04 RX ADMIN — Medication 8 UNIT(S): at 08:27

## 2024-11-04 RX ADMIN — Medication 400 MILLIGRAM(S): at 05:25

## 2024-11-04 RX ADMIN — Medication 45 UNIT(S): at 21:17

## 2024-11-04 RX ADMIN — CHLORHEXIDINE GLUCONATE 1 APPLICATION(S): 40 SOLUTION TOPICAL at 05:28

## 2024-11-04 RX ADMIN — IPRATROPIUM BROMIDE AND ALBUTEROL SULFATE 3 MILLILITER(S): .5; 2.5 SOLUTION RESPIRATORY (INHALATION) at 07:38

## 2024-11-04 RX ADMIN — Medication 400 MILLIGRAM(S): at 17:22

## 2024-11-04 RX ADMIN — Medication 40 MILLIGRAM(S): at 05:26

## 2024-11-04 RX ADMIN — Medication 8 UNIT(S): at 12:16

## 2024-11-04 RX ADMIN — Medication 0.6 MILLIGRAM(S): at 05:27

## 2024-11-04 RX ADMIN — Medication 50 MILLIGRAM(S): at 17:22

## 2024-11-04 RX ADMIN — PREGABALIN 150 MILLIGRAM(S): 150 CAPSULE ORAL at 05:25

## 2024-11-04 RX ADMIN — PANTOPRAZOLE SODIUM 40 MILLIGRAM(S): 40 TABLET, DELAYED RELEASE ORAL at 05:27

## 2024-11-04 RX ADMIN — Medication 80 MILLIGRAM(S): at 21:16

## 2024-11-04 NOTE — PHYSICAL THERAPY INITIAL EVALUATION ADULT - GENERAL OBSERVATIONS, REHAB EVAL
9:05-10:00 55 min  pt received in bed in NAD, pt agreeable to PT, pt had no c/o, O2 on at the b/s but off pt
13:40-14:05 Chart reviewed. Pt encountered sitting in chair,  may be seen by Physical Therapist as confirmed with Nurse. Patient denied pain but feels "weak and tired"; +tele/ IV lock RUE/ family at bedside

## 2024-11-04 NOTE — PHYSICAL THERAPY INITIAL EVALUATION ADULT - IMPAIRMENTS CONTRIBUTING IMPAIRED BED MOBILITY, REHAB EVAL
decreased endurance/impaired balance/impaired postural control/decreased strength
impaired balance/decreased strength

## 2024-11-04 NOTE — PROGRESS NOTE ADULT - SUBJECTIVE AND OBJECTIVE BOX
CHIEF COMPLAINT:    Patient is a 79y old  Male who presents with a chief complaint of Pericardial effusion, suspected tamponade    INTERVAL HPI/OVERNIGHT EVENTS:    Patient seen and examined at bedside. No acute overnight events occurred.    ROS: Reports SOB while ambulating. All other systems are negative.    Medications:  Standing  albuterol/ipratropium for Nebulization 3 milliLiter(s) Nebulizer every 6 hours  aMIOdarone    Tablet 400 milliGRAM(s) Oral two times a day  apixaban 2.5 milliGRAM(s) Oral every 12 hours  aspirin enteric coated 81 milliGRAM(s) Oral daily  atorvastatin 80 milliGRAM(s) Oral at bedtime  chlorhexidine 2% Cloths 1 Application(s) Topical <User Schedule>  colchicine 0.6 milliGRAM(s) Oral every 48 hours  dextrose 50% Injectable 12.5 Gram(s) IV Push once  dextrose 50% Injectable 25 Gram(s) IV Push once  dextrose 50% Injectable 25 Gram(s) IV Push once  furosemide   Injectable 40 milliGRAM(s) IV Push two times a day  influenza  Vaccine (HIGH DOSE) 0.5 milliLiter(s) IntraMuscular once  insulin glargine Injectable (LANTUS) 45 Unit(s) SubCutaneous at bedtime  insulin lispro (ADMELOG) corrective regimen sliding scale   SubCutaneous three times a day before meals  insulin lispro Injectable (ADMELOG) 8 Unit(s) SubCutaneous three times a day before meals  memantine 5 milliGRAM(s) Oral daily  metoprolol tartrate 50 milliGRAM(s) Oral two times a day  pantoprazole    Tablet 40 milliGRAM(s) Oral before breakfast  pregabalin 150 milliGRAM(s) Oral every 12 hours  tamsulosin 0.4 milliGRAM(s) Oral at bedtime    PRN Meds  acetaminophen     Tablet .. 650 milliGRAM(s) Oral every 6 hours PRN  albuterol    90 MICROgram(s) HFA Inhaler 2 Puff(s) Inhalation every 6 hours PRN  aluminum hydroxide/magnesium hydroxide/simethicone Suspension 30 milliLiter(s) Oral every 4 hours PRN  dextrose Oral Gel 15 Gram(s) Oral once PRN  melatonin 5 milliGRAM(s) Oral at bedtime PRN        Vital Signs:    T(F): 98 (24 @ 11:57), Max: 99.1 (24 @ 16:47)  HR: 89 (24 @ 11:57) (83 - 94)  BP: 100/63 (24 @ 11:57) (96/58 - 107/66)  RR: 17 (24 @ 11:57) (17 - 18)  SpO2: 96% (24 @ 16:47) (96% - 96%)  I&O's Summary    2024 07:01  -  2024 07:00  --------------------------------------------------------  IN: 520 mL / OUT: 1300 mL / NET: -780 mL    2024 07:01  -  2024 12:33  --------------------------------------------------------  IN: 266 mL / OUT: 700 mL / NET: -434 mL      Daily     Daily Weight in k.9 (2024 06:06)  CAPILLARY BLOOD GLUCOSE      POCT Blood Glucose.: 329 mg/dL (2024 11:23)  POCT Blood Glucose.: 216 mg/dL (2024 07:45)  POCT Blood Glucose.: 172 mg/dL (2024 21:41)  POCT Blood Glucose.: 254 mg/dL (2024 16:41)      PHYSICAL EXAM:  GENERAL:  NAD  SKIN: No rashes or lesions  HEENT: Atraumatic. Normocephalic. Anicteric  NECK:  No JVD.   PULMONARY: Clear to ausculation bilaterally. No wheezing. No rales  CVS: Normal S1, S2. Regular rate and rhythm. No murmurs.  ABDOMEN/GI: Soft, Nontender, Nondistended; Bowel sounds are present  EXTREMITIES:  No edema B/L LE.  NEUROLOGIC:  No motor deficit.  PSYCH: Alert & oriented x 3, normal affect      LABS:                        12.7   13.09 )-----------( 221      ( 2024 08:01 )             38.5     11-04    135  |  96[L]  |  59[H]  ----------------------------<  239[H]  4.0   |  25  |  2.1[H]    Ca    8.8      2024 08:01  Mg     2.2           RADIOLOGY & ADDITIONAL TESTS:  Imaging or report Personally Reviewed:  [ ] YES  [ ] NO -->no new images    Telemetry reviewed independently - NSR, no acute events  EKG reviewed independently -->no new EKGs    Consultant(s) Notes Reviewed:  [ ] YES  [ ] NO  Care Discussed with Consultants/Other Providers [ ] YES  [ ] NO    Case discussed with resident  Care discussed with pt

## 2024-11-04 NOTE — OCCUPATIONAL THERAPY INITIAL EVALUATION ADULT - ADDITIONAL COMMENTS
As per pt report, resides on 1 level in private house with 4 steps to enter from side door.  Still drives.

## 2024-11-04 NOTE — PHYSICAL THERAPY INITIAL EVALUATION ADULT - PERTINENT HX OF CURRENT PROBLEM, REHAB EVAL
pt adm for weakness, chest pressure, pericardial effusion, pt with recent adm and d/c for vision loss, returned same day for above c/o, pt transferred from [facility]
78 y/o male admitted with diagnosis of Weakness, returned to ED on 10/28/24 for chest pain associated with shortness of breath,  which began shortly after being discharged from ED earlier for generalized weakness, was hospitalized last week for vision loss, seen by Cardiology

## 2024-11-04 NOTE — PHYSICAL THERAPY INITIAL EVALUATION ADULT - MANUAL MUSCLE TESTING RESULTS, REHAB EVAL
Both upper extremites/Both lower extremities muscle strength grossly graded 3- to 3/5
B UE at least 3/5, B LE 4+/5

## 2024-11-04 NOTE — PHYSICAL THERAPY INITIAL EVALUATION ADULT - ADDITIONAL COMMENTS
pt lives with his wife in a private house, per pt there are no steps to enter or inside, per chart, there are 4 steps to enter on the side, PTA pt was I with amb and ADLs
Per patient and family at bedside, there are 4 steps outside with fence on (L) side going up to enter home from side of the house then no further steps inside; was not using any assistive device; was scheduled to start home PT yesterday

## 2024-11-04 NOTE — PROGRESS NOTE ADULT - SUBJECTIVE AND OBJECTIVE BOX
24H events:    Patient is a 79y old Male who presents with a chief complaint of Pericardial effusion, suspected tamponade (31 Oct 2024 11:35)    Primary diagnosis of General weakness    Today is 7d of hospitalization. This morning patient was seen and examined at bedside, resting comfortably in bed.    No acute or major events overnight. Pt was using the bipap machine on my initial exam and later seen with apparenyl inxcreased work of breathing. Will get a repeat limited echo for resolution of the WOB, and also increased lispro to 12 units before meals. Spoken to wife and patient at bedside.       PAST MEDICAL & SURGICAL HISTORY  DM (diabetes mellitus)    CAD (coronary artery disease)    BPH (benign prostatic hyperplasia)    History of hematologic disorder    Total cataract    [place] (hard of hearing)    Acute myocardial infarction    Chronic kidney disease (CKD)    H/O coronary angiogram    Stented coronary artery    History of ear surgery    H/O tonsillitis      SOCIAL HISTORY:  Social History:      ALLERGIES:  No Known Drug Allergies  Seafood (Anaphylaxis)    MEDICATIONS:  STANDING MEDICATIONS  albuterol/ipratropium for Nebulization 3 milliLiter(s) Nebulizer every 6 hours  aMIOdarone    Tablet 400 milliGRAM(s) Oral two times a day  apixaban 2.5 milliGRAM(s) Oral every 12 hours  aspirin enteric coated 81 milliGRAM(s) Oral daily  atorvastatin 80 milliGRAM(s) Oral at bedtime  chlorhexidine 2% Cloths 1 Application(s) Topical <User Schedule>  colchicine 0.6 milliGRAM(s) Oral every 48 hours  dextrose 50% Injectable 25 Gram(s) IV Push once  dextrose 50% Injectable 12.5 Gram(s) IV Push once  dextrose 50% Injectable 25 Gram(s) IV Push once  furosemide   Injectable 40 milliGRAM(s) IV Push two times a day  influenza  Vaccine (HIGH DOSE) 0.5 milliLiter(s) IntraMuscular once  insulin glargine Injectable (LANTUS) 45 Unit(s) SubCutaneous at bedtime  insulin lispro (ADMELOG) corrective regimen sliding scale   SubCutaneous three times a day before meals  memantine 5 milliGRAM(s) Oral daily  metoprolol tartrate 50 milliGRAM(s) Oral two times a day  pantoprazole    Tablet 40 milliGRAM(s) Oral before breakfast  pregabalin 150 milliGRAM(s) Oral every 12 hours  tamsulosin 0.4 milliGRAM(s) Oral at bedtime    PRN MEDICATIONS  acetaminophen     Tablet .. 650 milliGRAM(s) Oral every 6 hours PRN  albuterol    90 MICROgram(s) HFA Inhaler 2 Puff(s) Inhalation every 6 hours PRN  aluminum hydroxide/magnesium hydroxide/simethicone Suspension 30 milliLiter(s) Oral every 4 hours PRN  dextrose Oral Gel 15 Gram(s) Oral once PRN  melatonin 5 milliGRAM(s) Oral at bedtime PRN    VITALS:   T(F): 98  HR: 89  BP: 100/63  RR: 17  SpO2: 96%    PHYSICAL EXAM:  GENERAL: NAD, lying comfortably in bed  HEENT: NCAT, PERRL  NECK: supple  PULMONARY: CTAB  CVS: RRR, normal S1, S2  ABDOMENI: soft, nontender, nondistended  EXTREMITIES: no LE edema  SKIN: No rashes or lesion  NEUROLOGIC:  AOx3, no focal deficits  PSYCH: normal affect      LABS:                        12.7   13.09 )-----------( 221      ( 04 Nov 2024 08:01 )             38.5     11-04    135  |  96[L]  |  59[H]  ----------------------------<  239[H]  4.0   |  25  |  2.1[H]    Ca    8.8      04 Nov 2024 08:01  Mg     2.2     11-04        Urinalysis Basic - ( 04 Nov 2024 08:01 )    Color: x / Appearance: x / SG: x / pH: x  Gluc: 239 mg/dL / Ketone: x  / Bili: x / Urobili: x   Blood: x / Protein: x / Nitrite: x   Leuk Esterase: x / RBC: x / WBC x   Sq Epi: x / Non Sq Epi: x / Bacteria: x

## 2024-11-04 NOTE — PROGRESS NOTE ADULT - ASSESSMENT
80 yo M PMHx CAD, DM II, HTN, and BPH, with recent admission for vision loss, and presented for evaluation of generalized weakness went to ER, was dc home, then returned later for sudden onset of substernal chest pressure, associated with shortness of breath.  Patient was initially at [facility] and was transferred to [facility] for evaluation of pulmonary edema on bipap, pericardial effusion and afib RVR. Pt was admitted to cardio step down. While in  pt was on amiodarone infusion and transitioned to PO.  on Nov 1 pt was sitting in chair, coughed and then wife  saw his eyes "roll to the back of his head" and started "shaking" after which RR was called. Patient returned to baseline in <30 seconds per wife. EEG was done and pending read. He was downgraded to telemetry on 11/2.    Acute on chronic HFpEF   New Onset Atrial Fibrillation w/ Rapid Ventricular Response  - Initially presented with significant sternal chest pain  - Trops 20 -> 16 -> 15 -> 20  - as per cardiology possibly due to Pericarditis and was started on colchcine-  - c/w lasix, colchicine  - c/w amio/metoprolol for now. c/w Eliquis  - 10/29 TTE: LVEF 65-70%, G1DD, borderline pHTN, small pericardial effusion  - ESR 34,   - Sent to [facility] due to concern for tamponade, poor windows on TTE at [facility]  - cardio held home Ranexa due to interaction w/ Amiodarone  - cardio recommended starting Eliquis 2.5mg BID for AC (Cr >1.5, will be 80 in 1.5 months)  - lasix PO   - has significant SOB and hypoxia while ambulating. Check repeat limited echo to assess pericardial effusion    Suspected vasovagal episode  - seen by neuro-recommended eeg  - EEG done, pending read  - doubt seizure    Pleural effusions/consolidation  - xray appears to be volume overloaded form admission  - while in  was treated for possible Aspiration PNA  - clinically doubt pNA  - CXR showing effusions and consolidation  - Afebrile though with leukocytosis  - 10/30 FEES w/ mild dysphagia, when eating needs soft and bite sized diet w/ sips of thin liquid  - Off abx for now  - F/u Procalcitonin, Blood Cx, Sputum Cx  - crackles resolve, change lasix to víctor  - repeat cxr in AM      Confusion   - as per EMR wife states patient is "forgetful" has periods of being AOx1-3  - 10/29 MR Head: No acute pathology, mild chronic microvascular ischemic changes and chronic lacunar infarcts  - aaox3 now    CKD III  - CARINA resolved  - back to baseline    DM II  - monitor FS  - has hyperglycemia  - c/w insulin--adjusted dose daily over past few days  - unclear why pt remains hyperglycemic    HTN  - Hypotensive during hospitalization  - Holding home antihypertensives for now    BPH  - C/w tamsulosin     DVT px  From home    #Progress Note Handoff:  Pending (specify):  FS control, repeat echo, home o2, possible dc in AM  Family discussion: As above with pt and wife  Disposition: Home___/SNF_x__/Other________/Unknown at this time________

## 2024-11-04 NOTE — PHYSICAL THERAPY INITIAL EVALUATION ADULT - LEVEL OF INDEPENDENCE: STAIR NEGOTIATION, REHAB EVAL
unable to perform secondary to unsteady gait when ambulating on level with rolling walker at this time
TBA as amb improves and SOB decreases

## 2024-11-04 NOTE — CHART NOTE - NSCHARTNOTEFT_GEN_A_CORE
It is medically necessary for this patient to receive home oxygen, pt with minimal SOB while ambulating, SPO2 84-88%, but pt recovered quickly while standing/resting to 96%. It is also medically necessary for this patient to get the Rolling Walker to participate in ADLs.

## 2024-11-04 NOTE — CHART NOTE - NSCHARTNOTEFT_GEN_A_CORE
Please clarify this patient's neurological status:     At time of evaluation on 10/24, patient in stable neurological status with no observed focal deficits.

## 2024-11-04 NOTE — OCCUPATIONAL THERAPY INITIAL EVALUATION ADULT - GENERAL OBSERVATIONS, REHAB EVAL
Pt received and left semifowler in bed. + IV locks, + tele. Spouse present. Declined Liberian . States understands English. Periodically, spouse translated into Liberian. Vitals taken: supine: BP: 100/67 HR: 91 short-sitting: BP: 78/42 HR: 88 asymptomatic. short-sitting s/p don/doff socks: BP: 85/57 HR: 98 standing: BP: 73/50 HR: 96 asymptomatic. RN made aware.

## 2024-11-04 NOTE — PROGRESS NOTE ADULT - ASSESSMENT
78 yo M PMHx CAD, DM II, HTN, and BPH, with recent admission for vision loss, and presented for evaluation of generalized weakness went to ER, was dc home, then returned later for sudden onset of substernal chest pressure, associated with shortness of breath.  Patient was initially at [facility] and was transferred to [facility] for evaluation of pulmonary edema on bipap, pericardial effusion and afib RVR. Pt was admitted to cardio step down. While in  pt was on amiodarone infusion and transitioned to PO.  on Nov 1 pt was sitting in chair, coughed and then wife  saw his eyes "roll to the back of his head" and started "shaking" after which RR was called. Patient returned to baseline in <30 seconds per wife. EEG was done and pending read. He was downgraded to telemetry on 11/2.    Acute on chronic HFpEF   New Onset Atrial Fibrillation w/ Rapid Ventricular Response  - Initially presented with significant sternal chest pain  - Trops 20 -> 16 -> 15 -> 20  - as per cardiology possibly due to Pericarditis and was started on colchcine  - c/w lasix, colchicine  - c/w amio/metoprolol for now. c/w Eliquis  - 10/29 TTE: LVEF 65-70%, G1DD, borderline pHTN, small pericardial effusion  - ESR 34,   - Sent to [facility] due to concern for tamponade, poor windows on TTE at [facility]  - cardio held home Ranexa due to interaction w/ Amiodarone  - cardio recommended starting Eliquis 2.5mg BID for AC (Cr >1.5, will be 80 in 1.5 months)  - consider lasix PO   - has significant SOB and hypoxia while ambulating. Check repeat limited echo to assess pericardial effusion    Suspected vasovagal episode  - seen by neuro-recommended eeg  - EEG done, Findings consistent with diffuse electrocerebral dysfunction secondary to nonspecific etiology    Pleural effusions/consolidation  - xray appears to be volume overloaded form admission  - while in  was treated for possible Aspiration PNA  - clinically doubt pNA  - CXR showing effusions and consolidation  - Afebrile though with leukocytosis  - 10/30 FEES w/ mild dysphagia, when eating needs soft and bite sized diet w/ sips of thin liquid  - Off abx for now  - F/u Procalcitonin, Blood Cx, Sputum Cx showing gram positive cocci  and gram variable rods  - crackles resolve, change lasix to oral, on discharge  - repeat cxr: Low lung volume. Stable bilateral opacities. No evidence of pneumothorax.    Confusion   - as per EMR wife states patient is "forgetful" has periods of being AOx1-3  - 10/29 MR Head: No acute pathology, mild chronic microvascular ischemic changes and chronic lacunar infarcts  - aaox3 now    CKD III  - CARINA resolved  - back to baseline    DM II  - monitor FS  - has hyperglycemia  - c/w insulin--adjusted dose daily over past few days  - this AM increased the insulin lispro to 12 units before meals    HTN  - Hypotensive during hospitalization  - Holding home antihypertensives for now    BPH  - C/w tamsulosin     #Recent Admission for Vision Loss  - Need for o/p f/u w/ Ophthalmology noted on last d/c    #Misc  #Code Status: Full Code  #DVT ppx: eliquis  #GI ppx: N/A  #Diet: Soft and Bite Sized w/ Thin Liquids, CC/DASH  #Activity: Out of Bed w/ Assistance  #Dispo: 3C, anticipate dc in the AM

## 2024-11-04 NOTE — PHYSICAL THERAPY INITIAL EVALUATION ADULT - GAIT DISTANCE, PT EVAL
x1, pt with min SOB while amb, SPO2 initially was 84-88%, but pt reccovered quickly while standing/resting to 96%, RN informed/75 feet
20 feet

## 2024-11-04 NOTE — OCCUPATIONAL THERAPY INITIAL EVALUATION ADULT - SHORT TERM MEMORY, REHAB EVAL
moderate impairment. Recalled 2/3 items s/p immediate recall. Recalled 1/3 items s/p 5 minute delay with provision of verbal y/n choices from field of 3./impaired

## 2024-11-04 NOTE — PHYSICAL THERAPY INITIAL EVALUATION ADULT - ORIENTATION, REHAB EVAL
oriented to person, place, time and situation
oriented to person, place, time and situation/situation

## 2024-11-04 NOTE — PHYSICAL THERAPY INITIAL EVALUATION ADULT - LEVEL OF INDEPENDENCE: SUPINE/SIT, REHAB EVAL
minimum assist (75% patients effort)
Patient encountered already out of bed in chair, no apparent distress.

## 2024-11-04 NOTE — OCCUPATIONAL THERAPY INITIAL EVALUATION ADULT - PERTINENT HX OF CURRENT PROBLEM, REHAB EVAL
79 year old male with PMH of CAD, DM, HTN, and BPH, with recent admission for vision loss, and presented earlier today for generalized weakness, returning to the ER for chest pain. Patient states shortly after being discharged from the ER, patient went home and then began to have a sudden onset of substernal chest pressure, associated with shortness of breath.  Patient denies experiencing similar symptoms earlier today.

## 2024-11-04 NOTE — OCCUPATIONAL THERAPY INITIAL EVALUATION ADULT - LEVEL OF INDEPENDENCE: DRESS UPPER BODY, OT EVAL
wrap around to wrap around back. As per spouse, this is baseline./minimum assist (75% patients effort)

## 2024-11-05 ENCOUNTER — TRANSCRIPTION ENCOUNTER (OUTPATIENT)
Age: 79
End: 2024-11-05

## 2024-11-05 ENCOUNTER — RESULT REVIEW (OUTPATIENT)
Age: 79
End: 2024-11-05

## 2024-11-05 DIAGNOSIS — Z82.49 FAMILY HISTORY OF ISCHEMIC HEART DISEASE AND OTHER DISEASES OF THE CIRCULATORY SYSTEM: ICD-10-CM

## 2024-11-05 DIAGNOSIS — H91.93 UNSPECIFIED HEARING LOSS, BILATERAL: ICD-10-CM

## 2024-11-05 DIAGNOSIS — Z80.3 FAMILY HISTORY OF MALIGNANT NEOPLASM OF BREAST: ICD-10-CM

## 2024-11-05 DIAGNOSIS — H53.123 TRANSIENT VISUAL LOSS, BILATERAL: ICD-10-CM

## 2024-11-05 DIAGNOSIS — Z79.4 LONG TERM (CURRENT) USE OF INSULIN: ICD-10-CM

## 2024-11-05 DIAGNOSIS — I25.10 ATHEROSCLEROTIC HEART DISEASE OF NATIVE CORONARY ARTERY WITHOUT ANGINA PECTORIS: ICD-10-CM

## 2024-11-05 DIAGNOSIS — Z79.82 LONG TERM (CURRENT) USE OF ASPIRIN: ICD-10-CM

## 2024-11-05 DIAGNOSIS — I10 ESSENTIAL (PRIMARY) HYPERTENSION: ICD-10-CM

## 2024-11-05 DIAGNOSIS — Z87.891 PERSONAL HISTORY OF NICOTINE DEPENDENCE: ICD-10-CM

## 2024-11-05 DIAGNOSIS — N40.0 BENIGN PROSTATIC HYPERPLASIA WITHOUT LOWER URINARY TRACT SYMPTOMS: ICD-10-CM

## 2024-11-05 DIAGNOSIS — I65.21 OCCLUSION AND STENOSIS OF RIGHT CAROTID ARTERY: ICD-10-CM

## 2024-11-05 DIAGNOSIS — H26.9 UNSPECIFIED CATARACT: ICD-10-CM

## 2024-11-05 DIAGNOSIS — Z79.899 OTHER LONG TERM (CURRENT) DRUG THERAPY: ICD-10-CM

## 2024-11-05 DIAGNOSIS — I25.2 OLD MYOCARDIAL INFARCTION: ICD-10-CM

## 2024-11-05 DIAGNOSIS — Z83.3 FAMILY HISTORY OF DIABETES MELLITUS: ICD-10-CM

## 2024-11-05 DIAGNOSIS — Z95.5 PRESENCE OF CORONARY ANGIOPLASTY IMPLANT AND GRAFT: ICD-10-CM

## 2024-11-05 DIAGNOSIS — E11.40 TYPE 2 DIABETES MELLITUS WITH DIABETIC NEUROPATHY, UNSPECIFIED: ICD-10-CM

## 2024-11-05 LAB
ALBUMIN SERPL ELPH-MCNC: 3.4 G/DL — LOW (ref 3.5–5.2)
ALP SERPL-CCNC: 110 U/L — SIGNIFICANT CHANGE UP (ref 30–115)
ALT FLD-CCNC: 19 U/L — SIGNIFICANT CHANGE UP (ref 0–41)
ANION GAP SERPL CALC-SCNC: 12 MMOL/L — SIGNIFICANT CHANGE UP (ref 7–14)
AST SERPL-CCNC: 25 U/L — SIGNIFICANT CHANGE UP (ref 0–41)
BASOPHILS # BLD AUTO: 0.08 K/UL — SIGNIFICANT CHANGE UP (ref 0–0.2)
BASOPHILS NFR BLD AUTO: 0.6 % — SIGNIFICANT CHANGE UP (ref 0–1)
BILIRUB SERPL-MCNC: 1.3 MG/DL — HIGH (ref 0.2–1.2)
BUN SERPL-MCNC: 62 MG/DL — CRITICAL HIGH (ref 10–20)
CALCIUM SERPL-MCNC: 9 MG/DL — SIGNIFICANT CHANGE UP (ref 8.4–10.5)
CHLORIDE SERPL-SCNC: 93 MMOL/L — LOW (ref 98–110)
CK MB BLD-MCNC: HIGH TITER
CO2 SERPL-SCNC: 28 MMOL/L — SIGNIFICANT CHANGE UP (ref 17–32)
COXSACKIE TYPE A-24: HIGH TITER
CREAT SERPL-MCNC: 2.4 MG/DL — HIGH (ref 0.7–1.5)
CULTURE RESULTS: SIGNIFICANT CHANGE UP
CULTURE RESULTS: SIGNIFICANT CHANGE UP
CV A24 IGG TITR SER IF: HIGH TITER
CV A7 AB SER-ACNC: HIGH TITER
CV A9 AB TITR FLD: HIGH TITER
CV B1 AB TITR FLD: HIGH
CV B2 AB TITR FLD: HIGH
CV B3 AB TITR FLD: HIGH
CV B4 AB TITR FLD: HIGH
CV B5 AB TITR FLD: HIGH
CV B6 AB TITR FLD: HIGH
EGFR: 27 ML/MIN/1.73M2 — LOW
EOSINOPHIL # BLD AUTO: 0.42 K/UL — SIGNIFICANT CHANGE UP (ref 0–0.7)
EOSINOPHIL NFR BLD AUTO: 3 % — SIGNIFICANT CHANGE UP (ref 0–8)
GLUCOSE BLDC GLUCOMTR-MCNC: 231 MG/DL — HIGH (ref 70–99)
GLUCOSE BLDC GLUCOMTR-MCNC: 237 MG/DL — HIGH (ref 70–99)
GLUCOSE BLDC GLUCOMTR-MCNC: 245 MG/DL — HIGH (ref 70–99)
GLUCOSE BLDC GLUCOMTR-MCNC: 315 MG/DL — HIGH (ref 70–99)
GLUCOSE SERPL-MCNC: 196 MG/DL — HIGH (ref 70–99)
HCT VFR BLD CALC: 39.5 % — LOW (ref 42–52)
HGB BLD-MCNC: 13.1 G/DL — LOW (ref 14–18)
IMM GRANULOCYTES NFR BLD AUTO: 2.2 % — HIGH (ref 0.1–0.3)
LYMPHOCYTES # BLD AUTO: 15.5 % — LOW (ref 20.5–51.1)
LYMPHOCYTES # BLD AUTO: 2.2 K/UL — SIGNIFICANT CHANGE UP (ref 1.2–3.4)
MAGNESIUM SERPL-MCNC: 2.4 MG/DL — SIGNIFICANT CHANGE UP (ref 1.8–2.4)
MCHC RBC-ENTMCNC: 28.8 PG — SIGNIFICANT CHANGE UP (ref 27–31)
MCHC RBC-ENTMCNC: 33.2 G/DL — SIGNIFICANT CHANGE UP (ref 32–37)
MCV RBC AUTO: 86.8 FL — SIGNIFICANT CHANGE UP (ref 80–94)
MONOCYTES # BLD AUTO: 1.29 K/UL — HIGH (ref 0.1–0.6)
MONOCYTES NFR BLD AUTO: 9.1 % — SIGNIFICANT CHANGE UP (ref 1.7–9.3)
NEUTROPHILS # BLD AUTO: 9.93 K/UL — HIGH (ref 1.4–6.5)
NEUTROPHILS NFR BLD AUTO: 69.6 % — SIGNIFICANT CHANGE UP (ref 42.2–75.2)
NRBC # BLD: 0 /100 WBCS — SIGNIFICANT CHANGE UP (ref 0–0)
PLATELET # BLD AUTO: 227 K/UL — SIGNIFICANT CHANGE UP (ref 130–400)
PMV BLD: 12.3 FL — HIGH (ref 7.4–10.4)
POTASSIUM SERPL-MCNC: 4.9 MMOL/L — SIGNIFICANT CHANGE UP (ref 3.5–5)
POTASSIUM SERPL-SCNC: 4.9 MMOL/L — SIGNIFICANT CHANGE UP (ref 3.5–5)
PROT SERPL-MCNC: 6.2 G/DL — SIGNIFICANT CHANGE UP (ref 6–8)
RBC # BLD: 4.55 M/UL — LOW (ref 4.7–6.1)
RBC # FLD: 16.7 % — HIGH (ref 11.5–14.5)
SODIUM SERPL-SCNC: 133 MMOL/L — LOW (ref 135–146)
SPECIMEN SOURCE: SIGNIFICANT CHANGE UP
SPECIMEN SOURCE: SIGNIFICANT CHANGE UP
WBC # BLD: 14.23 K/UL — HIGH (ref 4.8–10.8)
WBC # FLD AUTO: 14.23 K/UL — HIGH (ref 4.8–10.8)

## 2024-11-05 PROCEDURE — 99233 SBSQ HOSP IP/OBS HIGH 50: CPT

## 2024-11-05 PROCEDURE — 93306 TTE W/DOPPLER COMPLETE: CPT | Mod: 26

## 2024-11-05 PROCEDURE — 71045 X-RAY EXAM CHEST 1 VIEW: CPT | Mod: 26

## 2024-11-05 RX ORDER — INSULIN GLARGINE,HUM.REC.ANLOG 100/ML
50 VIAL (ML) SUBCUTANEOUS AT BEDTIME
Refills: 0 | Status: DISCONTINUED | OUTPATIENT
Start: 2024-11-05 | End: 2024-11-08

## 2024-11-05 RX ORDER — CEFTRIAXONE SODIUM 10 G
1000 VIAL (EA) INJECTION EVERY 24 HOURS
Refills: 0 | Status: DISCONTINUED | OUTPATIENT
Start: 2024-11-06 | End: 2024-11-06

## 2024-11-05 RX ORDER — CEFTRIAXONE SODIUM 10 G
VIAL (EA) INJECTION
Refills: 0 | Status: DISCONTINUED | OUTPATIENT
Start: 2024-11-05 | End: 2024-11-06

## 2024-11-05 RX ORDER — INSULIN LISPRO 100/ML
17 VIAL (ML) SUBCUTANEOUS
Refills: 0 | Status: DISCONTINUED | OUTPATIENT
Start: 2024-11-05 | End: 2024-11-08

## 2024-11-05 RX ORDER — CEFTRIAXONE SODIUM 10 G
1000 VIAL (EA) INJECTION ONCE
Refills: 0 | Status: COMPLETED | OUTPATIENT
Start: 2024-11-05 | End: 2024-11-05

## 2024-11-05 RX ORDER — METHYLPREDNISOLONE ACETATE 80 MG/ML
40 INJECTION, SUSPENSION INTRALESIONAL; INTRAMUSCULAR; INTRASYNOVIAL; SOFT TISSUE ONCE
Refills: 0 | Status: COMPLETED | OUTPATIENT
Start: 2024-11-05 | End: 2024-11-05

## 2024-11-05 RX ADMIN — Medication 2: at 17:38

## 2024-11-05 RX ADMIN — Medication 50 UNIT(S): at 21:03

## 2024-11-05 RX ADMIN — Medication 80 MILLIGRAM(S): at 21:03

## 2024-11-05 RX ADMIN — APIXABAN 2.5 MILLIGRAM(S): 5 TABLET, FILM COATED ORAL at 17:39

## 2024-11-05 RX ADMIN — CHLORHEXIDINE GLUCONATE 1 APPLICATION(S): 40 SOLUTION TOPICAL at 05:39

## 2024-11-05 RX ADMIN — IPRATROPIUM BROMIDE AND ALBUTEROL SULFATE 3 MILLILITER(S): .5; 2.5 SOLUTION RESPIRATORY (INHALATION) at 07:46

## 2024-11-05 RX ADMIN — Medication 12 UNIT(S): at 08:13

## 2024-11-05 RX ADMIN — Medication 17 UNIT(S): at 17:39

## 2024-11-05 RX ADMIN — IPRATROPIUM BROMIDE AND ALBUTEROL SULFATE 3 MILLILITER(S): .5; 2.5 SOLUTION RESPIRATORY (INHALATION) at 21:05

## 2024-11-05 RX ADMIN — Medication 81 MILLIGRAM(S): at 11:36

## 2024-11-05 RX ADMIN — Medication 400 MILLIGRAM(S): at 05:37

## 2024-11-05 RX ADMIN — IPRATROPIUM BROMIDE AND ALBUTEROL SULFATE 3 MILLILITER(S): .5; 2.5 SOLUTION RESPIRATORY (INHALATION) at 13:28

## 2024-11-05 RX ADMIN — Medication 50 MILLIGRAM(S): at 05:56

## 2024-11-05 RX ADMIN — Medication 4: at 11:35

## 2024-11-05 RX ADMIN — PANTOPRAZOLE SODIUM 40 MILLIGRAM(S): 40 TABLET, DELAYED RELEASE ORAL at 05:38

## 2024-11-05 RX ADMIN — METHYLPREDNISOLONE ACETATE 40 MILLIGRAM(S): 80 INJECTION, SUSPENSION INTRALESIONAL; INTRAMUSCULAR; INTRASYNOVIAL; SOFT TISSUE at 17:39

## 2024-11-05 RX ADMIN — Medication 40 MILLIGRAM(S): at 05:56

## 2024-11-05 RX ADMIN — Medication 100 MILLIGRAM(S): at 11:35

## 2024-11-05 RX ADMIN — MEMANTINE HYDROCHLORIDE 5 MILLIGRAM(S): 21 CAPSULE, EXTENDED RELEASE ORAL at 11:36

## 2024-11-05 RX ADMIN — Medication 2: at 08:13

## 2024-11-05 RX ADMIN — Medication 0.4 MILLIGRAM(S): at 21:03

## 2024-11-05 RX ADMIN — Medication 650 MILLIGRAM(S): at 20:51

## 2024-11-05 RX ADMIN — APIXABAN 2.5 MILLIGRAM(S): 5 TABLET, FILM COATED ORAL at 05:38

## 2024-11-05 NOTE — CHART NOTE - NSCHARTNOTEFT_GEN_A_CORE
The beneficiary is able to use the rolling walker and the functional walker deficit can be sufficiently resolved with the use of a rolling walker for th diagnosis of Debility.

## 2024-11-05 NOTE — PROGRESS NOTE ADULT - ASSESSMENT
78 yo M PMHx CAD, DM II, HTN, and BPH, with recent admission for vision loss, and presented for evaluation of generalized weakness went to ER, was dc home, then returned later for sudden onset of substernal chest pressure, associated with shortness of breath.  Patient was initially at [facility] and was transferred to [facility] for evaluation of pulmonary edema on bipap, pericardial effusion and afib RVR. Pt was admitted to cardio step down. While in  pt was on amiodarone infusion and transitioned to PO.  on Nov 1 pt was sitting in chair, coughed and then wife  saw his eyes "roll to the back of his head" and started "shaking" after which RR was called. Patient returned to baseline in <30 seconds per wife. EEG was done and pending read. He was downgraded to telemetry on 11/2.    Acute on chronic HFpEF   New Onset Atrial Fibrillation w/ Rapid Ventricular Response  - Initially presented with significant sternal chest pain  - Trops 20 -> 16 -> 15 -> 20  - as per cardiology possibly due to Pericarditis and was started on colchcine  - c/w lasix, colchicine  - c/w amio/metoprolol for now. c/w Eliquis  - 10/29 TTE: LVEF 65-70%, G1DD, borderline pHTN, small pericardial effusion  - ESR 34,   - Sent to [facility] due to concern for tamponade, poor windows on TTE at [facility]  - cardio held home Ranexa due to interaction w/ Amiodarone  - cardio recommended starting Eliquis 2.5mg BID for AC (Cr >1.5, will be 80 in 1.5 months)  - consider lasix PO   - has significant SOB and hypoxia while ambulating. limited echo to assess pericardial effusion pending    Suspected vasovagal episode  - seen by neuro-recommended eeg  - EEG done, Findings consistent with diffuse electrocerebral dysfunction secondary to nonspecific etiology    Pleural effusions/consolidation  - xray appears to be volume overloaded form admission  - while in  was treated for possible Aspiration PNA  - clinically doubt pNA  - CXR showing effusions and consolidation  - Afebrile though with leukocytosis  - 10/30 FEES w/ mild dysphagia, when eating needs soft and bite sized diet w/ sips of thin liquid  - Off abx for now  - Procalcitonin elevated, Blood Cx NTD, Sputum Cx showing gram positive cocci  and gram variable rods, will start rocephin  - crackles resolve, change lasix to oral, on discharge  - repeat cxr: Low lung volume. Stable bilateral opacities. No evidence of pneumothorax.    Confusion   - as per EMR wife states patient is "forgetful" has periods of being AOx1-3  - 10/29 MR Head: No acute pathology, mild chronic microvascular ischemic changes and chronic lacunar infarcts  - aaox3 now    CKD III  - back to baseline  - Cr trending 2.0--->2.4    DM II  - monitor FS  - has hyperglycemia  - c/w insulin--adjusted dose daily over past few days  - this AM increased the insulin lispro to 12 units before meals    HTN  - Hypotensive during hospitalization  - Holding home antihypertensives for now    BPH  - C/w tamsulosin     #Recent Admission for Vision Loss  - Need for o/p f/u w/ Ophthalmology noted on last d/c    #Misc  #Code Status: Full Code  #DVT ppx: eliquis  #GI ppx: N/A  #Diet: Soft and Bite Sized w/ Thin Liquids, CC/DASH  #Activity: Out of Bed w/ Assistance  #Dispo: 3C   78 yo M PMHx CAD, DM II, HTN, and BPH, with recent admission for vision loss, and presented for evaluation of generalized weakness went to ER, was dc home, then returned later for sudden onset of substernal chest pressure, associated with shortness of breath.  Patient was initially at [facility] and was transferred to [facility] for evaluation of pulmonary edema on bipap, pericardial effusion and afib RVR. Pt was admitted to cardio step down. While in  pt was on amiodarone infusion and transitioned to PO.  on Nov 1 pt was sitting in chair, coughed and then wife  saw his eyes "roll to the back of his head" and started "shaking" after which RR was called. Patient returned to baseline in <30 seconds per wife. EEG was done and pending read. He was downgraded to telemetry on 11/2.    Acute on chronic HFpEF   New Onset Atrial Fibrillation w/ Rapid Ventricular Response  - Initially presented with significant sternal chest pain  - Trops 20 -> 16 -> 15 -> 20  - as per cardiology possibly due to Pericarditis and was started on colchcine  - c/w lasix, colchicine  - c/w amio/metoprolol for now. c/w Eliquis  - 10/29 TTE: LVEF 65-70%, G1DD, borderline pHTN, small pericardial effusion  - ESR 34,   - Sent to [facility] due to concern for tamponade, poor windows on TTE at [facility]  - cardio held home Ranexa due to interaction w/ Amiodarone  - cardio recommended starting Eliquis 2.5mg BID for AC (Cr >1.5, will be 80 in 1.5 months)  - 11/5: held lasix IV  - consider lasix PO on dc  - has significant SOB and hypoxia while ambulating. limited echo to assess pericardial effusion pending    Suspected vasovagal episode  - seen by neuro-recommended eeg  - EEG done, Findings consistent with diffuse electrocerebral dysfunction secondary to nonspecific etiology    Pleural effusions/consolidation  - xray appears to be volume overloaded form admission  - while in  was treated for possible Aspiration PNA  - clinically doubt pNA  - CXR showing effusions and consolidation  - Afebrile though with leukocytosis  - 10/30 FEES w/ mild dysphagia, when eating needs soft and bite sized diet w/ sips of thin liquid  - Off abx for now  - Procalcitonin elevated, Blood Cx NTD, Sputum Cx showing gram positive cocci  and gram variable rods, will start rocephin  - crackles resolve, change lasix to oral, on discharge  - repeat cxr: Low lung volume. Stable bilateral opacities. No evidence of pneumothorax.    Confusion   - as per EMR wife states patient is "forgetful" has periods of being AOx1-3  - 10/29 MR Head: No acute pathology, mild chronic microvascular ischemic changes and chronic lacunar infarcts  - aaox3 now    CKD III  - back to baseline  - Cr trending 2.0--->2.4  - 11/5: held lasix IV    DM II  - monitor FS  - has hyperglycemia  - c/w insulin--adjusted dose daily over past few days  - this AM on 50 units of lantus and 5 additional units of lispro    HTN  - Hypotensive during hospitalization  - Holding home antihypertensives for now    BPH  - C/w tamsulosin     #Recent Admission for Vision Loss  - Need for o/p f/u w/ Ophthalmology noted on last d/c    #Misc  #Code Status: Full Code  #DVT ppx: eliquis  #GI ppx: N/A  #Diet: Soft and Bite Sized w/ Thin Liquids, CC/DASH  #Activity: Out of Bed w/ Assistance  #Dispo: 3C

## 2024-11-05 NOTE — DISCHARGE NOTE NURSING/CASE MANAGEMENT/SOCIAL WORK - MODE OF TRANSPORTATION
Subjective:  HPI                    Objective:  System    Physical Exam    General     ROS    Assessment/Plan:    DISCHARGE REPORT    Progress reporting period is from 7.22 to 10.31.19.     SUBJECTIVE  Subjective: Pt states R knee is feeling good. Reports mostly experiencing pain now when sitting down onto low surface.    Current Pain level: 1/10   Initial Pain level: 9/10   Changes in function: Yes, see goal flow sheet for change in function   Adverse reactions: None;   ,     Patient has failed to return to therapy so current objective findings are unknown.    OBJECTIVE  Objective: tolerated advancement of ex's well, good fatigue.       ASSESSMENT/PLAN  Updated problem list and treatment plan: Diagnosis 1:  Knee pain     STG/LTGs have been met or progress has been made towards goals:  None  Assessment of Progress: Patient has not returned to therapy.  Current status is unknown and discharge G code cannot be reported.  Self Management Plans:  Patient has been instructed in a home treatment program.  Patient  has been instructed in self management of symptoms.    Sydney continues to require the following intervention to meet STG and LTG's:   The patient failed to complete scheduled/ordered appointments so current information is unknown.  We will discharge this patient from PT.    Recommendations:      Please refer to the daily flowsheet for treatment today, total treatment time and time spent performing 1:1 timed codes.     Other

## 2024-11-05 NOTE — PROGRESS NOTE ADULT - SUBJECTIVE AND OBJECTIVE BOX
CHIEF COMPLAINT:    Patient is a 79y old  Male who presents with a chief complaint of Pericardial effusion, suspected tamponade     INTERVAL HPI/OVERNIGHT EVENTS:    Patient seen and examined at bedside. No acute overnight events occurred.    ROS: Reports SOB. All other systems are negative.    Medications:  Standing  albuterol/ipratropium for Nebulization 3 milliLiter(s) Nebulizer every 6 hours  aMIOdarone    Tablet 400 milliGRAM(s) Oral two times a day  apixaban 2.5 milliGRAM(s) Oral every 12 hours  aspirin enteric coated 81 milliGRAM(s) Oral daily  atorvastatin 80 milliGRAM(s) Oral at bedtime  cefTRIAXone   IVPB      chlorhexidine 2% Cloths 1 Application(s) Topical <User Schedule>  colchicine 0.6 milliGRAM(s) Oral every 48 hours  dextrose 50% Injectable 12.5 Gram(s) IV Push once  dextrose 50% Injectable 25 Gram(s) IV Push once  dextrose 50% Injectable 25 Gram(s) IV Push once  influenza  Vaccine (HIGH DOSE) 0.5 milliLiter(s) IntraMuscular once  insulin glargine Injectable (LANTUS) 50 Unit(s) SubCutaneous at bedtime  insulin lispro (ADMELOG) corrective regimen sliding scale   SubCutaneous three times a day before meals  insulin lispro Injectable (ADMELOG) 17 Unit(s) SubCutaneous three times a day before meals  memantine 5 milliGRAM(s) Oral daily  metoprolol tartrate 50 milliGRAM(s) Oral two times a day  pantoprazole    Tablet 40 milliGRAM(s) Oral before breakfast  tamsulosin 0.4 milliGRAM(s) Oral at bedtime    PRN Meds  acetaminophen     Tablet .. 650 milliGRAM(s) Oral every 6 hours PRN  albuterol    90 MICROgram(s) HFA Inhaler 2 Puff(s) Inhalation every 6 hours PRN  aluminum hydroxide/magnesium hydroxide/simethicone Suspension 30 milliLiter(s) Oral every 4 hours PRN  dextrose Oral Gel 15 Gram(s) Oral once PRN  melatonin 5 milliGRAM(s) Oral at bedtime PRN        Vital Signs:    T(F): 97.5 (11-05-24 @ 13:54), Max: 98.8 (11-04-24 @ 20:28)  HR: 80 (11-05-24 @ 13:54) (80 - 90)  BP: 100/65 (11-05-24 @ 13:54) (100/65 - 113/70)  RR: 18 (11-05-24 @ 13:54) (18 - 18)  SpO2: 99% (11-05-24 @ 07:47) (90% - 100%)  I&O's Summary    04 Nov 2024 07:01  -  05 Nov 2024 07:00  --------------------------------------------------------  IN: 1484 mL / OUT: 1050 mL / NET: 434 mL    05 Nov 2024 07:01  -  05 Nov 2024 14:46  --------------------------------------------------------  IN: 0 mL / OUT: 200 mL / NET: -200 mL      Daily     Daily   CAPILLARY BLOOD GLUCOSE      POCT Blood Glucose.: 315 mg/dL (05 Nov 2024 11:24)  POCT Blood Glucose.: 237 mg/dL (05 Nov 2024 07:49)  POCT Blood Glucose.: 172 mg/dL (04 Nov 2024 21:03)  POCT Blood Glucose.: 243 mg/dL (04 Nov 2024 16:59)      PHYSICAL EXAM:  GENERAL:  NAD  SKIN: No rashes or lesions  HEENT: Atraumatic. Normocephalic. Anicteric  NECK:  No JVD.   PULMONARY: Clear to ausculation bilaterally. No wheezing. No rales  CVS: Normal S1, S2. Regular rate and rhythm. No murmurs.  ABDOMEN/GI: Soft, Nontender, Nondistended; Bowel sounds are present  EXTREMITIES:  No edema B/L LE.  NEUROLOGIC:  No motor deficit.  PSYCH: Alert & oriented x 3, normal affect      LABS:                        13.1   14.23 )-----------( 227      ( 05 Nov 2024 05:20 )             39.5     11-05    133[L]  |  93[L]  |  62[HH]  ----------------------------<  196[H]  4.9   |  28  |  2.4[H]    Ca    9.0      05 Nov 2024 05:20  Mg     2.4     11-05    TPro  6.2  /  Alb  3.4[L]  /  TBili  1.3[H]  /  DBili  x   /  AST  25  /  ALT  19  /  AlkPhos  110  11-05              RADIOLOGY & ADDITIONAL TESTS:  Imaging or report Personally Reviewed:  [ ] YES  [ ] NO -->no new images    Telemetry reviewed independently - NSR, no acute events  EKG reviewed independently -->no new EKGs    Consultant(s) Notes Reviewed:  [ ] YES  [ ] NO  Care Discussed with Consultants/Other Providers [ ] YES  [ ] NO    Case discussed with resident  Care discussed with pt

## 2024-11-05 NOTE — PROGRESS NOTE ADULT - ASSESSMENT
78 yo M PMHx CAD, DM II, HTN, and BPH, with recent admission for vision loss, and presented for evaluation of generalized weakness went to ER, was dc home, then returned later for sudden onset of substernal chest pressure, associated with shortness of breath.  Patient was initially at [facility] and was transferred to [facility] for evaluation of pulmonary edema on bipap, pericardial effusion and afib RVR. Pt was admitted to cardio step down. While in 4t pt was on amiodarone infusion and transitioned to PO.  on Nov 1 pt was sitting in chair, coughed and then wife  saw his eyes "roll to the back of his head" and started "shaking" after which RR was called. Patient returned to baseline in <30 seconds per wife. EEG was done and pending read. He was downgraded to telemetry on 11/2.    Acute on chronic HFpEF   New Onset Atrial Fibrillation w/ Rapid Ventricular Response  - Initially presented with significant sternal chest pain  - Trops 20 -> 16 -> 15 -> 20  - as per cardiology possibly due to Pericarditis and was started on colchcine-  - c/w amio/metoprolol for now. c/w Eliquis  - 10/29 TTE: LVEF 65-70%, G1DD, borderline pHTN, small pericardial effusion  - ESR 34,   - Sent to [facility] due to concern for tamponade, poor windows on TTE at [facility]  - cardio held home Ranexa due to interaction w/ Amiodarone  - cardio recommended starting Eliquis 2.5mg BID for AC (Cr >1.5, will be 80 in 1.5 months)  - lasix PO   - has significant SOB and hypoxia while ambulating. Check repeat limited echo to assess pericardial effusion. As per cardio fellow who reviewed echo-no tamponade, f/u with Dr. Payne  - Coxsakie abs elevated but clinically on signs of coxsakie, given prior treatment of ILD this may be more rheum related effusion  - on CT chest from 6/2024 pt likely had usual interstitial pneumonia. Trial dose of steroids.  - pt was treated for SOB at [facility] june 2024. He was being worked up for ILD. Rheum panel sent but only C3, c4, ccp in system all of which are negative    Suspected vasovagal episode  - seen by neuro-recommended eeg  - EEG done, generalized slowing  - doubt seizure    Pleural effusions/consolidation  - xray appears to be volume overloaded form admission  - while in 4t was treated for possible Aspiration PNA  - clinically doubt pNA  - CXR showing effusions and consolidation  - Afebrile though with leukocytosis  - 10/30 FEES w/ mild dysphagia, when eating needs soft and bite sized diet w/ sips of thin liquid  - Off abx for now  - F/u Procalcitonin, Blood Cx, Sputum Cx  - crackles resolve, change lasix to víctor  - repeat cxr in AM    Leukocytosis  - likely due to UTI  - pending cultures, rocephin started    Confusion   - as per EMR wife states patient is "forgetful" has periods of being AOx1-3  - 10/29 MR Head: No acute pathology, mild chronic microvascular ischemic changes and chronic lacunar infarcts  - aaox3 now    CKD III  - CARINA resolved  - back to baseline    DM II  - monitor FS  - has hyperglycemia  - c/w insulin--adjusted dose daily over past few days  - unclear why pt remains hyperglycemic    HTN  - Hypotensive during hospitalization  - Holding home antihypertensives for now    BPH  - C/w tamsulosin     DVT px  From home    #Progress Note Handoff:  Pending (specify):  FS control, repeat echo, home o2, possible dc in AM  Family discussion: As above with pt  Disposition: Home___/SNF_x__/Other________/Unknown at this time________     80 yo M PMHx CAD, DM II, HTN, and BPH, with recent admission for vision loss, and presented for evaluation of generalized weakness went to ER, was dc home, then returned later for sudden onset of substernal chest pressure, associated with shortness of breath.  Patient was initially at [facility] and was transferred to [facility] for evaluation of pulmonary edema on bipap, pericardial effusion and afib RVR. Pt was admitted to cardio step down. While in 4t pt was on amiodarone infusion and transitioned to PO.  on Nov 1 pt was sitting in chair, coughed and then wife  saw his eyes "roll to the back of his head" and started "shaking" after which RR was called. Patient returned to baseline in <30 seconds per wife. EEG was done and pending read. He was downgraded to telemetry on 11/2.    Acute on chronic HFpEF   New Onset Atrial Fibrillation w/ Rapid Ventricular Response  - Initially presented with significant sternal chest pain  - Trops 20 -> 16 -> 15 -> 20  - as per cardiology possibly due to Pericarditis and was started on colchcine-  - c/w amio/metoprolol for now. c/w Eliquis  - 10/29 TTE: LVEF 65-70%, G1DD, borderline pHTN, small pericardial effusion  - ESR 34,   - Sent to [facility] due to concern for tamponade, poor windows on TTE at [facility]  - cardio held home Ranexa due to interaction w/ Amiodarone  - cardio recommended starting Eliquis 2.5mg BID for AC (Cr >1.5, will be 80 in 1.5 months)  - lasix PO   - has significant SOB and hypoxia while ambulating. Check repeat limited echo to assess pericardial effusion. As per cardio fellow who reviewed echo-no tamponade, f/u with Dr. Payne  - Coxsakie abs elevated but clinically on signs of coxsakie, given prior treatment of ILD this may be more rheum related effusion  - on CT chest from 6/2024 pt likely had usual interstitial pneumonia. Trial dose of steroids.  - pt was treated for SOB at [facility] june 2024. He was being worked up for ILD. Rheum panel sent but only C3, c4, ccp in system all of which are negative    CARINA  - check bladder scan  - lasix held yesterday, possible pre renal from dehydration  - trend SCr    Suspected vasovagal episode  - seen by neuro-recommended eeg  - EEG done, generalized slowing  - doubt seizure    Pleural effusions/consolidation  - xray appears to be volume overloaded form admission  - while in 4t was treated for possible Aspiration PNA  - clinically doubt pNA  - CXR showing effusions and consolidation  - Afebrile though with leukocytosis  - 10/30 FEES w/ mild dysphagia, when eating needs soft and bite sized diet w/ sips of thin liquid  - Off abx for now  - F/u Procalcitonin, Blood Cx, Sputum Cx  - crackles resolve, change lasix to víctor  - repeat cxr in AM    Leukocytosis  - likely due to UTI  - pending cultures, rocephin started    Confusion   - as per EMR wife states patient is "forgetful" has periods of being AOx1-3  - 10/29 MR Head: No acute pathology, mild chronic microvascular ischemic changes and chronic lacunar infarcts  - aaox3 now    CKD III  - CARINA resolved  - back to baseline    DM II  - monitor FS  - has hyperglycemia  - c/w insulin--adjusted dose daily over past few days  - unclear why pt remains hyperglycemic    HTN  - Hypotensive during hospitalization  - Holding home antihypertensives for now    BPH  - C/w tamsulosin     DVT px  From home    #Progress Note Handoff:  Pending (specify):  FS control, repeat echo, home o2, possible dc in AM  Family discussion: As above with pt  Disposition: Home___/SNF_x__/Other________/Unknown at this time________

## 2024-11-05 NOTE — DISCHARGE NOTE NURSING/CASE MANAGEMENT/SOCIAL WORK - FINANCIAL ASSISTANCE
[facility], [place]   Psychiatric Progress Note        Interim History:     The patient's care was discussed with the treatment team during the daily team meeting and/or staff's chart notes were reviewed.  Staff report patient spends a lot of time outside of his room, goes to groups, reports still feeling very depressed, but denies presence of Suicidal ideation. He had clear difficulties processing while in groups and during today's visit asked me if we could try ECT. I discussed with him that in order to do ECT he would have to have ECT consult and IM work up. He agreed with that. He also asked permission to see a personal counselor. This request was discussed with unit RN management and permission to see counselor was granted.          Medications:       amLODIPine  10 mg Oral Daily     atorvastatin  20 mg Oral Daily     buPROPion  450 mg Oral QAM     diltiazem ER COATED BEADS  360 mg Oral Daily     lisinopril-hydrochlorothiazide  2 tablet Oral Daily          Allergies:     Allergies   Allergen Reactions     Contrast Dye Difficulty breathing          Labs:   No results found for this or any previous visit (from the past 24 hour(s)).       Psychiatric Examination:     /88   Pulse 87   Temp 97.9  F (36.6  C) (Oral)   Resp 16   Ht 1.829 m (6')   Wt 112.1 kg (247 lb 1.6 oz)   SpO2 98%   BMI 33.51 kg/m    Weight is 247 lbs 1.6 oz  Body mass index is 33.51 kg/m .     Orthostatic Vitals       Most Recent      Sitting Orthostatic /88 06/26 0846    Sitting Orthostatic Pulse (bpm) 96 06/26 0846    Standing Orthostatic /80 06/26 0846    Standing Orthostatic Pulse (bpm) 103 06/26 0846         Appearance: dressed in hospital garbs  Attitude: cooperative  Eye Contact:  partial  Mood: depressed  Affect: constricted, sad  Speech: slow, monotone  Psychomotor Behavior: psychomotor retardation  Throught Process:  Slow, but logical  Associations:  tight  Thought Content: denies  Suicidal ideation, psychosis  Insight: partial  Judgement:  Moderately impaired  Oriented to:  Self, time and place  Attention Span and Concentration: mildly impaired  Recent and Remote Memory: fair    Clinical Global Impressions  First:  Considering your total clinical experience with this particular patient population, how severe are the patient's symptoms at this time?: 7 (06/21/19 1701)  Compared to the patient's condition at the START of treatment, this patient's condition is:: 4 (06/21/19 1701)  Most recent:  Considering your total clinical experience with this particular patient population, how severe are the patient's symptoms at this time?: 7 (06/21/19 1701)  Compared to the patient's condition at the START of treatment, this patient's condition is:: 4 (06/21/19 1701)         Precautions:     Behavioral Orders   Procedures     Code 1 - Restrict to Unit     Routine Programming     As clinically indicated     Status 15     Every 15 minutes.     Suicide precautions     Patients on Suicide Precautions should have a Combination Diet ordered that includes a Diet selection(s) AND a Behavioral Tray selection for Safe Tray - with utensils, or Safe Tray - NO utensils            DIagnoses:     Major depressive disorder, current, severe.  Rule out generalized anxiety disorder.  The patient was also diagnosed in the past with dependent personality disorder.          Plan:     Will order ECT and related IM consults. No medication changes today. Will continue to provide support and structure.  Will also consider IRTS.          [facility] provides services at a reduced cost to those who are determined to be eligible through [facility]’s financial assistance program. Information regarding [facility]’s financial assistance program can be found by going to [email or web address] or by calling [phone].

## 2024-11-05 NOTE — DISCHARGE NOTE NURSING/CASE MANAGEMENT/SOCIAL WORK - PATIENT PORTAL LINK FT
You can access the FollowMyHealth Patient Portal offered by [facility] by registering at the following website: [email or web address]. By joining [facility]’s FollowMyHealth portal, you will also be able to view your health information using other applications (apps) compatible with our system.

## 2024-11-05 NOTE — PROGRESS NOTE ADULT - SUBJECTIVE AND OBJECTIVE BOX
24H events:    Patient is a 79y old Male who presents with a chief complaint of Pericardial effusion, suspected tamponade (31 Oct 2024 11:35)    Primary diagnosis of General weakness    Today is 8d of hospitalization. This morning patient was seen and examined at bedside, resting comfortably in bed.    No acute or major events overnight. Noted cultures, gram positive cocci and gram variable rods. Noted Cr worse this am and also persistently high white count. Will need to follow up ambulating oxygen saturation.       PAST MEDICAL & SURGICAL HISTORY  DM (diabetes mellitus)    CAD (coronary artery disease)    BPH (benign prostatic hyperplasia)    History of hematologic disorder    Total cataract    [place] (hard of hearing)    Acute myocardial infarction    Chronic kidney disease (CKD)    H/O coronary angiogram    Stented coronary artery    History of ear surgery    H/O tonsillitis      SOCIAL HISTORY:  Social History:      ALLERGIES:  No Known Drug Allergies  Seafood (Anaphylaxis)    MEDICATIONS:  STANDING MEDICATIONS  albuterol/ipratropium for Nebulization 3 milliLiter(s) Nebulizer every 6 hours  aMIOdarone    Tablet 400 milliGRAM(s) Oral two times a day  apixaban 2.5 milliGRAM(s) Oral every 12 hours  aspirin enteric coated 81 milliGRAM(s) Oral daily  atorvastatin 80 milliGRAM(s) Oral at bedtime  chlorhexidine 2% Cloths 1 Application(s) Topical <User Schedule>  colchicine 0.6 milliGRAM(s) Oral every 48 hours  dextrose 50% Injectable 25 Gram(s) IV Push once  dextrose 50% Injectable 25 Gram(s) IV Push once  dextrose 50% Injectable 12.5 Gram(s) IV Push once  furosemide   Injectable 40 milliGRAM(s) IV Push two times a day  influenza  Vaccine (HIGH DOSE) 0.5 milliLiter(s) IntraMuscular once  insulin glargine Injectable (LANTUS) 45 Unit(s) SubCutaneous at bedtime  insulin lispro (ADMELOG) corrective regimen sliding scale   SubCutaneous three times a day before meals  insulin lispro Injectable (ADMELOG) 12 Unit(s) SubCutaneous three times a day before meals  memantine 5 milliGRAM(s) Oral daily  metoprolol tartrate 50 milliGRAM(s) Oral two times a day  pantoprazole    Tablet 40 milliGRAM(s) Oral before breakfast  tamsulosin 0.4 milliGRAM(s) Oral at bedtime    PRN MEDICATIONS  acetaminophen     Tablet .. 650 milliGRAM(s) Oral every 6 hours PRN  albuterol    90 MICROgram(s) HFA Inhaler 2 Puff(s) Inhalation every 6 hours PRN  aluminum hydroxide/magnesium hydroxide/simethicone Suspension 30 milliLiter(s) Oral every 4 hours PRN  dextrose Oral Gel 15 Gram(s) Oral once PRN  melatonin 5 milliGRAM(s) Oral at bedtime PRN    VITALS:   T(F): 97.9  HR: 84  BP: 113/70  RR: 18  SpO2: 99%    PHYSICAL EXAM:  GENERAL: NAD, lying comfortably in bed  HEENT: NCAT, PERRL  NECK: supple  PULMONARY: CTAB  CVS: RRR, normal S1, S2  ABDOMENI: soft, nontender, nondistended  EXTREMITIES: no LE edema  SKIN: No rashes or lesion  NEUROLOGIC:  AOx3, no focal deficits  PSYCH: normal affect    LABS:                        13.1   14.23 )-----------( 227      ( 05 Nov 2024 05:20 )             39.5     11-05    133[L]  |  93[L]  |  62[HH]  ----------------------------<  196[H]  4.9   |  28  |  2.4[H]    Ca    9.0      05 Nov 2024 05:20  Mg     2.4     11-05    TPro  6.2  /  Alb  3.4[L]  /  TBili  1.3[H]  /  DBili  x   /  AST  25  /  ALT  19  /  AlkPhos  110  11-05      Urinalysis Basic - ( 05 Nov 2024 05:20 )    Color: x / Appearance: x / SG: x / pH: x  Gluc: 196 mg/dL / Ketone: x  / Bili: x / Urobili: x   Blood: x / Protein: x / Nitrite: x   Leuk Esterase: x / RBC: x / WBC x   Sq Epi: x / Non Sq Epi: x / Bacteria: x

## 2024-11-05 NOTE — CHART NOTE - NSCHARTNOTEFT_GEN_A_CORE
Dx : ILD    - Room air pulse ox. at rest: 96%   - Pulse ox  while ambulatin%  - Room air pulse ox while ambulating on 3L: 94%    Patient will require home O2 for discharge.  Patient is aware and agreeable to home O2.  Patient is in a chronic stable state of ILD.

## 2024-11-06 LAB
ALBUMIN SERPL ELPH-MCNC: 3.5 G/DL — SIGNIFICANT CHANGE UP (ref 3.5–5.2)
ALP SERPL-CCNC: 111 U/L — SIGNIFICANT CHANGE UP (ref 30–115)
ALT FLD-CCNC: 19 U/L — SIGNIFICANT CHANGE UP (ref 0–41)
ANION GAP SERPL CALC-SCNC: 18 MMOL/L — HIGH (ref 7–14)
AST SERPL-CCNC: 20 U/L — SIGNIFICANT CHANGE UP (ref 0–41)
BASOPHILS # BLD AUTO: 0.01 K/UL — SIGNIFICANT CHANGE UP (ref 0–0.2)
BASOPHILS NFR BLD AUTO: 0.1 % — SIGNIFICANT CHANGE UP (ref 0–1)
BILIRUB SERPL-MCNC: 1 MG/DL — SIGNIFICANT CHANGE UP (ref 0.2–1.2)
BUN SERPL-MCNC: 65 MG/DL — CRITICAL HIGH (ref 10–20)
CALCIUM SERPL-MCNC: 8.8 MG/DL — SIGNIFICANT CHANGE UP (ref 8.4–10.5)
CHLORIDE SERPL-SCNC: 91 MMOL/L — LOW (ref 98–110)
CO2 SERPL-SCNC: 20 MMOL/L — SIGNIFICANT CHANGE UP (ref 17–32)
CREAT SERPL-MCNC: 2.3 MG/DL — HIGH (ref 0.7–1.5)
CULTURE RESULTS: SIGNIFICANT CHANGE UP
EGFR: 28 ML/MIN/1.73M2 — LOW
EOSINOPHIL # BLD AUTO: 0 K/UL — SIGNIFICANT CHANGE UP (ref 0–0.7)
EOSINOPHIL NFR BLD AUTO: 0 % — SIGNIFICANT CHANGE UP (ref 0–8)
GLUCOSE BLDC GLUCOMTR-MCNC: 199 MG/DL — HIGH (ref 70–99)
GLUCOSE BLDC GLUCOMTR-MCNC: 259 MG/DL — HIGH (ref 70–99)
GLUCOSE BLDC GLUCOMTR-MCNC: 303 MG/DL — HIGH (ref 70–99)
GLUCOSE BLDC GLUCOMTR-MCNC: 328 MG/DL — HIGH (ref 70–99)
GLUCOSE SERPL-MCNC: 339 MG/DL — HIGH (ref 70–99)
HCT VFR BLD CALC: 38.3 % — LOW (ref 42–52)
HGB BLD-MCNC: 12.6 G/DL — LOW (ref 14–18)
IMM GRANULOCYTES NFR BLD AUTO: 1.5 % — HIGH (ref 0.1–0.3)
LYMPHOCYTES # BLD AUTO: 0.66 K/UL — LOW (ref 1.2–3.4)
LYMPHOCYTES # BLD AUTO: 5.3 % — LOW (ref 20.5–51.1)
MAGNESIUM SERPL-MCNC: 2.4 MG/DL — SIGNIFICANT CHANGE UP (ref 1.8–2.4)
MCHC RBC-ENTMCNC: 28.4 PG — SIGNIFICANT CHANGE UP (ref 27–31)
MCHC RBC-ENTMCNC: 32.9 G/DL — SIGNIFICANT CHANGE UP (ref 32–37)
MCV RBC AUTO: 86.5 FL — SIGNIFICANT CHANGE UP (ref 80–94)
MONOCYTES # BLD AUTO: 0.25 K/UL — SIGNIFICANT CHANGE UP (ref 0.1–0.6)
MONOCYTES NFR BLD AUTO: 2 % — SIGNIFICANT CHANGE UP (ref 1.7–9.3)
NEUTROPHILS # BLD AUTO: 11.45 K/UL — HIGH (ref 1.4–6.5)
NEUTROPHILS NFR BLD AUTO: 91.1 % — HIGH (ref 42.2–75.2)
NRBC # BLD: 0 /100 WBCS — SIGNIFICANT CHANGE UP (ref 0–0)
PLATELET # BLD AUTO: 222 K/UL — SIGNIFICANT CHANGE UP (ref 130–400)
PMV BLD: 12.3 FL — HIGH (ref 7.4–10.4)
POTASSIUM SERPL-MCNC: 4.6 MMOL/L — SIGNIFICANT CHANGE UP (ref 3.5–5)
POTASSIUM SERPL-SCNC: 4.6 MMOL/L — SIGNIFICANT CHANGE UP (ref 3.5–5)
PROT SERPL-MCNC: 6.2 G/DL — SIGNIFICANT CHANGE UP (ref 6–8)
RBC # BLD: 4.43 M/UL — LOW (ref 4.7–6.1)
RBC # FLD: 16.4 % — HIGH (ref 11.5–14.5)
SODIUM SERPL-SCNC: 129 MMOL/L — LOW (ref 135–146)
SPECIMEN SOURCE: SIGNIFICANT CHANGE UP
WBC # BLD: 12.56 K/UL — HIGH (ref 4.8–10.8)
WBC # FLD AUTO: 12.56 K/UL — HIGH (ref 4.8–10.8)

## 2024-11-06 PROCEDURE — 99233 SBSQ HOSP IP/OBS HIGH 50: CPT

## 2024-11-06 RX ORDER — POLYETHYLENE GLYCOL 3350 17 G/17G
17 POWDER, FOR SOLUTION ORAL ONCE
Refills: 0 | Status: COMPLETED | OUTPATIENT
Start: 2024-11-06 | End: 2024-11-06

## 2024-11-06 RX ADMIN — Medication 50 UNIT(S): at 21:12

## 2024-11-06 RX ADMIN — Medication 80 MILLIGRAM(S): at 21:11

## 2024-11-06 RX ADMIN — POLYETHYLENE GLYCOL 3350 17 GRAM(S): 17 POWDER, FOR SOLUTION ORAL at 21:11

## 2024-11-06 RX ADMIN — Medication 0.4 MILLIGRAM(S): at 21:12

## 2024-11-06 RX ADMIN — Medication 17 UNIT(S): at 08:22

## 2024-11-06 RX ADMIN — APIXABAN 2.5 MILLIGRAM(S): 5 TABLET, FILM COATED ORAL at 17:20

## 2024-11-06 RX ADMIN — IPRATROPIUM BROMIDE AND ALBUTEROL SULFATE 3 MILLILITER(S): .5; 2.5 SOLUTION RESPIRATORY (INHALATION) at 20:48

## 2024-11-06 RX ADMIN — Medication 50 MILLIGRAM(S): at 05:05

## 2024-11-06 RX ADMIN — Medication 400 MILLIGRAM(S): at 05:04

## 2024-11-06 RX ADMIN — IPRATROPIUM BROMIDE AND ALBUTEROL SULFATE 3 MILLILITER(S): .5; 2.5 SOLUTION RESPIRATORY (INHALATION) at 14:03

## 2024-11-06 RX ADMIN — Medication 0.6 MILLIGRAM(S): at 05:05

## 2024-11-06 RX ADMIN — Medication 30 MILLILITER(S): at 18:19

## 2024-11-06 RX ADMIN — Medication 17 UNIT(S): at 16:52

## 2024-11-06 RX ADMIN — MEMANTINE HYDROCHLORIDE 5 MILLIGRAM(S): 21 CAPSULE, EXTENDED RELEASE ORAL at 11:07

## 2024-11-06 RX ADMIN — CHLORHEXIDINE GLUCONATE 1 APPLICATION(S): 40 SOLUTION TOPICAL at 05:06

## 2024-11-06 RX ADMIN — Medication 4: at 08:22

## 2024-11-06 RX ADMIN — IPRATROPIUM BROMIDE AND ALBUTEROL SULFATE 3 MILLILITER(S): .5; 2.5 SOLUTION RESPIRATORY (INHALATION) at 08:19

## 2024-11-06 RX ADMIN — Medication 17 UNIT(S): at 11:49

## 2024-11-06 RX ADMIN — Medication 4: at 11:49

## 2024-11-06 RX ADMIN — APIXABAN 2.5 MILLIGRAM(S): 5 TABLET, FILM COATED ORAL at 05:05

## 2024-11-06 RX ADMIN — PANTOPRAZOLE SODIUM 40 MILLIGRAM(S): 40 TABLET, DELAYED RELEASE ORAL at 05:05

## 2024-11-06 RX ADMIN — Medication 81 MILLIGRAM(S): at 11:07

## 2024-11-06 RX ADMIN — Medication 3: at 16:51

## 2024-11-06 RX ADMIN — Medication 50 MILLIGRAM(S): at 17:20

## 2024-11-06 RX ADMIN — Medication 100 MILLIGRAM(S): at 09:21

## 2024-11-06 RX ADMIN — Medication 400 MILLIGRAM(S): at 17:20

## 2024-11-06 NOTE — PROGRESS NOTE ADULT - SUBJECTIVE AND OBJECTIVE BOX
CASEY BENÍTEZ  79y  Male      Patient is a 79y old  Male who presents with a chief complaint of chest pain (06 Nov 2024 13:48)      INTERVAL HPI/OVERNIGHT EVENTS:  He feels ok, no chest pain.   Vital Signs Last 24 Hrs  T(C): 36.7 (06 Nov 2024 20:38), Max: 36.7 (06 Nov 2024 20:38)  T(F): 98 (06 Nov 2024 20:38), Max: 98 (06 Nov 2024 20:38)  HR: 54 (06 Nov 2024 20:38) (54 - 81)  BP: 106/61 (06 Nov 2024 20:38) (103/65 - 110/65)  BP(mean): 80 (06 Nov 2024 04:40) (80 - 80)  RR: 18 (06 Nov 2024 20:38) (17 - 18)  SpO2: 97% (06 Nov 2024 10:56) (97% - 100%)    Parameters below as of 06 Nov 2024 10:56  Patient On (Oxygen Delivery Method): room air          11-05-24 @ 07:01  -  11-06-24 @ 07:00  --------------------------------------------------------  IN: 200 mL / OUT: 850 mL / NET: -650 mL    11-06-24 @ 07:01  -  11-06-24 @ 21:14  --------------------------------------------------------  IN: 487 mL / OUT: 0 mL / NET: 487 mL            Consultant(s) Notes Reviewed:  [x ] YES  [ ] NO          MEDICATIONS  (STANDING):  albuterol/ipratropium for Nebulization 3 milliLiter(s) Nebulizer every 6 hours  aMIOdarone    Tablet 400 milliGRAM(s) Oral two times a day  apixaban 2.5 milliGRAM(s) Oral every 12 hours  aspirin enteric coated 81 milliGRAM(s) Oral daily  atorvastatin 80 milliGRAM(s) Oral at bedtime  chlorhexidine 2% Cloths 1 Application(s) Topical <User Schedule>  colchicine 0.6 milliGRAM(s) Oral every 48 hours  dextrose 50% Injectable 12.5 Gram(s) IV Push once  dextrose 50% Injectable 25 Gram(s) IV Push once  dextrose 50% Injectable 25 Gram(s) IV Push once  influenza  Vaccine (HIGH DOSE) 0.5 milliLiter(s) IntraMuscular once  insulin glargine Injectable (LANTUS) 50 Unit(s) SubCutaneous at bedtime  insulin lispro (ADMELOG) corrective regimen sliding scale   SubCutaneous three times a day before meals  insulin lispro Injectable (ADMELOG) 17 Unit(s) SubCutaneous three times a day before meals  memantine 5 milliGRAM(s) Oral daily  metoprolol tartrate 50 milliGRAM(s) Oral two times a day  pantoprazole    Tablet 40 milliGRAM(s) Oral before breakfast  tamsulosin 0.4 milliGRAM(s) Oral at bedtime    MEDICATIONS  (PRN):  acetaminophen     Tablet .. 650 milliGRAM(s) Oral every 6 hours PRN Temp greater or equal to 38C (100.4F), Mild Pain (1 - 3)  albuterol    90 MICROgram(s) HFA Inhaler 2 Puff(s) Inhalation every 6 hours PRN Shortness of Breath and/or Wheezing  aluminum hydroxide/magnesium hydroxide/simethicone Suspension 30 milliLiter(s) Oral every 4 hours PRN Dyspepsia  dextrose Oral Gel 15 Gram(s) Oral once PRN Blood Glucose LESS THAN 70 milliGRAM(s)/deciliter  melatonin 5 milliGRAM(s) Oral at bedtime PRN Insomnia      LABS                          12.6   12.56 )-----------( 222      ( 06 Nov 2024 05:46 )             38.3     11-06    129[L]  |  91[L]  |  65[HH]  ----------------------------<  339[H]  4.6   |  20  |  2.3[H]    Ca    8.8      06 Nov 2024 05:46  Mg     2.4     11-06    TPro  6.2  /  Alb  3.5  /  TBili  1.0  /  DBili  x   /  AST  20  /  ALT  19  /  AlkPhos  111  11-06      Urinalysis Basic - ( 06 Nov 2024 05:46 )    Color: x / Appearance: x / SG: x / pH: x  Gluc: 339 mg/dL / Ketone: x  / Bili: x / Urobili: x   Blood: x / Protein: x / Nitrite: x   Leuk Esterase: x / RBC: x / WBC x   Sq Epi: x / Non Sq Epi: x / Bacteria: x        Lactate Trend        CAPILLARY BLOOD GLUCOSE      POCT Blood Glucose.: 199 mg/dL (06 Nov 2024 21:00)      Culture - Urine (collected 11-05-24 @ 11:05)  Source: Clean Catch Clean Catch (Midstream)  Final Report (11-06-24 @ 18:39):    <10,000 CFU/mL Normal Urogenital Oli    Culture - Sputum (collected 10-31-24 @ 19:18)  Source: .Sputum Sputum  Gram Stain (11-01-24 @ 07:28):    Few polymorphonuclear leukocytes per low power field    Few Squamous epithelial cells per low power field    Few Gram positive cocci in pairs seen per oil power field    Few Gram Variable Rods seen per oil power field  Final Report (11-02-24 @ 17:29):    Commensal oli consistent with body site    Culture - Blood (collected 10-31-24 @ 17:44)  Source: .Blood BLOOD  Final Report (11-05-24 @ 23:00):    No growth at 5 days    Culture - Blood (collected 10-31-24 @ 17:44)  Source: .Blood BLOOD  Final Report (11-05-24 @ 23:00):    No growth at 5 days        RADIOLOGY & ADDITIONAL TESTS:    Imaging Personally Reviewed:  [ ] YES  [ ] NO    HEALTH ISSUES - PROBLEM Dx:          PHYSICAL EXAM:  GENERAL: NAD, well-developed.  HEAD:  Atraumatic, Normocephalic.  EYES: EOMI, PERRLA, conjunctiva and sclera clear.  NECK: Supple, No JVD.  CHEST/LUNG: Clear to auscultation bilaterally; No wheeze.  HEART: Regular rate and rhythm; S1 S2.   ABDOMEN: Soft, Nontender, Nondistended; Bowel sounds present.  EXTREMITIES:  2+ Peripheral Pulses, No clubbing, cyanosis, or edema.  PSYCH: AAOx3.  NEUROLOGY: non-focal.  SKIN: No rashes or lesions.

## 2024-11-06 NOTE — PROGRESS NOTE ADULT - ASSESSMENT
80 yo M PMHx CAD, DM II, HTN, and BPH, with recent admission for vision loss, and presented for evaluation of generalized weakness went to ER, was dc home, then returned later for sudden onset of substernal chest pressure, associated with shortness of breath.  Patient was initially at [facility] and was transferred to [facility] for evaluation of pulmonary edema on bipap, pericardial effusion and afib RVR. Pt was admitted to cardio step down. While in 4t pt was on amiodarone infusion and transitioned to PO.  on Nov 1 pt was sitting in chair, coughed and then wife  saw his eyes "roll to the back of his head" and started "shaking" after which RR was called. Patient returned to baseline in <30 seconds per wife. EEG was done and pending read. He was downgraded to telemetry on 11/2.    #Acute on chronic HFpEF   #New Onset Atrial Fibrillation w/ Rapid Ventricular Response  - Initially presented with significant sternal chest pain  - Trops 20 -> 16 -> 15 -> 20  - as per cardiology possibly due to Pericarditis and was started on colchcine-  - c/w amio/metoprolol for now. c/w Eliquis  - 10/29 TTE: LVEF 65-70%, G1DD, borderline pHTN, small pericardial effusion  - ESR 34,   - Sent to [facility] due to concern for tamponade, poor windows on TTE at [facility]  - cardio held home Ranexa due to interaction w/ Amiodarone  - cardio recommended starting Eliquis 2.5mg BID for AC (Cr >1.5, will be 80 in 1.5 months)  - lasix PO   - has significant SOB and hypoxia while ambulating. Check repeat limited echo to assess pericardial effusion. As per cardio fellow who reviewed echo, official read-no tamponade, f/u with Dr. Payne  - Coxsakie abs elevated but clinically on signs of coxsakie, given prior treatment of ILD this may be more rheum related effusion  - on CT chest from 6/2024 pt likely had usual interstitial pneumonia. Trial dose of steroids.  - pt was treated for SOB at [facility] june 2024. He was being worked up for ILD. Rheum panel sent but only C3, c4, ccp in system all of which are negative    #CARINA  - check bladder scan  - lasix held yesterday, possible pre renal from dehydration  - trend SCr    #Suspected vasovagal episode  - seen by neuro-recommended eeg  - EEG done, generalized slowing  - doubt seizure    #Pleural effusions/consolidation  - xray appears to be volume overloaded form admission  - while in 4t was treated for possible Aspiration PNA  - clinically doubt pNA  - CXR showing effusions and consolidation  - Afebrile though with leukocytosis  - 10/30 FEES w/ mild dysphagia, when eating needs soft and bite sized diet w/ sips of thin liquid  - Off abx for now  - 10/31 Procal 0.32  - Multiple BCX NGTD; sputum culture shows commensal oli, PMNs  - crackles resolve, change lasix to oral    #Leukocytosis  - Initially presumed due to UTI, however UA only remarkable for glucosuria  - Rocephin was started, discontinued today    #Confusion   - as per EMR wife states patient is "forgetful" has periods of being AOx1-3  - 10/29 MR Head: No acute pathology, mild chronic microvascular ischemic changes and chronic lacunar infarcts  - aaox3 now    #CKD III  - CARINA resolved  - back to baseline    #DM II  - monitor FS  - has hyperglycemia  - c/w insulin--adjusted dose daily over past few days  - unclear why pt remains hyperglycemic    #HTN  - Hypotensive during hospitalization  - Holding home antihypertensives for now    #BPH  - C/w tamsulosin     DVT PPX: eliquis  GI PPX: protonix  DIET: soft and bite sized  ACTIVITY: IAT  CODE STATUS: FULL  DISPOSITION: 3c, Dc planning    PENDING: DC 24 hrs if clinically stable.

## 2024-11-06 NOTE — PROGRESS NOTE ADULT - ASSESSMENT
78 yo M PMHx CAD, DM II, HTN, and BPH, with recent admission for vision loss, and presented for evaluation of generalized weakness went to ER, was dc home, then returned later for sudden onset of substernal chest pressure, associated with shortness of breath.  Patient was initially at [facility] and was transferred to [facility] for evaluation of pulmonary edema on bipap, pericardial effusion and afib RVR. Pt was admitted to cardio step down. While in  pt was on amiodarone infusion and transitioned to PO.  on Nov 1 pt was sitting in chair, coughed and then wife  saw his eyes "roll to the back of his head" and started "shaking" after which RR was called. Patient returned to baseline in <30 seconds per wife. EEG was done and pending read. He was downgraded to telemetry on 11/2.    A/P:   Acute Hypoxic Respiratory Failure:   Acute on chronic HFpEF   Patient with chest pain, SOB.   10/29 TTE: LVEF 65-70%, G1DD, borderline pHTN, small pericardial effusion  s/p Lasix IV    Paroxysmal Atrial Fibrillation:   Continue Amiodarone, Metoprolol and Eliquis.     Chest Pain:   pericardial effusion: small possibly pericarditis vs CHF  ESR 34,   Echo showed small pericardial effusion, no tamponade.   Cardiology recommended Colchicine.     on CT chest from 6/2024 pt likely had usual interstitial pneumonia. Trial dose of steroids.  - pt was treated for SOB at [facility] june 2024. He was being worked up for ILD. Rheum panel sent but only C3, c4, ccp in system all of which are negative    Suspected vasovagal episode  - seen by neuro-recommended eeg  - EEG done, generalized slowing  - doubt seizure    Pleural effusions/consolidation   xray appears to be volume overloaded form admission  - while in  was treated for possible Aspiration PNA  - clinically doubt pNA  - CXR showing effusions and consolidation  - Afebrile though with leukocytosis  - 10/30 FEES w/ mild dysphagia, when eating needs soft and bite sized diet w/ sips of thin liquid  - Off abx for now  - F/u Procalcitonin, Blood Cx, Sputum Cx  - crackles resolve, change lasix to víctor  - repeat cxr in AM    Leukocytosis  - likely due to UTI  - pending cultures, rocephin started    Confusion   - as per EMR wife states patient is "forgetful" has periods of being AOx1-3  - 10/29 MR Head: No acute pathology, mild chronic microvascular ischemic changes and chronic lacunar infarcts  - aaox3 now    CKD 3-4  Cr 2.1-2.3    DM II  A1C 8.1  FS was elevated, Lantus and Lispro doses increased.     HTN    BPH tamsulosin     DVT Prophylaxis:     #Progress Note Handoff:  Pending (specify): PT,   Family discussion: with his wife.   Disposition: Home with home PT in 24hrs.

## 2024-11-06 NOTE — PROGRESS NOTE ADULT - SUBJECTIVE AND OBJECTIVE BOX
24H events:    Patient is a 79y old Male who presents with a chief complaint of Pericardial effusion, suspected tamponade (31 Oct 2024 11:35)    Primary diagnosis of General weakness       Today is hospital day 9d. This morning patient was seen and examined at bedside, resting comfortably in bed. No overnight events. Pt on 4L NC for comfort? as pt O2sat 97 while doing PT.     PAST MEDICAL & SURGICAL HISTORY  DM (diabetes mellitus)    CAD (coronary artery disease)    BPH (benign prostatic hyperplasia)    History of hematologic disorder    Total cataract    [place] (hard of hearing)    Acute myocardial infarction    Chronic kidney disease (CKD)    H/O coronary angiogram    Stented coronary artery    History of ear surgery    H/O tonsillitis      SOCIAL HISTORY:  Social History:      ALLERGIES:  No Known Drug Allergies  Seafood (Anaphylaxis)    MEDICATIONS:  STANDING MEDICATIONS  albuterol/ipratropium for Nebulization 3 milliLiter(s) Nebulizer every 6 hours  aMIOdarone    Tablet 400 milliGRAM(s) Oral two times a day  apixaban 2.5 milliGRAM(s) Oral every 12 hours  aspirin enteric coated 81 milliGRAM(s) Oral daily  atorvastatin 80 milliGRAM(s) Oral at bedtime  chlorhexidine 2% Cloths 1 Application(s) Topical <User Schedule>  colchicine 0.6 milliGRAM(s) Oral every 48 hours  dextrose 50% Injectable 25 Gram(s) IV Push once  dextrose 50% Injectable 12.5 Gram(s) IV Push once  dextrose 50% Injectable 25 Gram(s) IV Push once  influenza  Vaccine (HIGH DOSE) 0.5 milliLiter(s) IntraMuscular once  insulin glargine Injectable (LANTUS) 50 Unit(s) SubCutaneous at bedtime  insulin lispro (ADMELOG) corrective regimen sliding scale   SubCutaneous three times a day before meals  insulin lispro Injectable (ADMELOG) 17 Unit(s) SubCutaneous three times a day before meals  memantine 5 milliGRAM(s) Oral daily  metoprolol tartrate 50 milliGRAM(s) Oral two times a day  pantoprazole    Tablet 40 milliGRAM(s) Oral before breakfast  tamsulosin 0.4 milliGRAM(s) Oral at bedtime    PRN MEDICATIONS  acetaminophen     Tablet .. 650 milliGRAM(s) Oral every 6 hours PRN  albuterol    90 MICROgram(s) HFA Inhaler 2 Puff(s) Inhalation every 6 hours PRN  aluminum hydroxide/magnesium hydroxide/simethicone Suspension 30 milliLiter(s) Oral every 4 hours PRN  dextrose Oral Gel 15 Gram(s) Oral once PRN  melatonin 5 milliGRAM(s) Oral at bedtime PRN    VITALS:   T(F): 97.9  HR: 81  BP: 103/65  RR: 17  SpO2: 97%    PHYSICAL EXAM:  GENERAL: NAD, well-groomed, well-developed  HEAD:  Atraumatic, Normocephalic  EYES: EOMI  NECK: Supple  NERVOUS SYSTEM:  Alert & Oriented X3, non focal   CHEST/LUNG: Clear to auscultation bilaterally; No rales, rhonchi, wheezing, or rubs  HEART: Regular rate and rhythm; No murmurs, rubs, or gallops  ABDOMEN: Soft, Nontender, Nondistended; Bowel sounds present  EXTREMITIES:  2+ Peripheral Pulses, No clubbing, cyanosis, or edema  LYMPH: No lymphadenopathy noted  SKIN: No rashes or lesions  LABS:                        12.6   12.56 )-----------( 222      ( 06 Nov 2024 05:46 )             38.3     11-06    129[L]  |  91[L]  |  65[HH]  ----------------------------<  339[H]  4.6   |  20  |  2.3[H]    Ca    8.8      06 Nov 2024 05:46  Mg     2.4     11-06    TPro  6.2  /  Alb  3.5  /  TBili  1.0  /  DBili  x   /  AST  20  /  ALT  19  /  AlkPhos  111  11-06      Urinalysis Basic - ( 06 Nov 2024 05:46 )    Color: x / Appearance: x / SG: x / pH: x  Gluc: 339 mg/dL / Ketone: x  / Bili: x / Urobili: x   Blood: x / Protein: x / Nitrite: x   Leuk Esterase: x / RBC: x / WBC x   Sq Epi: x / Non Sq Epi: x / Bacteria: x

## 2024-11-07 LAB
ALBUMIN SERPL ELPH-MCNC: 3.5 G/DL — SIGNIFICANT CHANGE UP (ref 3.5–5.2)
ALP SERPL-CCNC: 117 U/L — HIGH (ref 30–115)
ALT FLD-CCNC: 18 U/L — SIGNIFICANT CHANGE UP (ref 0–41)
ANION GAP SERPL CALC-SCNC: 16 MMOL/L — HIGH (ref 7–14)
APPEARANCE UR: CLEAR — SIGNIFICANT CHANGE UP
AST SERPL-CCNC: 25 U/L — SIGNIFICANT CHANGE UP (ref 0–41)
BASOPHILS # BLD AUTO: 0.02 K/UL — SIGNIFICANT CHANGE UP (ref 0–0.2)
BASOPHILS NFR BLD AUTO: 0.1 % — SIGNIFICANT CHANGE UP (ref 0–1)
BILIRUB SERPL-MCNC: 0.9 MG/DL — SIGNIFICANT CHANGE UP (ref 0.2–1.2)
BILIRUB UR-MCNC: NEGATIVE — SIGNIFICANT CHANGE UP
BUN SERPL-MCNC: 69 MG/DL — CRITICAL HIGH (ref 10–20)
CALCIUM SERPL-MCNC: 8.7 MG/DL — SIGNIFICANT CHANGE UP (ref 8.4–10.5)
CHLORIDE SERPL-SCNC: 95 MMOL/L — LOW (ref 98–110)
CO2 SERPL-SCNC: 22 MMOL/L — SIGNIFICANT CHANGE UP (ref 17–32)
COLOR SPEC: YELLOW — SIGNIFICANT CHANGE UP
CREAT SERPL-MCNC: 2.2 MG/DL — HIGH (ref 0.7–1.5)
DIFF PNL FLD: NEGATIVE — SIGNIFICANT CHANGE UP
EGFR: 30 ML/MIN/1.73M2 — LOW
EOSINOPHIL # BLD AUTO: 0.02 K/UL — SIGNIFICANT CHANGE UP (ref 0–0.7)
EOSINOPHIL NFR BLD AUTO: 0.1 % — SIGNIFICANT CHANGE UP (ref 0–8)
GLUCOSE BLDC GLUCOMTR-MCNC: 139 MG/DL — HIGH (ref 70–99)
GLUCOSE BLDC GLUCOMTR-MCNC: 145 MG/DL — HIGH (ref 70–99)
GLUCOSE BLDC GLUCOMTR-MCNC: 154 MG/DL — HIGH (ref 70–99)
GLUCOSE BLDC GLUCOMTR-MCNC: 274 MG/DL — HIGH (ref 70–99)
GLUCOSE BLDC GLUCOMTR-MCNC: 66 MG/DL — LOW (ref 70–99)
GLUCOSE SERPL-MCNC: 115 MG/DL — HIGH (ref 70–99)
GLUCOSE UR QL: NEGATIVE MG/DL — SIGNIFICANT CHANGE UP
HCT VFR BLD CALC: 34.8 % — LOW (ref 42–52)
HGB BLD-MCNC: 11.8 G/DL — LOW (ref 14–18)
IMM GRANULOCYTES NFR BLD AUTO: 1.7 % — HIGH (ref 0.1–0.3)
KETONES UR-MCNC: NEGATIVE MG/DL — SIGNIFICANT CHANGE UP
LEUKOCYTE ESTERASE UR-ACNC: NEGATIVE — SIGNIFICANT CHANGE UP
LYMPHOCYTES # BLD AUTO: 1.43 K/UL — SIGNIFICANT CHANGE UP (ref 1.2–3.4)
LYMPHOCYTES # BLD AUTO: 9.6 % — LOW (ref 20.5–51.1)
MAGNESIUM SERPL-MCNC: 2.5 MG/DL — HIGH (ref 1.8–2.4)
MCHC RBC-ENTMCNC: 29.1 PG — SIGNIFICANT CHANGE UP (ref 27–31)
MCHC RBC-ENTMCNC: 33.9 G/DL — SIGNIFICANT CHANGE UP (ref 32–37)
MCV RBC AUTO: 85.7 FL — SIGNIFICANT CHANGE UP (ref 80–94)
MONOCYTES # BLD AUTO: 1.06 K/UL — HIGH (ref 0.1–0.6)
MONOCYTES NFR BLD AUTO: 7.1 % — SIGNIFICANT CHANGE UP (ref 1.7–9.3)
NEUTROPHILS # BLD AUTO: 12.11 K/UL — HIGH (ref 1.4–6.5)
NEUTROPHILS NFR BLD AUTO: 81.4 % — HIGH (ref 42.2–75.2)
NITRITE UR-MCNC: NEGATIVE — SIGNIFICANT CHANGE UP
NRBC # BLD: 0 /100 WBCS — SIGNIFICANT CHANGE UP (ref 0–0)
PH UR: 6 — SIGNIFICANT CHANGE UP (ref 5–8)
PLATELET # BLD AUTO: 229 K/UL — SIGNIFICANT CHANGE UP (ref 130–400)
PMV BLD: 12.9 FL — HIGH (ref 7.4–10.4)
POTASSIUM SERPL-MCNC: 3.7 MMOL/L — SIGNIFICANT CHANGE UP (ref 3.5–5)
POTASSIUM SERPL-SCNC: 3.7 MMOL/L — SIGNIFICANT CHANGE UP (ref 3.5–5)
PROT SERPL-MCNC: 5.9 G/DL — LOW (ref 6–8)
PROT UR-MCNC: NEGATIVE MG/DL — SIGNIFICANT CHANGE UP
RBC # BLD: 4.06 M/UL — LOW (ref 4.7–6.1)
RBC # FLD: 16.6 % — HIGH (ref 11.5–14.5)
SODIUM SERPL-SCNC: 133 MMOL/L — LOW (ref 135–146)
SP GR SPEC: 1.01 — SIGNIFICANT CHANGE UP (ref 1–1.03)
UROBILINOGEN FLD QL: 0.2 MG/DL — SIGNIFICANT CHANGE UP (ref 0.2–1)
WBC # BLD: 14.9 K/UL — HIGH (ref 4.8–10.8)
WBC # FLD AUTO: 14.9 K/UL — HIGH (ref 4.8–10.8)

## 2024-11-07 PROCEDURE — 99233 SBSQ HOSP IP/OBS HIGH 50: CPT

## 2024-11-07 PROCEDURE — 71045 X-RAY EXAM CHEST 1 VIEW: CPT | Mod: 26

## 2024-11-07 RX ORDER — CALCIUM CARBONATE 400(1000)
1 TABLET,CHEWABLE ORAL ONCE
Refills: 0 | Status: COMPLETED | OUTPATIENT
Start: 2024-11-07 | End: 2024-11-07

## 2024-11-07 RX ORDER — PHENAZOPYRIDINE HCL 95 MG
100 TABLET ORAL EVERY 8 HOURS
Refills: 0 | Status: DISCONTINUED | OUTPATIENT
Start: 2024-11-07 | End: 2024-11-08

## 2024-11-07 RX ADMIN — IPRATROPIUM BROMIDE AND ALBUTEROL SULFATE 3 MILLILITER(S): .5; 2.5 SOLUTION RESPIRATORY (INHALATION) at 07:54

## 2024-11-07 RX ADMIN — MEMANTINE HYDROCHLORIDE 5 MILLIGRAM(S): 21 CAPSULE, EXTENDED RELEASE ORAL at 11:59

## 2024-11-07 RX ADMIN — Medication 1 TABLET(S): at 06:37

## 2024-11-07 RX ADMIN — Medication 50 MILLIGRAM(S): at 05:13

## 2024-11-07 RX ADMIN — PANTOPRAZOLE SODIUM 40 MILLIGRAM(S): 40 TABLET, DELAYED RELEASE ORAL at 05:13

## 2024-11-07 RX ADMIN — Medication 0.4 MILLIGRAM(S): at 22:05

## 2024-11-07 RX ADMIN — Medication 400 MILLIGRAM(S): at 05:13

## 2024-11-07 RX ADMIN — Medication 81 MILLIGRAM(S): at 12:00

## 2024-11-07 RX ADMIN — Medication 1: at 11:59

## 2024-11-07 RX ADMIN — APIXABAN 2.5 MILLIGRAM(S): 5 TABLET, FILM COATED ORAL at 17:48

## 2024-11-07 RX ADMIN — Medication 100 MILLIGRAM(S): at 22:05

## 2024-11-07 RX ADMIN — Medication 17 UNIT(S): at 08:32

## 2024-11-07 RX ADMIN — CHLORHEXIDINE GLUCONATE 1 APPLICATION(S): 40 SOLUTION TOPICAL at 05:13

## 2024-11-07 RX ADMIN — Medication 17 UNIT(S): at 18:50

## 2024-11-07 RX ADMIN — APIXABAN 2.5 MILLIGRAM(S): 5 TABLET, FILM COATED ORAL at 05:13

## 2024-11-07 RX ADMIN — Medication 17 UNIT(S): at 11:59

## 2024-11-07 RX ADMIN — IPRATROPIUM BROMIDE AND ALBUTEROL SULFATE 3 MILLILITER(S): .5; 2.5 SOLUTION RESPIRATORY (INHALATION) at 20:28

## 2024-11-07 RX ADMIN — Medication 400 MILLIGRAM(S): at 17:48

## 2024-11-07 RX ADMIN — Medication 80 MILLIGRAM(S): at 22:06

## 2024-11-07 RX ADMIN — Medication 50 MILLIGRAM(S): at 17:48

## 2024-11-07 RX ADMIN — Medication 50 UNIT(S): at 22:05

## 2024-11-07 NOTE — PROGRESS NOTE ADULT - ASSESSMENT
80 yo M PMHx CAD, DM II, HTN, and BPH, with recent admission for vision loss, and presented for evaluation of generalized weakness went to ER, was dc home, then returned later for sudden onset of substernal chest pressure, associated with shortness of breath.  Patient was initially at [facility] and was transferred to [facility] for evaluation of pulmonary edema on bipap, pericardial effusion and afib RVR. Pt was admitted to cardio step down. While in 4t pt was on amiodarone infusion and transitioned to PO.  on Nov 1 pt was sitting in chair, coughed and then wife  saw his eyes "roll to the back of his head" and started "shaking" after which RR was called. Patient returned to baseline in <30 seconds per wife. EEG was done and pending read. He was downgraded to telemetry on 11/2.    A/P:   Leukocytosis:   No fever, his WBC has been fluctuating,   CXR is stable, patient with urinary symptoms, check UA, if fever will start Rocephin.     Acute Hypoxic Respiratory Failure:   Acute on chronic HFpEF:   Patient with chest pain, SOB.   10/29 TTE: LVEF 65-70%, G1DD, borderline pHTN, small pericardial effusion  s/p Lasix IV  Currently euvolemic.     Paroxysmal Atrial Fibrillation with Rapid Ventricular Response:   HR is better controlled.   Continue Amiodarone, Metoprolol and Eliquis.     Chest Pain:   pericardial effusion: small possibly pericarditis vs CHF  ESR 34,   Echo showed small pericardial effusion, no tamponade.   Repeated echo 11/4 showed moderate pericardial effusion, effusion size is stable.   Cardiology recommended Colchicine.     Interstitial Lung disease:   CT chest showed interstitial lung disease consisting of reticulation and honeycombing at the bases in the subpleural area bilaterally.  He was being worked up for ILD. Rheum panel sent but only C3, c4, ccp in system all of which are negative  Pulmonary follow up     Syncopal episode on 11/1 Suspected vasovagal episode  Episode of AMS after coughing, return to baseline in less than a minute.   EEG showed generalized slowing      Leukocytosis  - likely due to UTI  - pending cultures, rocephin started    Confusion possibly hospital induced delirium.   - as per EMR wife states patient is "forgetful" has periods of being AOx1-3  - 10/29 MR Head: No acute pathology, mild chronic microvascular ischemic changes and chronic lacunar infarcts  - aaox3 now    CKD 3-4  Cr 2.1-2.3    DM II  A1C 8.1  FS was elevated, Lantus and Lispro doses increased.     HTN    BPH: tamsulosin     DVT Prophylaxis:     #Progress Note Handoff:  Pending (specify): UA, monitor WBC  Family discussion: with his wife.   Disposition: Home with home PT in 24hrs.

## 2024-11-07 NOTE — PROGRESS NOTE ADULT - SUBJECTIVE AND OBJECTIVE BOX
CASEY BENÍTEZ  79y  Male      Patient is a 79y old  Male who presents with a chief complaint of chest pain (06 Nov 2024 13:48)      INTERVAL HPI/OVERNIGHT EVENTS:  He feels ok, still with burning with urination  Vital Signs Last 24 Hrs  T(C): 36.7 (07 Nov 2024 12:08), Max: 36.7 (06 Nov 2024 20:38)  T(F): 98 (07 Nov 2024 12:08), Max: 98 (06 Nov 2024 20:38)  HR: 73 (07 Nov 2024 12:08) (54 - 73)  BP: 110/66 (07 Nov 2024 12:08) (102/65 - 110/66)  BP(mean): --  RR: 18 (07 Nov 2024 12:08) (18 - 18)  SpO2: 100% (07 Nov 2024 04:49) (100% - 100%)    Parameters below as of 07 Nov 2024 04:49  Patient On (Oxygen Delivery Method): BiPAP/CPAP          11-06-24 @ 07:01  -  11-07-24 @ 07:00  --------------------------------------------------------  IN: 727 mL / OUT: 350 mL / NET: 377 mL    11-07-24 @ 07:01  -  11-07-24 @ 15:48  --------------------------------------------------------  IN: 487 mL / OUT: 0 mL / NET: 487 mL            Consultant(s) Notes Reviewed:  [x ] YES  [ ] NO          MEDICATIONS  (STANDING):  albuterol/ipratropium for Nebulization 3 milliLiter(s) Nebulizer every 6 hours  aMIOdarone    Tablet 400 milliGRAM(s) Oral two times a day  apixaban 2.5 milliGRAM(s) Oral every 12 hours  aspirin enteric coated 81 milliGRAM(s) Oral daily  atorvastatin 80 milliGRAM(s) Oral at bedtime  chlorhexidine 2% Cloths 1 Application(s) Topical <User Schedule>  colchicine 0.6 milliGRAM(s) Oral every 48 hours  dextrose 50% Injectable 12.5 Gram(s) IV Push once  dextrose 50% Injectable 25 Gram(s) IV Push once  dextrose 50% Injectable 25 Gram(s) IV Push once  influenza  Vaccine (HIGH DOSE) 0.5 milliLiter(s) IntraMuscular once  insulin glargine Injectable (LANTUS) 50 Unit(s) SubCutaneous at bedtime  insulin lispro (ADMELOG) corrective regimen sliding scale   SubCutaneous three times a day before meals  insulin lispro Injectable (ADMELOG) 17 Unit(s) SubCutaneous three times a day before meals  memantine 5 milliGRAM(s) Oral daily  metoprolol tartrate 50 milliGRAM(s) Oral two times a day  pantoprazole    Tablet 40 milliGRAM(s) Oral before breakfast  phenazopyridine 100 milliGRAM(s) Oral every 8 hours  tamsulosin 0.4 milliGRAM(s) Oral at bedtime    MEDICATIONS  (PRN):  acetaminophen     Tablet .. 650 milliGRAM(s) Oral every 6 hours PRN Temp greater or equal to 38C (100.4F), Mild Pain (1 - 3)  albuterol    90 MICROgram(s) HFA Inhaler 2 Puff(s) Inhalation every 6 hours PRN Shortness of Breath and/or Wheezing  aluminum hydroxide/magnesium hydroxide/simethicone Suspension 30 milliLiter(s) Oral every 4 hours PRN Dyspepsia  dextrose Oral Gel 15 Gram(s) Oral once PRN Blood Glucose LESS THAN 70 milliGRAM(s)/deciliter  melatonin 5 milliGRAM(s) Oral at bedtime PRN Insomnia      LABS                          11.8   14.90 )-----------( 229      ( 07 Nov 2024 05:57 )             34.8     11-07    133[L]  |  95[L]  |  69[HH]  ----------------------------<  115[H]  3.7   |  22  |  2.2[H]    Ca    8.7      07 Nov 2024 05:57  Mg     2.5     11-07    TPro  5.9[L]  /  Alb  3.5  /  TBili  0.9  /  DBili  x   /  AST  25  /  ALT  18  /  AlkPhos  117[H]  11-07      Urinalysis Basic - ( 07 Nov 2024 05:57 )    Color: x / Appearance: x / SG: x / pH: x  Gluc: 115 mg/dL / Ketone: x  / Bili: x / Urobili: x   Blood: x / Protein: x / Nitrite: x   Leuk Esterase: x / RBC: x / WBC x   Sq Epi: x / Non Sq Epi: x / Bacteria: x        Lactate Trend        CAPILLARY BLOOD GLUCOSE      POCT Blood Glucose.: 154 mg/dL (07 Nov 2024 11:23)      Culture - Urine (collected 11-05-24 @ 11:05)  Source: Clean Catch Clean Catch (Midstream)  Final Report (11-06-24 @ 18:39):    <10,000 CFU/mL Normal Urogenital Oli    Culture - Sputum (collected 10-31-24 @ 19:18)  Source: .Sputum Sputum  Gram Stain (11-01-24 @ 07:28):    Few polymorphonuclear leukocytes per low power field    Few Squamous epithelial cells per low power field    Few Gram positive cocci in pairs seen per oil power field    Few Gram Variable Rods seen per oil power field  Final Report (11-02-24 @ 17:29):    Commensal oli consistent with body site    Culture - Blood (collected 10-31-24 @ 17:44)  Source: .Blood BLOOD  Final Report (11-05-24 @ 23:00):    No growth at 5 days    Culture - Blood (collected 10-31-24 @ 17:44)  Source: .Blood BLOOD  Final Report (11-05-24 @ 23:00):    No growth at 5 days        RADIOLOGY & ADDITIONAL TESTS:    Imaging Personally Reviewed:  [ ] YES  [ ] NO    HEALTH ISSUES - PROBLEM Dx:          PHYSICAL EXAM:  GENERAL: NAD, well-developed.  HEAD:  Atraumatic, Normocephalic.  EYES: EOMI, PERRLA, conjunctiva and sclera clear.  NECK: Supple, No JVD.  CHEST/LUNG: Clear to auscultation bilaterally; No wheeze.  HEART: Regular rate and rhythm; S1 S2.   ABDOMEN: Soft, Nontender, Nondistended; Bowel sounds present.  EXTREMITIES:  2+ Peripheral Pulses, No clubbing, cyanosis, or edema.  PSYCH: AAOx3.  NEUROLOGY: non-focal.  SKIN: No rashes or lesions.

## 2024-11-07 NOTE — PROGRESS NOTE ADULT - SUBJECTIVE AND OBJECTIVE BOX
24H events:    Patient is a 79y old Male who presents with a chief complaint of chest pain (06 Nov 2024 13:48)    Primary diagnosis of General weakness    Today is 10d of hospitalization. This morning patient was seen and examined at bedside, resting comfortably in bed.    No acute or major events overnight. Noted white count elevated this am, will send CXR and ua. Pt endorsign some dysuria. Pt anticipated discharge for kateryna. Pt spoken to at length and in agreement. Noted Na imprved to 133 from 129. Pt not in respiratory distress but on oxygen for symptom relief.       PAST MEDICAL & SURGICAL HISTORY  DM (diabetes mellitus)    CAD (coronary artery disease)    BPH (benign prostatic hyperplasia)    History of hematologic disorder    Total cataract    [place] (hard of hearing)    Acute myocardial infarction    Chronic kidney disease (CKD)    H/O coronary angiogram    Stented coronary artery    History of ear surgery    H/O tonsillitis      SOCIAL HISTORY:  Social History:      ALLERGIES:  No Known Drug Allergies  Seafood (Anaphylaxis)    MEDICATIONS:  STANDING MEDICATIONS  albuterol/ipratropium for Nebulization 3 milliLiter(s) Nebulizer every 6 hours  aMIOdarone    Tablet 400 milliGRAM(s) Oral two times a day  apixaban 2.5 milliGRAM(s) Oral every 12 hours  aspirin enteric coated 81 milliGRAM(s) Oral daily  atorvastatin 80 milliGRAM(s) Oral at bedtime  chlorhexidine 2% Cloths 1 Application(s) Topical <User Schedule>  colchicine 0.6 milliGRAM(s) Oral every 48 hours  dextrose 50% Injectable 12.5 Gram(s) IV Push once  dextrose 50% Injectable 25 Gram(s) IV Push once  dextrose 50% Injectable 25 Gram(s) IV Push once  influenza  Vaccine (HIGH DOSE) 0.5 milliLiter(s) IntraMuscular once  insulin glargine Injectable (LANTUS) 50 Unit(s) SubCutaneous at bedtime  insulin lispro (ADMELOG) corrective regimen sliding scale   SubCutaneous three times a day before meals  insulin lispro Injectable (ADMELOG) 17 Unit(s) SubCutaneous three times a day before meals  memantine 5 milliGRAM(s) Oral daily  metoprolol tartrate 50 milliGRAM(s) Oral two times a day  pantoprazole    Tablet 40 milliGRAM(s) Oral before breakfast  tamsulosin 0.4 milliGRAM(s) Oral at bedtime    PRN MEDICATIONS  acetaminophen     Tablet .. 650 milliGRAM(s) Oral every 6 hours PRN  albuterol    90 MICROgram(s) HFA Inhaler 2 Puff(s) Inhalation every 6 hours PRN  aluminum hydroxide/magnesium hydroxide/simethicone Suspension 30 milliLiter(s) Oral every 4 hours PRN  dextrose Oral Gel 15 Gram(s) Oral once PRN  melatonin 5 milliGRAM(s) Oral at bedtime PRN    VITALS:   T(F): 97.5  HR: 70  BP: 102/65  RR: 18  SpO2: 100%    PHYSICAL EXAM:  GENERAL: NAD, well-groomed, well-developed  HEAD:  Atraumatic, Normocephalic  EYES: EOMI  NECK: Supple  NERVOUS SYSTEM:  Alert & Oriented X3, non focal   CHEST/LUNG: Clear to auscultation bilaterally; No rales, rhonchi, wheezing, or rubs  HEART: Regular rate and rhythm; No murmurs, rubs, or gallops  ABDOMEN: Soft, Nontender, Nondistended; Bowel sounds present  EXTREMITIES:  2+ Peripheral Pulses, No clubbing, cyanosis, or edema  LYMPH: No lymphadenopathy noted  SKIN: No rashes or lesions      LABS:                        11.8   14.90 )-----------( 229      ( 07 Nov 2024 05:57 )             34.8     11-07    133[L]  |  95[L]  |  69[HH]  ----------------------------<  115[H]  3.7   |  22  |  2.2[H]    Ca    8.7      07 Nov 2024 05:57  Mg     2.5     11-07    TPro  5.9[L]  /  Alb  3.5  /  TBili  0.9  /  DBili  x   /  AST  25  /  ALT  18  /  AlkPhos  117[H]  11-07      Urinalysis Basic - ( 07 Nov 2024 05:57 )    Color: x / Appearance: x / SG: x / pH: x  Gluc: 115 mg/dL / Ketone: x  / Bili: x / Urobili: x   Blood: x / Protein: x / Nitrite: x   Leuk Esterase: x / RBC: x / WBC x   Sq Epi: x / Non Sq Epi: x / Bacteria: x            Culture - Urine (collected 05 Nov 2024 11:05)  Source: Clean Catch Clean Catch (Midstream)  Final Report (06 Nov 2024 18:39):    <10,000 CFU/mL Normal Urogenital Khalida

## 2024-11-07 NOTE — PROGRESS NOTE ADULT - PROVIDER SPECIALTY LIST ADULT
Cardiology
Hospitalist
Internal Medicine
Hospitalist
Hospitalist
Internal Medicine
Hospitalist
Hospitalist
Internal Medicine

## 2024-11-08 ENCOUNTER — TRANSCRIPTION ENCOUNTER (OUTPATIENT)
Age: 79
End: 2024-11-08

## 2024-11-08 ENCOUNTER — APPOINTMENT (OUTPATIENT)
Dept: PULMONOLOGY | Facility: CLINIC | Age: 79
End: 2024-11-08

## 2024-11-08 VITALS
TEMPERATURE: 98 F | DIASTOLIC BLOOD PRESSURE: 71 MMHG | RESPIRATION RATE: 18 BRPM | SYSTOLIC BLOOD PRESSURE: 116 MMHG | HEART RATE: 87 BPM

## 2024-11-08 LAB
ANION GAP SERPL CALC-SCNC: 12 MMOL/L — SIGNIFICANT CHANGE UP (ref 7–14)
BUN SERPL-MCNC: 64 MG/DL — CRITICAL HIGH (ref 10–20)
CALCIUM SERPL-MCNC: 8.2 MG/DL — LOW (ref 8.4–10.5)
CHLORIDE SERPL-SCNC: 95 MMOL/L — LOW (ref 98–110)
CO2 SERPL-SCNC: 24 MMOL/L — SIGNIFICANT CHANGE UP (ref 17–32)
CREAT SERPL-MCNC: 2.1 MG/DL — HIGH (ref 0.7–1.5)
CULTURE RESULTS: NO GROWTH — SIGNIFICANT CHANGE UP
EGFR: 31 ML/MIN/1.73M2 — LOW
GLUCOSE BLDC GLUCOMTR-MCNC: 134 MG/DL — HIGH (ref 70–99)
GLUCOSE BLDC GLUCOMTR-MCNC: 209 MG/DL — HIGH (ref 70–99)
GLUCOSE SERPL-MCNC: 115 MG/DL — HIGH (ref 70–99)
HCT VFR BLD CALC: 38.4 % — LOW (ref 42–52)
HGB BLD-MCNC: 12.5 G/DL — LOW (ref 14–18)
MCHC RBC-ENTMCNC: 28.4 PG — SIGNIFICANT CHANGE UP (ref 27–31)
MCHC RBC-ENTMCNC: 32.6 G/DL — SIGNIFICANT CHANGE UP (ref 32–37)
MCV RBC AUTO: 87.3 FL — SIGNIFICANT CHANGE UP (ref 80–94)
NRBC # BLD: 0 /100 WBCS — SIGNIFICANT CHANGE UP (ref 0–0)
PLATELET # BLD AUTO: 242 K/UL — SIGNIFICANT CHANGE UP (ref 130–400)
PMV BLD: 12.6 FL — HIGH (ref 7.4–10.4)
POTASSIUM SERPL-MCNC: 3.7 MMOL/L — SIGNIFICANT CHANGE UP (ref 3.5–5)
POTASSIUM SERPL-SCNC: 3.7 MMOL/L — SIGNIFICANT CHANGE UP (ref 3.5–5)
RBC # BLD: 4.4 M/UL — LOW (ref 4.7–6.1)
RBC # FLD: 16.9 % — HIGH (ref 11.5–14.5)
SODIUM SERPL-SCNC: 131 MMOL/L — LOW (ref 135–146)
SPECIMEN SOURCE: SIGNIFICANT CHANGE UP
WBC # BLD: 12.73 K/UL — HIGH (ref 4.8–10.8)
WBC # FLD AUTO: 12.73 K/UL — HIGH (ref 4.8–10.8)

## 2024-11-08 PROCEDURE — 99239 HOSP IP/OBS DSCHRG MGMT >30: CPT

## 2024-11-08 RX ORDER — AMIODARONE HCL 200 MG
1 TABLET ORAL
Qty: 30 | Refills: 1
Start: 2024-11-08 | End: 2025-01-06

## 2024-11-08 RX ORDER — APIXABAN 5 MG/1
1 TABLET, FILM COATED ORAL
Qty: 60 | Refills: 1
Start: 2024-11-08 | End: 2025-01-06

## 2024-11-08 RX ADMIN — APIXABAN 2.5 MILLIGRAM(S): 5 TABLET, FILM COATED ORAL at 05:42

## 2024-11-08 RX ADMIN — Medication 81 MILLIGRAM(S): at 12:28

## 2024-11-08 RX ADMIN — Medication 100 MILLIGRAM(S): at 05:42

## 2024-11-08 RX ADMIN — Medication 2: at 12:25

## 2024-11-08 RX ADMIN — CHLORHEXIDINE GLUCONATE 1 APPLICATION(S): 40 SOLUTION TOPICAL at 05:43

## 2024-11-08 RX ADMIN — IPRATROPIUM BROMIDE AND ALBUTEROL SULFATE 3 MILLILITER(S): .5; 2.5 SOLUTION RESPIRATORY (INHALATION) at 09:24

## 2024-11-08 RX ADMIN — Medication 17 UNIT(S): at 12:27

## 2024-11-08 RX ADMIN — PANTOPRAZOLE SODIUM 40 MILLIGRAM(S): 40 TABLET, DELAYED RELEASE ORAL at 05:42

## 2024-11-08 RX ADMIN — Medication 50 MILLIGRAM(S): at 05:42

## 2024-11-08 RX ADMIN — Medication 400 MILLIGRAM(S): at 05:41

## 2024-11-08 RX ADMIN — Medication 0.6 MILLIGRAM(S): at 05:42

## 2024-11-08 RX ADMIN — MEMANTINE HYDROCHLORIDE 5 MILLIGRAM(S): 21 CAPSULE, EXTENDED RELEASE ORAL at 12:28

## 2024-11-08 NOTE — DISCHARGE NOTE PROVIDER - NSDCMRMEDTOKEN_GEN_ALL_CORE_FT
albuterol 90 mcg/inh inhalation aerosol: 2 puff(s) inhaled every 6 hours as needed for Shortness of Breath and/or Wheezing  amiodarone 200 mg oral tablet: 1 tab(s) orally once a day  apixaban 2.5 mg oral tablet: 1 tab(s) orally every 12 hours  Aspir 81 oral delayed release tablet: 1 tab(s) orally once a day  atorvastatin 80 mg oral tablet: 1 tab(s) orally once a day (at bedtime)  clopidogrel 75 mg oral tablet: 1 tab(s) orally once a day  colchicine 0.6 mg oral tablet: 1 tab(s) orally every 48 hours  glipiZIDE 5 mg oral tablet: 1 tab(s) orally once a day  Lantus 100 units/mL subcutaneous solution: 45 unit(s) subcutaneous once a day (at bedtime)  MEMANTINE HCL ER 7 MG CAPSULE: 1 tab(s) orally once a day  metoprolol tartrate 50 mg oral tablet: 1 tab(s) orally 2 times a day 50 mg twice a day  Mounjaro 2.5 mg/0.5 mL subcutaneous solution: 2.5 milligram(s) subcutaneously  omeprazole 40 mg oral delayed release capsule: 1 cap(s) orally once a day  tamsulosin 0.4 mg oral capsule: 1 cap(s) orally once a day (at bedtime)

## 2024-11-08 NOTE — DISCHARGE NOTE PROVIDER - NSDCFUSCHEDAPPT_GEN_ALL_CORE_FT
Liborio Deleon  [facility]  ELECTROPH [address]  Scheduled Appointment: 11/25/2024    [facility]  CARDIOLOGY [address]  Scheduled Appointment: 01/30/2025

## 2024-11-08 NOTE — DISCHARGE NOTE PROVIDER - CARE PROVIDERS DIRECT ADDRESSES
[email or web address]

## 2024-11-08 NOTE — DISCHARGE NOTE PROVIDER - PROVIDER TOKENS
PROVIDER:[TOKEN:[[id number]],FOLLOWUP:[1-3 days],ESTABLISHEDPATIENT:[T]] PROVIDER:[TOKEN:[[id number]],FOLLOWUP:[1-3 days],ESTABLISHEDPATIENT:[T]],PROVIDER:[TOKEN:[[id number]],FOLLOWUP:[2 weeks]],PROVIDER:[TOKEN:[[id number]],FOLLOWUP:[1 month]]

## 2024-11-08 NOTE — DISCHARGE NOTE PROVIDER - CARE PROVIDER_API CALL
Karen Ferris J  Internal Medicine  [address] [place] [address]  Phone: [phone]  Fax: [phone]  Established Patient  Follow Up Time: 1-3 days   Karen Ferris  Internal Medicine  [address] [place] [address]  Phone: [phone]  Fax: [phone]  Established Patient  Follow Up Time: 1-3 days    Rashawn Murrieta  Cardiology  [address] [place] [address], [address]  [place] [address]  Phone: [phone]  Fax: [phone]  Follow Up Time: 2 weeks    Gael Hogue  Critical Care Medicine  [address] [place] [address]  Phone: [phone]  Fax: [phone]  Follow Up Time: 1 month

## 2024-11-08 NOTE — DISCHARGE NOTE PROVIDER - NSDCFUADDAPPT_GEN_ALL_CORE_FT
APPTS ARE READY TO BE MADE: [x ] YES    Best Family or Patient Contact (if needed):    Additional Information about above appointments (if needed):    1: Dr Maldonado in 2 weeks  2:   3:     Other comments or requests:

## 2024-11-08 NOTE — DISCHARGE NOTE PROVIDER - NSDCCPCAREPLAN_GEN_ALL_CORE_FT
PRINCIPAL DISCHARGE DIAGNOSIS  Diagnosis: Acute on chronic heart failure with preserved ejection fraction (HFpEF)  Assessment and Plan of Treatment: You have a condition where your heart does not pump blood as efficinetly as it should. This lead to accumulation of water around the heart and inflammation of the membrane. We treated you with medication remove the excess water and reduce the inflammation. If you start to experience chest pain or become short of breath please return to the hospital.      SECONDARY DISCHARGE DIAGNOSES  Diagnosis: Atrial fibrillation  Assessment and Plan of Treatment: During your hospitilization you developed an irregular heart beat that increased your heart rate. We gave you medication that controlled the heart rate. You will need to continue this medication when you are discharged from the hospital. If you start to experience palpitations, begin to feel dizzy, lightheaded or start to experience shortness of breath please return to the hospital.

## 2024-11-08 NOTE — DISCHARGE NOTE PROVIDER - HOSPITAL COURSE
80 yo M PMHx CAD, DM II, HTN, and BPH, with recent admission for vision loss, and presented for evaluation of generalized weakness went to ER, was dc home, then returned later for sudden onset of substernal chest pressure, associated with shortness of breath.  Patient was initially at [facility] and was transferred to [facility] for evaluation of pulmonary edema on bipap, pericardial effusion and afib RVR. Pt was admitted to cardio step down. While in 4t pt was on amiodarone infusion and transitioned to PO.  on Nov 1 pt was sitting in chair, coughed and then wife  saw his eyes "roll to the back of his head" and started "shaking" after which RR was called. Patient returned to baseline in <30 seconds per wife. EEG was done and pending read. He was downgraded to telemetry on 11/2.    #Acute on chronic HFpEF   #New Onset Atrial Fibrillation w/ Rapid Ventricular Response  - Initially presented with significant sternal chest pain  - Trops 20 -> 16 -> 15 -> 20  - as per cardiology possibly due to Pericarditis and was started on colchcine  - c/w amio/metoprolol for now. c/w Eliquis  - 10/29 TTE: LVEF 65-70%, G1DD, borderline pHTN, small pericardial effusion  - ESR 34,   - Sent to [facility] due to concern for tamponade, poor windows on TTE at [facility]  - cardio held home Ranexa due to interaction w/ Amiodarone  - cardio recommended starting Eliquis 2.5mg BID for AC   - was on IV lasix, held on 11/5 to worsening Cr  - lasix PO on discharge    #Suspected ILD  - has significant SOB and hypoxia while ambulating. Check repeat limited echo to assess pericardial effusion. As per cardio fellow who reviewed echo, official read-no tamponade, f/u with Dr. Payne  - Aiden abs elevated but clinically no signs of coxsakie, given prior treatment of ILD this may be more rheum related effusion  - on CT chest from 6/2024 pt likely had usual interstitial pneumonia. Trial dose of steroids.  - pt was treated for SOB at [facility] june 2024. He was being worked up for ILD. Rheum panel sent but only C3, c4, ccp in system all of which are negative  - repeat CXr this AM    #Leukocytosis, unclear source  - Initially presumed due to UTI, however UA only remarkable for glucosuria  - next presumed due to to sputum cultures growing gram positive cocci and gram variable rods, commensal, so started on abx and later held  - Get CXR, repeat UA    #Dysuria  - repeat UA    #CARINA, resolved  - check bladder scan  - lasix held yesterday, possible pre renal from dehydration  - trend SCr    #Suspected vasovagal episode  - seen by neuro-recommended eeg  - EEG done, generalized slowing  - doubt seizure    #Pleural effusions/consolidation  #Suspected ILD  - xray appears to be volume overloaded form admission  - while in 4t was treated for possible Aspiration PNA  - clinically doubt pNA  - CXR showing effusions and consolidation  - Afebrile though with leukocytosis  - 10/30 FEES w/ mild dysphagia, when eating needs soft and bite sized diet w/ sips of thin liquid  - Off abx for now  - 10/31 Procal 0.32  - Multiple BCX NGTD; sputum culture shows commensal oli, PMNs, no indication for abx  - crackles resolve, change lasix to oral    #Confusion   - as per EMR wife states patient is "forgetful" has periods of being AOx1-3  - 10/29 MR Head: No acute pathology, mild chronic microvascular ischemic changes and chronic lacunar infarcts  - aaox3 now    #CKD III  - CARINA resolved  - back to baseline    #DM II  - monitor FS  - has hyperglycemia  - c/w insulin--adjusted dose daily over past few days  - unclear why pt remains hyperglycemic    #HTN  - Hypotensive during hospitalization  - Holding home antihypertensives for now    #BPH  - C/w tamsulosin    78 yo M PMHx CAD, DM II, HTN, and BPH, with recent admission for vision loss, and presented for evaluation of generalized weakness went to ER, was dc home, then returned later for sudden onset of substernal chest pressure, associated with shortness of breath.  Patient was initially at [facility] and was transferred to [facility] for evaluation of pulmonary edema on bipap, pericardial effusion and afib RVR. Pt was admitted to cardio step down. While in 4t pt was on amiodarone infusion and transitioned to PO.  on Nov 1 pt was sitting in chair, coughed and then wife  saw his eyes "roll to the back of his head" and started "shaking" after which RR was called. Patient returned to baseline in <30 seconds per wife. EEG was done and pending read. He was downgraded to telemetry on 11/2.        A/P:   Leukocytosis:   No fever, his WBC has been fluctuating,   CXR is stable, patient with urinary symptoms, UA is negative  Leukocytosis is likely reactive, no signs of infection.     Acute Hypoxic Respiratory Failure:   Acute on chronic HFpEF:   Patient with chest pain, SOB.   10/29 TTE: LVEF 65-70%, G1DD, borderline pHTN, small pericardial effusion  s/p Lasix IV  Currently euvolemic.     Paroxysmal Atrial Fibrillation with Rapid Ventricular Response: new diagnosis  HR is better controlled.   Continue Amiodarone, Metoprolol and Eliquis.     Chest Pain:   pericardial effusion: small possibly pericarditis vs CHF  ESR 34,   Echo showed small pericardial effusion, no tamponade.   Repeated echo 11/4 showed moderate pericardial effusion, effusion size is stable.   Cardiology recommended Colchicine, will treat for 2 weeks.     Interstitial Lung disease:   CT chest showed interstitial lung disease consisting of reticulation and honeycombing at the bases in the subpleural area bilaterally.  He was being worked up for ILD. Rheum panel sent but only C3, c4, ccp in system all of which are negative  Pulmonary follow up outpatient with Dr. Hogue    Syncopal episode on 11/1 Suspected vasovagal episode  Episode of AMS after coughing, return to baseline in less than a minute.   EEG showed generalized slowing    Confusion possibly hospital induced delirium.   10/29 MR Head: No acute pathology, mild chronic microvascular ischemic changes and chronic lacunar infarcts  Mental status improved    CKD 3-4  Cr 2.1-2.3    DM II  A1C 8.1  Continue Joss Jones     BPH: tamsulosin

## 2024-11-11 NOTE — CHART NOTE - NSCHARTNOTESELECT_GEN_ALL_CORE
Electrophysiology
Hypotension/Event Note
LOMN/Event Note
LOMN/Event Note
Concern for tamponade
Event Note
LOMN/Event Note
Letter of medical necessity/Event Note
Non-Clinical Appointment Info/Event Note
Swallow Evaluation/Event Note
Transfer Note
Transfer Note

## 2024-11-12 ENCOUNTER — RESULT REVIEW (OUTPATIENT)
Age: 79
End: 2024-11-12

## 2024-11-12 ENCOUNTER — INPATIENT (INPATIENT)
Facility: HOSPITAL | Age: 79
LOS: 3 days | Discharge: HOME HEALTH | DRG: 314 | End: 2024-11-16
Attending: INTERNAL MEDICINE | Admitting: INTERNAL MEDICINE
Payer: MEDICARE

## 2024-11-12 VITALS
HEIGHT: 66 IN | SYSTOLIC BLOOD PRESSURE: 95 MMHG | RESPIRATION RATE: 24 BRPM | DIASTOLIC BLOOD PRESSURE: 65 MMHG | HEART RATE: 82 BPM | OXYGEN SATURATION: 100 %

## 2024-11-12 DIAGNOSIS — Z95.5 PRESENCE OF CORONARY ANGIOPLASTY IMPLANT AND GRAFT: Chronic | ICD-10-CM

## 2024-11-12 DIAGNOSIS — I31.4 CARDIAC TAMPONADE: ICD-10-CM

## 2024-11-12 DIAGNOSIS — Z87.09 PERSONAL HISTORY OF OTHER DISEASES OF THE RESPIRATORY SYSTEM: Chronic | ICD-10-CM

## 2024-11-12 DIAGNOSIS — Z98.890 OTHER SPECIFIED POSTPROCEDURAL STATES: Chronic | ICD-10-CM

## 2024-11-12 LAB
ALBUMIN SERPL ELPH-MCNC: 3.1 G/DL — LOW (ref 3.5–5.2)
ALBUMIN SERPL ELPH-MCNC: 3.3 G/DL — LOW (ref 3.5–5.2)
ALBUMIN SERPL ELPH-MCNC: 3.6 G/DL — SIGNIFICANT CHANGE UP (ref 3.5–5.2)
ALP SERPL-CCNC: 149 U/L — HIGH (ref 30–115)
ALP SERPL-CCNC: 149 U/L — HIGH (ref 30–115)
ALP SERPL-CCNC: 177 U/L — HIGH (ref 30–115)
ALT FLD-CCNC: 2798 U/L — HIGH (ref 0–41)
ALT FLD-CCNC: 3104 U/L — HIGH (ref 0–41)
ALT FLD-CCNC: 4229 U/L — HIGH (ref 0–41)
AMMONIA BLD-MCNC: 23 UMOL/L — SIGNIFICANT CHANGE UP (ref 11–55)
ANION GAP SERPL CALC-SCNC: 14 MMOL/L — SIGNIFICANT CHANGE UP (ref 7–14)
ANION GAP SERPL CALC-SCNC: 19 MMOL/L — HIGH (ref 7–14)
ANION GAP SERPL CALC-SCNC: 24 MMOL/L — HIGH (ref 7–14)
APPEARANCE UR: ABNORMAL
APTT BLD: 28.2 SEC — SIGNIFICANT CHANGE UP (ref 27–39.2)
APTT BLD: 28.4 SEC — SIGNIFICANT CHANGE UP (ref 27–39.2)
AST SERPL-CCNC: 2670 U/L — HIGH (ref 0–41)
AST SERPL-CCNC: 3004 U/L — HIGH (ref 0–41)
AST SERPL-CCNC: 4268 U/L — HIGH (ref 0–41)
B PERT IGG+IGM PNL SER: ABNORMAL
BASE EXCESS BLDV CALC-SCNC: -7.4 MMOL/L — LOW (ref -2–3)
BASOPHILS # BLD AUTO: 0.02 K/UL — SIGNIFICANT CHANGE UP (ref 0–0.2)
BASOPHILS # BLD AUTO: 0.04 K/UL — SIGNIFICANT CHANGE UP (ref 0–0.2)
BASOPHILS NFR BLD AUTO: 0.1 % — SIGNIFICANT CHANGE UP (ref 0–1)
BASOPHILS NFR BLD AUTO: 0.2 % — SIGNIFICANT CHANGE UP (ref 0–1)
BILIRUB SERPL-MCNC: 1.4 MG/DL — HIGH (ref 0.2–1.2)
BILIRUB SERPL-MCNC: 2.2 MG/DL — HIGH (ref 0.2–1.2)
BILIRUB SERPL-MCNC: 2.2 MG/DL — HIGH (ref 0.2–1.2)
BILIRUB UR-MCNC: NEGATIVE — SIGNIFICANT CHANGE UP
BLD GP AB SCN SERPL QL: SIGNIFICANT CHANGE UP
BUN SERPL-MCNC: 73 MG/DL — CRITICAL HIGH (ref 10–20)
BUN SERPL-MCNC: 74 MG/DL — CRITICAL HIGH (ref 10–20)
BUN SERPL-MCNC: 78 MG/DL — CRITICAL HIGH (ref 10–20)
CA-I SERPL-SCNC: 1.1 MMOL/L — LOW (ref 1.15–1.33)
CALCIUM SERPL-MCNC: 7.8 MG/DL — LOW (ref 8.4–10.4)
CALCIUM SERPL-MCNC: 8.1 MG/DL — LOW (ref 8.4–10.4)
CALCIUM SERPL-MCNC: 9.2 MG/DL — SIGNIFICANT CHANGE UP (ref 8.4–10.5)
CHLORIDE SERPL-SCNC: 100 MMOL/L — SIGNIFICANT CHANGE UP (ref 98–110)
CHLORIDE SERPL-SCNC: 94 MMOL/L — LOW (ref 98–110)
CHLORIDE SERPL-SCNC: 97 MMOL/L — LOW (ref 98–110)
CO2 SERPL-SCNC: 14 MMOL/L — LOW (ref 17–32)
CO2 SERPL-SCNC: 16 MMOL/L — LOW (ref 17–32)
CO2 SERPL-SCNC: 17 MMOL/L — SIGNIFICANT CHANGE UP (ref 17–32)
COLOR FLD: SIGNIFICANT CHANGE UP
COLOR SPEC: SIGNIFICANT CHANGE UP
CREAT SERPL-MCNC: 3.5 MG/DL — HIGH (ref 0.7–1.5)
CREAT SERPL-MCNC: 3.5 MG/DL — HIGH (ref 0.7–1.5)
CREAT SERPL-MCNC: 3.7 MG/DL — HIGH (ref 0.7–1.5)
CRP SERPL-MCNC: 41.4 MG/L — HIGH
DIFF PNL FLD: ABNORMAL
EGFR: 16 ML/MIN/1.73M2 — LOW
EGFR: 16 ML/MIN/1.73M2 — LOW
EGFR: 17 ML/MIN/1.73M2 — LOW
EOSINOPHIL # BLD AUTO: 0 K/UL — SIGNIFICANT CHANGE UP (ref 0–0.7)
EOSINOPHIL # BLD AUTO: 0 K/UL — SIGNIFICANT CHANGE UP (ref 0–0.7)
EOSINOPHIL NFR BLD AUTO: 0 % — SIGNIFICANT CHANGE UP (ref 0–8)
EOSINOPHIL NFR BLD AUTO: 0 % — SIGNIFICANT CHANGE UP (ref 0–8)
ERYTHROCYTE [SEDIMENTATION RATE] IN BLOOD: 13 MM/HR — HIGH (ref 0–10)
FLUID INTAKE SUBSTANCE CLASS: SIGNIFICANT CHANGE UP
GAS PNL BLDA: SIGNIFICANT CHANGE UP
GAS PNL BLDV: 128 MMOL/L — LOW (ref 136–145)
GAS PNL BLDV: SIGNIFICANT CHANGE UP
GAS PNL BLDV: SIGNIFICANT CHANGE UP
GLUCOSE BLDC GLUCOMTR-MCNC: 129 MG/DL — HIGH (ref 70–99)
GLUCOSE BLDC GLUCOMTR-MCNC: 211 MG/DL — HIGH (ref 70–99)
GLUCOSE BLDC GLUCOMTR-MCNC: 218 MG/DL — HIGH (ref 70–99)
GLUCOSE SERPL-MCNC: 121 MG/DL — HIGH (ref 70–99)
GLUCOSE SERPL-MCNC: 215 MG/DL — HIGH (ref 70–99)
GLUCOSE SERPL-MCNC: 216 MG/DL — HIGH (ref 70–99)
GLUCOSE UR QL: >=1000 MG/DL
HCO3 BLDV-SCNC: 18 MMOL/L — LOW (ref 22–29)
HCT VFR BLD CALC: 31.9 % — LOW (ref 42–52)
HCT VFR BLD CALC: 34 % — LOW (ref 42–52)
HCT VFR BLD CALC: 39.7 % — LOW (ref 42–52)
HCT VFR BLDA CALC: 42 % — SIGNIFICANT CHANGE UP (ref 39–51)
HGB BLD CALC-MCNC: 14.1 G/DL — SIGNIFICANT CHANGE UP (ref 12.6–17.4)
HGB BLD-MCNC: 10.6 G/DL — LOW (ref 14–18)
HGB BLD-MCNC: 10.8 G/DL — LOW (ref 14–18)
HGB BLD-MCNC: 12.7 G/DL — LOW (ref 14–18)
IMM GRANULOCYTES NFR BLD AUTO: 1.6 % — HIGH (ref 0.1–0.3)
IMM GRANULOCYTES NFR BLD AUTO: 2.3 % — HIGH (ref 0.1–0.3)
INR BLD: 1.6 RATIO — HIGH (ref 0.65–1.3)
INR BLD: 2.36 RATIO — HIGH (ref 0.65–1.3)
KETONES UR-MCNC: NEGATIVE MG/DL — SIGNIFICANT CHANGE UP
LACTATE BLDV-MCNC: 5.6 MMOL/L — CRITICAL HIGH (ref 0.5–2)
LACTATE SERPL-SCNC: 5.7 MMOL/L — CRITICAL HIGH (ref 0.7–2)
LEUKOCYTE ESTERASE UR-ACNC: NEGATIVE — SIGNIFICANT CHANGE UP
LYMPHOCYTES # BLD AUTO: 0.62 K/UL — LOW (ref 1.2–3.4)
LYMPHOCYTES # BLD AUTO: 1.48 K/UL — SIGNIFICANT CHANGE UP (ref 1.2–3.4)
LYMPHOCYTES # BLD AUTO: 3.5 % — LOW (ref 20.5–51.1)
LYMPHOCYTES # BLD AUTO: 7.3 % — LOW (ref 20.5–51.1)
LYMPHOCYTES # FLD: 18 — SIGNIFICANT CHANGE UP
MAGNESIUM SERPL-MCNC: 2.6 MG/DL — HIGH (ref 1.8–2.4)
MCHC RBC-ENTMCNC: 28.1 PG — SIGNIFICANT CHANGE UP (ref 27–31)
MCHC RBC-ENTMCNC: 28.5 PG — SIGNIFICANT CHANGE UP (ref 27–31)
MCHC RBC-ENTMCNC: 28.9 PG — SIGNIFICANT CHANGE UP (ref 27–31)
MCHC RBC-ENTMCNC: 31.8 G/DL — LOW (ref 32–37)
MCHC RBC-ENTMCNC: 32 G/DL — SIGNIFICANT CHANGE UP (ref 32–37)
MCHC RBC-ENTMCNC: 33.2 G/DL — SIGNIFICANT CHANGE UP (ref 32–37)
MCV RBC AUTO: 86.9 FL — SIGNIFICANT CHANGE UP (ref 80–94)
MCV RBC AUTO: 88.5 FL — SIGNIFICANT CHANGE UP (ref 80–94)
MCV RBC AUTO: 89.2 FL — SIGNIFICANT CHANGE UP (ref 80–94)
MONOCYTES # BLD AUTO: 1 K/UL — HIGH (ref 0.1–0.6)
MONOCYTES # BLD AUTO: 1.47 K/UL — HIGH (ref 0.1–0.6)
MONOCYTES NFR BLD AUTO: 5.6 % — SIGNIFICANT CHANGE UP (ref 1.7–9.3)
MONOCYTES NFR BLD AUTO: 7.2 % — SIGNIFICANT CHANGE UP (ref 1.7–9.3)
MONOS+MACROS # FLD: 10 % — SIGNIFICANT CHANGE UP
MRSA PCR RESULT.: NEGATIVE — SIGNIFICANT CHANGE UP
NEUTROPHILS # BLD AUTO: 15.77 K/UL — HIGH (ref 1.4–6.5)
NEUTROPHILS # BLD AUTO: 17.04 K/UL — HIGH (ref 1.4–6.5)
NEUTROPHILS NFR BLD AUTO: 83.7 % — HIGH (ref 42.2–75.2)
NEUTROPHILS NFR BLD AUTO: 88.5 % — HIGH (ref 42.2–75.2)
NEUTROPHILS-BODY FLUID: 72 % — SIGNIFICANT CHANGE UP
NITRITE UR-MCNC: NEGATIVE — SIGNIFICANT CHANGE UP
NRBC # BLD: 0 /100 WBCS — SIGNIFICANT CHANGE UP (ref 0–0)
NRBC BLD-RTO: 0 /100 WBCS — SIGNIFICANT CHANGE UP (ref 0–0)
NT-PROBNP SERPL-SCNC: 1891 PG/ML — HIGH (ref 0–300)
OSMOLALITY UR: 510 MOS/KG — SIGNIFICANT CHANGE UP (ref 50–1200)
PCO2 BLDV: 36 MMHG — LOW (ref 42–55)
PH BLDV: 7.31 — LOW (ref 7.32–7.43)
PH UR: 5.5 — SIGNIFICANT CHANGE UP (ref 5–8)
PLATELET # BLD AUTO: 193 K/UL — SIGNIFICANT CHANGE UP (ref 130–400)
PLATELET # BLD AUTO: 224 K/UL — SIGNIFICANT CHANGE UP (ref 130–400)
PLATELET # BLD AUTO: 268 K/UL — SIGNIFICANT CHANGE UP (ref 130–400)
PMV BLD: 13.3 FL — HIGH (ref 7.4–10.4)
PMV BLD: 13.3 FL — HIGH (ref 7.4–10.4)
PMV BLD: 13.7 FL — HIGH (ref 7.4–10.4)
PO2 BLDV: 19 MMHG — LOW (ref 25–45)
POTASSIUM BLDV-SCNC: 5.1 MMOL/L — SIGNIFICANT CHANGE UP (ref 3.5–5.1)
POTASSIUM SERPL-MCNC: 5 MMOL/L — SIGNIFICANT CHANGE UP (ref 3.5–5)
POTASSIUM SERPL-MCNC: 5.3 MMOL/L — HIGH (ref 3.5–5)
POTASSIUM SERPL-MCNC: 5.4 MMOL/L — HIGH (ref 3.5–5)
POTASSIUM SERPL-SCNC: 5 MMOL/L — SIGNIFICANT CHANGE UP (ref 3.5–5)
POTASSIUM SERPL-SCNC: 5.3 MMOL/L — HIGH (ref 3.5–5)
POTASSIUM SERPL-SCNC: 5.4 MMOL/L — HIGH (ref 3.5–5)
POTASSIUM UR-SCNC: 40 MMOL/L — SIGNIFICANT CHANGE UP
PROT SERPL-MCNC: 5.4 G/DL — LOW (ref 6–8)
PROT SERPL-MCNC: 5.6 G/DL — LOW (ref 6–8)
PROT SERPL-MCNC: 6.4 G/DL — SIGNIFICANT CHANGE UP (ref 6–8)
PROT UR-MCNC: 30 MG/DL
PROTHROM AB SERPL-ACNC: 19.1 SEC — HIGH (ref 9.95–12.87)
PROTHROM AB SERPL-ACNC: 28.4 SEC — HIGH (ref 9.95–12.87)
RBC # BLD: 3.67 M/UL — LOW (ref 4.7–6.1)
RBC # BLD: 3.84 M/UL — LOW (ref 4.7–6.1)
RBC # BLD: 4.45 M/UL — LOW (ref 4.7–6.1)
RBC # FLD: 17 % — HIGH (ref 11.5–14.5)
RBC # FLD: 17.3 % — HIGH (ref 11.5–14.5)
RBC # FLD: 17.4 % — HIGH (ref 11.5–14.5)
RCV VOL RI: HIGH /UL (ref 0–0)
SAO2 % BLDV: 17 % — LOW (ref 67–88)
SODIUM SERPL-SCNC: 131 MMOL/L — LOW (ref 135–146)
SODIUM SERPL-SCNC: 132 MMOL/L — LOW (ref 135–146)
SODIUM SERPL-SCNC: 132 MMOL/L — LOW (ref 135–146)
SODIUM UR-SCNC: <20 MMOL/L — SIGNIFICANT CHANGE UP
SP GR SPEC: 1.02 — SIGNIFICANT CHANGE UP (ref 1–1.03)
T4 AB SER-ACNC: 9.9 UG/DL — SIGNIFICANT CHANGE UP (ref 4.6–12)
TOTAL NUCLEATED CELL COUNT, BODY FLUID: 5160 /UL — SIGNIFICANT CHANGE UP
TROPONIN T, HIGH SENSITIVITY RESULT: 30 NG/L — HIGH (ref 6–21)
TSH SERPL-MCNC: 1.51 UIU/ML — SIGNIFICANT CHANGE UP (ref 0.27–4.2)
TUBE TYPE: SIGNIFICANT CHANGE UP
UROBILINOGEN FLD QL: 0.2 MG/DL — SIGNIFICANT CHANGE UP (ref 0.2–1)
WBC # BLD: 16.17 K/UL — HIGH (ref 4.8–10.8)
WBC # BLD: 17.82 K/UL — HIGH (ref 4.8–10.8)
WBC # BLD: 20.36 K/UL — HIGH (ref 4.8–10.8)
WBC # FLD AUTO: 16.17 K/UL — HIGH (ref 4.8–10.8)
WBC # FLD AUTO: 17.82 K/UL — HIGH (ref 4.8–10.8)
WBC # FLD AUTO: 20.36 K/UL — HIGH (ref 4.8–10.8)

## 2024-11-12 PROCEDURE — 87640 STAPH A DNA AMP PROBE: CPT

## 2024-11-12 PROCEDURE — 85018 HEMOGLOBIN: CPT

## 2024-11-12 PROCEDURE — 83735 ASSAY OF MAGNESIUM: CPT

## 2024-11-12 PROCEDURE — 87015 SPECIMEN INFECT AGNT CONCNTJ: CPT

## 2024-11-12 PROCEDURE — 84540 ASSAY OF URINE/UREA-N: CPT

## 2024-11-12 PROCEDURE — 92610 EVALUATE SWALLOWING FUNCTION: CPT | Mod: GN

## 2024-11-12 PROCEDURE — 85610 PROTHROMBIN TIME: CPT

## 2024-11-12 PROCEDURE — 88112 CYTOPATH CELL ENHANCE TECH: CPT | Mod: 26

## 2024-11-12 PROCEDURE — 84132 ASSAY OF SERUM POTASSIUM: CPT

## 2024-11-12 PROCEDURE — 33017 PRCRD DRG 6YR+ W/O CGEN CAR: CPT

## 2024-11-12 PROCEDURE — 86850 RBC ANTIBODY SCREEN: CPT

## 2024-11-12 PROCEDURE — 87641 MR-STAPH DNA AMP PROBE: CPT

## 2024-11-12 PROCEDURE — 93005 ELECTROCARDIOGRAM TRACING: CPT

## 2024-11-12 PROCEDURE — 84145 PROCALCITONIN (PCT): CPT

## 2024-11-12 PROCEDURE — C1894: CPT

## 2024-11-12 PROCEDURE — 87075 CULTR BACTERIA EXCEPT BLOOD: CPT

## 2024-11-12 PROCEDURE — 87102 FUNGUS ISOLATION CULTURE: CPT

## 2024-11-12 PROCEDURE — 85014 HEMATOCRIT: CPT

## 2024-11-12 PROCEDURE — 84156 ASSAY OF PROTEIN URINE: CPT

## 2024-11-12 PROCEDURE — 86900 BLOOD TYPING SEROLOGIC ABO: CPT

## 2024-11-12 PROCEDURE — 82330 ASSAY OF CALCIUM: CPT

## 2024-11-12 PROCEDURE — 85730 THROMBOPLASTIN TIME PARTIAL: CPT

## 2024-11-12 PROCEDURE — 76705 ECHO EXAM OF ABDOMEN: CPT

## 2024-11-12 PROCEDURE — 84295 ASSAY OF SERUM SODIUM: CPT

## 2024-11-12 PROCEDURE — 82803 BLOOD GASES ANY COMBINATION: CPT

## 2024-11-12 PROCEDURE — 87116 MYCOBACTERIA CULTURE: CPT

## 2024-11-12 PROCEDURE — 83935 ASSAY OF URINE OSMOLALITY: CPT

## 2024-11-12 PROCEDURE — 87252 VIRUS INOCULATION TISSUE: CPT

## 2024-11-12 PROCEDURE — 93308 TTE F-UP OR LMTD: CPT

## 2024-11-12 PROCEDURE — C1729: CPT

## 2024-11-12 PROCEDURE — 80053 COMPREHEN METABOLIC PANEL: CPT

## 2024-11-12 PROCEDURE — 84133 ASSAY OF URINE POTASSIUM: CPT

## 2024-11-12 PROCEDURE — 85027 COMPLETE CBC AUTOMATED: CPT

## 2024-11-12 PROCEDURE — 36620 INSERTION CATHETER ARTERY: CPT

## 2024-11-12 PROCEDURE — 81001 URINALYSIS AUTO W/SCOPE: CPT

## 2024-11-12 PROCEDURE — 87205 SMEAR GRAM STAIN: CPT

## 2024-11-12 PROCEDURE — 93010 ELECTROCARDIOGRAM REPORT: CPT

## 2024-11-12 PROCEDURE — 97163 PT EVAL HIGH COMPLEX 45 MIN: CPT | Mod: GP

## 2024-11-12 PROCEDURE — 93306 TTE W/DOPPLER COMPLETE: CPT | Mod: 26,59

## 2024-11-12 PROCEDURE — C1769: CPT

## 2024-11-12 PROCEDURE — 87040 BLOOD CULTURE FOR BACTERIA: CPT

## 2024-11-12 PROCEDURE — 85025 COMPLETE CBC W/AUTO DIFF WBC: CPT

## 2024-11-12 PROCEDURE — 89051 BODY FLUID CELL COUNT: CPT

## 2024-11-12 PROCEDURE — 80074 ACUTE HEPATITIS PANEL: CPT

## 2024-11-12 PROCEDURE — 82962 GLUCOSE BLOOD TEST: CPT

## 2024-11-12 PROCEDURE — 71045 X-RAY EXAM CHEST 1 VIEW: CPT

## 2024-11-12 PROCEDURE — 0225U NFCT DS DNA&RNA 21 SARSCOV2: CPT

## 2024-11-12 PROCEDURE — 97116 GAIT TRAINING THERAPY: CPT | Mod: GP

## 2024-11-12 PROCEDURE — 86901 BLOOD TYPING SEROLOGIC RH(D): CPT

## 2024-11-12 PROCEDURE — 84157 ASSAY OF PROTEIN OTHER: CPT

## 2024-11-12 PROCEDURE — 83605 ASSAY OF LACTIC ACID: CPT

## 2024-11-12 PROCEDURE — 82042 OTHER SOURCE ALBUMIN QUAN EA: CPT

## 2024-11-12 PROCEDURE — 99291 CRITICAL CARE FIRST HOUR: CPT | Mod: 25

## 2024-11-12 PROCEDURE — 88112 CYTOPATH CELL ENHANCE TECH: CPT

## 2024-11-12 PROCEDURE — 97112 NEUROMUSCULAR REEDUCATION: CPT | Mod: GP

## 2024-11-12 PROCEDURE — 86658 ENTEROVIRUS ANTIBODY: CPT

## 2024-11-12 PROCEDURE — 87070 CULTURE OTHR SPECIMN AEROBIC: CPT

## 2024-11-12 PROCEDURE — 93306 TTE W/DOPPLER COMPLETE: CPT

## 2024-11-12 PROCEDURE — 93308 TTE F-UP OR LMTD: CPT | Mod: 26

## 2024-11-12 PROCEDURE — 99291 CRITICAL CARE FIRST HOUR: CPT

## 2024-11-12 PROCEDURE — 36415 COLL VENOUS BLD VENIPUNCTURE: CPT

## 2024-11-12 PROCEDURE — 82570 ASSAY OF URINE CREATININE: CPT

## 2024-11-12 PROCEDURE — 36410 VNPNXR 3YR/> PHY/QHP DX/THER: CPT | Mod: 59

## 2024-11-12 PROCEDURE — 84300 ASSAY OF URINE SODIUM: CPT

## 2024-11-12 PROCEDURE — 83615 LACTATE (LD) (LDH) ENZYME: CPT

## 2024-11-12 PROCEDURE — 82945 GLUCOSE OTHER FLUID: CPT

## 2024-11-12 PROCEDURE — 87206 SMEAR FLUORESCENT/ACID STAI: CPT

## 2024-11-12 RX ORDER — METOPROLOL SUCCINATE 50 MG/1
50 TABLET, EXTENDED RELEASE ORAL
Refills: 0 | Status: DISCONTINUED | OUTPATIENT
Start: 2024-11-12 | End: 2024-11-16

## 2024-11-12 RX ORDER — DEXTROSE 50 % IN WATER 50 %
15 SYRINGE (ML) INTRAVENOUS ONCE
Refills: 0 | Status: DISCONTINUED | OUTPATIENT
Start: 2024-11-12 | End: 2024-11-16

## 2024-11-12 RX ORDER — ASPIRIN 325 MG
81 TABLET ORAL DAILY
Refills: 0 | Status: DISCONTINUED | OUTPATIENT
Start: 2024-11-12 | End: 2024-11-15

## 2024-11-12 RX ORDER — SODIUM CHLORIDE 9 G/1000ML
1000 INJECTION, SOLUTION INTRAVENOUS ONCE
Refills: 0 | Status: COMPLETED | OUTPATIENT
Start: 2024-11-12 | End: 2024-11-12

## 2024-11-12 RX ORDER — DEXTROSE 50 % IN WATER 50 %
25 SYRINGE (ML) INTRAVENOUS ONCE
Refills: 0 | Status: DISCONTINUED | OUTPATIENT
Start: 2024-11-12 | End: 2024-11-16

## 2024-11-12 RX ORDER — SODIUM CHLORIDE 9 G/1000ML
1000 INJECTION, SOLUTION INTRAVENOUS
Refills: 0 | Status: DISCONTINUED | OUTPATIENT
Start: 2024-11-12 | End: 2024-11-16

## 2024-11-12 RX ORDER — INSULIN GLARGINE-YFGN 100 [IU]/ML
100 INJECTION, SOLUTION SUBCUTANEOUS
Refills: 0 | DISCHARGE

## 2024-11-12 RX ORDER — DEXTROSE 50 % IN WATER 50 %
12.5 SYRINGE (ML) INTRAVENOUS ONCE
Refills: 0 | Status: DISCONTINUED | OUTPATIENT
Start: 2024-11-12 | End: 2024-11-16

## 2024-11-12 RX ORDER — EMPAGLIFLOZIN 25 MG/1
1 TABLET, FILM COATED ORAL
Refills: 0 | DISCHARGE

## 2024-11-12 RX ORDER — COLCHICINE 0.6 MG/1
0.3 TABLET, FILM COATED ORAL
Refills: 0 | Status: DISCONTINUED | OUTPATIENT
Start: 2024-11-14 | End: 2024-11-15

## 2024-11-12 RX ORDER — TAMSULOSIN HYDROCHLORIDE 0.4 MG/1
0.4 CAPSULE ORAL AT BEDTIME
Refills: 0 | Status: DISCONTINUED | OUTPATIENT
Start: 2024-11-12 | End: 2024-11-16

## 2024-11-12 RX ORDER — INSULIN LISPRO 100 U/ML
INJECTION, SOLUTION INTRAVENOUS; SUBCUTANEOUS
Refills: 0 | Status: DISCONTINUED | OUTPATIENT
Start: 2024-11-12 | End: 2024-11-16

## 2024-11-12 RX ORDER — NOREPINEPHRINE BITARTRATE 8 MG
0.05 SOLUTION INTRAVENOUS
Qty: 8 | Refills: 0 | Status: DISCONTINUED | OUTPATIENT
Start: 2024-11-12 | End: 2024-11-12

## 2024-11-12 RX ORDER — COLCHICINE 0.6 MG/1
0.6 TABLET, FILM COATED ORAL
Refills: 0 | Status: DISCONTINUED | OUTPATIENT
Start: 2024-11-12 | End: 2024-11-12

## 2024-11-12 RX ORDER — MEMANTINE HYDROCHLORIDE 21 MG/1
5 CAPSULE, EXTENDED RELEASE ORAL DAILY
Refills: 0 | Status: DISCONTINUED | OUTPATIENT
Start: 2024-11-12 | End: 2024-11-16

## 2024-11-12 RX ORDER — TIRZEPATIDE 7.5 MG/.5ML
2.5 INJECTION, SOLUTION SUBCUTANEOUS
Refills: 0 | DISCHARGE

## 2024-11-12 RX ORDER — COLCHICINE 0.6 MG/1
0.6 TABLET, FILM COATED ORAL ONCE
Refills: 0 | Status: COMPLETED | OUTPATIENT
Start: 2024-11-12 | End: 2024-11-12

## 2024-11-12 RX ORDER — CEFEPIME 2 G/20ML
2000 INJECTION, POWDER, FOR SOLUTION INTRAVENOUS ONCE
Refills: 0 | Status: COMPLETED | OUTPATIENT
Start: 2024-11-12 | End: 2024-11-12

## 2024-11-12 RX ORDER — INSULIN LISPRO 100 U/ML
7 INJECTION, SOLUTION INTRAVENOUS; SUBCUTANEOUS
Refills: 0 | Status: DISCONTINUED | OUTPATIENT
Start: 2024-11-12 | End: 2024-11-16

## 2024-11-12 RX ORDER — PROTHROMBIN COMPLEX CONCENTRATE (HUMAN) 25.5; 16.5; 24; 22; 22; 26 [IU]/ML; [IU]/ML; [IU]/ML; [IU]/ML; [IU]/ML; [IU]/ML
4500 POWDER, FOR SOLUTION INTRAVENOUS ONCE
Refills: 0 | Status: COMPLETED | OUTPATIENT
Start: 2024-11-12 | End: 2024-11-12

## 2024-11-12 RX ORDER — EZETIMIBE 10 MG/1
1 TABLET ORAL
Refills: 0 | DISCHARGE

## 2024-11-12 RX ORDER — ALBUTEROL SULFATE 2.5 MG/3ML
2 VIAL, NEBULIZER (ML) INHALATION EVERY 6 HOURS
Refills: 0 | Status: DISCONTINUED | OUTPATIENT
Start: 2024-11-12 | End: 2024-11-16

## 2024-11-12 RX ORDER — INSULIN GLARGINE-YFGN 100 [IU]/ML
20 INJECTION, SOLUTION SUBCUTANEOUS AT BEDTIME
Refills: 0 | Status: DISCONTINUED | OUTPATIENT
Start: 2024-11-12 | End: 2024-11-16

## 2024-11-12 RX ORDER — GLUCAGON 3 MG/1
1 POWDER NASAL ONCE
Refills: 0 | Status: DISCONTINUED | OUTPATIENT
Start: 2024-11-12 | End: 2024-11-16

## 2024-11-12 RX ADMIN — PROTHROMBIN COMPLEX CONCENTRATE (HUMAN) 400 INTERNATIONAL UNIT(S): 25.5; 16.5; 24; 22; 22; 26 POWDER, FOR SOLUTION INTRAVENOUS at 10:59

## 2024-11-12 RX ADMIN — SODIUM CHLORIDE 1000 MILLILITER(S): 9 INJECTION, SOLUTION INTRAVENOUS at 10:24

## 2024-11-12 RX ADMIN — Medication 40 MILLIGRAM(S): at 17:42

## 2024-11-12 RX ADMIN — MEMANTINE HYDROCHLORIDE 5 MILLIGRAM(S): 21 CAPSULE, EXTENDED RELEASE ORAL at 17:42

## 2024-11-12 RX ADMIN — Medication 81 MILLIGRAM(S): at 17:44

## 2024-11-12 RX ADMIN — COLCHICINE 0.6 MILLIGRAM(S): 0.6 TABLET, FILM COATED ORAL at 17:42

## 2024-11-12 RX ADMIN — INSULIN GLARGINE-YFGN 20 UNIT(S): 100 INJECTION, SOLUTION SUBCUTANEOUS at 22:06

## 2024-11-12 RX ADMIN — CEFEPIME 100 MILLIGRAM(S): 2 INJECTION, POWDER, FOR SOLUTION INTRAVENOUS at 10:24

## 2024-11-12 RX ADMIN — METOPROLOL SUCCINATE 50 MILLIGRAM(S): 50 TABLET, EXTENDED RELEASE ORAL at 17:44

## 2024-11-12 RX ADMIN — NOREPINEPHRINE BITARTRATE 8.44 MICROGRAM(S)/KG/MIN: 8 SOLUTION at 10:59

## 2024-11-12 RX ADMIN — INSULIN LISPRO 7 UNIT(S): 100 INJECTION, SOLUTION INTRAVENOUS; SUBCUTANEOUS at 17:45

## 2024-11-12 RX ADMIN — Medication 1 APPLICATION(S): at 17:42

## 2024-11-12 RX ADMIN — INSULIN LISPRO 2: 100 INJECTION, SOLUTION INTRAVENOUS; SUBCUTANEOUS at 17:45

## 2024-11-12 RX ADMIN — TAMSULOSIN HYDROCHLORIDE 0.4 MILLIGRAM(S): 0.4 CAPSULE ORAL at 22:06

## 2024-11-13 ENCOUNTER — RESULT REVIEW (OUTPATIENT)
Age: 79
End: 2024-11-13

## 2024-11-13 DIAGNOSIS — E66.9 OBESITY, UNSPECIFIED: ICD-10-CM

## 2024-11-13 DIAGNOSIS — I50.33 ACUTE ON CHRONIC DIASTOLIC (CONGESTIVE) HEART FAILURE: ICD-10-CM

## 2024-11-13 DIAGNOSIS — Z79.82 LONG TERM (CURRENT) USE OF ASPIRIN: ICD-10-CM

## 2024-11-13 DIAGNOSIS — Z79.899 OTHER LONG TERM (CURRENT) DRUG THERAPY: ICD-10-CM

## 2024-11-13 DIAGNOSIS — I48.0 PAROXYSMAL ATRIAL FIBRILLATION: ICD-10-CM

## 2024-11-13 DIAGNOSIS — Z79.4 LONG TERM (CURRENT) USE OF INSULIN: ICD-10-CM

## 2024-11-13 DIAGNOSIS — E11.22 TYPE 2 DIABETES MELLITUS WITH DIABETIC CHRONIC KIDNEY DISEASE: ICD-10-CM

## 2024-11-13 DIAGNOSIS — D84.81 IMMUNODEFICIENCY DUE TO CONDITIONS CLASSIFIED ELSEWHERE: ICD-10-CM

## 2024-11-13 DIAGNOSIS — F05 DELIRIUM DUE TO KNOWN PHYSIOLOGICAL CONDITION: ICD-10-CM

## 2024-11-13 DIAGNOSIS — D72.829 ELEVATED WHITE BLOOD CELL COUNT, UNSPECIFIED: ICD-10-CM

## 2024-11-13 DIAGNOSIS — J96.01 ACUTE RESPIRATORY FAILURE WITH HYPOXIA: ICD-10-CM

## 2024-11-13 DIAGNOSIS — Z79.84 LONG TERM (CURRENT) USE OF ORAL HYPOGLYCEMIC DRUGS: ICD-10-CM

## 2024-11-13 DIAGNOSIS — Z79.01 LONG TERM (CURRENT) USE OF ANTICOAGULANTS: ICD-10-CM

## 2024-11-13 DIAGNOSIS — Z79.621 LONG TERM (CURRENT) USE OF CALCINEURIN INHIBITOR: ICD-10-CM

## 2024-11-13 DIAGNOSIS — N18.30 CHRONIC KIDNEY DISEASE, STAGE 3 UNSPECIFIED: ICD-10-CM

## 2024-11-13 DIAGNOSIS — I25.2 OLD MYOCARDIAL INFARCTION: ICD-10-CM

## 2024-11-13 DIAGNOSIS — R55 SYNCOPE AND COLLAPSE: ICD-10-CM

## 2024-11-13 DIAGNOSIS — E11.65 TYPE 2 DIABETES MELLITUS WITH HYPERGLYCEMIA: ICD-10-CM

## 2024-11-13 DIAGNOSIS — I13.0 HYPERTENSIVE HEART AND CHRONIC KIDNEY DISEASE WITH HEART FAILURE AND STAGE 1 THROUGH STAGE 4 CHRONIC KIDNEY DISEASE, OR UNSPECIFIED CHRONIC KIDNEY DISEASE: ICD-10-CM

## 2024-11-13 DIAGNOSIS — I31.39 OTHER PERICARDIAL EFFUSION (NONINFLAMMATORY): ICD-10-CM

## 2024-11-13 DIAGNOSIS — J84.9 INTERSTITIAL PULMONARY DISEASE, UNSPECIFIED: ICD-10-CM

## 2024-11-13 DIAGNOSIS — I25.10 ATHEROSCLEROTIC HEART DISEASE OF NATIVE CORONARY ARTERY WITHOUT ANGINA PECTORIS: ICD-10-CM

## 2024-11-13 DIAGNOSIS — Z95.0 PRESENCE OF CARDIAC PACEMAKER: ICD-10-CM

## 2024-11-13 DIAGNOSIS — N17.9 ACUTE KIDNEY FAILURE, UNSPECIFIED: ICD-10-CM

## 2024-11-13 DIAGNOSIS — Z95.5 PRESENCE OF CORONARY ANGIOPLASTY IMPLANT AND GRAFT: ICD-10-CM

## 2024-11-13 DIAGNOSIS — N40.0 BENIGN PROSTATIC HYPERPLASIA WITHOUT LOWER URINARY TRACT SYMPTOMS: ICD-10-CM

## 2024-11-13 LAB
ALBUMIN FLD-MCNC: 2.9 G/DL — SIGNIFICANT CHANGE UP
ALBUMIN SERPL ELPH-MCNC: 3.1 G/DL — LOW (ref 3.5–5.2)
ALP SERPL-CCNC: 150 U/L — HIGH (ref 30–115)
ALT FLD-CCNC: 3706 U/L — HIGH (ref 0–41)
ANION GAP SERPL CALC-SCNC: 11 MMOL/L — SIGNIFICANT CHANGE UP (ref 7–14)
APTT BLD: 29.2 SEC — SIGNIFICANT CHANGE UP (ref 27–39.2)
AST SERPL-CCNC: 3201 U/L — HIGH (ref 0–41)
BASOPHILS # BLD AUTO: 0.02 K/UL — SIGNIFICANT CHANGE UP (ref 0–0.2)
BASOPHILS NFR BLD AUTO: 0.2 % — SIGNIFICANT CHANGE UP (ref 0–1)
BILIRUB SERPL-MCNC: 1.4 MG/DL — HIGH (ref 0.2–1.2)
BUN SERPL-MCNC: 76 MG/DL — CRITICAL HIGH (ref 10–20)
CALCIUM SERPL-MCNC: 7.7 MG/DL — LOW (ref 8.4–10.4)
CHLORIDE SERPL-SCNC: 102 MMOL/L — SIGNIFICANT CHANGE UP (ref 98–110)
CO2 SERPL-SCNC: 21 MMOL/L — SIGNIFICANT CHANGE UP (ref 17–32)
CREAT ?TM UR-MCNC: 89 MG/DL — SIGNIFICANT CHANGE UP
CREAT SERPL-MCNC: 3.2 MG/DL — HIGH (ref 0.7–1.5)
EGFR: 19 ML/MIN/1.73M2 — LOW
EGFR: 19 ML/MIN/1.73M2 — LOW
EOSINOPHIL # BLD AUTO: 0.02 K/UL — SIGNIFICANT CHANGE UP (ref 0–0.7)
EOSINOPHIL NFR BLD AUTO: 0.2 % — SIGNIFICANT CHANGE UP (ref 0–8)
GAS PNL BLDA: SIGNIFICANT CHANGE UP
GLUCOSE BLDC GLUCOMTR-MCNC: 111 MG/DL — HIGH (ref 70–99)
GLUCOSE BLDC GLUCOMTR-MCNC: 126 MG/DL — HIGH (ref 70–99)
GLUCOSE BLDC GLUCOMTR-MCNC: 148 MG/DL — HIGH (ref 70–99)
GLUCOSE BLDC GLUCOMTR-MCNC: 223 MG/DL — HIGH (ref 70–99)
GLUCOSE BLDC GLUCOMTR-MCNC: 64 MG/DL — LOW (ref 70–99)
GLUCOSE FLD-MCNC: 119 MG/DL — SIGNIFICANT CHANGE UP
GLUCOSE SERPL-MCNC: 62 MG/DL — LOW (ref 70–99)
GRAM STN FLD: SIGNIFICANT CHANGE UP
HCT VFR BLD CALC: 31.2 % — LOW (ref 42–52)
HGB BLD-MCNC: 10.4 G/DL — LOW (ref 14–18)
IMM GRANULOCYTES NFR BLD AUTO: 1.1 % — HIGH (ref 0.1–0.3)
INR BLD: 1.92 RATIO — HIGH (ref 0.65–1.3)
LDH SERPL L TO P-CCNC: 1475 U/L — SIGNIFICANT CHANGE UP
LYMPHOCYTES # BLD AUTO: 1.54 K/UL — SIGNIFICANT CHANGE UP (ref 1.2–3.4)
LYMPHOCYTES # BLD AUTO: 13.8 % — LOW (ref 20.5–51.1)
MAGNESIUM SERPL-MCNC: 2.6 MG/DL — HIGH (ref 1.8–2.4)
MCHC RBC-ENTMCNC: 28.6 PG — SIGNIFICANT CHANGE UP (ref 27–31)
MCHC RBC-ENTMCNC: 33.3 G/DL — SIGNIFICANT CHANGE UP (ref 32–37)
MCV RBC AUTO: 85.7 FL — SIGNIFICANT CHANGE UP (ref 80–94)
MONOCYTES # BLD AUTO: 0.58 K/UL — SIGNIFICANT CHANGE UP (ref 0.1–0.6)
MONOCYTES NFR BLD AUTO: 5.2 % — SIGNIFICANT CHANGE UP (ref 1.7–9.3)
NEUTROPHILS # BLD AUTO: 8.9 K/UL — HIGH (ref 1.4–6.5)
NEUTROPHILS NFR BLD AUTO: 79.5 % — HIGH (ref 42.2–75.2)
NIGHT BLUE STAIN TISS: SIGNIFICANT CHANGE UP
NRBC # BLD: 0 /100 WBCS — SIGNIFICANT CHANGE UP (ref 0–0)
NRBC BLD-RTO: 0 /100 WBCS — SIGNIFICANT CHANGE UP (ref 0–0)
PLATELET # BLD AUTO: 190 K/UL — SIGNIFICANT CHANGE UP (ref 130–400)
PMV BLD: 13 FL — HIGH (ref 7.4–10.4)
POTASSIUM SERPL-MCNC: 3.6 MMOL/L — SIGNIFICANT CHANGE UP (ref 3.5–5)
POTASSIUM SERPL-SCNC: 3.6 MMOL/L — SIGNIFICANT CHANGE UP (ref 3.5–5)
PROCALCITONIN SERPL-MCNC: 0.13 NG/ML — HIGH (ref 0.02–0.1)
PROT ?TM UR-MCNC: 36 MG/DLG/24H — SIGNIFICANT CHANGE UP
PROT FLD-MCNC: 5.2 G/DL — SIGNIFICANT CHANGE UP
PROT SERPL-MCNC: 5.2 G/DL — LOW (ref 6–8)
PROT/CREAT UR-RTO: 0.4 RATIO — HIGH (ref 0–0.2)
PROTHROM AB SERPL-ACNC: 22.9 SEC — HIGH (ref 9.95–12.87)
RAPID RVP RESULT: SIGNIFICANT CHANGE UP
RBC # BLD: 3.64 M/UL — LOW (ref 4.7–6.1)
RBC # FLD: 17.1 % — HIGH (ref 11.5–14.5)
SARS-COV-2 RNA SPEC QL NAA+PROBE: SIGNIFICANT CHANGE UP
SODIUM SERPL-SCNC: 134 MMOL/L — LOW (ref 135–146)
SPECIMEN SOURCE: SIGNIFICANT CHANGE UP
SPECIMEN SOURCE: SIGNIFICANT CHANGE UP
T3/T3 UPTAKE INDEX SERPL-RTO: 47 % — HIGH (ref 28–41)
T4/T3 UPTAKE INDEX SERPL: 0.6 TBI — LOW (ref 0.8–1.3)
UUN UR-MCNC: 605 MG/DL — SIGNIFICANT CHANGE UP
WBC # BLD: 11.18 K/UL — HIGH (ref 4.8–10.8)
WBC # FLD AUTO: 11.18 K/UL — HIGH (ref 4.8–10.8)

## 2024-11-13 PROCEDURE — 71045 X-RAY EXAM CHEST 1 VIEW: CPT | Mod: 26

## 2024-11-13 PROCEDURE — 76705 ECHO EXAM OF ABDOMEN: CPT | Mod: 26

## 2024-11-13 PROCEDURE — 93010 ELECTROCARDIOGRAM REPORT: CPT

## 2024-11-13 PROCEDURE — 93306 TTE W/DOPPLER COMPLETE: CPT | Mod: 26

## 2024-11-13 PROCEDURE — 99291 CRITICAL CARE FIRST HOUR: CPT

## 2024-11-13 RX ORDER — SODIUM ZIRCONIUM CYCLOSILICATE 5 G/5G
5 POWDER, FOR SUSPENSION ORAL ONCE
Refills: 0 | Status: COMPLETED | OUTPATIENT
Start: 2024-11-13 | End: 2024-11-13

## 2024-11-13 RX ORDER — POLYETHYLENE GLYCOL 3350 17 G/17G
17 POWDER, FOR SOLUTION ORAL DAILY
Refills: 0 | Status: DISCONTINUED | OUTPATIENT
Start: 2024-11-13 | End: 2024-11-16

## 2024-11-13 RX ADMIN — Medication 81 MILLIGRAM(S): at 12:57

## 2024-11-13 RX ADMIN — INSULIN GLARGINE-YFGN 20 UNIT(S): 100 INJECTION, SOLUTION SUBCUTANEOUS at 21:39

## 2024-11-13 RX ADMIN — METOPROLOL SUCCINATE 50 MILLIGRAM(S): 50 TABLET, EXTENDED RELEASE ORAL at 05:14

## 2024-11-13 RX ADMIN — Medication 50 MILLIEQUIVALENT(S): at 09:28

## 2024-11-13 RX ADMIN — Medication 1 APPLICATION(S): at 05:14

## 2024-11-13 RX ADMIN — TAMSULOSIN HYDROCHLORIDE 0.4 MILLIGRAM(S): 0.4 CAPSULE ORAL at 21:37

## 2024-11-13 RX ADMIN — Medication 40 MILLIGRAM(S): at 05:14

## 2024-11-13 RX ADMIN — Medication 50 MILLIEQUIVALENT(S): at 12:57

## 2024-11-13 RX ADMIN — METOPROLOL SUCCINATE 50 MILLIGRAM(S): 50 TABLET, EXTENDED RELEASE ORAL at 18:02

## 2024-11-13 RX ADMIN — MEMANTINE HYDROCHLORIDE 5 MILLIGRAM(S): 21 CAPSULE, EXTENDED RELEASE ORAL at 12:57

## 2024-11-13 RX ADMIN — SODIUM ZIRCONIUM CYCLOSILICATE 5 GRAM(S): 5 POWDER, FOR SUSPENSION ORAL at 00:30

## 2024-11-14 ENCOUNTER — RESULT REVIEW (OUTPATIENT)
Age: 79
End: 2024-11-14

## 2024-11-14 LAB
ALBUMIN SERPL ELPH-MCNC: 3 G/DL — LOW (ref 3.5–5.2)
ALP SERPL-CCNC: 166 U/L — HIGH (ref 30–115)
ALT FLD-CCNC: 2498 U/L — HIGH (ref 0–41)
ANION GAP SERPL CALC-SCNC: 12 MMOL/L — SIGNIFICANT CHANGE UP (ref 7–14)
AST SERPL-CCNC: 973 U/L — HIGH (ref 0–41)
BASOPHILS # BLD AUTO: 0.03 K/UL — SIGNIFICANT CHANGE UP (ref 0–0.2)
BASOPHILS NFR BLD AUTO: 0.3 % — SIGNIFICANT CHANGE UP (ref 0–1)
BILIRUB SERPL-MCNC: 1.4 MG/DL — HIGH (ref 0.2–1.2)
BUN SERPL-MCNC: 55 MG/DL — HIGH (ref 10–20)
CALCIUM SERPL-MCNC: 7.9 MG/DL — LOW (ref 8.4–10.5)
CHLORIDE SERPL-SCNC: 102 MMOL/L — SIGNIFICANT CHANGE UP (ref 98–110)
CO2 SERPL-SCNC: 20 MMOL/L — SIGNIFICANT CHANGE UP (ref 17–32)
CREAT SERPL-MCNC: 2.1 MG/DL — HIGH (ref 0.7–1.5)
EGFR: 31 ML/MIN/1.73M2 — LOW
EGFR: 31 ML/MIN/1.73M2 — LOW
EOSINOPHIL # BLD AUTO: 0.04 K/UL — SIGNIFICANT CHANGE UP (ref 0–0.7)
EOSINOPHIL NFR BLD AUTO: 0.4 % — SIGNIFICANT CHANGE UP (ref 0–8)
GLUCOSE BLDC GLUCOMTR-MCNC: 177 MG/DL — HIGH (ref 70–99)
GLUCOSE BLDC GLUCOMTR-MCNC: 190 MG/DL — HIGH (ref 70–99)
GLUCOSE BLDC GLUCOMTR-MCNC: 195 MG/DL — HIGH (ref 70–99)
GLUCOSE BLDC GLUCOMTR-MCNC: 318 MG/DL — HIGH (ref 70–99)
GLUCOSE BLDC GLUCOMTR-MCNC: 90 MG/DL — SIGNIFICANT CHANGE UP (ref 70–99)
GLUCOSE SERPL-MCNC: 117 MG/DL — HIGH (ref 70–99)
HAV IGM SER-ACNC: SIGNIFICANT CHANGE UP
HBV CORE IGM SER-ACNC: SIGNIFICANT CHANGE UP
HBV SURFACE AG SER-ACNC: SIGNIFICANT CHANGE UP
HCT VFR BLD CALC: 35.8 % — LOW (ref 42–52)
HCV AB S/CO SERPL IA: 0.13 S/CO — SIGNIFICANT CHANGE UP (ref 0–0.99)
HCV AB SERPL-IMP: SIGNIFICANT CHANGE UP
HGB BLD-MCNC: 11.5 G/DL — LOW (ref 14–18)
IMM GRANULOCYTES NFR BLD AUTO: 0.9 % — HIGH (ref 0.1–0.3)
LYMPHOCYTES # BLD AUTO: 1.23 K/UL — SIGNIFICANT CHANGE UP (ref 1.2–3.4)
LYMPHOCYTES # BLD AUTO: 12 % — LOW (ref 20.5–51.1)
MAGNESIUM SERPL-MCNC: 2.4 MG/DL — SIGNIFICANT CHANGE UP (ref 1.8–2.4)
MCHC RBC-ENTMCNC: 28 PG — SIGNIFICANT CHANGE UP (ref 27–31)
MCHC RBC-ENTMCNC: 32.1 G/DL — SIGNIFICANT CHANGE UP (ref 32–37)
MCV RBC AUTO: 87.3 FL — SIGNIFICANT CHANGE UP (ref 80–94)
MONOCYTES # BLD AUTO: 0.74 K/UL — HIGH (ref 0.1–0.6)
MONOCYTES NFR BLD AUTO: 7.2 % — SIGNIFICANT CHANGE UP (ref 1.7–9.3)
NEUTROPHILS # BLD AUTO: 8.08 K/UL — HIGH (ref 1.4–6.5)
NEUTROPHILS NFR BLD AUTO: 79.2 % — HIGH (ref 42.2–75.2)
NRBC # BLD: 0 /100 WBCS — SIGNIFICANT CHANGE UP (ref 0–0)
NRBC BLD-RTO: 0 /100 WBCS — SIGNIFICANT CHANGE UP (ref 0–0)
PLATELET # BLD AUTO: 205 K/UL — SIGNIFICANT CHANGE UP (ref 130–400)
PMV BLD: 13.1 FL — HIGH (ref 7.4–10.4)
POTASSIUM SERPL-MCNC: 4.4 MMOL/L — SIGNIFICANT CHANGE UP (ref 3.5–5)
POTASSIUM SERPL-SCNC: 4.4 MMOL/L — SIGNIFICANT CHANGE UP (ref 3.5–5)
PROT SERPL-MCNC: 5.1 G/DL — LOW (ref 6–8)
RBC # BLD: 4.1 M/UL — LOW (ref 4.7–6.1)
RBC # FLD: 17.2 % — HIGH (ref 11.5–14.5)
SODIUM SERPL-SCNC: 134 MMOL/L — LOW (ref 135–146)
WBC # BLD: 10.21 K/UL — SIGNIFICANT CHANGE UP (ref 4.8–10.8)
WBC # FLD AUTO: 10.21 K/UL — SIGNIFICANT CHANGE UP (ref 4.8–10.8)

## 2024-11-14 PROCEDURE — 93308 TTE F-UP OR LMTD: CPT | Mod: 26

## 2024-11-14 PROCEDURE — 99221 1ST HOSP IP/OBS SF/LOW 40: CPT

## 2024-11-14 PROCEDURE — 93010 ELECTROCARDIOGRAM REPORT: CPT

## 2024-11-14 PROCEDURE — 71045 X-RAY EXAM CHEST 1 VIEW: CPT | Mod: 26

## 2024-11-14 PROCEDURE — 99291 CRITICAL CARE FIRST HOUR: CPT

## 2024-11-14 RX ORDER — APIXABAN 2.5 MG/1
5 TABLET, FILM COATED ORAL EVERY 12 HOURS
Refills: 0 | Status: DISCONTINUED | OUTPATIENT
Start: 2024-11-14 | End: 2024-11-16

## 2024-11-14 RX ADMIN — Medication 1 APPLICATION(S): at 05:29

## 2024-11-14 RX ADMIN — TAMSULOSIN HYDROCHLORIDE 0.4 MILLIGRAM(S): 0.4 CAPSULE ORAL at 21:40

## 2024-11-14 RX ADMIN — COLCHICINE 0.3 MILLIGRAM(S): 0.6 TABLET, FILM COATED ORAL at 05:25

## 2024-11-14 RX ADMIN — INSULIN LISPRO 1: 100 INJECTION, SOLUTION INTRAVENOUS; SUBCUTANEOUS at 12:53

## 2024-11-14 RX ADMIN — MEMANTINE HYDROCHLORIDE 5 MILLIGRAM(S): 21 CAPSULE, EXTENDED RELEASE ORAL at 13:42

## 2024-11-14 RX ADMIN — INSULIN GLARGINE-YFGN 20 UNIT(S): 100 INJECTION, SOLUTION SUBCUTANEOUS at 21:40

## 2024-11-14 RX ADMIN — Medication 40 MILLIGRAM(S): at 05:55

## 2024-11-14 RX ADMIN — METOPROLOL SUCCINATE 50 MILLIGRAM(S): 50 TABLET, EXTENDED RELEASE ORAL at 17:43

## 2024-11-14 RX ADMIN — Medication 81 MILLIGRAM(S): at 12:52

## 2024-11-14 RX ADMIN — INSULIN LISPRO 7 UNIT(S): 100 INJECTION, SOLUTION INTRAVENOUS; SUBCUTANEOUS at 12:54

## 2024-11-14 RX ADMIN — INSULIN LISPRO 4: 100 INJECTION, SOLUTION INTRAVENOUS; SUBCUTANEOUS at 10:29

## 2024-11-14 RX ADMIN — POLYETHYLENE GLYCOL 3350 17 GRAM(S): 17 POWDER, FOR SOLUTION ORAL at 12:52

## 2024-11-14 RX ADMIN — APIXABAN 5 MILLIGRAM(S): 2.5 TABLET, FILM COATED ORAL at 18:25

## 2024-11-14 RX ADMIN — INSULIN LISPRO 7 UNIT(S): 100 INJECTION, SOLUTION INTRAVENOUS; SUBCUTANEOUS at 17:38

## 2024-11-14 RX ADMIN — INSULIN LISPRO 7 UNIT(S): 100 INJECTION, SOLUTION INTRAVENOUS; SUBCUTANEOUS at 10:30

## 2024-11-14 RX ADMIN — INSULIN LISPRO 1: 100 INJECTION, SOLUTION INTRAVENOUS; SUBCUTANEOUS at 17:37

## 2024-11-14 RX ADMIN — METOPROLOL SUCCINATE 50 MILLIGRAM(S): 50 TABLET, EXTENDED RELEASE ORAL at 05:29

## 2024-11-15 ENCOUNTER — TRANSCRIPTION ENCOUNTER (OUTPATIENT)
Age: 79
End: 2024-11-15

## 2024-11-15 LAB
ALBUMIN SERPL ELPH-MCNC: 2.8 G/DL — LOW (ref 3.5–5.2)
ALP SERPL-CCNC: 158 U/L — HIGH (ref 30–115)
ALT FLD-CCNC: 1686 U/L — HIGH (ref 0–41)
ANION GAP SERPL CALC-SCNC: 9 MMOL/L — SIGNIFICANT CHANGE UP (ref 7–14)
AST SERPL-CCNC: 323 U/L — HIGH (ref 0–41)
BASOPHILS # BLD AUTO: 0.04 K/UL — SIGNIFICANT CHANGE UP (ref 0–0.2)
BASOPHILS NFR BLD AUTO: 0.3 % — SIGNIFICANT CHANGE UP (ref 0–1)
BILIRUB SERPL-MCNC: 1.4 MG/DL — HIGH (ref 0.2–1.2)
BUN SERPL-MCNC: 36 MG/DL — HIGH (ref 10–20)
CALCIUM SERPL-MCNC: 7.8 MG/DL — LOW (ref 8.4–10.4)
CHLORIDE SERPL-SCNC: 107 MMOL/L — SIGNIFICANT CHANGE UP (ref 98–110)
CK MB BLD-MCNC: HIGH TITER
CO2 SERPL-SCNC: 21 MMOL/L — SIGNIFICANT CHANGE UP (ref 17–32)
COXSACKIE TYPE A-24: HIGH TITER
CREAT SERPL-MCNC: 1.7 MG/DL — HIGH (ref 0.7–1.5)
CV A24 IGG TITR SER IF: HIGH TITER
CV A7 AB SER-ACNC: HIGH TITER
CV A9 AB TITR FLD: HIGH TITER
EGFR: 40 ML/MIN/1.73M2 — LOW
EGFR: 40 ML/MIN/1.73M2 — LOW
EOSINOPHIL # BLD AUTO: 0.11 K/UL — SIGNIFICANT CHANGE UP (ref 0–0.7)
EOSINOPHIL NFR BLD AUTO: 0.9 % — SIGNIFICANT CHANGE UP (ref 0–8)
GLUCOSE BLDC GLUCOMTR-MCNC: 148 MG/DL — HIGH (ref 70–99)
GLUCOSE BLDC GLUCOMTR-MCNC: 152 MG/DL — HIGH (ref 70–99)
GLUCOSE BLDC GLUCOMTR-MCNC: 188 MG/DL — HIGH (ref 70–99)
GLUCOSE BLDC GLUCOMTR-MCNC: 94 MG/DL — SIGNIFICANT CHANGE UP (ref 70–99)
GLUCOSE SERPL-MCNC: 72 MG/DL — SIGNIFICANT CHANGE UP (ref 70–99)
HCT VFR BLD CALC: 35.6 % — LOW (ref 42–52)
HGB BLD-MCNC: 11.6 G/DL — LOW (ref 14–18)
IMM GRANULOCYTES NFR BLD AUTO: 0.7 % — HIGH (ref 0.1–0.3)
LYMPHOCYTES # BLD AUTO: 1.89 K/UL — SIGNIFICANT CHANGE UP (ref 1.2–3.4)
LYMPHOCYTES # BLD AUTO: 15.2 % — LOW (ref 20.5–51.1)
MAGNESIUM SERPL-MCNC: 2.3 MG/DL — SIGNIFICANT CHANGE UP (ref 1.8–2.4)
MCHC RBC-ENTMCNC: 28 PG — SIGNIFICANT CHANGE UP (ref 27–31)
MCHC RBC-ENTMCNC: 32.6 G/DL — SIGNIFICANT CHANGE UP (ref 32–37)
MCV RBC AUTO: 86 FL — SIGNIFICANT CHANGE UP (ref 80–94)
MONOCYTES # BLD AUTO: 0.98 K/UL — HIGH (ref 0.1–0.6)
MONOCYTES NFR BLD AUTO: 7.9 % — SIGNIFICANT CHANGE UP (ref 1.7–9.3)
NEUTROPHILS # BLD AUTO: 9.33 K/UL — HIGH (ref 1.4–6.5)
NEUTROPHILS NFR BLD AUTO: 75 % — SIGNIFICANT CHANGE UP (ref 42.2–75.2)
NRBC # BLD: 0 /100 WBCS — SIGNIFICANT CHANGE UP (ref 0–0)
NRBC BLD-RTO: 0 /100 WBCS — SIGNIFICANT CHANGE UP (ref 0–0)
PLATELET # BLD AUTO: 213 K/UL — SIGNIFICANT CHANGE UP (ref 130–400)
PMV BLD: 12.5 FL — HIGH (ref 7.4–10.4)
POTASSIUM SERPL-MCNC: 4.3 MMOL/L — SIGNIFICANT CHANGE UP (ref 3.5–5)
POTASSIUM SERPL-SCNC: 4.3 MMOL/L — SIGNIFICANT CHANGE UP (ref 3.5–5)
PROT SERPL-MCNC: 5.1 G/DL — LOW (ref 6–8)
RBC # BLD: 4.14 M/UL — LOW (ref 4.7–6.1)
RBC # FLD: 17.3 % — HIGH (ref 11.5–14.5)
SODIUM SERPL-SCNC: 137 MMOL/L — SIGNIFICANT CHANGE UP (ref 135–146)
WBC # BLD: 12.44 K/UL — HIGH (ref 4.8–10.8)
WBC # FLD AUTO: 12.44 K/UL — HIGH (ref 4.8–10.8)

## 2024-11-15 PROCEDURE — 93010 ELECTROCARDIOGRAM REPORT: CPT

## 2024-11-15 PROCEDURE — 99233 SBSQ HOSP IP/OBS HIGH 50: CPT

## 2024-11-15 RX ORDER — COLCHICINE 0.6 MG/1
0.6 TABLET, FILM COATED ORAL EVERY 24 HOURS
Refills: 0 | Status: DISCONTINUED | OUTPATIENT
Start: 2024-11-15 | End: 2024-11-16

## 2024-11-15 RX ADMIN — MEMANTINE HYDROCHLORIDE 5 MILLIGRAM(S): 21 CAPSULE, EXTENDED RELEASE ORAL at 13:25

## 2024-11-15 RX ADMIN — METOPROLOL SUCCINATE 50 MILLIGRAM(S): 50 TABLET, EXTENDED RELEASE ORAL at 05:24

## 2024-11-15 RX ADMIN — APIXABAN 5 MILLIGRAM(S): 2.5 TABLET, FILM COATED ORAL at 05:24

## 2024-11-15 RX ADMIN — INSULIN LISPRO 1: 100 INJECTION, SOLUTION INTRAVENOUS; SUBCUTANEOUS at 13:04

## 2024-11-15 RX ADMIN — INSULIN LISPRO 0: 100 INJECTION, SOLUTION INTRAVENOUS; SUBCUTANEOUS at 17:04

## 2024-11-15 RX ADMIN — INSULIN LISPRO 7 UNIT(S): 100 INJECTION, SOLUTION INTRAVENOUS; SUBCUTANEOUS at 13:05

## 2024-11-15 RX ADMIN — Medication 1 APPLICATION(S): at 05:24

## 2024-11-15 RX ADMIN — INSULIN GLARGINE-YFGN 20 UNIT(S): 100 INJECTION, SOLUTION SUBCUTANEOUS at 21:29

## 2024-11-15 RX ADMIN — INSULIN LISPRO 7 UNIT(S): 100 INJECTION, SOLUTION INTRAVENOUS; SUBCUTANEOUS at 09:30

## 2024-11-15 RX ADMIN — Medication 40 MILLIGRAM(S): at 05:25

## 2024-11-15 RX ADMIN — POLYETHYLENE GLYCOL 3350 17 GRAM(S): 17 POWDER, FOR SOLUTION ORAL at 13:25

## 2024-11-15 RX ADMIN — INSULIN LISPRO 7 UNIT(S): 100 INJECTION, SOLUTION INTRAVENOUS; SUBCUTANEOUS at 17:06

## 2024-11-15 RX ADMIN — TAMSULOSIN HYDROCHLORIDE 0.4 MILLIGRAM(S): 0.4 CAPSULE ORAL at 21:28

## 2024-11-15 RX ADMIN — COLCHICINE 0.6 MILLIGRAM(S): 0.6 TABLET, FILM COATED ORAL at 17:06

## 2024-11-15 RX ADMIN — INSULIN LISPRO 0: 100 INJECTION, SOLUTION INTRAVENOUS; SUBCUTANEOUS at 09:29

## 2024-11-15 RX ADMIN — APIXABAN 5 MILLIGRAM(S): 2.5 TABLET, FILM COATED ORAL at 17:14

## 2024-11-16 ENCOUNTER — TRANSCRIPTION ENCOUNTER (OUTPATIENT)
Age: 79
End: 2024-11-16

## 2024-11-16 VITALS
HEART RATE: 90 BPM | DIASTOLIC BLOOD PRESSURE: 60 MMHG | OXYGEN SATURATION: 98 % | RESPIRATION RATE: 18 BRPM | SYSTOLIC BLOOD PRESSURE: 118 MMHG

## 2024-11-16 LAB
ALBUMIN SERPL ELPH-MCNC: 3 G/DL — LOW (ref 3.5–5.2)
ALP SERPL-CCNC: 146 U/L — HIGH (ref 30–115)
ALT FLD-CCNC: 1074 U/L — HIGH (ref 0–41)
ANION GAP SERPL CALC-SCNC: 9 MMOL/L — SIGNIFICANT CHANGE UP (ref 7–14)
AST SERPL-CCNC: 124 U/L — HIGH (ref 0–41)
BASOPHILS # BLD AUTO: 0.05 K/UL — SIGNIFICANT CHANGE UP (ref 0–0.2)
BASOPHILS NFR BLD AUTO: 0.4 % — SIGNIFICANT CHANGE UP (ref 0–1)
BILIRUB SERPL-MCNC: 1.2 MG/DL — SIGNIFICANT CHANGE UP (ref 0.2–1.2)
BUN SERPL-MCNC: 24 MG/DL — HIGH (ref 10–20)
CALCIUM SERPL-MCNC: 7.8 MG/DL — LOW (ref 8.4–10.4)
CHLORIDE SERPL-SCNC: 107 MMOL/L — SIGNIFICANT CHANGE UP (ref 98–110)
CO2 SERPL-SCNC: 21 MMOL/L — SIGNIFICANT CHANGE UP (ref 17–32)
CREAT SERPL-MCNC: 1.5 MG/DL — SIGNIFICANT CHANGE UP (ref 0.7–1.5)
CV B1 AB TITR FLD: HIGH
CV B2 AB TITR FLD: HIGH
CV B3 AB TITR FLD: HIGH
CV B4 AB TITR FLD: HIGH
CV B5 AB TITR FLD: HIGH
CV B6 AB TITR FLD: HIGH
EGFR: 47 ML/MIN/1.73M2 — LOW
EGFR: 47 ML/MIN/1.73M2 — LOW
EOSINOPHIL # BLD AUTO: 0.24 K/UL — SIGNIFICANT CHANGE UP (ref 0–0.7)
EOSINOPHIL NFR BLD AUTO: 2.1 % — SIGNIFICANT CHANGE UP (ref 0–8)
GLUCOSE BLDC GLUCOMTR-MCNC: 112 MG/DL — HIGH (ref 70–99)
GLUCOSE BLDC GLUCOMTR-MCNC: 193 MG/DL — HIGH (ref 70–99)
GLUCOSE SERPL-MCNC: 109 MG/DL — HIGH (ref 70–99)
HCT VFR BLD CALC: 35.3 % — LOW (ref 42–52)
HGB BLD-MCNC: 11.4 G/DL — LOW (ref 14–18)
IMM GRANULOCYTES NFR BLD AUTO: 0.9 % — HIGH (ref 0.1–0.3)
LYMPHOCYTES # BLD AUTO: 1.91 K/UL — SIGNIFICANT CHANGE UP (ref 1.2–3.4)
LYMPHOCYTES # BLD AUTO: 16.7 % — LOW (ref 20.5–51.1)
MAGNESIUM SERPL-MCNC: 2.1 MG/DL — SIGNIFICANT CHANGE UP (ref 1.8–2.4)
MCHC RBC-ENTMCNC: 27.9 PG — SIGNIFICANT CHANGE UP (ref 27–31)
MCHC RBC-ENTMCNC: 32.3 G/DL — SIGNIFICANT CHANGE UP (ref 32–37)
MCV RBC AUTO: 86.5 FL — SIGNIFICANT CHANGE UP (ref 80–94)
MONOCYTES # BLD AUTO: 0.93 K/UL — HIGH (ref 0.1–0.6)
MONOCYTES NFR BLD AUTO: 8.1 % — SIGNIFICANT CHANGE UP (ref 1.7–9.3)
NEUTROPHILS # BLD AUTO: 8.22 K/UL — HIGH (ref 1.4–6.5)
NEUTROPHILS NFR BLD AUTO: 71.8 % — SIGNIFICANT CHANGE UP (ref 42.2–75.2)
NRBC # BLD: 0 /100 WBCS — SIGNIFICANT CHANGE UP (ref 0–0)
NRBC BLD-RTO: 0 /100 WBCS — SIGNIFICANT CHANGE UP (ref 0–0)
PLATELET # BLD AUTO: 213 K/UL — SIGNIFICANT CHANGE UP (ref 130–400)
PMV BLD: 12.1 FL — HIGH (ref 7.4–10.4)
POTASSIUM SERPL-MCNC: 4.4 MMOL/L — SIGNIFICANT CHANGE UP (ref 3.5–5)
POTASSIUM SERPL-SCNC: 4.4 MMOL/L — SIGNIFICANT CHANGE UP (ref 3.5–5)
PROT SERPL-MCNC: 5.1 G/DL — LOW (ref 6–8)
RBC # BLD: 4.08 M/UL — LOW (ref 4.7–6.1)
RBC # FLD: 17.6 % — HIGH (ref 11.5–14.5)
SODIUM SERPL-SCNC: 137 MMOL/L — SIGNIFICANT CHANGE UP (ref 135–146)
WBC # BLD: 11.45 K/UL — HIGH (ref 4.8–10.8)
WBC # FLD AUTO: 11.45 K/UL — HIGH (ref 4.8–10.8)

## 2024-11-16 PROCEDURE — 93010 ELECTROCARDIOGRAM REPORT: CPT

## 2024-11-16 PROCEDURE — 99238 HOSP IP/OBS DSCHRG MGMT 30/<: CPT

## 2024-11-16 RX ORDER — COLCHICINE 0.6 MG/1
0.6 TABLET, FILM COATED ORAL EVERY 12 HOURS
Refills: 0 | Status: DISCONTINUED | OUTPATIENT
Start: 2024-11-16 | End: 2024-11-16

## 2024-11-16 RX ORDER — APIXABAN 2.5 MG/1
1 TABLET, FILM COATED ORAL
Qty: 120 | Refills: 0
Start: 2024-11-16 | End: 2025-01-14

## 2024-11-16 RX ORDER — METOPROLOL SUCCINATE 50 MG/1
1 TABLET, EXTENDED RELEASE ORAL
Qty: 180 | Refills: 0
Start: 2024-11-16 | End: 2025-02-13

## 2024-11-16 RX ORDER — COLCHICINE 0.6 MG/1
1 TABLET, FILM COATED ORAL
Qty: 112 | Refills: 0
Start: 2024-11-16 | End: 2025-01-10

## 2024-11-16 RX ADMIN — Medication 1 APPLICATION(S): at 05:58

## 2024-11-16 RX ADMIN — INSULIN LISPRO 1: 100 INJECTION, SOLUTION INTRAVENOUS; SUBCUTANEOUS at 11:50

## 2024-11-16 RX ADMIN — INSULIN LISPRO 7 UNIT(S): 100 INJECTION, SOLUTION INTRAVENOUS; SUBCUTANEOUS at 11:50

## 2024-11-16 RX ADMIN — Medication 40 MILLIGRAM(S): at 05:58

## 2024-11-16 RX ADMIN — APIXABAN 5 MILLIGRAM(S): 2.5 TABLET, FILM COATED ORAL at 05:55

## 2024-11-16 RX ADMIN — MEMANTINE HYDROCHLORIDE 5 MILLIGRAM(S): 21 CAPSULE, EXTENDED RELEASE ORAL at 11:51

## 2024-11-16 RX ADMIN — INSULIN LISPRO 7 UNIT(S): 100 INJECTION, SOLUTION INTRAVENOUS; SUBCUTANEOUS at 07:55

## 2024-11-17 LAB
CULTURE RESULTS: NO GROWTH — SIGNIFICANT CHANGE UP
CULTURE RESULTS: SIGNIFICANT CHANGE UP
CULTURE RESULTS: SIGNIFICANT CHANGE UP
SPECIMEN SOURCE: SIGNIFICANT CHANGE UP

## 2024-11-18 ENCOUNTER — NON-APPOINTMENT (OUTPATIENT)
Age: 79
End: 2024-11-18

## 2024-11-19 DIAGNOSIS — B97.11 COXSACKIEVIRUS AS THE CAUSE OF DISEASES CLASSIFIED ELSEWHERE: ICD-10-CM

## 2024-11-19 DIAGNOSIS — I31.4 CARDIAC TAMPONADE: ICD-10-CM

## 2024-11-19 DIAGNOSIS — K72.00 ACUTE AND SUBACUTE HEPATIC FAILURE WITHOUT COMA: ICD-10-CM

## 2024-11-19 DIAGNOSIS — R74.01 ELEVATION OF LEVELS OF LIVER TRANSAMINASE LEVELS: ICD-10-CM

## 2024-11-19 DIAGNOSIS — I30.9 ACUTE PERICARDITIS, UNSPECIFIED: ICD-10-CM

## 2024-11-19 DIAGNOSIS — E11.9 TYPE 2 DIABETES MELLITUS WITHOUT COMPLICATIONS: ICD-10-CM

## 2024-11-19 DIAGNOSIS — I48.0 PAROXYSMAL ATRIAL FIBRILLATION: ICD-10-CM

## 2024-11-19 DIAGNOSIS — Z79.01 LONG TERM (CURRENT) USE OF ANTICOAGULANTS: ICD-10-CM

## 2024-11-19 DIAGNOSIS — N40.0 BENIGN PROSTATIC HYPERPLASIA WITHOUT LOWER URINARY TRACT SYMPTOMS: ICD-10-CM

## 2024-11-19 DIAGNOSIS — I50.32 CHRONIC DIASTOLIC (CONGESTIVE) HEART FAILURE: ICD-10-CM

## 2024-11-19 DIAGNOSIS — I11.0 HYPERTENSIVE HEART DISEASE WITH HEART FAILURE: ICD-10-CM

## 2024-11-19 DIAGNOSIS — I25.10 ATHEROSCLEROTIC HEART DISEASE OF NATIVE CORONARY ARTERY WITHOUT ANGINA PECTORIS: ICD-10-CM

## 2024-11-19 DIAGNOSIS — N17.9 ACUTE KIDNEY FAILURE, UNSPECIFIED: ICD-10-CM

## 2024-11-22 LAB — VIRUS SPEC CULT: SIGNIFICANT CHANGE UP

## 2024-11-25 ENCOUNTER — NON-APPOINTMENT (OUTPATIENT)
Age: 79
End: 2024-11-25

## 2024-11-25 ENCOUNTER — APPOINTMENT (OUTPATIENT)
Dept: ELECTROPHYSIOLOGY | Facility: CLINIC | Age: 79
End: 2024-11-25
Payer: MEDICARE

## 2024-11-25 ENCOUNTER — LABORATORY RESULT (OUTPATIENT)
Age: 79
End: 2024-11-25

## 2024-11-25 ENCOUNTER — APPOINTMENT (OUTPATIENT)
Dept: ENDOCRINOLOGY | Facility: CLINIC | Age: 79
End: 2024-11-25
Payer: MEDICARE

## 2024-11-25 VITALS
HEART RATE: 80 BPM | WEIGHT: 175 LBS | DIASTOLIC BLOOD PRESSURE: 60 MMHG | OXYGEN SATURATION: 98 % | SYSTOLIC BLOOD PRESSURE: 118 MMHG | BODY MASS INDEX: 29.88 KG/M2 | HEIGHT: 64 IN

## 2024-11-25 VITALS
HEIGHT: 64 IN | OXYGEN SATURATION: 100 % | SYSTOLIC BLOOD PRESSURE: 100 MMHG | HEART RATE: 83 BPM | DIASTOLIC BLOOD PRESSURE: 62 MMHG | WEIGHT: 173 LBS | BODY MASS INDEX: 29.53 KG/M2

## 2024-11-25 DIAGNOSIS — N18.9 CHRONIC KIDNEY DISEASE, UNSPECIFIED: ICD-10-CM

## 2024-11-25 DIAGNOSIS — E78.5 HYPERLIPIDEMIA, UNSPECIFIED: ICD-10-CM

## 2024-11-25 DIAGNOSIS — G45.9 TRANSIENT CEREBRAL ISCHEMIC ATTACK, UNSPECIFIED: ICD-10-CM

## 2024-11-25 DIAGNOSIS — R00.1 BRADYCARDIA, UNSPECIFIED: ICD-10-CM

## 2024-11-25 DIAGNOSIS — I48.0 PAROXYSMAL ATRIAL FIBRILLATION: ICD-10-CM

## 2024-11-25 DIAGNOSIS — R09.82 POSTNASAL DRIP: ICD-10-CM

## 2024-11-25 DIAGNOSIS — E11.65 TYPE 2 DIABETES MELLITUS WITH HYPERGLYCEMIA: ICD-10-CM

## 2024-11-25 PROCEDURE — 93280 PM DEVICE PROGR EVAL DUAL: CPT

## 2024-11-25 PROCEDURE — 99214 OFFICE O/P EST MOD 30 MIN: CPT

## 2024-11-25 PROCEDURE — G2211 COMPLEX E/M VISIT ADD ON: CPT

## 2024-11-25 PROCEDURE — 93000 ELECTROCARDIOGRAM COMPLETE: CPT | Mod: 59

## 2024-11-25 RX ORDER — COLCHICINE 0.6 MG/1
TABLET ORAL
Refills: 0 | Status: ACTIVE | COMMUNITY

## 2024-11-25 RX ORDER — BLOOD-GLUCOSE,RECEIVER,CONT
EACH MISCELLANEOUS
Qty: 1 | Refills: 0 | Status: ACTIVE | COMMUNITY
Start: 2024-11-25 | End: 1900-01-01

## 2024-11-25 RX ORDER — APIXABAN 5 MG/1
5 TABLET, FILM COATED ORAL
Refills: 0 | Status: ACTIVE | COMMUNITY

## 2024-11-25 RX ORDER — MEMANTINE HYDROCHLORIDE 5 MG-10 MG
KIT ORAL
Refills: 0 | Status: ACTIVE | COMMUNITY

## 2024-11-25 RX ORDER — BLOOD-GLUCOSE SENSOR
EACH MISCELLANEOUS
Qty: 2 | Refills: 5 | Status: ACTIVE | COMMUNITY
Start: 2024-11-25 | End: 1900-01-01

## 2024-11-26 ENCOUNTER — APPOINTMENT (OUTPATIENT)
Dept: PULMONOLOGY | Facility: CLINIC | Age: 79
End: 2024-11-26
Payer: MEDICARE

## 2024-11-26 VITALS
HEART RATE: 93 BPM | SYSTOLIC BLOOD PRESSURE: 104 MMHG | HEIGHT: 64 IN | DIASTOLIC BLOOD PRESSURE: 72 MMHG | BODY MASS INDEX: 29.53 KG/M2 | OXYGEN SATURATION: 97 % | WEIGHT: 173 LBS

## 2024-11-26 DIAGNOSIS — G47.33 OBSTRUCTIVE SLEEP APNEA (ADULT) (PEDIATRIC): ICD-10-CM

## 2024-11-26 DIAGNOSIS — E66.9 OBESITY, UNSPECIFIED: ICD-10-CM

## 2024-11-26 DIAGNOSIS — J84.112 IDIOPATHIC PULMONARY FIBROSIS: ICD-10-CM

## 2024-11-26 PROCEDURE — 99214 OFFICE O/P EST MOD 30 MIN: CPT

## 2024-11-26 PROCEDURE — G2211 COMPLEX E/M VISIT ADD ON: CPT

## 2024-12-03 LAB
ALBUMIN SERPL ELPH-MCNC: 3.6 G/DL
ALP BLD-CCNC: 148 U/L
ALT SERPL-CCNC: 97 U/L
ANION GAP SERPL CALC-SCNC: 18 MMOL/L
AST SERPL-CCNC: 30 U/L
BASOPHILS # BLD AUTO: 0.08 K/UL
BASOPHILS NFR BLD AUTO: 0.8 %
BILIRUB SERPL-MCNC: 0.9 MG/DL
BUN SERPL-MCNC: 20 MG/DL
CALCIUM SERPL-MCNC: 9.2 MG/DL
CHLORIDE SERPL-SCNC: 103 MMOL/L
CHOLEST SERPL-MCNC: 87 MG/DL
CO2 SERPL-SCNC: 21 MMOL/L
CREAT SERPL-MCNC: 1.7 MG/DL
EGFR: 40 ML/MIN/1.73M2
EOSINOPHIL # BLD AUTO: 0.22 K/UL
EOSINOPHIL NFR BLD AUTO: 2.1 %
ESTIMATED AVERAGE GLUCOSE: 183 MG/DL
GLUCOSE SERPL-MCNC: 148 MG/DL
HBA1C MFR BLD HPLC: 8 %
HCT VFR BLD CALC: 43.4 %
HDLC SERPL-MCNC: 31 MG/DL
HGB BLD-MCNC: 13 G/DL
IMM GRANULOCYTES NFR BLD AUTO: 0.3 %
LDLC SERPL CALC-MCNC: 38 MG/DL
LYMPHOCYTES # BLD AUTO: 2.15 K/UL
LYMPHOCYTES NFR BLD AUTO: 20.7 %
MAN DIFF?: NORMAL
MCHC RBC-ENTMCNC: 27.5 PG
MCHC RBC-ENTMCNC: 30 G/DL
MCV RBC AUTO: 91.9 FL
MONOCYTES # BLD AUTO: 0.7 K/UL
MONOCYTES NFR BLD AUTO: 6.7 %
NEUTROPHILS # BLD AUTO: 7.22 K/UL
NEUTROPHILS NFR BLD AUTO: 69.4 %
NONHDLC SERPL-MCNC: 56 MG/DL
PLATELET # BLD AUTO: 227 K/UL
PMV BLD AUTO: 0 /100 WBCS
POTASSIUM SERPL-SCNC: 4.4 MMOL/L
PROT SERPL-MCNC: 6.3 G/DL
RBC # BLD: 4.72 M/UL
RBC # FLD: 18.1 %
SODIUM SERPL-SCNC: 142 MMOL/L
T4 FREE SERPL-MCNC: 1.6 NG/DL
TRIGL SERPL-MCNC: 92 MG/DL
TSH SERPL-ACNC: 0.62 UIU/ML
WBC # FLD AUTO: 10.4 K/UL

## 2024-12-04 RX ORDER — INSULIN GLARGINE 100 [IU]/ML
100 INJECTION, SOLUTION SUBCUTANEOUS DAILY
Qty: 1 | Refills: 5 | Status: ACTIVE | COMMUNITY
Start: 2024-12-04 | End: 1900-01-01

## 2024-12-11 LAB
CULTURE RESULTS: SIGNIFICANT CHANGE UP
SPECIMEN SOURCE: SIGNIFICANT CHANGE UP

## 2024-12-12 NOTE — PATIENT PROFILE ADULT - FUNCTIONAL ASSESSMENT - BASIC MOBILITY 2.
Chief complaint:   Chief Complaint   Patient presents with    Vomiting       Vitals:  Visit Vitals  BP (!) 151/82   Pulse 97   Temp 98.2 °F (36.8 °C) (Oral)   Resp 18   Wt 108.9 kg (240 lb)   LMP 11/26/2024 (Approximate)   SpO2 97%   BMI 37.59 kg/m²       HISTORY OF PRESENT ILLNESS     Darlyn Argueta is a 25 year old female presents to the urgent care today for nausea, vomiting x7, and diarrhea x10 since last night. She has not vomited since 5am this morning but is still having diarrhea. No blood or bile in the stool. She was seen in  yesterday for URI symptoms; COVID/FLU testing was negative and URI symptoms have greatly improved. No recent antibiotic use or travel. No history of intra-abdominal surgeries or bowel disease.     There are no other complaints or further modifying factors at this time.           Other significant problems:  Patient Active Problem List    Diagnosis Date Noted    OCD (obsessive compulsive disorder) 04/23/2021     Priority: Low    Anxiety disorder 04/23/2021     Priority: Low    PTSD (post-traumatic stress disorder) 04/23/2021     Priority: Low    History of myocarditis 12/01/2015     Priority: Low     On 11/20/2015 .         PAST MEDICAL, FAMILY AND SOCIAL HISTORY     Medications:  Current Outpatient Medications   Medication Sig Dispense Refill    ondansetron (ZOFRAN ODT) 4 MG disintegrating tablet Place 1 tablet onto the tongue every 8 hours as needed for Nausea. 10 tablet 0    dicyclomine (BENTYL) 20 MG tablet Take 1 tablet by mouth 4 times daily as needed (cramps). 30 tablet 0    methylpheniDATE ER (METADATE ER) 10 MG tablet Take 10 mg by mouth every morning.      HYDROcodone-acetaminophen 7.5-325 MG/15ML solution Take 15 mLs by mouth 4 times daily as needed for Pain. (Patient not taking: Reported on 5/1/2024) 480 mL 0    sertraline (ZOLOFT) 100 MG tablet Take 200 mg by mouth every morning.      desogestrel-ethinyl estradiol (Isibloom) 0.15-30 MG-MCG per tablet Take 1 tablet by mouth  Patient arrives to ed for c/o left sided chest pain with SOB started today. Denies any nausea or vomiting. Denies any medical history.   every morning.      melatonin 3 MG Take 3 mg by mouth at bedtime. Pt instructed to stop now (Patient not taking: Reported on 5/1/2024)      Multiple Vitamins-Minerals (MULTIVITAMIN ADULTS PO) Pt instructed to stop now (Patient not taking: Reported on 5/1/2024)       No current facility-administered medications for this visit.       Allergies:  ALLERGIES:   Allergen Reactions    Penicillins HIVES    Pineapple   (Food Or Med) Other (See Comments)       Past Medical  History/Surgeries:  Past Medical History:   Diagnosis Date    Anxiety     Myocarditis  (CMD) 2015    Resolved       Past Surgical History:   Procedure Laterality Date    [place] tooth extraction         Family History:  No family history on file.    Social History:  Social History     Tobacco Use    Smoking status: Never    Smokeless tobacco: Never   Substance Use Topics    Alcohol use: Yes     Comment: Rarely       REVIEW OF SYSTEMS     Review of Systems   Constitutional:  Negative for activity change, appetite change, chills, diaphoresis, fatigue and fever.   HENT:  Positive for congestion (improving), rhinorrhea (improving) and sore throat (improving). Negative for dental problem, drooling, ear discharge, ear pain, postnasal drip, sinus pressure, sinus pain, sneezing, trouble swallowing and voice change.    Respiratory:  Positive for cough (improving).    Cardiovascular:  Negative for chest pain.   Gastrointestinal:  Positive for abdominal pain (cramping), diarrhea, nausea and vomiting. Negative for constipation.   Musculoskeletal:  Negative for myalgias.   Skin:  Negative for rash.       PHYSICAL EXAM     Physical Exam  Vitals and nursing note reviewed.   Constitutional:       General: She is not in acute distress.     Appearance: Normal appearance. She is not ill-appearing, toxic-appearing or diaphoretic.   HENT:      Mouth/Throat:      Mouth: Mucous membranes are moist.      Pharynx: Oropharynx is clear. Uvula midline. No pharyngeal swelling,  oropharyngeal exudate, posterior oropharyngeal erythema or uvula swelling.   Cardiovascular:      Rate and Rhythm: Normal rate and regular rhythm.      Pulses: Normal pulses.      Heart sounds: Normal heart sounds.   Pulmonary:      Effort: Pulmonary effort is normal. No respiratory distress.      Breath sounds: Normal breath sounds. No stridor. No wheezing, rhonchi or rales.   Abdominal:      General: Abdomen is flat. Bowel sounds are normal. There is no distension.      Palpations: Abdomen is soft. There is no mass.      Tenderness: There is no abdominal tenderness. There is no right CVA tenderness, left CVA tenderness, guarding or rebound.   Skin:     General: Skin is warm.      Capillary Refill: Capillary refill takes less than 2 seconds.   Neurological:      Mental Status: She is alert.      GCS: GCS eye subscore is 4. GCS verbal subscore is 5. GCS motor subscore is 6.         ASSESSMENT/PLAN     1. Viral illness  - ondansetron (ZOFRAN ODT) 4 MG disintegrating tablet; Place 1 tablet onto the tongue every 8 hours as needed for Nausea.  Dispense: 10 tablet; Refill: 0  - dicyclomine (BENTYL) 20 MG tablet; Take 1 tablet by mouth 4 times daily as needed (cramps).  Dispense: 30 tablet; Refill: 0    Darlyn Argueta is a 25 year old female who presented to urgent care today for evaluation of nausea, vomiting x7, and diarrhea x10 since last night. She is nontoxic appearing, afebrile with stable vital signs and appears in no acute distress. She appeared well hydrated with moist oral mucous membranes. She had normoactive bowel sounds as well as a soft and non-distended non-tender abdomen.    Discussed with patient that symptoms are most likely due to viral, especially with the URI symptoms. Zofran and Bentyl sent to preferred pharmacy. Advised patient to follow-up with PCP if she continues to have persistent diarrhea to rule out C. difficile and likely have stool studies. Encouraged supportive treatment and re-evaluation for  any new or worsening concerns. All concerns were addressed and questions answered. Patient was agreeable with plan of care and verbalized understanding. Work note provided.    KEVEN Mccabe   4 = No assist / stand by assistance

## 2024-12-16 ENCOUNTER — APPOINTMENT (OUTPATIENT)
Dept: NEUROLOGY | Facility: CLINIC | Age: 79
End: 2024-12-16

## 2024-12-28 LAB
CULTURE RESULTS: SIGNIFICANT CHANGE UP
SPECIMEN SOURCE: SIGNIFICANT CHANGE UP

## 2025-01-30 ENCOUNTER — APPOINTMENT (OUTPATIENT)
Dept: CARDIOLOGY | Facility: CLINIC | Age: 80
End: 2025-01-30
Payer: MEDICARE

## 2025-01-30 ENCOUNTER — NON-APPOINTMENT (OUTPATIENT)
Age: 80
End: 2025-01-30

## 2025-01-30 PROCEDURE — 93294 REM INTERROG EVL PM/LDLS PM: CPT

## 2025-01-30 PROCEDURE — 93296 REM INTERROG EVL PM/IDS: CPT

## 2025-02-04 ENCOUNTER — APPOINTMENT (OUTPATIENT)
Dept: PULMONOLOGY | Facility: CLINIC | Age: 80
End: 2025-02-04
Payer: MEDICARE

## 2025-02-04 VITALS
BODY MASS INDEX: 30.73 KG/M2 | WEIGHT: 180 LBS | SYSTOLIC BLOOD PRESSURE: 128 MMHG | HEIGHT: 64 IN | DIASTOLIC BLOOD PRESSURE: 76 MMHG

## 2025-02-04 DIAGNOSIS — R06.02 SHORTNESS OF BREATH: ICD-10-CM

## 2025-02-04 DIAGNOSIS — J84.112 IDIOPATHIC PULMONARY FIBROSIS: ICD-10-CM

## 2025-02-04 DIAGNOSIS — E66.9 OBESITY, UNSPECIFIED: ICD-10-CM

## 2025-02-04 DIAGNOSIS — G47.33 OBSTRUCTIVE SLEEP APNEA (ADULT) (PEDIATRIC): ICD-10-CM

## 2025-02-04 PROCEDURE — G2211 COMPLEX E/M VISIT ADD ON: CPT

## 2025-02-04 PROCEDURE — 99214 OFFICE O/P EST MOD 30 MIN: CPT

## 2025-02-04 RX ORDER — MEMANTINE HYDROCHLORIDE 14 MG/1
14 CAPSULE, EXTENDED RELEASE ORAL
Refills: 0 | Status: ACTIVE | COMMUNITY

## 2025-02-11 ENCOUNTER — APPOINTMENT (OUTPATIENT)
Dept: ENDOCRINOLOGY | Facility: CLINIC | Age: 80
End: 2025-02-11

## 2025-02-12 ENCOUNTER — OUTPATIENT (OUTPATIENT)
Dept: OUTPATIENT SERVICES | Facility: HOSPITAL | Age: 80
LOS: 1 days | End: 2025-02-12
Payer: MEDICARE

## 2025-02-12 ENCOUNTER — RESULT REVIEW (OUTPATIENT)
Age: 80
End: 2025-02-12

## 2025-02-12 ENCOUNTER — APPOINTMENT (OUTPATIENT)
Dept: PULMONOLOGY | Facility: HOSPITAL | Age: 80
End: 2025-02-12
Payer: MEDICARE

## 2025-02-12 DIAGNOSIS — R06.02 SHORTNESS OF BREATH: ICD-10-CM

## 2025-02-12 DIAGNOSIS — Z87.09 PERSONAL HISTORY OF OTHER DISEASES OF THE RESPIRATORY SYSTEM: Chronic | ICD-10-CM

## 2025-02-12 DIAGNOSIS — Z95.5 PRESENCE OF CORONARY ANGIOPLASTY IMPLANT AND GRAFT: Chronic | ICD-10-CM

## 2025-02-12 DIAGNOSIS — Z98.890 OTHER SPECIFIED POSTPROCEDURAL STATES: Chronic | ICD-10-CM

## 2025-02-12 PROCEDURE — 94664 DEMO&/EVAL PT USE INHALER: CPT

## 2025-02-12 PROCEDURE — 71250 CT THORAX DX C-: CPT

## 2025-02-12 PROCEDURE — 94070 EVALUATION OF WHEEZING: CPT

## 2025-02-12 PROCEDURE — 71250 CT THORAX DX C-: CPT | Mod: 26

## 2025-02-12 PROCEDURE — 94729 DIFFUSING CAPACITY: CPT

## 2025-02-12 PROCEDURE — 94727 GAS DIL/WSHOT DETER LNG VOL: CPT | Mod: 26

## 2025-02-12 PROCEDURE — 94729 DIFFUSING CAPACITY: CPT | Mod: 26

## 2025-02-12 PROCEDURE — 94060 EVALUATION OF WHEEZING: CPT | Mod: 26

## 2025-02-12 PROCEDURE — 94727 GAS DIL/WSHOT DETER LNG VOL: CPT

## 2025-02-13 ENCOUNTER — APPOINTMENT (OUTPATIENT)
Dept: PULMONOLOGY | Facility: CLINIC | Age: 80
End: 2025-02-13

## 2025-02-13 DIAGNOSIS — R06.02 SHORTNESS OF BREATH: ICD-10-CM

## 2025-03-06 ENCOUNTER — APPOINTMENT (OUTPATIENT)
Dept: ENDOCRINOLOGY | Facility: CLINIC | Age: 80
End: 2025-03-06
Payer: MEDICARE

## 2025-03-06 VITALS
WEIGHT: 182 LBS | HEIGHT: 64 IN | OXYGEN SATURATION: 93 % | BODY MASS INDEX: 31.07 KG/M2 | DIASTOLIC BLOOD PRESSURE: 68 MMHG | HEART RATE: 93 BPM | SYSTOLIC BLOOD PRESSURE: 120 MMHG

## 2025-03-06 DIAGNOSIS — E66.9 OBESITY, UNSPECIFIED: ICD-10-CM

## 2025-03-06 DIAGNOSIS — E78.5 HYPERLIPIDEMIA, UNSPECIFIED: ICD-10-CM

## 2025-03-06 DIAGNOSIS — N18.9 CHRONIC KIDNEY DISEASE, UNSPECIFIED: ICD-10-CM

## 2025-03-06 DIAGNOSIS — E11.65 TYPE 2 DIABETES MELLITUS WITH HYPERGLYCEMIA: ICD-10-CM

## 2025-03-06 PROCEDURE — G2211 COMPLEX E/M VISIT ADD ON: CPT

## 2025-03-06 PROCEDURE — 99214 OFFICE O/P EST MOD 30 MIN: CPT

## 2025-03-06 RX ORDER — TIRZEPATIDE 5 MG/.5ML
5 INJECTION, SOLUTION SUBCUTANEOUS
Qty: 3 | Refills: 1 | Status: ACTIVE | COMMUNITY
Start: 2025-03-06 | End: 1900-01-01

## 2025-03-06 RX ORDER — TIRZEPATIDE 2.5 MG/.5ML
2.5 INJECTION, SOLUTION SUBCUTANEOUS
Qty: 1 | Refills: 0 | Status: ACTIVE | COMMUNITY
Start: 2025-03-06 | End: 1900-01-01

## 2025-03-29 ENCOUNTER — EMERGENCY (EMERGENCY)
Facility: HOSPITAL | Age: 80
LOS: 0 days | Discharge: HOME | End: 2025-03-29
Attending: STUDENT IN AN ORGANIZED HEALTH CARE EDUCATION/TRAINING PROGRAM
Payer: MEDICARE

## 2025-03-29 VITALS — DIASTOLIC BLOOD PRESSURE: 68 MMHG | HEART RATE: 75 BPM | SYSTOLIC BLOOD PRESSURE: 110 MMHG

## 2025-03-29 VITALS
TEMPERATURE: 98 F | SYSTOLIC BLOOD PRESSURE: 124 MMHG | WEIGHT: 190.04 LBS | RESPIRATION RATE: 16 BRPM | HEART RATE: 87 BPM | DIASTOLIC BLOOD PRESSURE: 76 MMHG | OXYGEN SATURATION: 98 %

## 2025-03-29 DIAGNOSIS — R53.1 WEAKNESS: ICD-10-CM

## 2025-03-29 DIAGNOSIS — R11.0 NAUSEA: ICD-10-CM

## 2025-03-29 DIAGNOSIS — Z95.5 PRESENCE OF CORONARY ANGIOPLASTY IMPLANT AND GRAFT: Chronic | ICD-10-CM

## 2025-03-29 DIAGNOSIS — Z98.890 OTHER SPECIFIED POSTPROCEDURAL STATES: Chronic | ICD-10-CM

## 2025-03-29 DIAGNOSIS — I48.91 UNSPECIFIED ATRIAL FIBRILLATION: ICD-10-CM

## 2025-03-29 DIAGNOSIS — R63.0 ANOREXIA: ICD-10-CM

## 2025-03-29 DIAGNOSIS — E11.9 TYPE 2 DIABETES MELLITUS WITHOUT COMPLICATIONS: ICD-10-CM

## 2025-03-29 DIAGNOSIS — N40.0 BENIGN PROSTATIC HYPERPLASIA WITHOUT LOWER URINARY TRACT SYMPTOMS: ICD-10-CM

## 2025-03-29 DIAGNOSIS — Z91.013 ALLERGY TO SEAFOOD: ICD-10-CM

## 2025-03-29 DIAGNOSIS — I11.0 HYPERTENSIVE HEART DISEASE WITH HEART FAILURE: ICD-10-CM

## 2025-03-29 DIAGNOSIS — I25.10 ATHEROSCLEROTIC HEART DISEASE OF NATIVE CORONARY ARTERY WITHOUT ANGINA PECTORIS: ICD-10-CM

## 2025-03-29 DIAGNOSIS — Z79.01 LONG TERM (CURRENT) USE OF ANTICOAGULANTS: ICD-10-CM

## 2025-03-29 DIAGNOSIS — Z87.09 PERSONAL HISTORY OF OTHER DISEASES OF THE RESPIRATORY SYSTEM: Chronic | ICD-10-CM

## 2025-03-29 DIAGNOSIS — I50.30 UNSPECIFIED DIASTOLIC (CONGESTIVE) HEART FAILURE: ICD-10-CM

## 2025-03-29 DIAGNOSIS — E78.5 HYPERLIPIDEMIA, UNSPECIFIED: ICD-10-CM

## 2025-03-29 LAB
ALBUMIN SERPL ELPH-MCNC: 3.8 G/DL — SIGNIFICANT CHANGE UP (ref 3.5–5.2)
ALP SERPL-CCNC: 100 U/L — SIGNIFICANT CHANGE UP (ref 30–115)
ALT FLD-CCNC: 25 U/L — SIGNIFICANT CHANGE UP (ref 0–41)
ANION GAP SERPL CALC-SCNC: 14 MMOL/L — SIGNIFICANT CHANGE UP (ref 7–14)
APPEARANCE UR: CLEAR — SIGNIFICANT CHANGE UP
APTT BLD: 33.8 SEC — SIGNIFICANT CHANGE UP (ref 27–39.2)
AST SERPL-CCNC: 36 U/L — SIGNIFICANT CHANGE UP (ref 0–41)
BASE EXCESS BLDV CALC-SCNC: -4 MMOL/L — LOW (ref -2–3)
BASE EXCESS BLDV CALC-SCNC: -5.5 MMOL/L — LOW (ref -2–3)
BASOPHILS # BLD AUTO: 0.04 K/UL — SIGNIFICANT CHANGE UP (ref 0–0.2)
BASOPHILS NFR BLD AUTO: 0.3 % — SIGNIFICANT CHANGE UP (ref 0–1)
BILIRUB DIRECT SERPL-MCNC: <0.2 MG/DL — SIGNIFICANT CHANGE UP (ref 0–0.3)
BILIRUB INDIRECT FLD-MCNC: >0.8 MG/DL — SIGNIFICANT CHANGE UP (ref 0.2–1.2)
BILIRUB SERPL-MCNC: 1 MG/DL — SIGNIFICANT CHANGE UP (ref 0.2–1.2)
BILIRUB UR-MCNC: NEGATIVE — SIGNIFICANT CHANGE UP
BUN SERPL-MCNC: 39 MG/DL — HIGH (ref 10–20)
CA-I SERPL-SCNC: 1.13 MMOL/L — LOW (ref 1.15–1.33)
CA-I SERPL-SCNC: 1.14 MMOL/L — LOW (ref 1.15–1.33)
CALCIUM SERPL-MCNC: 8.7 MG/DL — SIGNIFICANT CHANGE UP (ref 8.4–10.5)
CHLORIDE SERPL-SCNC: 104 MMOL/L — SIGNIFICANT CHANGE UP (ref 98–110)
CO2 SERPL-SCNC: 20 MMOL/L — SIGNIFICANT CHANGE UP (ref 17–32)
COLOR SPEC: YELLOW — SIGNIFICANT CHANGE UP
CREAT SERPL-MCNC: 1.7 MG/DL — HIGH (ref 0.7–1.5)
DIFF PNL FLD: NEGATIVE — SIGNIFICANT CHANGE UP
EGFR: 40 ML/MIN/1.73M2 — LOW
EGFR: 40 ML/MIN/1.73M2 — LOW
EOSINOPHIL # BLD AUTO: 0.26 K/UL — SIGNIFICANT CHANGE UP (ref 0–0.7)
EOSINOPHIL NFR BLD AUTO: 1.7 % — SIGNIFICANT CHANGE UP (ref 0–8)
FLUAV AG NPH QL: SIGNIFICANT CHANGE UP
FLUBV AG NPH QL: SIGNIFICANT CHANGE UP
GAS PNL BLDV: 130 MMOL/L — LOW (ref 136–145)
GAS PNL BLDV: 131 MMOL/L — LOW (ref 136–145)
GAS PNL BLDV: SIGNIFICANT CHANGE UP
GAS PNL BLDV: SIGNIFICANT CHANGE UP
GLUCOSE SERPL-MCNC: 250 MG/DL — HIGH (ref 70–99)
GLUCOSE UR QL: >=1000 MG/DL
HCO3 BLDV-SCNC: 17 MMOL/L — LOW (ref 22–29)
HCO3 BLDV-SCNC: 20 MMOL/L — LOW (ref 22–29)
HCT VFR BLD CALC: 46.6 % — SIGNIFICANT CHANGE UP (ref 42–52)
HCT VFR BLDA CALC: 40 % — SIGNIFICANT CHANGE UP (ref 39–51)
HCT VFR BLDA CALC: 44 % — SIGNIFICANT CHANGE UP (ref 39–51)
HGB BLD CALC-MCNC: 13.2 G/DL — SIGNIFICANT CHANGE UP (ref 12.6–17.4)
HGB BLD CALC-MCNC: 14.6 G/DL — SIGNIFICANT CHANGE UP (ref 12.6–17.4)
HGB BLD-MCNC: 14.2 G/DL — SIGNIFICANT CHANGE UP (ref 14–18)
IMM GRANULOCYTES NFR BLD AUTO: 1.5 % — HIGH (ref 0.1–0.3)
INR BLD: 1.41 RATIO — HIGH (ref 0.65–1.3)
KETONES UR-MCNC: NEGATIVE MG/DL — SIGNIFICANT CHANGE UP
LACTATE BLDV-MCNC: 3 MMOL/L — HIGH (ref 0.5–2)
LACTATE BLDV-MCNC: 3.1 MMOL/L — HIGH (ref 0.5–2)
LEUKOCYTE ESTERASE UR-ACNC: NEGATIVE — SIGNIFICANT CHANGE UP
LIDOCAIN IGE QN: 27 U/L — SIGNIFICANT CHANGE UP (ref 7–60)
LYMPHOCYTES # BLD AUTO: 17.5 % — LOW (ref 20.5–51.1)
LYMPHOCYTES # BLD AUTO: 2.71 K/UL — SIGNIFICANT CHANGE UP (ref 1.2–3.4)
MAGNESIUM SERPL-MCNC: 2.3 MG/DL — SIGNIFICANT CHANGE UP (ref 1.8–2.4)
MCHC RBC-ENTMCNC: 22.3 PG — LOW (ref 27–31)
MCHC RBC-ENTMCNC: 30.5 G/DL — LOW (ref 32–37)
MCV RBC AUTO: 73.3 FL — LOW (ref 80–94)
MONOCYTES # BLD AUTO: 1.18 K/UL — HIGH (ref 0.1–0.6)
MONOCYTES NFR BLD AUTO: 7.6 % — SIGNIFICANT CHANGE UP (ref 1.7–9.3)
NEUTROPHILS # BLD AUTO: 11.04 K/UL — HIGH (ref 1.4–6.5)
NEUTROPHILS NFR BLD AUTO: 71.4 % — SIGNIFICANT CHANGE UP (ref 42.2–75.2)
NITRITE UR-MCNC: NEGATIVE — SIGNIFICANT CHANGE UP
NRBC BLD AUTO-RTO: 0 /100 WBCS — SIGNIFICANT CHANGE UP (ref 0–0)
NT-PROBNP SERPL-SCNC: 633 PG/ML — HIGH (ref 0–300)
PCO2 BLDV: 26 MMHG — LOW (ref 42–55)
PCO2 BLDV: 33 MMHG — LOW (ref 42–55)
PH BLDV: 7.39 — SIGNIFICANT CHANGE UP (ref 7.32–7.43)
PH BLDV: 7.43 — SIGNIFICANT CHANGE UP (ref 7.32–7.43)
PH UR: 5.5 — SIGNIFICANT CHANGE UP (ref 5–8)
PLATELET # BLD AUTO: 168 K/UL — SIGNIFICANT CHANGE UP (ref 130–400)
PMV BLD: SIGNIFICANT CHANGE UP (ref 7.4–10.4)
PO2 BLDV: 103 MMHG — HIGH (ref 25–45)
PO2 BLDV: 87 MMHG — HIGH (ref 25–45)
POTASSIUM BLDV-SCNC: 4.6 MMOL/L — SIGNIFICANT CHANGE UP (ref 3.5–5.1)
POTASSIUM BLDV-SCNC: 5 MMOL/L — SIGNIFICANT CHANGE UP (ref 3.5–5.1)
POTASSIUM SERPL-MCNC: 5.8 MMOL/L — HIGH (ref 3.5–5)
POTASSIUM SERPL-SCNC: 5.8 MMOL/L — HIGH (ref 3.5–5)
PROT SERPL-MCNC: 6.1 G/DL — SIGNIFICANT CHANGE UP (ref 6–8)
PROT UR-MCNC: NEGATIVE MG/DL — SIGNIFICANT CHANGE UP
PROTHROM AB SERPL-ACNC: 16.7 SEC — HIGH (ref 9.95–12.87)
RBC # BLD: 6.36 M/UL — HIGH (ref 4.7–6.1)
RBC # FLD: 23.4 % — HIGH (ref 11.5–14.5)
RSV RNA NPH QL NAA+NON-PROBE: SIGNIFICANT CHANGE UP
SAO2 % BLDV: 97.7 % — HIGH (ref 67–88)
SAO2 % BLDV: 98.3 % — HIGH (ref 67–88)
SARS-COV-2 RNA SPEC QL NAA+PROBE: SIGNIFICANT CHANGE UP
SODIUM SERPL-SCNC: 138 MMOL/L — SIGNIFICANT CHANGE UP (ref 135–146)
SOURCE RESPIRATORY: SIGNIFICANT CHANGE UP
SP GR SPEC: 1.03 — SIGNIFICANT CHANGE UP (ref 1–1.03)
TROPONIN T, HIGH SENSITIVITY RESULT: 32 NG/L — HIGH (ref 6–21)
TROPONIN T, HIGH SENSITIVITY RESULT: 36 NG/L — HIGH (ref 6–21)
UROBILINOGEN FLD QL: 1 MG/DL — SIGNIFICANT CHANGE UP (ref 0.2–1)
WBC # BLD: 15.46 K/UL — HIGH (ref 4.8–10.8)
WBC # FLD AUTO: 15.46 K/UL — HIGH (ref 4.8–10.8)

## 2025-03-29 PROCEDURE — 99285 EMERGENCY DEPT VISIT HI MDM: CPT | Mod: 25

## 2025-03-29 PROCEDURE — 74176 CT ABD & PELVIS W/O CONTRAST: CPT

## 2025-03-29 PROCEDURE — 83605 ASSAY OF LACTIC ACID: CPT

## 2025-03-29 PROCEDURE — 81003 URINALYSIS AUTO W/O SCOPE: CPT

## 2025-03-29 PROCEDURE — 70450 CT HEAD/BRAIN W/O DYE: CPT | Mod: XU

## 2025-03-29 PROCEDURE — 84484 ASSAY OF TROPONIN QUANT: CPT | Mod: 91

## 2025-03-29 PROCEDURE — 87086 URINE CULTURE/COLONY COUNT: CPT

## 2025-03-29 PROCEDURE — 93010 ELECTROCARDIOGRAM REPORT: CPT

## 2025-03-29 PROCEDURE — 85025 COMPLETE CBC W/AUTO DIFF WBC: CPT

## 2025-03-29 PROCEDURE — 85014 HEMATOCRIT: CPT

## 2025-03-29 PROCEDURE — 74176 CT ABD & PELVIS W/O CONTRAST: CPT | Mod: 26

## 2025-03-29 PROCEDURE — 71045 X-RAY EXAM CHEST 1 VIEW: CPT | Mod: 26

## 2025-03-29 PROCEDURE — 71045 X-RAY EXAM CHEST 1 VIEW: CPT

## 2025-03-29 PROCEDURE — 99285 EMERGENCY DEPT VISIT HI MDM: CPT | Mod: FS

## 2025-03-29 PROCEDURE — 83880 ASSAY OF NATRIURETIC PEPTIDE: CPT

## 2025-03-29 PROCEDURE — 84132 ASSAY OF SERUM POTASSIUM: CPT

## 2025-03-29 PROCEDURE — 85018 HEMOGLOBIN: CPT

## 2025-03-29 PROCEDURE — 83690 ASSAY OF LIPASE: CPT

## 2025-03-29 PROCEDURE — 82803 BLOOD GASES ANY COMBINATION: CPT | Mod: 91

## 2025-03-29 PROCEDURE — 84295 ASSAY OF SERUM SODIUM: CPT

## 2025-03-29 PROCEDURE — 80048 BASIC METABOLIC PNL TOTAL CA: CPT

## 2025-03-29 PROCEDURE — 83735 ASSAY OF MAGNESIUM: CPT

## 2025-03-29 PROCEDURE — 85610 PROTHROMBIN TIME: CPT

## 2025-03-29 PROCEDURE — 93005 ELECTROCARDIOGRAM TRACING: CPT

## 2025-03-29 PROCEDURE — 82330 ASSAY OF CALCIUM: CPT | Mod: 91

## 2025-03-29 PROCEDURE — 70450 CT HEAD/BRAIN W/O DYE: CPT | Mod: 26

## 2025-03-29 PROCEDURE — 0241U: CPT

## 2025-03-29 PROCEDURE — 85730 THROMBOPLASTIN TIME PARTIAL: CPT

## 2025-03-29 PROCEDURE — 36415 COLL VENOUS BLD VENIPUNCTURE: CPT

## 2025-03-29 PROCEDURE — 36000 PLACE NEEDLE IN VEIN: CPT

## 2025-03-29 PROCEDURE — 80076 HEPATIC FUNCTION PANEL: CPT

## 2025-03-29 RX ADMIN — Medication 1000 MILLILITER(S): at 13:41

## 2025-03-30 LAB
CULTURE RESULTS: SIGNIFICANT CHANGE UP
SPECIMEN SOURCE: SIGNIFICANT CHANGE UP

## 2025-04-28 NOTE — PATIENT PROFILE ADULT - PRO INTERPRETER NEED 2
Suspect symptomatic hypertension given history of lightheadedness accompanying you were seen in the ER for symptomatic hypertension.  Your labs showed no concerning findings and you will be discharged home.  Please follow-up with your primary care provider tomorrow as planned.  You should come back to the emergency department if you begin to experience any chest pain, shortness of breath, palpitations, or new concerns.  
English

## 2025-05-01 ENCOUNTER — APPOINTMENT (OUTPATIENT)
Dept: CARDIOLOGY | Facility: CLINIC | Age: 80
End: 2025-05-01
Payer: MEDICARE

## 2025-05-01 ENCOUNTER — NON-APPOINTMENT (OUTPATIENT)
Age: 80
End: 2025-05-01

## 2025-05-01 PROCEDURE — 93294 REM INTERROG EVL PM/LDLS PM: CPT

## 2025-05-01 PROCEDURE — 93296 REM INTERROG EVL PM/IDS: CPT

## 2025-05-27 ENCOUNTER — APPOINTMENT (OUTPATIENT)
Dept: ELECTROPHYSIOLOGY | Facility: CLINIC | Age: 80
End: 2025-05-27

## 2025-06-10 ENCOUNTER — APPOINTMENT (OUTPATIENT)
Dept: PULMONOLOGY | Facility: CLINIC | Age: 80
End: 2025-06-10
Payer: MEDICARE

## 2025-06-10 VITALS
BODY MASS INDEX: 29.71 KG/M2 | SYSTOLIC BLOOD PRESSURE: 104 MMHG | DIASTOLIC BLOOD PRESSURE: 64 MMHG | HEIGHT: 64 IN | OXYGEN SATURATION: 98 % | HEART RATE: 90 BPM | WEIGHT: 174 LBS

## 2025-06-10 PROCEDURE — G2211 COMPLEX E/M VISIT ADD ON: CPT

## 2025-06-10 PROCEDURE — 99214 OFFICE O/P EST MOD 30 MIN: CPT

## 2025-07-10 ENCOUNTER — APPOINTMENT (OUTPATIENT)
Dept: ENDOCRINOLOGY | Facility: CLINIC | Age: 80
End: 2025-07-10
Payer: MEDICARE

## 2025-07-10 ENCOUNTER — NON-APPOINTMENT (OUTPATIENT)
Age: 80
End: 2025-07-10

## 2025-07-10 VITALS
HEIGHT: 64 IN | OXYGEN SATURATION: 98 % | BODY MASS INDEX: 29.02 KG/M2 | HEART RATE: 88 BPM | DIASTOLIC BLOOD PRESSURE: 60 MMHG | WEIGHT: 170 LBS | SYSTOLIC BLOOD PRESSURE: 100 MMHG

## 2025-07-10 PROCEDURE — G2211 COMPLEX E/M VISIT ADD ON: CPT

## 2025-07-10 PROCEDURE — 99213 OFFICE O/P EST LOW 20 MIN: CPT

## 2025-07-11 ENCOUNTER — APPOINTMENT (OUTPATIENT)
Dept: SURGERY | Facility: CLINIC | Age: 80
End: 2025-07-11
Payer: MEDICARE

## 2025-07-11 VITALS — HEIGHT: 65 IN | WEIGHT: 172 LBS | BODY MASS INDEX: 28.66 KG/M2

## 2025-07-11 PROBLEM — K40.90 RIGHT INGUINAL HERNIA: Status: ACTIVE | Noted: 2025-07-11

## 2025-07-11 PROCEDURE — 99203 OFFICE O/P NEW LOW 30 MIN: CPT

## 2025-07-16 ENCOUNTER — APPOINTMENT (OUTPATIENT)
Dept: SURGERY | Facility: CLINIC | Age: 80
End: 2025-07-16

## 2025-07-18 ENCOUNTER — APPOINTMENT (OUTPATIENT)
Dept: NEUROSURGERY | Facility: CLINIC | Age: 80
End: 2025-07-18

## 2025-07-19 ENCOUNTER — EMERGENCY (EMERGENCY)
Facility: HOSPITAL | Age: 80
LOS: 0 days | Discharge: HOME | End: 2025-07-19
Attending: EMERGENCY MEDICINE
Payer: MEDICARE

## 2025-07-19 VITALS
OXYGEN SATURATION: 96 % | WEIGHT: 164.02 LBS | SYSTOLIC BLOOD PRESSURE: 107 MMHG | TEMPERATURE: 98 F | HEIGHT: 65 IN | RESPIRATION RATE: 20 BRPM | HEART RATE: 86 BPM | DIASTOLIC BLOOD PRESSURE: 68 MMHG

## 2025-07-19 VITALS
SYSTOLIC BLOOD PRESSURE: 107 MMHG | HEART RATE: 75 BPM | OXYGEN SATURATION: 98 % | RESPIRATION RATE: 17 BRPM | DIASTOLIC BLOOD PRESSURE: 75 MMHG

## 2025-07-19 DIAGNOSIS — Z98.890 OTHER SPECIFIED POSTPROCEDURAL STATES: Chronic | ICD-10-CM

## 2025-07-19 DIAGNOSIS — R07.89 OTHER CHEST PAIN: ICD-10-CM

## 2025-07-19 DIAGNOSIS — I25.10 ATHEROSCLEROTIC HEART DISEASE OF NATIVE CORONARY ARTERY WITHOUT ANGINA PECTORIS: ICD-10-CM

## 2025-07-19 DIAGNOSIS — I50.30 UNSPECIFIED DIASTOLIC (CONGESTIVE) HEART FAILURE: ICD-10-CM

## 2025-07-19 DIAGNOSIS — Z86.73 PERSONAL HISTORY OF TRANSIENT ISCHEMIC ATTACK (TIA), AND CEREBRAL INFARCTION WITHOUT RESIDUAL DEFICITS: ICD-10-CM

## 2025-07-19 DIAGNOSIS — Z79.01 LONG TERM (CURRENT) USE OF ANTICOAGULANTS: ICD-10-CM

## 2025-07-19 DIAGNOSIS — N40.0 BENIGN PROSTATIC HYPERPLASIA WITHOUT LOWER URINARY TRACT SYMPTOMS: ICD-10-CM

## 2025-07-19 DIAGNOSIS — R05.8 OTHER SPECIFIED COUGH: ICD-10-CM

## 2025-07-19 DIAGNOSIS — I11.0 HYPERTENSIVE HEART DISEASE WITH HEART FAILURE: ICD-10-CM

## 2025-07-19 DIAGNOSIS — E78.5 HYPERLIPIDEMIA, UNSPECIFIED: ICD-10-CM

## 2025-07-19 DIAGNOSIS — I48.91 UNSPECIFIED ATRIAL FIBRILLATION: ICD-10-CM

## 2025-07-19 DIAGNOSIS — R10.13 EPIGASTRIC PAIN: ICD-10-CM

## 2025-07-19 DIAGNOSIS — Z87.09 PERSONAL HISTORY OF OTHER DISEASES OF THE RESPIRATORY SYSTEM: Chronic | ICD-10-CM

## 2025-07-19 DIAGNOSIS — Z95.5 PRESENCE OF CORONARY ANGIOPLASTY IMPLANT AND GRAFT: Chronic | ICD-10-CM

## 2025-07-19 DIAGNOSIS — E11.9 TYPE 2 DIABETES MELLITUS WITHOUT COMPLICATIONS: ICD-10-CM

## 2025-07-19 LAB
ALBUMIN SERPL ELPH-MCNC: 4.1 G/DL — SIGNIFICANT CHANGE UP (ref 3.5–5.2)
ALP SERPL-CCNC: 103 U/L — SIGNIFICANT CHANGE UP (ref 30–115)
ALT FLD-CCNC: 14 U/L — SIGNIFICANT CHANGE UP (ref 0–41)
ANION GAP SERPL CALC-SCNC: 16 MMOL/L — HIGH (ref 7–14)
APPEARANCE UR: CLEAR — SIGNIFICANT CHANGE UP
AST SERPL-CCNC: 18 U/L — SIGNIFICANT CHANGE UP (ref 0–41)
B-OH-BUTYR SERPL-SCNC: <0.2 MMOL/L — SIGNIFICANT CHANGE UP
BASOPHILS # BLD AUTO: 0.03 K/UL — SIGNIFICANT CHANGE UP (ref 0–0.2)
BASOPHILS NFR BLD AUTO: 0.3 % — SIGNIFICANT CHANGE UP (ref 0–2)
BILIRUB SERPL-MCNC: 0.5 MG/DL — SIGNIFICANT CHANGE UP (ref 0.2–1.2)
BILIRUB UR-MCNC: NEGATIVE — SIGNIFICANT CHANGE UP
BUN SERPL-MCNC: 46 MG/DL — HIGH (ref 10–20)
CALCIUM SERPL-MCNC: 9.1 MG/DL — SIGNIFICANT CHANGE UP (ref 8.4–10.5)
CHLORIDE SERPL-SCNC: 101 MMOL/L — SIGNIFICANT CHANGE UP (ref 98–110)
CO2 SERPL-SCNC: 20 MMOL/L — SIGNIFICANT CHANGE UP (ref 17–32)
COLOR SPEC: YELLOW — SIGNIFICANT CHANGE UP
CREAT SERPL-MCNC: 1.9 MG/DL — HIGH (ref 0.7–1.5)
DIFF PNL FLD: NEGATIVE — SIGNIFICANT CHANGE UP
EGFR: 35 ML/MIN/1.73M2 — LOW
EGFR: 35 ML/MIN/1.73M2 — LOW
EOSINOPHIL # BLD AUTO: 0 K/UL — SIGNIFICANT CHANGE UP (ref 0–0.5)
EOSINOPHIL NFR BLD AUTO: 0 % — SIGNIFICANT CHANGE UP (ref 0–6)
GAS PNL BLDV: SIGNIFICANT CHANGE UP
GLUCOSE BLDC GLUCOMTR-MCNC: 238 MG/DL — HIGH (ref 70–99)
GLUCOSE BLDC GLUCOMTR-MCNC: 247 MG/DL — HIGH (ref 70–99)
GLUCOSE SERPL-MCNC: 253 MG/DL — HIGH (ref 70–99)
GLUCOSE UR QL: >=1000 MG/DL
HCT VFR BLD CALC: 37.6 % — LOW (ref 39–50)
HGB BLD-MCNC: 11.3 G/DL — LOW (ref 13–17)
IMM GRANULOCYTES # BLD AUTO: 0.06 K/UL — SIGNIFICANT CHANGE UP (ref 0–0.07)
IMM GRANULOCYTES NFR BLD AUTO: 0.5 % — SIGNIFICANT CHANGE UP (ref 0–0.9)
KETONES UR QL: NEGATIVE MG/DL — SIGNIFICANT CHANGE UP
LEUKOCYTE ESTERASE UR-ACNC: NEGATIVE — SIGNIFICANT CHANGE UP
LIDOCAIN IGE QN: 22 U/L — SIGNIFICANT CHANGE UP (ref 7–60)
LYMPHOCYTES # BLD AUTO: 1.98 K/UL — SIGNIFICANT CHANGE UP (ref 1–3.3)
LYMPHOCYTES NFR BLD AUTO: 18.1 % — SIGNIFICANT CHANGE UP (ref 13–44)
MAGNESIUM SERPL-MCNC: 2.7 MG/DL — HIGH (ref 1.8–2.4)
MCHC RBC-ENTMCNC: 22.8 PG — LOW (ref 27–34)
MCHC RBC-ENTMCNC: 30.1 G/DL — LOW (ref 32–36)
MCV RBC AUTO: 76 FL — LOW (ref 80–100)
MONOCYTES # BLD AUTO: 1.12 K/UL — HIGH (ref 0–0.9)
MONOCYTES NFR BLD AUTO: 10.3 % — SIGNIFICANT CHANGE UP (ref 2–14)
NEUTROPHILS # BLD AUTO: 7.72 K/UL — HIGH (ref 1.8–7.4)
NEUTROPHILS NFR BLD AUTO: 70.8 % — SIGNIFICANT CHANGE UP (ref 43–77)
NITRITE UR-MCNC: NEGATIVE — SIGNIFICANT CHANGE UP
NRBC # BLD AUTO: 0 K/UL — SIGNIFICANT CHANGE UP (ref 0–0)
NRBC # FLD: 0 K/UL — SIGNIFICANT CHANGE UP (ref 0–0)
NRBC BLD AUTO-RTO: 0 /100 WBCS — SIGNIFICANT CHANGE UP (ref 0–0)
PH UR: 6 — SIGNIFICANT CHANGE UP (ref 5–8)
PHOSPHATE SERPL-MCNC: 4.4 MG/DL — SIGNIFICANT CHANGE UP (ref 2.1–4.9)
PLATELET # BLD AUTO: 188 K/UL — SIGNIFICANT CHANGE UP (ref 150–400)
PMV BLD: 11 FL — SIGNIFICANT CHANGE UP (ref 7–13)
POTASSIUM SERPL-MCNC: 5.1 MMOL/L — HIGH (ref 3.5–5)
POTASSIUM SERPL-SCNC: 5.1 MMOL/L — HIGH (ref 3.5–5)
PROT SERPL-MCNC: 6.7 G/DL — SIGNIFICANT CHANGE UP (ref 6–8)
PROT UR-MCNC: NEGATIVE MG/DL — SIGNIFICANT CHANGE UP
RBC # BLD: 4.95 M/UL — SIGNIFICANT CHANGE UP (ref 4.2–5.8)
RBC # FLD: 23.5 % — HIGH (ref 10.3–14.5)
SODIUM SERPL-SCNC: 137 MMOL/L — SIGNIFICANT CHANGE UP (ref 135–146)
SP GR SPEC: 1.02 — SIGNIFICANT CHANGE UP (ref 1–1.03)
TROPONIN T, HIGH SENSITIVITY RESULT: 21 NG/L — SIGNIFICANT CHANGE UP (ref 6–21)
TROPONIN T, HIGH SENSITIVITY RESULT: 22 NG/L — HIGH (ref 6–21)
UROBILINOGEN FLD QL: 1 MG/DL — SIGNIFICANT CHANGE UP (ref 0.2–1)
WBC # BLD: 10.91 K/UL — HIGH (ref 3.8–10.5)
WBC # FLD AUTO: 10.91 K/UL — HIGH (ref 3.8–10.5)

## 2025-07-19 PROCEDURE — 74177 CT ABD & PELVIS W/CONTRAST: CPT | Mod: 26

## 2025-07-19 PROCEDURE — 83735 ASSAY OF MAGNESIUM: CPT

## 2025-07-19 PROCEDURE — 84100 ASSAY OF PHOSPHORUS: CPT

## 2025-07-19 PROCEDURE — 96374 THER/PROPH/DIAG INJ IV PUSH: CPT | Mod: XU

## 2025-07-19 PROCEDURE — 74177 CT ABD & PELVIS W/CONTRAST: CPT

## 2025-07-19 PROCEDURE — 96375 TX/PRO/DX INJ NEW DRUG ADDON: CPT

## 2025-07-19 PROCEDURE — 83605 ASSAY OF LACTIC ACID: CPT

## 2025-07-19 PROCEDURE — 82803 BLOOD GASES ANY COMBINATION: CPT

## 2025-07-19 PROCEDURE — 99285 EMERGENCY DEPT VISIT HI MDM: CPT | Mod: FS

## 2025-07-19 PROCEDURE — 71045 X-RAY EXAM CHEST 1 VIEW: CPT | Mod: 26

## 2025-07-19 PROCEDURE — 99285 EMERGENCY DEPT VISIT HI MDM: CPT | Mod: 25

## 2025-07-19 PROCEDURE — 93005 ELECTROCARDIOGRAM TRACING: CPT

## 2025-07-19 PROCEDURE — 85025 COMPLETE CBC W/AUTO DIFF WBC: CPT

## 2025-07-19 PROCEDURE — 84484 ASSAY OF TROPONIN QUANT: CPT | Mod: 91

## 2025-07-19 PROCEDURE — 80053 COMPREHEN METABOLIC PANEL: CPT

## 2025-07-19 PROCEDURE — 82962 GLUCOSE BLOOD TEST: CPT

## 2025-07-19 PROCEDURE — 71045 X-RAY EXAM CHEST 1 VIEW: CPT

## 2025-07-19 PROCEDURE — 81003 URINALYSIS AUTO W/O SCOPE: CPT

## 2025-07-19 PROCEDURE — 83690 ASSAY OF LIPASE: CPT

## 2025-07-19 PROCEDURE — 36415 COLL VENOUS BLD VENIPUNCTURE: CPT

## 2025-07-19 PROCEDURE — 93010 ELECTROCARDIOGRAM REPORT: CPT

## 2025-07-19 PROCEDURE — 82010 KETONE BODYS QUAN: CPT

## 2025-07-19 PROCEDURE — 94640 AIRWAY INHALATION TREATMENT: CPT

## 2025-07-19 PROCEDURE — 84295 ASSAY OF SERUM SODIUM: CPT

## 2025-07-19 RX ORDER — DIPHENHYDRAMINE HCL 12.5MG/5ML
50 ELIXIR ORAL ONCE
Refills: 0 | Status: COMPLETED | OUTPATIENT
Start: 2025-07-19 | End: 2025-07-19

## 2025-07-19 RX ORDER — IPRATROPIUM BROMIDE AND ALBUTEROL SULFATE .5; 2.5 MG/3ML; MG/3ML
3 SOLUTION RESPIRATORY (INHALATION) ONCE
Refills: 0 | Status: COMPLETED | OUTPATIENT
Start: 2025-07-19 | End: 2025-07-19

## 2025-07-19 RX ORDER — ALBUTEROL SULFATE 2.5 MG/3ML
1 VIAL, NEBULIZER (ML) INHALATION ONCE
Refills: 0 | Status: DISCONTINUED | OUTPATIENT
Start: 2025-07-19 | End: 2025-07-19

## 2025-07-19 RX ORDER — METHYLPREDNISOLONE ACETATE 80 MG/ML
125 INJECTION, SUSPENSION INTRA-ARTICULAR; INTRALESIONAL; INTRAMUSCULAR; SOFT TISSUE ONCE
Refills: 0 | Status: COMPLETED | OUTPATIENT
Start: 2025-07-19 | End: 2025-07-19

## 2025-07-19 RX ADMIN — IPRATROPIUM BROMIDE AND ALBUTEROL SULFATE 3 MILLILITER(S): .5; 2.5 SOLUTION RESPIRATORY (INHALATION) at 08:10

## 2025-07-19 RX ADMIN — Medication 500 MILLILITER(S): at 09:20

## 2025-07-19 RX ADMIN — Medication 50 MILLIGRAM(S): at 08:10

## 2025-07-19 RX ADMIN — METHYLPREDNISOLONE ACETATE 125 MILLIGRAM(S): 80 INJECTION, SUSPENSION INTRA-ARTICULAR; INTRALESIONAL; INTRAMUSCULAR; SOFT TISSUE at 08:10

## 2025-08-15 ENCOUNTER — APPOINTMENT (OUTPATIENT)
Dept: NEUROSURGERY | Facility: CLINIC | Age: 80
End: 2025-08-15

## 2025-08-26 ENCOUNTER — APPOINTMENT (OUTPATIENT)
Dept: CARDIOLOGY | Facility: CLINIC | Age: 80
End: 2025-08-26

## 2025-08-26 ENCOUNTER — NON-APPOINTMENT (OUTPATIENT)
Age: 80
End: 2025-08-26

## 2025-08-26 PROCEDURE — 93296 REM INTERROG EVL PM/IDS: CPT

## 2025-08-26 PROCEDURE — 93294 REM INTERROG EVL PM/LDLS PM: CPT
